# Patient Record
Sex: MALE | Employment: OTHER
[De-identification: names, ages, dates, MRNs, and addresses within clinical notes are randomized per-mention and may not be internally consistent; named-entity substitution may affect disease eponyms.]

---

## 2021-12-01 ENCOUNTER — TRANSCRIBE ORDERS (OUTPATIENT)
Dept: OTHER | Age: 76
End: 2021-12-01

## 2021-12-01 DIAGNOSIS — H91.90 HEARING LOSS: Primary | ICD-10-CM

## 2021-12-14 ENCOUNTER — OFFICE VISIT (OUTPATIENT)
Dept: AUDIOLOGY | Facility: CLINIC | Age: 76
End: 2021-12-14
Payer: COMMERCIAL

## 2021-12-14 DIAGNOSIS — H90.3 SENSORINEURAL HEARING LOSS, ASYMMETRICAL: Primary | ICD-10-CM

## 2021-12-14 PROCEDURE — 92550 TYMPANOMETRY & REFLEX THRESH: CPT | Performed by: AUDIOLOGIST

## 2021-12-14 PROCEDURE — 92557 COMPREHENSIVE HEARING TEST: CPT | Performed by: AUDIOLOGIST

## 2021-12-14 PROCEDURE — 99207 PR NO CHARGE LOS: CPT | Performed by: AUDIOLOGIST

## 2021-12-14 NOTE — PROGRESS NOTES
AUDIOLOGY REPORT    SUBJECTIVE:  Kristofer Montana is a 76 year old male who was seen in the Audiology Clinic [facility] on 12/14/21 for audiologic evaluation, referred by [facility] reference number [id number], expiration 7/21/2022.  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 The patient denies  bilateral tinnitus, bilateral otalgia, bilateral drainage, bilateral aural fullness, family history of hearing loss, and dizziness. The patient notes difficulty with communication in a variety of listening situations. Patient was unaccompanied to today's visit.     Abuse Screening:  Do you feel unsafe at home or work/school? No  Do you feel threatened by someone? No  Does anyone try to keep you from having contact with others, or doing things outside of your home? No  Physical signs of abuse present? No     OBJECTIVE:    Otoscopic exam indicates ears are clear of cerumen bilaterally     Pure Tone Thresholds assessed using standard techniques  audiometry with good  reliability from 250-8000 Hz bilaterally using insert earphones and circumaural headphones     RIGHT:  normal hearing sensitivity through 1500 Hz then a mild to moderately severe sensorineural hearing loss    LEFT:    normal and borderline-normal hearing sensitivity through 1000 Hz then a mild to severe sensorineural hearing loss    Tympanogram:    RIGHT: normal eardrum mobility    LEFT:   normal eardrum mobility    Reflexes (reported by stimulus ear): 1000 Hz  RIGHT: Ipsilateral is present at normal levels  RIGHT: Contralateral is absent at frequencies tested  LEFT:   Ipsilateral is elevated   LEFT:   Contralateral is present at normal levels    Speech Reception Threshold:    RIGHT: 20 dB HL    LEFT:   30 dB HL    Word Recognition Score:     RIGHT: 92% at 65 dB HL using NU-6 recorded word list.    LEFT:   92% at 75 dB HL using NU-6 recorded word list.    ASSESSMENT:   Bilateral  asymmetric sensorineural hearing loss      Today s results were discussed with the patient in detail.     PLAN:  Patient was counseled regarding hearing loss and impact on communication.  Patient is a good candidate for amplification at this time. [facility] is [facility] and possible visit to ENT for the asymmetric hearing loss.  Please call this clinic with questions regarding these results or recommendations.    Benny Lowery [facility]  Licensed Audiologist #[id number]  12/14/2021

## 2024-03-14 ENCOUNTER — TRANSFERRED RECORDS (OUTPATIENT)
Dept: HEALTH INFORMATION MANAGEMENT | Facility: CLINIC | Age: 79
End: 2024-03-14
Payer: COMMERCIAL

## 2024-03-29 ENCOUNTER — TRANSCRIBE ORDERS (OUTPATIENT)
Dept: OTHER | Age: 79
End: 2024-03-29

## 2024-03-29 DIAGNOSIS — C44.92 SCC (SQUAMOUS CELL CARCINOMA): Primary | ICD-10-CM

## 2024-04-01 ENCOUNTER — TELEPHONE (OUTPATIENT)
Dept: DERMATOLOGY | Facility: CLINIC | Age: 79
End: 2024-04-01
Payer: COMMERCIAL

## 2024-04-01 NOTE — TELEPHONE ENCOUNTER
Called patient to schedule surgery with Dr. Deluna    Date of Surgery: 05/08    Surgery type: Mohs    Consult scheduled: Yes    Has patient had mohs with us before? No    Additional comments: pt requested a mailed copy of the appt info.     [facility] for [facility].     Christiane Cortes on 4/1/2024 at 9:25 AM

## 2024-04-24 ENCOUNTER — VIRTUAL VISIT (OUTPATIENT)
Dept: DERMATOLOGY | Facility: CLINIC | Age: 79
End: 2024-04-24
Payer: COMMERCIAL

## 2024-04-24 DIAGNOSIS — D04.4 SQUAMOUS CELL CARCINOMA IN SITU (SCCIS) OF SCALP: Primary | ICD-10-CM

## 2024-04-24 PROCEDURE — 99441 PR PHYSICIAN TELEPHONE EVALUATION 5-10 MIN: CPT | Mod: 93 | Performed by: DERMATOLOGY

## 2024-04-24 RX ORDER — ATORVASTATIN CALCIUM 40 MG/1
20 TABLET, FILM COATED ORAL
COMMUNITY
Start: 2023-11-29

## 2024-04-24 RX ORDER — LOSARTAN POTASSIUM 50 MG/1
50 TABLET ORAL
COMMUNITY
Start: 2023-11-29

## 2024-04-24 RX ORDER — METOPROLOL SUCCINATE 25 MG/1
25 TABLET, EXTENDED RELEASE ORAL
COMMUNITY
Start: 2023-12-06

## 2024-04-24 NOTE — NURSING NOTE
Kristofer Montana's goals for this visit include:   Chief Complaint   Patient presents with    Consult     Mohs consult - SCC, anterior vertex scalp.       He requests these members of his care team be copied on today's visit information:     PCP: No primary care provider on file.    Referring Provider:  Natalya Barron DO  [address]  [place] [address]    There were no vitals taken for this visit.    Do you need any medication refills at today's visit?         Lindsey Grey EMT        Teledermatology Nurse Call Patients:     Are you in the [facility] at the time of the encounter? yes    Today's visit will be billed to you and your insurance.    A teledermatology visit is not as thorough as an in-person visit and the quality of the photograph sent may not be of the same quality as that taken by the dermatology clinic.           Excision/Mohs previsit information                                                    Diagnosis: squamous cell carcinoma  Site(s): anterior vertex scalp    Over the counter Chlorhexidine surgical soap to wash all skin below the belly button twice before surgery should be recommended for the following:  - Surgical sites below the waist  - Immunosuppressed  - Previous surgical site infection  - Anticipated wound care challenges    Medication & Allergy Information                                                      Review and update allergy and medication list.    Do you take the following medications:  Coumadin, Eliquis, Pradaxa, Xarelto:  NO   -If on Coumadin, INR should be checked within 7 days of surgery.  Range should be 3.5 or less or within therapeutic range.    Past Medical History                                                    Do you currently or have you previously had any of the following conditions:    Hepatitis:  NO  HIV/AIDS:  NO  Prolonged bleeding or bleeding disorder:  NO  Pacemaker or Defibrillator:  NO.    History of artificial or heart valve replacement:   NO  Endocarditis (inflammation of the inner lining of the heart's chambers and valves):  NO  Have you ever had a prosthetic joint infection:  NO  Pregnant or Breastfeeding:  N/A  Mobility device (wheelchair, transfer difficulty): NO    Important Reminders:                                                      Ok to take all of their medications as prescribed  Patients can eat, no need to be fasting  Patient will not be able to get the site wet for 48 hrs  No submerging wound in standing water (lake, pool, bathtub, hot tub) for 2 weeks  No physical activity for 48 hrs (further restrictions will be discussed by MD at time of visit)    If any positives, send to RN for further review  Lindsey Grey

## 2024-04-24 NOTE — PROGRESS NOTES
[facility] Dermatology Note  Encounter Date: Apr 24, 2024  Store-and-Forward and Telephone ([phone] ). Location of teledermatologist: [facility].  Start time: 1203. End time: 1211.    Dermatology Problem List:  1. Nmsc  -  SCCIS anterior vertex scalp, status post biopsy 3/14/2024, Mohs scheduled 5/8/2024  - 7-8 NMSC treated surgically at the [facility]    GEN Derm care is at the [facility].   ____________________________________________    Assessment & Plan:     # 1.  SCCIS anterior vertex scalp, status post biopsy 3/14/2024, Mohs scheduled 5/8/2024  -The nature, risks, benefits, and alternatives to Mohs surgery were discussed. The patient would like to proceed with Mohs surgery.  -Likely repair lifting or sliding flap versus granulation  -He does not take anticoagulants  -No indication for preop antibiotics       Staff:     Luís Deluna DO    Department of Dermatology  [facility]: Phone: [phone], Fax:[phone]  [facility]: Phone: [phone], Fax: [phone]    ____________________________________________    CC: Consult (Mohs consult - SCC, anterior vertex scalp.)    HPI:  Mr. 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  He has had Mohs 7 or 8 times already.  The area of the biopsy is tight, there was a Mohs and repair nearby.     Patient is otherwise feeling well, without additional skin concerns.     Labs Reviewed:  [facility] reviewed, anterior vertex scalp, squamous cell carcinoma in situ    Physical Exam:  Vitals: There were no vitals taken for this visit.  SKIN: Teledermatology photos were reviewed; image quality and interpretability: acceptable.   -Anterior vertex scalp  with approximately 1.2 cm poorly defined pink patch with central erosion and fresh blood.     - No other lesions of concern on areas examined.     Medications:  Current Outpatient Medications   Medication Sig Dispense Refill    atorvastatin (LIPITOR) 40 MG tablet 20 mg      losartan (COZAAR) 50 MG tablet 50 mg      metoprolol succinate ER (TOPROL XL) 25 MG 24 hr tablet 25 mg       No current facility-administered medications for this visit.      CC Natalya Barron, DO  [address] [facility] [address] [address] on close of this encounter.

## 2024-04-24 NOTE — LETTER
4/24/2024         RE: Kristofer Montana  [address]  [place] [address]        Dear Colleague,    Thank you for referring your patient, Kristofer Montana, to the [facility]. Please see a copy of my visit note below.    [facility] Dermatology Note  Encounter Date: Apr 24, 2024  Store-and-Forward and Telephone ([phone] ). Location of teledermatologist: [facility].  Start time: 1203. End time: 1211.    Dermatology Problem List:  1. Nmsc  -  SCCIS anterior vertex scalp, status post biopsy 3/14/2024, Mohs scheduled 5/8/2024  - 7-8 NMSC treated surgically at the [facility]    GEN Derm care is at the [facility].   ____________________________________________    Assessment & Plan:     # 1.  SCCIS anterior vertex scalp, status post biopsy 3/14/2024, Mohs scheduled 5/8/2024  -The nature, risks, benefits, and alternatives to Mohs surgery were discussed. The patient would like to proceed with Mohs surgery.  -Likely repair lifting or sliding flap versus granulation  -He does not take anticoagulants  -No indication for preop antibiotics       Staff:     Luís Deluna DO    Department of Dermatology  [facility]: Phone: [phone], Fax:[phone]  [facility]: Phone: [phone], Fax: [phone]    ____________________________________________    CC: Consult (Mohs consult - SCC, anterior vertex scalp.)    HPI:  Mr. 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  He has had Mohs 7 or 8 times already.  The area of the biopsy is tight, there was a Mohs and repair nearby.     Patient is otherwise feeling well, without additional skin concerns.     Labs Reviewed:  [facility]  reviewed, anterior vertex scalp, squamous cell carcinoma in situ    Physical Exam:  Vitals: There were no vitals taken for this visit.  SKIN: Teledermatology photos were reviewed; image quality and interpretability: acceptable.   -Anterior vertex scalp with approximately 1.2 cm poorly defined pink patch with central erosion and fresh blood.     - No other lesions of concern on areas examined.     Medications:  Current Outpatient Medications   Medication Sig Dispense Refill     atorvastatin (LIPITOR) 40 MG tablet 20 mg       losartan (COZAAR) 50 MG tablet 50 mg       metoprolol succinate ER (TOPROL XL) 25 MG 24 hr tablet 25 mg       No current facility-administered medications for this visit.      CC Natalya Barron, DO  [address] [facility] [address] [address] on close of this encounter.     Duplicate note opened in error.       Again, thank you for allowing me to participate in the care of your patient.        Sincerely,        Luís Deluna MD

## 2024-05-08 ENCOUNTER — OFFICE VISIT (OUTPATIENT)
Dept: DERMATOLOGY | Facility: CLINIC | Age: 79
End: 2024-05-08
Payer: COMMERCIAL

## 2024-05-08 VITALS — DIASTOLIC BLOOD PRESSURE: 81 MMHG | HEART RATE: 73 BPM | SYSTOLIC BLOOD PRESSURE: 138 MMHG

## 2024-05-08 DIAGNOSIS — D04.4 SQUAMOUS CELL CARCINOMA IN SITU (SCCIS) OF SCALP: ICD-10-CM

## 2024-05-08 PROCEDURE — 17311 MOHS 1 STAGE H/N/HF/G: CPT | Performed by: DERMATOLOGY

## 2024-05-08 ASSESSMENT — PAIN SCALES - GENERAL: PAINLEVEL: NO PAIN (0)

## 2024-05-08 NOTE — NURSING NOTE
The following medication was given:     MEDICATION:  Lidocaine with epinephrine 1% 1:[id number]  ROUTE: SQ  SITE: see procedure note  DOSE: 2 mL  LOT #: [id number]  : [employer]  EXPIRATION DATE: 04/30/2025  NDC#: 13990-926-82  Was there drug waste? 1 mL  Multi-dose vial: Yes    Vaseline and pressure dressing applied to Mohs site on anterior vertex scalp.  Wound care instructions reviewed with patient and AVS provided.  Patient verbalized understanding.  Patient will follow up for suture removal: N/A.  No further questions or concerns at this time.      Nydia Jaimes CMA  May 8, 2024

## 2024-05-08 NOTE — LETTER
5/8/2024         RE: Kristofer Montana  [address]  [place] [address]        Dear Colleague,    Thank you for referring your patient, Kristofer Montana, to the [facility]. Please see a copy of my visit note below.      [facility] Dermatologic Surgery Clinic [place] Procedure Note    Dermatology Problem List:  1. Hx of NMSC  -  SCCIS anterior vertex scalp, s/p 3/14/24, s/p Mohs 5/8/24  - 7-8 NMSC treated surgically at the [facility]     Gen Derm care is at the [facility].   ____________________________________________    Date of Service:  May 8, 2024  Surgery: Mohs micrographic surgery    Case 1  Repair Type: secondary  Repair Size: n/a  Suture Material: n/a  Tumor Type: SCCIS - Squamous cell carcinoma in situ  Location: anterior vertex scalp  Derm-Path Accession #: [id number]  PreOp Size: 0.9x0.8 cm  PostOp Size: 2.0x1.7 cm  Mohs Accession #: [id number]  Level of Defect: fat      Procedure:  We discussed the principles of treatment and most likely complications including scarring, bleeding, infection, swelling, pain, crusting, nerve damage, large wound,  incomplete excision, wound dehiscence,  nerve damage, recurrence, and a second procedure may be recommended to obtain the best cosmetic or functional result.    Informed consent was obtained and the patient underwent the procedure as follows:  The patient was placed supine on the operating table.  The cancer was identified, outlined with a marker, and verified by the patient.  The entire surgical field was prepped with ChoraPrep.  The surgical site was anesthetized using Lidocaine 1% with epi 1:100,000.      The area of clinically apparent tumor was debulked. The layer of tissue was then surgically excised using a #15 blade and was then transferred onto a specimen sheet maintaining the orientation of the specimen. Hemostasis was obtained using bipolar electrocoagulation. The wound site was then  covered with a dressing while the tissue samples were processed for examination.    The excised tissue was transported to the Mohs histology laboratory maintaining the tissue orientation.  The tissue specimen was relaxed so that the entire surgical margin was in a a single horizontal plane for sectioning and inked for precise mapping.  A precise reference map was drawn to reflect the sectioning of the specimen, colored inking of the margins, and orientation on the patient. The tissue was processed using horizontal sectioning of the base and continuous peripheral margins.  The histopathologic sections were reviewed in conjunction with the reference map.    Total blocks: 1    Total slides:  2    There were no cancer cells visualized on examination, therefore Mohs surgery was complete.    EVALUATION OF MOHS DEFECT FOR RECONSTRUCTION    The patient is status post Mohs micrographic surgery.  The surgical site was examined with attention to normal anatomic and functional relationships.  After consideration and discussion of multiple options with the patient, it was determined that healing by second intention would offer the best chance for preservation/restoration of all normal anatomic and functional relationships.  The patient verbalized understanding and agrees with this plan. It is also understood that should second intention healing be sub-optimal, additional procedures such as scar revision, steroid injection or dermabrasion may be recommended.    The open wound was cleaned with Chlorhexidine and rinsed with sterile saline, and a thick layer of ointment was applied.  A pressure dressing consisting of non-adherent gauze, gauze and hypafixwas applied.   Wound care was discussed with patient both orally and in writing.  The patient stated understanding and agreement of the course of care.    Scribe Disclosure:   Roseanna LIM, am serving as a scribe; to document services personally performed by Dr. Luís Deluna -  -based on data collection and the provider's statements to me.     Provider Disclosure:   The documentation recorded by the scribe accurately reflects the services I personally performed and the decisions made by me.  I personally performed the procedures today.  Luís Deluna DO    Department of Dermatology  [facility]: Phone: [phone], Fax:[phone]  [facility]: Phone: [phone], Fax: [phone]    Care and Laboratory Testing Performed at:  [facility]   Dermatology Clinic  [address] [place] [address]      Again, thank you for allowing me to participate in the care of your patient.        Sincerely,        Luís Deluna MD

## 2024-05-08 NOTE — PROGRESS NOTES
[facility] Dermatologic Surgery Clinic [place] Procedure Note    Dermatology Problem List:  1. Hx of NMSC  -  SCCIS anterior vertex scalp, s/p 3/14/24, s/p Mohs 5/8/24  - 7-8 NMSC treated surgically at the [facility]     Gen Derm care is at the [facility].   ____________________________________________    Date of Service:  May 8, 2024  Surgery: Mohs micrographic surgery    Case 1  Repair Type: secondary  Repair Size: n/a  Suture Material: n/a  Tumor Type: SCCIS - Squamous cell carcinoma in situ  Location: anterior vertex scalp  Derm-Path Accession #: [id number]  PreOp Size: 0.9x0.8 cm  PostOp Size: 2.0x1.7 cm  Mohs Accession #: [id number]  Level of Defect: fat      Procedure:  We discussed the principles of treatment and most likely complications including scarring, bleeding, infection, swelling, pain, crusting, nerve damage, large wound,  incomplete excision, wound dehiscence,  nerve damage, recurrence, and a second procedure may be recommended to obtain the best cosmetic or functional result.    Informed consent was obtained and the patient underwent the procedure as follows:  The patient was placed supine on the operating table.  The cancer was identified, outlined with a marker, and verified by the patient.  The entire surgical field was prepped with ChoraPrep.  The surgical site was anesthetized using Lidocaine 1% with epi 1:100,000.      The area of clinically apparent tumor was debulked. The layer of tissue was then surgically excised using a #15 blade and was then transferred onto a specimen sheet maintaining the orientation of the specimen. Hemostasis was obtained using bipolar electrocoagulation. The wound site was then covered with a dressing while the tissue samples were processed for examination.    The excised tissue was transported to the Mohs histology laboratory maintaining the tissue orientation.  The tissue specimen was relaxed so that the entire surgical  margin was in a a single horizontal plane for sectioning and inked for precise mapping.  A precise reference map was drawn to reflect the sectioning of the specimen, colored inking of the margins, and orientation on the patient. The tissue was processed using horizontal sectioning of the base and continuous peripheral margins.  The histopathologic sections were reviewed in conjunction with the reference map.    Total blocks: 1    Total slides:  2    There were no cancer cells visualized on examination, therefore Mohs surgery was complete.    EVALUATION OF MOHS DEFECT FOR RECONSTRUCTION    The patient is status post Mohs micrographic surgery.  The surgical site was examined with attention to normal anatomic and functional relationships.  After consideration and discussion of multiple options with the patient, it was determined that healing by second intention would offer the best chance for preservation/restoration of all normal anatomic and functional relationships.  The patient verbalized understanding and agrees with this plan. It is also understood that should second intention healing be sub-optimal, additional procedures such as scar revision, steroid injection or dermabrasion may be recommended.    The open wound was cleaned with Chlorhexidine and rinsed with sterile saline, and a thick layer of ointment was applied.  A pressure dressing consisting of non-adherent gauze, gauze and hypafixwas applied.   Wound care was discussed with patient both orally and in writing.  The patient stated understanding and agreement of the course of care.    Scribe Disclosure:   I, Roseanna Kimball, am serving as a scribe; to document services personally performed by Dr. Luís Deluna - -based on data collection and the provider's statements to me.     Provider Disclosure:   The documentation recorded by the scribe accurately reflects the services I personally performed and the decisions made by me.  I personally performed the  procedures today.  Luís Deluna DO    Department of Dermatology  [facility]: Phone: [phone], Fax:[phone]  [facility]: Phone: [phone], Fax: [phone]    Care and Laboratory Testing Performed at:  [facility]   Dermatology Clinic  [address] [place] [address]

## 2024-05-08 NOTE — PATIENT INSTRUCTIONS
Post-Operative Care for Granulating Site  After your surgery, a pressure bandage will be placed over the area. This will help prevent bleeding. Please follow these instructions as they will help you to prevent complications as your wound heals.  For the First 48 hours After Surgery:  Leave the pressure bandage on and keep it dry. If it should come loose, you may retape it, but do not take it off.  Relax and take it easy. Do not do any vigorous exercise, heavy lifting, or bending forward. This could cause the wound to bleed.  Post-operative pain is usually mild. You may alternate between 1000 mg of Tylenol (acetaminophen) and 400 mg of Ibuprofen every 4 hours.  Do not take more than 4,000mg of acetaminophen in a 24 hour period or 3200 mg of Ibuprofen in a 24 hr period.  Avoid alcohol and vitamin E as these may increase your tendency to bleed.  You may put an ice pack around the bandaged area for 20 minutes every 2-3 hours. This may help reduce swelling, bruising, and pain. Make sure the ice pack is waterproof so that the pressure bandage does not get wet.   You may see a small amount of drainage or blood on your pressure bandage. This is normal. However, if drainage or bleeding continues or saturates the bandage, you will need to apply firm pressure over the bandage with a washcloth for 15 minutes. If bleeding continues after applying pressure for 15 minutes, apply an ice pack to the bandaged area for 15 minutes. If bleeding still continues, go to the nearest emergency room.  48 Hours After Surgery:  Carefully remove the bandage and start daily wound care and dressing changes. You may also now shower and get the wound wet. Use caution when washing the wound, be gentle.  Do not use a wash cloth on the wound.  Daily Wound Care:  Wash with mild soap and water every day until wound appears well-healed.  Rinse well and pat dry.  Apply Vaseline over site with a Q-tip and re-apply a dressing until wound appears well healed.  " A well healed wound is \"pinked over\" with a shiny surface.  When area is \"pinked over\" it is no longer necessary to apply Vaseline.  Call Us If:  You have pain that is not controlled with Tylenol.  You have signs or symptoms of an infection, such as: fever over 100 degrees F, redness, warmth, or foul-smelling or yellow/creamy drainage from the wound.  Who should I call with questions?  [facility]: [phone]  [facility]: [phone]  For urgent needs outside of business hours call the [facility] at [phone] and ask for the dermatology resident on call   "

## 2024-05-08 NOTE — NURSING NOTE
Kristofer Montana's chief complaint for this visit includes:  Chief Complaint   Patient presents with    Mohs     SCC anterior vertex scalp     PCP: No Ref-Primary, Physician    Referring Provider:  Natalya Barron DO  [address] [place] [id number]    There were no vitals taken for this visit.  No Pain (0)        Allergies   Allergen Reactions    Amoxicillin Diarrhea         Do you need any medication refills at today's visit? No    Nydia Jaimes CMA

## 2024-05-10 ENCOUNTER — TELEPHONE (OUTPATIENT)
Dept: DERMATOLOGY | Facility: CLINIC | Age: 79
End: 2024-05-10

## 2024-05-10 NOTE — TELEPHONE ENCOUNTER
SUBJECTIVE/OBJECTIVE:                                                    Kristofer is 2 days s/p Mohs     ASSESSMENT:                                                       NURSING ASSESSMENT:     Wound location: scalp     Pressure bandage removed today and patient completed wound care without difficulty.  He denies pain, bleeding.  He has no concerns.     PLAN:                                                       Wound care directions reviewed.  Post op appointment confirmed.     Melissa Jones RN

## 2024-06-10 ENCOUNTER — VIRTUAL VISIT (OUTPATIENT)
Dept: DERMATOLOGY | Facility: CLINIC | Age: 79
End: 2024-06-10
Payer: COMMERCIAL

## 2024-06-10 ENCOUNTER — MYC MEDICAL ADVICE (OUTPATIENT)
Dept: DERMATOLOGY | Facility: CLINIC | Age: 79
End: 2024-06-10

## 2024-06-10 DIAGNOSIS — Z51.89 VISIT FOR WOUND CHECK: Primary | ICD-10-CM

## 2024-06-10 PROCEDURE — 99441 PR PHYSICIAN TELEPHONE EVALUATION 5-10 MIN: CPT | Mod: 93 | Performed by: DERMATOLOGY

## 2024-06-10 NOTE — NURSING NOTE
Teledermatology Nurse Call Patients:     Are you in the [facility] at the time of the encounter? yes    Today's visit will be billed to you and your insurance.    A teledermatology visit is not as thorough as an in-person visit and the quality of the photograph sent may not be of the same quality as that taken by the dermatology clinic.       Laisha Cedillo RN on 6/10/2024 at 10:05 AM

## 2024-06-10 NOTE — LETTER
6/10/2024      Kristofer Montana  [address]  [facility] [address]      Dear Colleague,    Thank you for referring your patient, Kristofer Montana, to the [facility]. Please see a copy of my visit note below.    Dermatologic Surgery Post-Op Wound Check     CC: Follow Up (Wound check on anterior vertex scalp s/p mohs 05/08/2024. Healing up nicely per patient. )  Encounter Date: Darrell 10, 2024  Store-and-Forward and Telephone. Location of teledermatologist: [facility].  Start time: 10:40am. End time: 10:43am.    Dermatology Problem List:  1. 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 Over the past several weeks site has healed well. Denies pain or drainage at site. Continues with vaseline multiple times throughout the day.    - no other concerns today    Objective: An exam of the vertex scalp was performed today via patient submitted photograph from 6/10/2024    - The surgical site noted above is clean, dry, and intact. There is no surrounding erythema, purulence, or significant tenderness to palpation. No clinical evidence of infection noted today.    Assessment and Plan:     1. Wound check following mohs surgery 5/08/2024  - The patient's surgery site(s) is/are healing very well. No evidence of infection on examination today.  - The patient was told to continue with wound cares until the area(s) is/are no longer crusted.   - The patient should follow up with dermatologic surgery PRN, as well as continue with regular skin exams in general dermatology clinic.    Patient was discussed with and evaluated by attending physician Dr. Deluna.    Radha Jovel PA-C  [facility]   Dermatology       Staff  Physician Comments:   I saw the photos and evaluated the patient with the physician assistant (Radha Jovel PA-C) and I agree with the assessment and plan. I was present for the key portions of the telephone call.    Luís Deluna DO    Department of Dermatology  [facility]: Phone: [phone], Fax:[phone]  [facility]: Phone: [phone], Fax: [phone]                Again, thank you for allowing me to participate in the care of your patient.        Sincerely,        Luís Deluna MD

## 2024-06-10 NOTE — PROGRESS NOTES
[facility] Dermatology Note  Encounter Date: Darrell 10, 2024  Store-and-Forward and Telephone ([phone]). Location of teledermatologist: [facility].  Start time: ***. End time: ***.    Dermatology Problem List:  1. ***    ____________________________________________    Assessment & Plan:     # {Diagnosesderm:782469}.   {kkplans:855005}   - ***     # {Diagnosesderm:315442}.   {kkplans:841477}   - ***     Procedures Performed:    None    Follow-up: {kkfollowup:512099}    {kkstaffinvolved:629639}    ***  ____________________________________________    CC: Follow Up (Wound check on anterior vertex scalp s/p mohs 05/08/2024. Healing up nicely per patient. )    HPI:  Mr. Kristofer Montana is a(n) 79 year old male who presents today {kknew/return:015254} for ***    Patient is otherwise feeling well, without additional skin concerns.    Labs Reviewed:  ***N/A    Physical Exam:  Vitals: There were no vitals taken for this visit.  SKIN: Teledermatology photos were reviewed; image quality and interpretability: ***acceptable. Image date: ***.  - ***  - No other lesions of concern on areas examined.     Medications:  Current Outpatient Medications   Medication Sig Dispense Refill    atorvastatin (LIPITOR) 40 MG tablet 20 mg      losartan (COZAAR) 50 MG tablet 50 mg      metoprolol succinate ER (TOPROL XL) 25 MG 24 hr tablet 25 mg       No current facility-administered medications for this visit.      Past Medical/Surgical History:   There is no problem list on file for this patient.    No past medical history on file.    CC Referred Self, MD  No address on file on close of this encounter.

## 2024-06-10 NOTE — PROGRESS NOTES
Dermatologic Surgery Post-Op Wound Check     CC: Follow Up (Wound check on anterior vertex scalp s/p mohs 05/08/2024. Healing up nicely per patient. )  Encounter Date: Darrell 10, 2024  Store-and-Forward and Telephone. Location of teledermatologist: [facility].  Start time: 10:40am. End time: 10:43am.    Dermatology Problem List:  1. 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 Over the past several weeks site has healed well. Denies pain or drainage at site. Continues with vaseline multiple times throughout the day.    - no other concerns today    Objective: An exam of the vertex scalp was performed today via patient submitted photograph from 6/10/2024    - The surgical site noted above is clean, dry, and intact. There is no surrounding erythema, purulence, or significant tenderness to palpation. No clinical evidence of infection noted today.    Assessment and Plan:     1. Wound check following mohs surgery 5/08/2024  - The patient's surgery site(s) is/are healing very well. No evidence of infection on examination today.  - The patient was told to continue with wound cares until the area(s) is/are no longer crusted.   - The patient should follow up with dermatologic surgery PRN, as well as continue with regular skin exams in general dermatology clinic.    Patient was discussed with and evaluated by attending physician Dr. Deluna.    Radha Jovel PA-C  [facility]   Dermatology       Staff Physician Comments:   I saw the photos and evaluated the patient with the physician assistant (Radha Jovel PA-C) and I agree with the assessment and plan. I was present for the key portions of the telephone call.    Luís Deluna,      Department of Dermatology  [facility]: Phone: [phone], Fax:[phone]  [facility]: Phone: [phone], Fax: [phone]

## 2024-07-06 ENCOUNTER — HEALTH MAINTENANCE LETTER (OUTPATIENT)
Age: 79
End: 2024-07-06

## 2025-07-13 ENCOUNTER — TRANSFERRED RECORDS (OUTPATIENT)
Dept: HEALTH INFORMATION MANAGEMENT | Facility: CLINIC | Age: 80
End: 2025-07-13

## 2025-07-13 ENCOUNTER — HEALTH MAINTENANCE LETTER (OUTPATIENT)
Age: 80
End: 2025-07-13

## 2025-07-13 ENCOUNTER — HOSPITAL ENCOUNTER (INPATIENT)
Facility: CLINIC | Age: 80
End: 2025-07-13
Attending: INTERNAL MEDICINE | Admitting: HOSPITALIST
Payer: MEDICARE

## 2025-07-13 DIAGNOSIS — K81.9 CHOLECYSTITIS: ICD-10-CM

## 2025-07-13 DIAGNOSIS — I82.4Y9 ACUTE DEEP VEIN THROMBOSIS (DVT) OF PROXIMAL VEIN OF LOWER EXTREMITY, UNSPECIFIED LATERALITY (H): ICD-10-CM

## 2025-07-13 DIAGNOSIS — K80.50 CHOLEDOCHOLITHIASIS: Primary | ICD-10-CM

## 2025-07-13 PROBLEM — I82.409 DVT (DEEP VENOUS THROMBOSIS) (H): Status: ACTIVE | Noted: 2025-07-13

## 2025-07-13 LAB
ALBUMIN SERPL BCG-MCNC: 2.9 G/DL (ref 3.5–5.2)
ALP SERPL-CCNC: 119 U/L (ref 40–150)
ALT SERPL W P-5'-P-CCNC: 32 U/L (ref 0–70)
ANION GAP SERPL CALCULATED.3IONS-SCNC: 16 MMOL/L (ref 7–15)
AST SERPL W P-5'-P-CCNC: 36 U/L (ref 0–45)
BILIRUB SERPL-MCNC: 1.1 MG/DL
BUN SERPL-MCNC: 27.3 MG/DL (ref 8–23)
CALCIUM SERPL-MCNC: 8 MG/DL (ref 8.8–10.4)
CHLORIDE SERPL-SCNC: 99 MMOL/L (ref 98–107)
CREAT SERPL-MCNC: 1.32 MG/DL (ref 0.67–1.17)
CRP SERPL-MCNC: 331.23 MG/L
EGFRCR SERPLBLD CKD-EPI 2021: 55 ML/MIN/1.73M2
GLUCOSE SERPL-MCNC: 117 MG/DL (ref 70–99)
HCO3 SERPL-SCNC: 20 MMOL/L (ref 22–29)
LACTATE SERPL-SCNC: 2.6 MMOL/L (ref 0.7–2)
POTASSIUM SERPL-SCNC: 4 MMOL/L (ref 3.4–5.3)
PROT SERPL-MCNC: 6.3 G/DL (ref 6.4–8.3)
SODIUM SERPL-SCNC: 135 MMOL/L (ref 135–145)

## 2025-07-13 PROCEDURE — 83605 ASSAY OF LACTIC ACID: CPT | Performed by: HOSPITALIST

## 2025-07-13 PROCEDURE — 99223 1ST HOSP IP/OBS HIGH 75: CPT | Performed by: HOSPITALIST

## 2025-07-13 PROCEDURE — 120N000013 HC R&B IMCU

## 2025-07-13 PROCEDURE — 36415 COLL VENOUS BLD VENIPUNCTURE: CPT | Performed by: HOSPITALIST

## 2025-07-13 PROCEDURE — 85007 BL SMEAR W/DIFF WBC COUNT: CPT | Performed by: HOSPITALIST

## 2025-07-13 PROCEDURE — 250N000011 HC RX IP 250 OP 636: Performed by: INTERNAL MEDICINE

## 2025-07-13 PROCEDURE — 250N000013 HC RX MED GY IP 250 OP 250 PS 637: Performed by: HOSPITALIST

## 2025-07-13 PROCEDURE — 85014 HEMATOCRIT: CPT | Performed by: HOSPITALIST

## 2025-07-13 PROCEDURE — 80053 COMPREHEN METABOLIC PANEL: CPT | Performed by: HOSPITALIST

## 2025-07-13 PROCEDURE — 86140 C-REACTIVE PROTEIN: CPT | Performed by: HOSPITALIST

## 2025-07-13 RX ORDER — HEPARIN SODIUM 10000 [USP'U]/100ML
0-5000 INJECTION, SOLUTION INTRAVENOUS CONTINUOUS
Status: DISCONTINUED | OUTPATIENT
Start: 2025-07-13 | End: 2025-07-13

## 2025-07-13 RX ORDER — LIDOCAINE 40 MG/G
CREAM TOPICAL
Status: DISCONTINUED | OUTPATIENT
Start: 2025-07-13 | End: 2025-07-13

## 2025-07-13 RX ORDER — PROCHLORPERAZINE MALEATE 5 MG/1
5 TABLET ORAL EVERY 6 HOURS PRN
Status: DISCONTINUED | OUTPATIENT
Start: 2025-07-13 | End: 2025-08-07 | Stop reason: HOSPADM

## 2025-07-13 RX ORDER — CARBOXYMETHYLCELLULOSE SODIUM 5 MG/ML
1 SOLUTION/ DROPS OPHTHALMIC 3 TIMES DAILY PRN
Status: ON HOLD | COMMUNITY
Start: 2025-02-04 | End: 2025-07-14

## 2025-07-13 RX ORDER — ONDANSETRON 2 MG/ML
4 INJECTION INTRAMUSCULAR; INTRAVENOUS EVERY 6 HOURS PRN
Status: DISCONTINUED | OUTPATIENT
Start: 2025-07-13 | End: 2025-08-07 | Stop reason: HOSPADM

## 2025-07-13 RX ORDER — HEPARIN SODIUM 10000 [USP'U]/100ML
0-5000 INJECTION, SOLUTION INTRAVENOUS CONTINUOUS
Status: DISCONTINUED | OUTPATIENT
Start: 2025-07-13 | End: 2025-07-17

## 2025-07-13 RX ORDER — CALCIUM CARBONATE 500 MG/1
1000 TABLET, CHEWABLE ORAL 4 TIMES DAILY PRN
Status: DISCONTINUED | OUTPATIENT
Start: 2025-07-13 | End: 2025-08-07 | Stop reason: HOSPADM

## 2025-07-13 RX ORDER — NALOXONE HYDROCHLORIDE 0.4 MG/ML
0.2 INJECTION, SOLUTION INTRAMUSCULAR; INTRAVENOUS; SUBCUTANEOUS
Status: DISCONTINUED | OUTPATIENT
Start: 2025-07-13 | End: 2025-08-07 | Stop reason: HOSPADM

## 2025-07-13 RX ORDER — NALOXONE HYDROCHLORIDE 0.4 MG/ML
0.4 INJECTION, SOLUTION INTRAMUSCULAR; INTRAVENOUS; SUBCUTANEOUS
Status: DISCONTINUED | OUTPATIENT
Start: 2025-07-13 | End: 2025-08-07 | Stop reason: HOSPADM

## 2025-07-13 RX ORDER — ONDANSETRON 4 MG/1
4 TABLET, ORALLY DISINTEGRATING ORAL EVERY 6 HOURS PRN
Status: DISCONTINUED | OUTPATIENT
Start: 2025-07-13 | End: 2025-08-07 | Stop reason: HOSPADM

## 2025-07-13 RX ORDER — ACETAMINOPHEN 325 MG/1
650 TABLET ORAL EVERY 4 HOURS PRN
Status: DISCONTINUED | OUTPATIENT
Start: 2025-07-13 | End: 2025-08-07 | Stop reason: HOSPADM

## 2025-07-13 RX ORDER — AMOXICILLIN 250 MG
1 CAPSULE ORAL 2 TIMES DAILY PRN
Status: DISCONTINUED | OUTPATIENT
Start: 2025-07-13 | End: 2025-07-29

## 2025-07-13 RX ORDER — LIDOCAINE 40 MG/G
CREAM TOPICAL
Status: DISCONTINUED | OUTPATIENT
Start: 2025-07-13 | End: 2025-07-16

## 2025-07-13 RX ORDER — HYDROMORPHONE HYDROCHLORIDE 1 MG/ML
0.5 INJECTION, SOLUTION INTRAMUSCULAR; INTRAVENOUS; SUBCUTANEOUS
Status: DISCONTINUED | OUTPATIENT
Start: 2025-07-13 | End: 2025-07-16

## 2025-07-13 RX ORDER — AMOXICILLIN 250 MG
2 CAPSULE ORAL 2 TIMES DAILY PRN
Status: DISCONTINUED | OUTPATIENT
Start: 2025-07-13 | End: 2025-07-29

## 2025-07-13 RX ORDER — ACETAMINOPHEN 650 MG/1
650 SUPPOSITORY RECTAL EVERY 4 HOURS PRN
Status: DISCONTINUED | OUTPATIENT
Start: 2025-07-13 | End: 2025-08-07 | Stop reason: HOSPADM

## 2025-07-13 RX ORDER — ASPIRIN 81 MG/1
81 TABLET ORAL DAILY
COMMUNITY

## 2025-07-13 RX ORDER — CEFEPIME HYDROCHLORIDE 2 G/1
2 INJECTION, POWDER, FOR SOLUTION INTRAVENOUS EVERY 12 HOURS
Status: DISCONTINUED | OUTPATIENT
Start: 2025-07-14 | End: 2025-07-16

## 2025-07-13 RX ORDER — METRONIDAZOLE 500 MG/100ML
500 INJECTION, SOLUTION INTRAVENOUS EVERY 12 HOURS
Status: DISCONTINUED | OUTPATIENT
Start: 2025-07-14 | End: 2025-07-18

## 2025-07-13 RX ORDER — HYDROMORPHONE HYDROCHLORIDE 2 MG/1
2 TABLET ORAL EVERY 4 HOURS PRN
Status: DISCONTINUED | OUTPATIENT
Start: 2025-07-13 | End: 2025-07-16

## 2025-07-13 RX ADMIN — HEPARIN SODIUM 1500 UNITS/HR: 10000 INJECTION, SOLUTION INTRAVENOUS at 22:19

## 2025-07-13 RX ADMIN — HYDROMORPHONE HYDROCHLORIDE 2 MG: 2 TABLET ORAL at 22:23

## 2025-07-13 ASSESSMENT — ACTIVITIES OF DAILY LIVING (ADL)
ADLS_ACUITY_SCORE: 46
ADLS_ACUITY_SCORE: 23
ADLS_ACUITY_SCORE: 41

## 2025-07-13 NOTE — LETTER
[place] [address] [facility]  [address]  [place] [address]  Phone: [phone]    August 7, 2025        Kristofer Montana  [address]  [place] [address]          To whom it may concern:    RE: Kristofer Montana    Patient was hospitalized at [facility] from 7/13/2025 - 8/7/2025.   Patient may return to work in 2 weeks (8/21/2025) with no specific restrictions.    Please contact me for questions or concerns.      Sincerely,          Stacey Cleveland MD

## 2025-07-13 NOTE — PROGRESS NOTES
Transfer Type: [facility]  Transfer Triage Note    Date of call: 25  Time of call: 4:54 PM    [facility]  Current Level of Care: ED    Vitals: BP: 99/57 HR: 68 O2 Sats: 94% on RA  Diagnosis: acute cholecystitis, possible acute cholangitis, acute DVT, sepsis  Reason for requested transfer: Procedure can be done here and not at referring hospital  Further diagnostic work up, management, and consultation for specialized care   Isolation Needs: None    Care everywhere has been updated and reviewed: Yes  Necessary images have been sent through PACS: Not yet, will request images be sent    If patient is transferring for specialty care or specific procedure, the specialist required has participated in the transfer call and agreed with need for transfer and anticipated timeline: No    Transfer accepted: Yes  Stability of Patient: Patient is at risk for instability, however patient requires urgent transfer and does not meet ICU criteria   Does the patient require placement on the [facility]? No.  Level of Care Needed: IMC  Telemetry Needed:  None  Expected Time of Arrival for Transfer: 0-8 hours  Arrival Location:  [facility]     Recommendations for Management and Stabilization: Not needed    Additional Comments:   80 year old male with PMHx of CAD, stroke (), heart failure (EF 40%) who [facility] due [facility] 2 days of anorexia, 1 day of fever, and RUQ abdominal pain that started this AM.     In the ED, he was hypotensive at arrival, now s/p 2L of boluses with improvement (SBP 90s). Labs were significant for lactate of 5.5 (now down to 3 after fluids), leukocytosis to 17.3, Cr 2 (baseline 1), troponins negative, . Had multiple imaging studies, as follows:   CXR: low lung volumes, minimal bibasilar opacities, likely atelectasis    Abdominal US. Correlating with same-day CT (see separate CT report), evidence of choledocholithiasis with  biliary dilatation and common bile duct stones better demonstrated on CT. Suggest GI consultation and ERCP versus MRCP.   2. Mild gallbladder wall thickening, favor reactive changes related to biliary obstruction.     CT C/A/P without contrast (no contrast given due to FARNAZ):  1. Notable choledocholithiasis and mild bile duct dilation. This study is diagnostic for choledocholithiasis; therefore, MRCP is not required. Advise GI consultation for consideration of ERCP/intervention.   2. Small gallbladder neck stone; inflammatory changes/minor peritoneal fluid in the right upper quadrant may reflect superimposed cholecystitis or ascending inflammation from the choledocholithiasis. Follow-up, as per Surgery.     Doppler USG of bilateral LE:   Left lower extremity: Occlusive DVT in the left mid and distal femoral vein. Nonocclusive DVT in the left popliteal vein.   Right lower extremity: Negative examination for thrombosis in the right lower extremity veins listed above.     He has been started on heparin gtt for DVT. No imaging to rule out PE has been completed yet - deferring CT with contrast due to FARNAZ, unable to get echo at this time to evaluate for right heart strain. He was started on cefepime and flagyl (has allergy to penicillins). He has been getting dilaudid for abdominal pain. Mental status is WNL. He has not yet had any urine output after receiving the fluids.     Surgery saw him as consult. They recommended lap cholecystectomy, but would defer this in favor of percutaneous cholecystostomy if needing anticoagulation for DVT and/or PE (which he is). ED provider [facility] who recommended transfer to facility capable of advanced endoscopy/ERCP given CT findings ([facility] unable to do this). Therefore, ED provider requesting transfer to [facility].     Patient accepted to [facility] bed at [facility]. [facility] captain Dr. Mayorga notified of transfer via [facility].     To notify the admitting provider that the  patient has arrived at their destination:    For [place]: Identify the correct provider on [employer]: [facility] [place] and then find the provider who has the title CAPTAIN: DIRECT ADMITS or CAPTAIN: ER / DIRECT ADMIT / PT PLACEMENT next to their name. Use [employer] to contact that person.      Lisa Greer MD

## 2025-07-13 NOTE — LETTER
[place] [address] [facility]  [address]  [place] [address]  Phone: [phone]    July 21, 2025    Kristofer Montana  [address]  [place] [place] [facility] [address]    To whom it may concern:    Kristofer Montana was admitted to the hospital for a medical condition on 7/13/26. He is currently unable to work and remains in the hospital. Time to return to work is to be determined.     Please contact me for questions or concerns.    Sincerely,    Scott Mcnamara, DO

## 2025-07-14 ENCOUNTER — TRANSFERRED RECORDS (OUTPATIENT)
Dept: HEALTH INFORMATION MANAGEMENT | Facility: CLINIC | Age: 80
End: 2025-07-14

## 2025-07-14 ENCOUNTER — APPOINTMENT (OUTPATIENT)
Dept: PHYSICAL THERAPY | Facility: CLINIC | Age: 80
DRG: 853 | End: 2025-07-14
Attending: HOSPITALIST
Payer: MEDICARE

## 2025-07-14 LAB
ACANTHOCYTES BLD QL SMEAR: ABNORMAL
ACANTHOCYTES BLD QL SMEAR: SLIGHT
ALBUMIN SERPL BCG-MCNC: 2.8 G/DL (ref 3.5–5.2)
ALP SERPL-CCNC: 115 U/L (ref 40–150)
ALT SERPL W P-5'-P-CCNC: 29 U/L (ref 0–70)
ANION GAP SERPL CALCULATED.3IONS-SCNC: 16 MMOL/L (ref 7–15)
AST SERPL W P-5'-P-CCNC: 31 U/L (ref 0–45)
ATRIAL RATE - MUSE: 114 BPM
BASOPHILS # BLD MANUAL: 0 10E3/UL (ref 0–0.2)
BASOPHILS # BLD MANUAL: 0 10E3/UL (ref 0–0.2)
BASOPHILS NFR BLD MANUAL: 0 %
BASOPHILS NFR BLD MANUAL: 0 %
BILIRUB SERPL-MCNC: 1.3 MG/DL
BUN SERPL-MCNC: 25.3 MG/DL (ref 8–23)
CALCIUM SERPL-MCNC: 7.8 MG/DL (ref 8.8–10.4)
CHLORIDE SERPL-SCNC: 101 MMOL/L (ref 98–107)
CREAT SERPL-MCNC: 1.12 MG/DL (ref 0.67–1.17)
DIASTOLIC BLOOD PRESSURE - MUSE: NORMAL MMHG
EGFRCR SERPLBLD CKD-EPI 2021: 67 ML/MIN/1.73M2
EOSINOPHIL # BLD MANUAL: 0 10E3/UL (ref 0–0.7)
EOSINOPHIL # BLD MANUAL: 0 10E3/UL (ref 0–0.7)
EOSINOPHIL NFR BLD MANUAL: 0 %
EOSINOPHIL NFR BLD MANUAL: 0 %
ERYTHROCYTE [DISTWIDTH] IN BLOOD BY AUTOMATED COUNT: 14.3 % (ref 10–15)
ERYTHROCYTE [DISTWIDTH] IN BLOOD BY AUTOMATED COUNT: 14.5 % (ref 10–15)
GLUCOSE SERPL-MCNC: 111 MG/DL (ref 70–99)
HCO3 SERPL-SCNC: 20 MMOL/L (ref 22–29)
HCT VFR BLD AUTO: 37.3 % (ref 40–53)
HCT VFR BLD AUTO: 38.2 % (ref 40–53)
HGB BLD-MCNC: 13 G/DL (ref 13.3–17.7)
HGB BLD-MCNC: 13.4 G/DL (ref 13.3–17.7)
INTERPRETATION ECG - MUSE: NORMAL
LACTATE SERPL-SCNC: 2 MMOL/L (ref 0.7–2)
LACTATE SERPL-SCNC: 2.3 MMOL/L (ref 0.7–2)
LACTATE SERPL-SCNC: 2.5 MMOL/L (ref 0.7–2)
LYMPHOCYTES # BLD MANUAL: 0.6 10E3/UL (ref 0.8–5.3)
LYMPHOCYTES # BLD MANUAL: 0.7 10E3/UL (ref 0.8–5.3)
LYMPHOCYTES NFR BLD MANUAL: 4 %
LYMPHOCYTES NFR BLD MANUAL: 5 %
MAGNESIUM SERPL-MCNC: 1.5 MG/DL (ref 1.7–2.3)
MAGNESIUM SERPL-MCNC: 2.6 MG/DL (ref 1.7–2.3)
MCH RBC QN AUTO: 29.7 PG (ref 26.5–33)
MCH RBC QN AUTO: 30 PG (ref 26.5–33)
MCHC RBC AUTO-ENTMCNC: 34.9 G/DL (ref 31.5–36.5)
MCHC RBC AUTO-ENTMCNC: 35.1 G/DL (ref 31.5–36.5)
MCV RBC AUTO: 85 FL (ref 78–100)
MCV RBC AUTO: 86 FL (ref 78–100)
METAMYELOCYTES # BLD MANUAL: 0.7 10E3/UL
METAMYELOCYTES # BLD MANUAL: 1.4 10E3/UL
METAMYELOCYTES NFR BLD MANUAL: 5 %
METAMYELOCYTES NFR BLD MANUAL: 9 %
MONOCYTES # BLD MANUAL: 0.3 10E3/UL (ref 0–1.3)
MONOCYTES # BLD MANUAL: 0.5 10E3/UL (ref 0–1.3)
MONOCYTES NFR BLD MANUAL: 2 %
MONOCYTES NFR BLD MANUAL: 3 %
MYELOCYTES # BLD MANUAL: 0.1 10E3/UL
MYELOCYTES # BLD MANUAL: 0.2 10E3/UL
MYELOCYTES NFR BLD MANUAL: 1 %
MYELOCYTES NFR BLD MANUAL: 1 %
NEUTROPHILS # BLD MANUAL: 11.9 10E3/UL (ref 1.6–8.3)
NEUTROPHILS # BLD MANUAL: 12.5 10E3/UL (ref 1.6–8.3)
NEUTROPHILS NFR BLD MANUAL: 83 %
NEUTROPHILS NFR BLD MANUAL: 87 %
P AXIS - MUSE: NORMAL DEGREES
PLAT MORPH BLD: ABNORMAL
PLAT MORPH BLD: ABNORMAL
PLATELET # BLD AUTO: 116 10E3/UL (ref 150–450)
PLATELET # BLD AUTO: 121 10E3/UL (ref 150–450)
POTASSIUM SERPL-SCNC: 4.1 MMOL/L (ref 3.4–5.3)
PR INTERVAL - MUSE: NORMAL MS
PROT SERPL-MCNC: 6.2 G/DL (ref 6.4–8.3)
QRS DURATION - MUSE: 100 MS
QT - MUSE: 300 MS
QTC - MUSE: 495 MS
R AXIS - MUSE: -41 DEGREES
RBC # BLD AUTO: 4.34 10E6/UL (ref 4.4–5.9)
RBC # BLD AUTO: 4.51 10E6/UL (ref 4.4–5.9)
RBC MORPH BLD: ABNORMAL
RBC MORPH BLD: ABNORMAL
SODIUM SERPL-SCNC: 137 MMOL/L (ref 135–145)
SYSTOLIC BLOOD PRESSURE - MUSE: NORMAL MMHG
T AXIS - MUSE: -4 DEGREES
TSH SERPL DL<=0.005 MIU/L-ACNC: 3.74 UIU/ML (ref 0.3–4.2)
UFH PPP CHRO-ACNC: 0.29 IU/ML (ref ?–1.1)
UFH PPP CHRO-ACNC: 0.32 IU/ML (ref ?–1.1)
UFH PPP CHRO-ACNC: 0.34 IU/ML (ref ?–1.1)
UFH PPP CHRO-ACNC: 0.49 IU/ML (ref ?–1.1)
VENTRICULAR RATE- MUSE: 164 BPM
WBC # BLD AUTO: 13.7 10E3/UL (ref 4–11)
WBC # BLD AUTO: 15.1 10E3/UL (ref 4–11)

## 2025-07-14 PROCEDURE — 250N000011 HC RX IP 250 OP 636: Performed by: HOSPITALIST

## 2025-07-14 PROCEDURE — 82310 ASSAY OF CALCIUM: CPT | Performed by: HOSPITALIST

## 2025-07-14 PROCEDURE — 93005 ELECTROCARDIOGRAM TRACING: CPT

## 2025-07-14 PROCEDURE — 83735 ASSAY OF MAGNESIUM: CPT | Performed by: HOSPITALIST

## 2025-07-14 PROCEDURE — 36415 COLL VENOUS BLD VENIPUNCTURE: CPT | Performed by: HOSPITALIST

## 2025-07-14 PROCEDURE — 250N000011 HC RX IP 250 OP 636: Performed by: NURSE PRACTITIONER

## 2025-07-14 PROCEDURE — 250N000011 HC RX IP 250 OP 636: Performed by: INTERNAL MEDICINE

## 2025-07-14 PROCEDURE — 85520 HEPARIN ASSAY: CPT | Performed by: INTERNAL MEDICINE

## 2025-07-14 PROCEDURE — 99207 PR APP CREDIT; MD BILLING SHARED VISIT: CPT | Performed by: PHYSICIAN ASSISTANT

## 2025-07-14 PROCEDURE — 120N000013 HC R&B IMCU

## 2025-07-14 PROCEDURE — 97530 THERAPEUTIC ACTIVITIES: CPT | Mod: GP

## 2025-07-14 PROCEDURE — 85007 BL SMEAR W/DIFF WBC COUNT: CPT | Performed by: HOSPITALIST

## 2025-07-14 PROCEDURE — 85520 HEPARIN ASSAY: CPT | Performed by: HOSPITALIST

## 2025-07-14 PROCEDURE — 258N000003 HC RX IP 258 OP 636: Performed by: HOSPITALIST

## 2025-07-14 PROCEDURE — 99223 1ST HOSP IP/OBS HIGH 75: CPT | Mod: FS | Performed by: NURSE PRACTITIONER

## 2025-07-14 PROCEDURE — 84443 ASSAY THYROID STIM HORMONE: CPT | Performed by: HOSPITALIST

## 2025-07-14 PROCEDURE — 83605 ASSAY OF LACTIC ACID: CPT | Performed by: HOSPITALIST

## 2025-07-14 PROCEDURE — 250N000013 HC RX MED GY IP 250 OP 250 PS 637: Performed by: HOSPITALIST

## 2025-07-14 PROCEDURE — 97161 PT EVAL LOW COMPLEX 20 MIN: CPT | Mod: GP

## 2025-07-14 PROCEDURE — 36415 COLL VENOUS BLD VENIPUNCTURE: CPT | Performed by: INTERNAL MEDICINE

## 2025-07-14 PROCEDURE — 93010 ELECTROCARDIOGRAM REPORT: CPT | Performed by: INTERNAL MEDICINE

## 2025-07-14 PROCEDURE — 97116 GAIT TRAINING THERAPY: CPT | Mod: GP

## 2025-07-14 PROCEDURE — 85018 HEMOGLOBIN: CPT | Performed by: HOSPITALIST

## 2025-07-14 PROCEDURE — 250N000009 HC RX 250: Performed by: HOSPITALIST

## 2025-07-14 PROCEDURE — 99233 SBSQ HOSP IP/OBS HIGH 50: CPT | Performed by: HOSPITALIST

## 2025-07-14 PROCEDURE — 99222 1ST HOSP IP/OBS MODERATE 55: CPT | Mod: FS | Performed by: SURGERY

## 2025-07-14 RX ORDER — FUROSEMIDE 10 MG/ML
20 INJECTION INTRAMUSCULAR; INTRAVENOUS ONCE
Status: COMPLETED | OUTPATIENT
Start: 2025-07-14 | End: 2025-07-14

## 2025-07-14 RX ORDER — OMEPRAZOLE 20 MG/1
20 TABLET, DELAYED RELEASE ORAL DAILY
COMMUNITY

## 2025-07-14 RX ORDER — ATORVASTATIN CALCIUM 40 MG/1
40 TABLET, FILM COATED ORAL DAILY
Status: DISCONTINUED | OUTPATIENT
Start: 2025-07-14 | End: 2025-07-17

## 2025-07-14 RX ORDER — SODIUM CHLORIDE 9 MG/ML
INJECTION, SOLUTION INTRAVENOUS CONTINUOUS
Status: ACTIVE | OUTPATIENT
Start: 2025-07-14 | End: 2025-07-14

## 2025-07-14 RX ORDER — HYDRALAZINE HYDROCHLORIDE 20 MG/ML
10 INJECTION INTRAMUSCULAR; INTRAVENOUS EVERY 4 HOURS PRN
Status: DISCONTINUED | OUTPATIENT
Start: 2025-07-14 | End: 2025-08-07 | Stop reason: HOSPADM

## 2025-07-14 RX ORDER — MAGNESIUM SULFATE HEPTAHYDRATE 40 MG/ML
4 INJECTION, SOLUTION INTRAVENOUS ONCE
Status: COMPLETED | OUTPATIENT
Start: 2025-07-14 | End: 2025-07-14

## 2025-07-14 RX ORDER — SODIUM CHLORIDE 9 MG/ML
INJECTION, SOLUTION INTRAVENOUS CONTINUOUS
Status: DISCONTINUED | OUTPATIENT
Start: 2025-07-14 | End: 2025-07-14

## 2025-07-14 RX ORDER — MULTIVITAMIN
1 TABLET ORAL DAILY
COMMUNITY

## 2025-07-14 RX ORDER — METOPROLOL TARTRATE 1 MG/ML
2.5 INJECTION, SOLUTION INTRAVENOUS EVERY 6 HOURS PRN
Status: DISCONTINUED | OUTPATIENT
Start: 2025-07-14 | End: 2025-08-07 | Stop reason: HOSPADM

## 2025-07-14 RX ORDER — METOPROLOL TARTRATE 1 MG/ML
2.5 INJECTION, SOLUTION INTRAVENOUS EVERY 4 HOURS PRN
Status: DISCONTINUED | OUTPATIENT
Start: 2025-07-14 | End: 2025-07-14

## 2025-07-14 RX ORDER — METOPROLOL SUCCINATE 25 MG/1
25 TABLET, EXTENDED RELEASE ORAL DAILY
Status: DISCONTINUED | OUTPATIENT
Start: 2025-07-14 | End: 2025-07-15

## 2025-07-14 RX ORDER — ASPIRIN 81 MG/1
81 TABLET ORAL DAILY
Status: DISCONTINUED | OUTPATIENT
Start: 2025-07-14 | End: 2025-07-25

## 2025-07-14 RX ORDER — LIDOCAINE 40 MG/G
CREAM TOPICAL
Status: DISCONTINUED | OUTPATIENT
Start: 2025-07-14 | End: 2025-07-16

## 2025-07-14 RX ORDER — LOSARTAN POTASSIUM 50 MG/1
50 TABLET ORAL DAILY
Status: DISCONTINUED | OUTPATIENT
Start: 2025-07-14 | End: 2025-08-07 | Stop reason: HOSPADM

## 2025-07-14 RX ADMIN — AMIODARONE HYDROCHLORIDE 1 MG/MIN: 1.8 INJECTION, SOLUTION INTRAVENOUS at 15:44

## 2025-07-14 RX ADMIN — AMIODARONE HYDROCHLORIDE 0.5 MG/MIN: 1.8 INJECTION, SOLUTION INTRAVENOUS at 21:11

## 2025-07-14 RX ADMIN — AMIODARONE HYDROCHLORIDE 150 MG: 1.5 INJECTION, SOLUTION INTRAVENOUS at 15:35

## 2025-07-14 RX ADMIN — MAGNESIUM SULFATE HEPTAHYDRATE 4 G: 40 INJECTION, SOLUTION INTRAVENOUS at 14:04

## 2025-07-14 RX ADMIN — METOPROLOL TARTRATE 2.5 MG: 5 INJECTION INTRAVENOUS at 12:24

## 2025-07-14 RX ADMIN — CEFEPIME 2 G: 2 INJECTION, POWDER, FOR SOLUTION INTRAVENOUS at 02:34

## 2025-07-14 RX ADMIN — METOPROLOL SUCCINATE 25 MG: 25 TABLET, EXTENDED RELEASE ORAL at 10:46

## 2025-07-14 RX ADMIN — CALCIUM CARBONATE (ANTACID) CHEW TAB 500 MG 1000 MG: 500 CHEW TAB at 22:39

## 2025-07-14 RX ADMIN — ACETAMINOPHEN 650 MG: 325 TABLET ORAL at 22:46

## 2025-07-14 RX ADMIN — CALCIUM CARBONATE (ANTACID) CHEW TAB 500 MG 1000 MG: 500 CHEW TAB at 15:54

## 2025-07-14 RX ADMIN — SODIUM CHLORIDE: 0.9 INJECTION, SOLUTION INTRAVENOUS at 10:46

## 2025-07-14 RX ADMIN — ACETAMINOPHEN 650 MG: 325 TABLET ORAL at 03:12

## 2025-07-14 RX ADMIN — CEFEPIME 2 G: 2 INJECTION, POWDER, FOR SOLUTION INTRAVENOUS at 16:54

## 2025-07-14 RX ADMIN — HEPARIN SODIUM 1650 UNITS/HR: 10000 INJECTION, SOLUTION INTRAVENOUS at 11:05

## 2025-07-14 RX ADMIN — METRONIDAZOLE 500 MG: 500 INJECTION, SOLUTION INTRAVENOUS at 03:08

## 2025-07-14 RX ADMIN — FUROSEMIDE 20 MG: 10 INJECTION, SOLUTION INTRAMUSCULAR; INTRAVENOUS at 12:33

## 2025-07-14 RX ADMIN — CALCIUM CARBONATE (ANTACID) CHEW TAB 500 MG 1000 MG: 500 CHEW TAB at 07:22

## 2025-07-14 RX ADMIN — METRONIDAZOLE 500 MG: 500 INJECTION, SOLUTION INTRAVENOUS at 16:54

## 2025-07-14 ASSESSMENT — ACTIVITIES OF DAILY LIVING (ADL)
ADLS_ACUITY_SCORE: 28
ADLS_ACUITY_SCORE: 28
ADLS_ACUITY_SCORE: 23
ADLS_ACUITY_SCORE: 28
ADLS_ACUITY_SCORE: 23
ADLS_ACUITY_SCORE: 23
ADLS_ACUITY_SCORE: 28
ADLS_ACUITY_SCORE: 23
ADLS_ACUITY_SCORE: 28
ADLS_ACUITY_SCORE: 23
ADLS_ACUITY_SCORE: 23
ADLS_ACUITY_SCORE: 28
ADLS_ACUITY_SCORE: 28

## 2025-07-14 NOTE — CONSULTS
[facility]    Cardiology Consultation     Date of Admission:  7/13/2025    Assessment & Plan   Kristofer Montana is a 80 year old male  with past medical history of HTN, HLD, cardiomyopathy HFrEF LVEF 40%, CAD with remote PCI in 2005, stroke (2013), who [facility] cholecystitis, possible acute cholangitis, acute leg leg DVT, sepsis. Had a couple days of not eating well, fever and RUQ pain. This morning around 1145 he went into AF with RVR in the 160's, prior to this in sinus rhythm. Blood pressure low 100's. Cardiology consulted for further management.     Assessment:   AF RVR, new onset 7/14/25 in setting of acute illness    History of ischemic cardiomyopathy with chronic HFrEF  CAD with remote LAD PCI in 2005, stable no chest pain on asa/statin   Prior CVA (2013)  HTN, controlled - low normal. Hold PTA losartan for now  New DVT left leg - started on IV heparin  Choledocholithiasis - GI consulted with plan for ERCP in the coming day  Lactic acidosis, possible sepsis   FARNAZ - improving  Hypomagnesia - replace     Plan:  Echocardiogram as planned  Continue metoprolol XL 25mg for now, monitoring blood pressure response   Given degree of tachycardia and limited BP room will attempt at rhythm control ahead of his ERCP planned for tomorrow per GI   Continue on IV heparin - DOAC test claim affordable [facility]   Start IV Amiodarone bolus with loading protocol   Baseline TSH and LFTs normal  Can use prn metoprolol 2.5mg every 6 hours if HR consistently > 120 despite amiodarone loading  Will request most recent cardiology notes and imaging [facility] the [facility]     High complexity     JERI Choi CNP    Primary Care Physician   [facility]    Reason for Consult   Reason for consult: I was asked by Dr. Pacheco to evaluate this patient for Afib with RVR.    History of Present Illness   Kristofer Montana is a 80 year old male with past medical  history of HTN, HLD, ischemic cardiomyopathy HFrEF LVEF reported more recently 40%, CAD with remote PCI in 2005, stroke (2013), who [facility] cholecystitis, possible acute cholangitis, acute leg leg DVT, sepsis. Had a couple days of not eating well, fever and RUQ pain. This morning around 1145 he went into AF with RVR in the 160's, prior to this in sinus rhythm, confirmed with monitor tech as he was hardwired to other monitor prior to room change. SR strip in chart. Currently AF RVR rates 130-150's.     Kristofer seen at bedside, reports in his usual state of health until a couple days ago with poor intake, RUQ pain. Abdomen tender still. Some palpitations earlier. Currently no chest pain, dyspnea or lightheadedness. No chest pain PTA. No edema in legs.     Past Medical History   Past Medical History:   Diagnosis Date    CAD (coronary artery disease)     History of CVA (cerebrovascular accident)     HTN (hypertension)     Ischemic cardiomyopathy        Past Surgical History   No past surgical history on file.  Prior LAD stenting     Prior to Admission Medications   Prior to Admission Medications   Prescriptions Last Dose Informant Patient Reported? Taking?   aspirin 81 MG EC tablet 7/12/2025  Yes Yes   Sig: Take 81 mg by mouth daily.   atorvastatin (LIPITOR) 40 MG tablet 7/12/2025  Yes Yes   Sig: Take 40 mg by mouth daily.   losartan (COZAAR) 50 MG tablet 7/12/2025  Yes Yes   Sig: Take 50 mg by mouth daily.   metoprolol succinate ER (TOPROL XL) 25 MG 24 hr tablet 7/12/2025  Yes Yes   Sig: Take 25 mg by mouth daily.   multivitamin w/minerals (MULTI-VITAMIN) tablet Past Week  Yes Yes   Sig: Take 1 tablet by mouth daily.   omeprazole (PRILOSEC OTC) 20 MG EC tablet 7/12/2025  Yes Yes   Sig: Take 20 mg by mouth daily.      Facility-Administered Medications: None     Current Facility-Administered Medications   Medication Dose Route Frequency Provider Last Rate Last Admin    acetaminophen  (TYLENOL) tablet 650 mg  650 mg Oral Q4H PRN Harris Sow MD   650 mg at 07/14/25 0312    Or    acetaminophen (TYLENOL) Suppository 650 mg  650 mg Rectal Q4H PRN Harris Sow MD        amiodarone (NEXTERONE) 1.8 mg/mL in dextrose 5% 200 mL ADULT STANDARD infusion  1 mg/min Intravenous Continuous Roselia Luciano APRN CNP        amiodarone (NEXTERONE) 1.8 mg/mL in dextrose 5% 200 mL ADULT STANDARD infusion  0.5 mg/min Intravenous Continuous Roselia Luciano APRN CNP        amiodarone (NEXTERONE) bolus 150 mg  150 mg Intravenous Once Roselia Luciano APRN CNP        [Held by provider] aspirin EC tablet 81 mg  81 mg Oral Daily Nikki Oneill MD        [Held by provider] atorvastatin (LIPITOR) tablet 40 mg  40 mg Oral Daily Nikki Oneill MD        calcium carbonate (TUMS) chewable tablet 1,000 mg  1,000 mg Oral 4x Daily PRN Harris Sow MD   1,000 mg at 07/14/25 0722    ceFEPIme (MAXIPIME) 2 g vial to attach to  mL bag for ADULTS or NS 50 mL bag for PEDS  2 g Intravenous Q12H Harris Sow MD   2 g at 07/14/25 0234    heparin 25,000 units in 0.45% NaCl 250 mL ANTICOAGULANT infusion  0-5,000 Units/hr Intravenous Continuous Keyana Mayorga MD 16.5 mL/hr at 07/14/25 1105 1,650 Units/hr at 07/14/25 1105    hydrALAZINE (APRESOLINE) injection 10 mg  10 mg Intravenous Q4H PRN Nikki Oneill MD        HYDROmorphone (DILAUDID) tablet 2 mg  2 mg Oral Q4H PRN Harris Sow MD   2 mg at 07/13/25 2223    HYDROmorphone (PF) (DILAUDID) injection 0.5 mg  0.5 mg Intravenous Q3H PRN Harris Sow MD        lidocaine (LMX4) cream   Topical Q1H PRN Harris Sow MD        lidocaine 1 % 0.1-1 mL  0.1-1 mL Other Q1H PRN Harris Sow MD        [Held by provider] losartan (COZAAR) tablet 50 mg  50 mg Oral Daily Nikki Oneill MD        magnesium sulfate 4 g in 100 mL sterile water intermittent infusion  4 g Intravenous Once Nikki Oneill,  MD 50 mL/hr at 07/14/25 1404 4 g at 07/14/25 1404    metoprolol (LOPRESSOR) injection 2.5 mg  2.5 mg Intravenous Q6H PRN Roselia Luciano APRN CNP        metoprolol succinate ER (TOPROL XL) 24 hr tablet 25 mg  25 mg Oral Daily Nikki Oneill MD   25 mg at 07/14/25 1046    metroNIDAZOLE (FLAGYL) infusion 500 mg  500 mg Intravenous Q12H Harris Sow MD   500 mg at 07/14/25 0308    naloxone (NARCAN) injection 0.2 mg  0.2 mg Intravenous Q2 Min PRN Keyana Mayorga MD        Or    naloxone (NARCAN) injection 0.4 mg  0.4 mg Intravenous Q2 Min PRN Keyana Mayorga MD        Or    naloxone (NARCAN) injection 0.2 mg  0.2 mg Intramuscular Q2 Min PRN Keyana Mayorga MD        Or    naloxone (NARCAN) injection 0.4 mg  0.4 mg Intramuscular Q2 Min PRN Keyana Mayorga MD        ondansetron (ZOFRAN ODT) ODT tab 4 mg  4 mg Oral Q6H PRN Harris Sow MD        Or    ondansetron (ZOFRAN) injection 4 mg  4 mg Intravenous Q6H PRN Harris Sow MD        Patient is already receiving anticoagulation with heparin, enoxaparin (LOVENOX), warfarin (COUMADIN)  or other anticoagulant medication   Does not apply Continuous PRN Harris Sow MD        prochlorperazine (COMPAZINE) injection 5 mg  5 mg Intravenous Q6H PRN Harris Sow MD        Or    prochlorperazine (COMPAZINE) tablet 5 mg  5 mg Oral Q6H PRN Harris Sow MD        senna-docusate (SENOKOT-S/PERICOLACE) 8.6-50 MG per tablet 1 tablet  1 tablet Oral BID PRN Harris Sow MD        Or    senna-docusate (SENOKOT-S/PERICOLACE) 8.6-50 MG per tablet 2 tablet  2 tablet Oral BID PRN Harris Sow MD        sodium chloride (PF) 0.9% PF flush 3 mL  3 mL Intracatheter Q8H STEPHANIE Harris Sow MD   3 mL at 07/13/25 2217    sodium chloride (PF) 0.9% PF flush 3 mL  3 mL Intracatheter q1 min prn Harris Sow MD        [Held by provider] sodium chloride 0.9 % infusion   Intravenous Continuous Nikki Oneill MD    Stopped at 07/14/25 1221    sodium chloride 0.9 % infusion   Intravenous Continuous Nikki Oneill  mL/hr at 07/14/25 1312 Restarted at 07/14/25 1312     Current Facility-Administered Medications   Medication Dose Route Frequency Provider Last Rate Last Admin    acetaminophen (TYLENOL) tablet 650 mg  650 mg Oral Q4H PRN Harris Sow MD   650 mg at 07/14/25 0312    Or    acetaminophen (TYLENOL) Suppository 650 mg  650 mg Rectal Q4H PRN Harris Sow MD        amiodarone (NEXTERONE) 1.8 mg/mL in dextrose 5% 200 mL ADULT STANDARD infusion  1 mg/min Intravenous Continuous Roselia Lucinao APRN CNP        amiodarone (NEXTERONE) 1.8 mg/mL in dextrose 5% 200 mL ADULT STANDARD infusion  0.5 mg/min Intravenous Continuous Roselia Luciano APRN CNP        amiodarone (NEXTERONE) bolus 150 mg  150 mg Intravenous Once Roselia Luciano APRN CNP        [Held by provider] aspirin EC tablet 81 mg  81 mg Oral Daily Nikki Oneill MD        [Held by provider] atorvastatin (LIPITOR) tablet 40 mg  40 mg Oral Daily Nikki Oneill MD        calcium carbonate (TUMS) chewable tablet 1,000 mg  1,000 mg Oral 4x Daily PRN Harris Sow MD   1,000 mg at 07/14/25 0722    ceFEPIme (MAXIPIME) 2 g vial to attach to  mL bag for ADULTS or NS 50 mL bag for PEDS  2 g Intravenous Q12H Harris Sow MD   2 g at 07/14/25 0234    heparin 25,000 units in 0.45% NaCl 250 mL ANTICOAGULANT infusion  0-5,000 Units/hr Intravenous Continuous Keyana Mayorga MD 16.5 mL/hr at 07/14/25 1105 1,650 Units/hr at 07/14/25 1105    hydrALAZINE (APRESOLINE) injection 10 mg  10 mg Intravenous Q4H PRN Nikki Oneill MD        HYDROmorphone (DILAUDID) tablet 2 mg  2 mg Oral Q4H PRN Harris Sow MD   2 mg at 07/13/25 2223    HYDROmorphone (PF) (DILAUDID) injection 0.5 mg  0.5 mg Intravenous Q3H PRN Harris Sow MD        lidocaine (LMX4) cream   Topical Q1H PRN Harris Sow MD         lidocaine 1 % 0.1-1 mL  0.1-1 mL Other Q1H PRN Harris Sow MD        [Held by provider] losartan (COZAAR) tablet 50 mg  50 mg Oral Daily Nikki Oneill MD        magnesium sulfate 4 g in 100 mL sterile water intermittent infusion  4 g Intravenous Once Nikki Oneill MD 50 mL/hr at 07/14/25 1404 4 g at 07/14/25 1404    metoprolol (LOPRESSOR) injection 2.5 mg  2.5 mg Intravenous Q6H PRN Roselia Luciano APRN CNP        metoprolol succinate ER (TOPROL XL) 24 hr tablet 25 mg  25 mg Oral Daily Nikki Oneill MD   25 mg at 07/14/25 1046    metroNIDAZOLE (FLAGYL) infusion 500 mg  500 mg Intravenous Q12H Harris Sow MD   500 mg at 07/14/25 0308    naloxone (NARCAN) injection 0.2 mg  0.2 mg Intravenous Q2 Min PRN Kyeana Mayorga MD        Or    naloxone (NARCAN) injection 0.4 mg  0.4 mg Intravenous Q2 Min PRN Keyana Mayorga MD        Or    naloxone (NARCAN) injection 0.2 mg  0.2 mg Intramuscular Q2 Min PRN Keyana Mayorga MD        Or    naloxone (NARCAN) injection 0.4 mg  0.4 mg Intramuscular Q2 Min PRN Keyana Mayorga MD        ondansetron (ZOFRAN ODT) ODT tab 4 mg  4 mg Oral Q6H PRN Harris Sow MD        Or    ondansetron (ZOFRAN) injection 4 mg  4 mg Intravenous Q6H PRN Harris Sow MD        Patient is already receiving anticoagulation with heparin, enoxaparin (LOVENOX), warfarin (COUMADIN)  or other anticoagulant medication   Does not apply Continuous PRN Harris Sow MD        prochlorperazine (COMPAZINE) injection 5 mg  5 mg Intravenous Q6H PRN Harris Sow MD        Or    prochlorperazine (COMPAZINE) tablet 5 mg  5 mg Oral Q6H PRN Harris Sow MD        senna-docusate (SENOKOT-S/PERICOLACE) 8.6-50 MG per tablet 1 tablet  1 tablet Oral BID PRN Harris Sow MD        Or    senna-docusate (SENOKOT-S/PERICOLACE) 8.6-50 MG per tablet 2 tablet  2 tablet Oral BID PRN Harris Sow MD        sodium chloride (PF) 0.9% PF  "flush 3 mL  3 mL Intracatheter Q8H STEPHANIE Harris Sow MD   3 mL at 07/13/25 2217    sodium chloride (PF) 0.9% PF flush 3 mL  3 mL Intracatheter q1 min prn Harris Sow MD        [Held by provider] sodium chloride 0.9 % infusion   Intravenous Continuous Nikki Oneill MD   Stopped at 07/14/25 1221    sodium chloride 0.9 % infusion   Intravenous Continuous Nikki Oneill  mL/hr at 07/14/25 1312 Restarted at 07/14/25 1312     Allergies   Allergies   Allergen Reactions    Amoxicillin Diarrhea       Social History    reports that he has never smoked. He has been exposed to tobacco smoke. He has never used smokeless tobacco.    Family History          Review of Systems   A comprehensive review of system was performed and is negative other than that noted in the HPI or here.     Physical Exam   Vital Signs with Ranges  Temp:  [98  F (36.7  C)-99.3  F (37.4  C)] 99.3  F (37.4  C)  Pulse:  [] 140  Resp:  [18] 18  BP: (101-124)/() 112/77  SpO2:  [93 %-96 %] 94 %  Wt Readings from Last 4 Encounters:   07/14/25 86.4 kg (190 lb 7.6 oz)     I/O last 3 completed shifts:  In: 160 [P.O.:60; IV Piggyback:100]  Out: -       Vitals: /77 (BP Location: Right arm)   Pulse (!) 140   Temp 99.3  F (37.4  C) (Oral)   Resp 18   Ht 1.778 m (5' 10\")   Wt 86.4 kg (190 lb 7.6 oz)   SpO2 94%   BMI 27.33 kg/m      Physical Exam:   General - Alert and oriented to time place and person in no acute distress  Cardiovascular - IRR, tachy S1, S2  Extremities - There is no LE edema  Respiratory - Non labored, LS clear   Skin - No pallor or cyanosis  Gastrointestinal - Rounded, tender, RUQ pain   Psych - Appropriate affect     No lab results found in last 7 days.    Invalid input(s): \"TROPONINIES\"    Recent Labs   Lab 07/14/25  1215 07/14/25  0536 07/13/25  2228   WBC 15.1*  --  13.7*   HGB 13.4  --  13.0*   MCV 85  --  86   *  --  116*   NA  --  137 135   POTASSIUM  --  4.1 4.0   CHLORIDE  --  " "101 99   CO2  --  20* 20*   BUN  --  25.3* 27.3*   CR  --  1.12 1.32*   GFRESTIMATED  --  67 55*   ANIONGAP  --  16* 16*   PAUL  --  7.8* 8.0*   GLC  --  111* 117*   ALBUMIN  --  2.8* 2.9*   PROTTOTAL  --  6.2* 6.3*   BILITOTAL  --  1.3* 1.1   ALKPHOS  --  115 119   ALT  --  29 32   AST  --  31 36     No results for input(s): \"CHOL\", \"HDL\", \"LDL\", \"TRIG\", \"CHOLHDLRATIO\" in the last 78548 hours.  Recent Labs   Lab 07/14/25 1215 07/13/25  2228   WBC 15.1* 13.7*   HGB 13.4 13.0*   HCT 38.2* 37.3*   MCV 85 86   * 116*     No results for input(s): \"PH\", \"PHV\", \"PO2\", \"PO2V\", \"SAT\", \"PCO2\", \"PCO2V\", \"HCO3\", \"HCO3V\" in the last 168 hours.  No results for input(s): \"NTBNPI\", \"NTBNP\" in the last 168 hours.  No results for input(s): \"DD\" in the last 168 hours.  No results for input(s): \"SED\", \"CRP\" in the last 168 hours.  Recent Labs   Lab 07/14/25  1215 07/13/25  2228   * 116*     Recent Labs   Lab 07/14/25  1215   TSH 3.74     No results for input(s): \"COLOR\", \"APPEARANCE\", \"URINEGLC\", \"URINEBILI\", \"URINEKETONE\", \"SG\", \"UBLD\", \"URINEPH\", \"PROTEIN\", \"UROBILINOGEN\", \"NITRITE\", \"LEUKEST\", \"RBCU\", \"WBCU\" in the last 168 hours.    Imaging:  No results found for this or any previous visit (from the past 48 hours).    Echo:  No results found for this or any previous visit (from the past 4320 hours).    Reviewed care everywhere - [facility] unable to [facility]     Clinically Significant Risk Factors Present on Admission             # Hypomagnesemia: Lowest Mg = 1.5 mg/dL in last 2 days, will replace as needed   # Hypoalbuminemia: Lowest albumin = 2.8 g/dL at 7/14/2025  5:36 AM, will monitor as appropriate   # Drug Induced Platelet Defect: home medication list includes an antiplatelet medication   # Hypertension: Home medication list includes antihypertensive(s)           # Overweight: Estimated body mass index is 27.33 kg/m  as calculated from the following:    Height as of this encounter: 1.778 m (5' 10\").    " Weight as of this encounter: 86.4 kg (190 lb 7.6 oz).           Native vessel CAD  Cardiac Arrhythmia: Atrial fibrillation: Persistent  Cardiomyopathy  Acidosis, Dehydration, and Other disorders of electrolyte and fluid balance, not elsewhere classified  Acute kidney failure, unspecified

## 2025-07-14 NOTE — PROGRESS NOTES
07/14/25 1000   Appointment Info   Signing Clinician's Name / Credentials (PT) Dianne Camacho, SIMONA   Living Environment   People in Home alone   Current Living Arrangements apartment   Home Accessibility stairs to enter home   Number of Stairs, Main Entrance greater than 10 stairs  (3rd floor apartment)   Stair Railings, Main Entrance railings safe and in good condition;railing on right side (ascending)   Transportation Anticipated car, drives self   Living Environment Comments Has a brother that lives nearby and is able to provide assist as needed   Self-Care   Usual Activity Tolerance good   Current Activity Tolerance moderate   Regular Exercise No   Equipment Currently Used at Home none   Fall history within last six months no   General Information   Onset of Illness/Injury or Date of Surgery 07/13/25   Referring Physician Dr. Mayorga   Patient/Family Therapy Goals Statement (PT) To go home   Pertinent History of Current Problem (include personal factors and/or comorbidities that impact the POC) Pt is a 80 year old male admitted with cholecystitis and left DVT.   Existing Precautions/Restrictions fall   Cognition   Affect/Mental Status (Cognition) WFL   Orientation Status (Cognition) oriented x 4   Follows Commands (Cognition) WFL   Pain Assessment   Patient Currently in Pain No   Range of Motion (ROM)   Range of Motion ROM is WFL   Strength (Manual Muscle Testing)   Strength (Manual Muscle Testing) Deficits observed during functional mobility   Bed Mobility   Comment, (Bed Mobility) SBA   Transfers   Comment, (Transfers) CGA   Gait/Stairs (Locomotion)   Comment, (Gait/Stairs) 5' no AD, IV pole, CGA   Balance   Balance Comments Good in sitting, fair in standing   Clinical Impression   Criteria for Skilled Therapeutic Intervention Yes, treatment indicated   PT Diagnosis (PT) Impaired ambulation   Influenced by the following impairments Decreased strength, decreased endurance, decreased balance   Functional  limitations due to impairments Difficulty with bed mobility, transfers, ambulation, stair management   Clinical Presentation (PT Evaluation Complexity) stable   Clinical Presentation Rationale VSS, pain controlled   Clinical Decision Making (Complexity) low complexity   Planned Therapy Interventions (PT) balance training;bed mobility training;gait training;stair training;strengthening;transfer training;patient/family education   Risk & Benefits of therapy have been explained evaluation/treatment results reviewed;care plan/treatment goals reviewed;risks/benefits reviewed;current/potential barriers reviewed;participants voiced agreement with care plan;participants included;patient   PT Total Evaluation Time   PT Jhon, Low Complexity Minutes (54052) 10   PT Discharge Planning   PT Plan Progress independence, increase ambulation distance, stairs   PT Discharge Recommendation (DC Rec) home with assist   PT Rationale for DC Rec Pt currently requires SBA for mobility. Anticipate with further medical management and therapy, pt will progress to independence and be safe to discharge home with family providing assistance for community errands.   PT Brief overview of current status Goals of therapy will be to address safe mobility and make recs for d/c to next level of care. Pt and RN will continue to follow all falls risk precautions as documented by RN staff while hospitalized. SBA.   PT Total Distance Amb During Session (feet) 200

## 2025-07-14 NOTE — PROVIDER NOTIFICATION
MD Notification    Notified Person: MD    Notified Person Name: Pantera Kealia    Notification Date/Time: 07/14 0110    Notification Interaction: Vocera    Purpose of Notification: Lactic 2.5    Orders Received:    Comments:

## 2025-07-14 NOTE — CONSULTS
[facility]  Gastroenterology Consultation    Kirstofer Montana  [address]  [facility] [address]  80 year old male    Admission Date/Time: 7/13/2025  Primary Care Provider: No Ref-Primary, Physician    We were asked to see the patient in consultation by Dr. Mayorga for evaluation of choledocholithiasis.        HPI:  Kristofer Montana is a 80 year old male who has a past medical history of coronary artery disease and MI with stent to the LAD in 2005, CVA 2013, heart failure, hypertension, hyperlipidemia, diverticulosis, thrombocytopenia, and ASD who presented to the ED with 2 days of anorexia, fever, chills, and right upper quadrant pain starting yesterday am.  Denies nausea or vomiting.  One episode of diarrhea without hematochezia or melena.      Found to be hypotensive initially, improved with IVF.      BUN 25.3, Cr 1.12.  Albumin 2.8.  Total bilirubin 1.3.  WBC 13.7.  Hemoglobin 13.  Platelets 116.    CT A/P at outside hospital without contrast showed choledocholithiasis and mild bile duct dilation.  Small gallbladder neck stone with inflammatory changes and minor peritoneal fluid in the RUQ showing possible superimposed cholecystitis or ascending inflammation.  Abdominal US with choledocholithiasis with biliary dilatation and CBD stones, mild gallbladder wall thickening.  US LLE with occlusive DVT in the left mid and distal femoral vein, nonocclusive left popliteal vein.      The patient was started on a heparin drip for DVT.  He was also started on cefepime and Flagyl.  [facility] in favor of a percutaneous andrew tube given the need for anticoagulation.  Transferred for ERCP.    ROS: A comprehensive ten point review of systems was negative aside from those in mentioned in the HPI.      MEDICATIONS:   Current Facility-Administered Medications   Medication Dose Route Frequency Provider Last Rate Last Admin    acetaminophen (TYLENOL) tablet 650 mg   650 mg Oral Q4H PRN Harris Sow MD   650 mg at 07/14/25 0312    Or    acetaminophen (TYLENOL) Suppository 650 mg  650 mg Rectal Q4H PRN Harris Sow MD        calcium carbonate (TUMS) chewable tablet 1,000 mg  1,000 mg Oral 4x Daily PRN Harris Sow MD   1,000 mg at 07/14/25 0722    ceFEPIme (MAXIPIME) 2 g vial to attach to  mL bag for ADULTS or NS 50 mL bag for PEDS  2 g Intravenous Q12H Harris Sow MD   2 g at 07/14/25 0234    heparin 25,000 units in 0.45% NaCl 250 mL ANTICOAGULANT infusion  0-5,000 Units/hr Intravenous Continuous Keyana Mayorga MD 16.5 mL/hr at 07/14/25 0647 1,650 Units/hr at 07/14/25 0647    HYDROmorphone (DILAUDID) tablet 2 mg  2 mg Oral Q4H PRN Harris Sow MD   2 mg at 07/13/25 2223    HYDROmorphone (PF) (DILAUDID) injection 0.5 mg  0.5 mg Intravenous Q3H PRN Harris Sow MD        lidocaine (LMX4) cream   Topical Q1H PRN Harris Sow MD        lidocaine 1 % 0.1-1 mL  0.1-1 mL Other Q1H PRN Harris Sow MD        metroNIDAZOLE (FLAGYL) infusion 500 mg  500 mg Intravenous Q12H Harris Sow MD   500 mg at 07/14/25 0308    naloxone (NARCAN) injection 0.2 mg  0.2 mg Intravenous Q2 Min PRN Keyana Mayorga MD        Or    naloxone (NARCAN) injection 0.4 mg  0.4 mg Intravenous Q2 Min PRN Keyana Mayorga MD        Or    naloxone (NARCAN) injection 0.2 mg  0.2 mg Intramuscular Q2 Min PRN Keyana Mayorga MD        Or    naloxone (NARCAN) injection 0.4 mg  0.4 mg Intramuscular Q2 Min PRN Keyana Mayorga MD        ondansetron (ZOFRAN ODT) ODT tab 4 mg  4 mg Oral Q6H PRN Harris Sow MD        Or    ondansetron (ZOFRAN) injection 4 mg  4 mg Intravenous Q6H PRN Harris Sow MD        Patient is already receiving anticoagulation with heparin, enoxaparin (LOVENOX), warfarin (COUMADIN)  or other anticoagulant medication   Does not apply Continuous PRN Harris Sow MD        prochlorperazine  (COMPAZINE) injection 5 mg  5 mg Intravenous Q6H PRN Harris Sow MD        Or    prochlorperazine (COMPAZINE) tablet 5 mg  5 mg Oral Q6H PRN Harris Sow MD        senna-docusate (SENOKOT-S/PERICOLACE) 8.6-50 MG per tablet 1 tablet  1 tablet Oral BID PRN Harris Sow MD        Or    senna-docusate (SENOKOT-S/PERICOLACE) 8.6-50 MG per tablet 2 tablet  2 tablet Oral BID PRN Harris Sow MD        sodium chloride (PF) 0.9% PF flush 3 mL  3 mL Intracatheter Q8H STEPHANIE Harris Sow MD   3 mL at 07/13/25 2217    sodium chloride (PF) 0.9% PF flush 3 mL  3 mL Intracatheter q1 min prn Harris Sow MD           ALLERGIES:   Allergies   Allergen Reactions    Amoxicillin Diarrhea       No past medical history on file.    No past surgical history on file.      SOCIAL HISTORY:  Social History     Tobacco Use    Smoking status: Never     Passive exposure: Past (spouse smoked 3 packs a day for 23 days)    Smokeless tobacco: Never       FAMILY HISTORY:  No family history on file.    PHYSICAL EXAM:   /66   Pulse 73   Temp 98  F (36.7  C) (Oral)   Resp 18   Wt 86.4 kg (190 lb 7.6 oz)   SpO2 93%     Constitutional: NAD, comfortable  Cardiovascular: RRR, normal S1 and S2, no r/c/g/m  Respiratory: CTAB  Psychiatric: mentation appears normal and affect normal  Head: Normocephalic. Atraumatic.    Neck: Neck supple. No adenopathy. Thyroid symmetric, normal size, trachea midline  Eyes:  PERRL, no icterus  ENT: Hearing adequate, pharynx normal without erythema or exudate  Abdomen:   Auscultation: + BS  Appearance: distended  Palpation: + RUQ tenderness without rebound or guarding  NEURO: grossly negative  SKIN: no suspicious lesions or rashes  LYMPH:   anterior cervical: no adenopathy  posterior cervical: no adenopathy  supraclavicular: no adenopathy          ADDITIONAL COMMENTS:   I reviewed the patient's new clinical lab test results.   Recent Labs   Lab Test 07/13/25  8624    WBC 13.7*   HGB 13.0*   MCV 86   *     Recent Labs   Lab Test 07/14/25  0536 07/13/25  2228    135   POTASSIUM 4.1 4.0   CHLORIDE 101 99   CO2 20* 20*   BUN 25.3* 27.3*   CR 1.12 1.32*   ANIONGAP 16* 16*   PAUL 7.8* 8.0*   * 117*     Recent Labs   Lab Test 07/14/25  0536 07/13/25  2228   ALBUMIN 2.8* 2.9*   BILITOTAL 1.3* 1.1   ALT 29 32   AST 31 36   ALKPHOS 115 119             .    CONSULTATION ASSESSMENT AND PLAN:      79 yo male with history of MI, CVA, heart failure, hypertension, hyperlipidemia, diverticulosis, thrombocytopenia, and ASD who presents with RUQ pain and is found to have choledocholithiasis on imaging.  Total bilirubin 1.3.  US and CT at outside hospital with choledocholithiasis.    -  Case discussed with Dr. Seymour.  Plan for ERCP tomorrow.  No physician availability today.  -  OK for diet today.  NPO after midnight.  -  OK to continue heparin today and hold prior to procedure tomorrow.  -  Continue antibiotics.    Total Time Spent: 31 minutes      I discussed the patient's findings and plan with Dr. Bob.          Emma Urena, PAC  [facility]  Office:  [phone] call if needed after 5PM  Cell:  [phone], not available after 5PM at this number

## 2025-07-14 NOTE — CONSULTS
IR consult for a possible cholecystostomy tube for choledocholithiasis. GI also consulted and will try first with an ERCP, stone removal, stent placement.     IR will sign off. Please consult if ERCP is not successful. Consult completed.     Total time: 15 minutes     Thanks [facility]iology CNP ([phone]) (phone [phone])

## 2025-07-14 NOTE — PLAN OF CARE
[id number]  Orientations: A&Ox4. Calm and cooperative.   Vitals/Pain: VSS on room air. Reports mild pain RUQ abd pain, declines medication intervention.  Tele: SR. Lungs clear and diminished.   Lines/Drains: R&L PIV- L infusing heparin gtt at 1650 units per hour and NS at 75 ml/hr.  Skin/Wounds: skin intact. Reddened bottom and scattered bruising.   GI/: passing gas, - BM. Voiding in bathroom. NPO ex ice chips and meds- MD notifed that GI is allowing diet to be ordered. No N/V. Prn tums for reflux/heartburn this AM.   Labs: Abnormal/Trends-heparin Xa check at 1234, Electrolyte Replacement- n/a  Ambulation/Assist: x1. PT eval done  Plan: heparin gtt. IR, gen surg, and GI consults. Plan for ERCP tomorrow. Continue plan of care.      Per MD- start clear liquid diet and stop IVF if gen/surg not needing pt NPO for a procedure.

## 2025-07-14 NOTE — H&P
"[facility]    History and Physical - Hospitalist Service       Date of Admission:  7/13/2025    Assessment & Plan      Kristofer Montana is a 80 year old male with history of CAD - MI and stent to LAD ~2005, stroke 2013 (residual difficulty only with spelling words), heart failure (EF 40%), hypertension, hyperlipidemia, diverticulosis, thrombocytopenia, and ASD who [facility] days of anorexia, f/c, and then right upper quadrant abdominal pain started this morning. He denies N or V but did have 1 x diarrhea, nonbloody - he described it as \"loosy juicy.\" He denies cough, SOB, chest pain, headache, vision change, lower extremity edema or pain. He takes his meds as directed. Prior to yesterday he has been feeling well and at baseline.     [facility] patient was hypotensive but improved to SBP's in the 90s after 2 L of IV fluids.  Lactic initially 5.5 then down to 3 after IV fluids.    *WBC 17, FARNAZ with creatinine of 2 (baseline 1), negative troponin, , CXR showed likely bibasilar atelectasis and low lung volumes;   *CT C/A/P without contrast due to FARNAZ showed choledocholithiasis and mild bile duct dilation -diagnostic (MRCP not felt required).    *ERCP/intervention with GI consultation recommended; small gallbladder neck stone with inflammatory changes and minor peritoneal fluid in the RUQ showing possible superimposed cholecystitis or ascending inflammation from the choledocholithiasis -surgery follow-up recommended.    *Abdominal ultrasound showed evidence of choledocholithiasis with biliary dilatation and CBD stones better shown on CT, mild gallbladder wall thickening favoring reactive changes related to biliary obstruction.  *Ultrasound of the left lower extremity showed an occlusive DVT in the left mid and distal femoral vein, nonocclusive DVT left popliteal vein.  RLE ultrasound negative for thrombosis.    --> Patient was started on heparin drip for DVT.  CT PE " protocol was deferred due to FARNAZ.  He was started on cefepime and Flagyl given Dilaudid for abdominal pain.    --> Surgery consulted there and recommended lap andrew but would defer this in favor of PERC Andrew tube given need for anticoagulation as aforementioned.    --> [facility] discussed with GI who recommended transfer to advanced endoscopy/ERCP capable facility patient has been accepted here at [facility].      *Upon arrival at [facility] he is nontoxic in appearance, afebrile, and hemodynamically stable with SBP in 110s. He is on heparin gtt and empiric abx continued.      Choledocholithiasis  Possible acute cholecystitis  Possible sepsis  Lactic acidosis  *2 days of anorexia, 1 day of fever, RUQ pain. Found to have choledocholithiasis on CT/US with possible superimposed acute cholecystitis. [facility] GI and surgery there rec consideration of perc andrew tube given acute DVT of LLE and now on heparin gtt as well as ERCP capable facility so SOC accepted and patient transferred to [facility].   *see full imaging reads in epic when available  *[facility] ED reports leukocytosis of 17, FARNAZ with creat 2 (baseline 1), LA 5--> 3 after 2L IVF. Initially hypotensive but SBP in 90s+ after 2L IVF. Received cefepime and flagyl. Unclear initially if blood cultures drawn but suspect they were. Await further results from care everywhere.  *stat labs ordered - creat improving 1.3, AG 16 (mild), LA 2.6, HCO 20  *LFTs and BR wnl  *VSS upon arrival, borderline tachycardic but RR normal and on RA, he is nontoxic in appearance  - IMC  - GI, surgery, IR consults - possible per andrew tube and ERCP in AM  - npo, sips, chips ok  - consider IVF if BP worsens - will be cautious with history of HFrEF and EF 40%, already received 2L at [facility]  - empiric abx continued with cefepime and flagyl  - labs returning - creat improving to 1.3, LA 2.6 (improved)    Acute LLE DVT  *denies any symptoms of legs or SOB, no predisposing history reported.    *denies history of clotting  *[facility] at [facility] showed occlusive DVT in the left mid and distal femoral vein, nonocclusive DVT left popliteal vein.  *RLE ultrasound negative for thrombosis.    *Patient was started on heparin drip for DVT.  CT PE protocol was deferred due to FARNAZ.  - upon arrival to [facility], stable on RA, mildly tachycardic but RR normal, BP 110s/70s.  - continue heparin gtt  - pharmacy liaison for DOAC coverage  - would consider CT PE only if decompensating as it is unlikely to  otherwise    Possible acute kidney injury - improving  *Creat reportedly 2 at [facility], upon arrival at [facility] creat now returned and 1.3  - follow BMP in AM, as above will be cautious with IVF and hold off for now - given EF 40% and has received 2L at [facility]    Chronic HFrEF  Hx of MI, stented 2004  HTN  *VSS upon [facility] arrival, mildly tachycardia  - PTA ASA 81, atorvastatin, BB, ACE/ARB awaiting verification - hold regimen tonight given on heparin gtt, BP initially soft at [facility] and possible intervention in AM with GI/Surgery/IR.   - could consider updating echo pending clinical course; appears fairly euvolemic currently but with DVT and technically unknown PE status, echo looking for R strain to be considered, holding off upon admission     Per chart review:  Diverticulosis  Thrombocytopenia         Diet:  npo, sips/chips  DVT Prophylaxis: heparin gtt  Mar Catheter: Not present  Lines: None     Cardiac Monitoring: None  Code Status:  Full Code -discussed in detail and confirmed upon adm    Clinically Significant Risk Factors Present on Admission                   # Hypertension: Home medication list includes antihypertensive(s)                      Disposition Plan     Medically Ready for Discharge: Anticipated in 2-4 Days           Harris Sow MD  Hospitalist Service  [facility]  Securely message with [employer] (more info)  Text page via [employer] Paging/Directory  "    ______________________________________________________________________    Chief Complaint   RUQ pain, fevers/chills, anorexia    History is obtained from the patient    History of Present Illness   Kristofer Montana is a 80 year old male with history of CAD - MI and stent to LAD ~2005, stroke 2013 (residual difficulty only with spelling words), heart failure (EF 40%), hypertension, hyperlipidemia, diverticulosis, thrombocytopenia, and ASD who [facility] with 2 days of anorexia, f/c, and then right upper quadrant abdominal pain started this morning. He denies N or V but did have 1 x diarrhea, nonbloody - he described it as \"loosy juicy.\" He denies cough, SOB, chest pain, headache, vision change, lower extremity edema or pain. He takes his meds as directed. Prior to yesterday he has been feeling well and at baseline.     [facility] patient was hypotensive but improved to SBP's in the 90s after 2 L of IV fluids.  Lactic initially 5.5 then down to 3 after IV fluids.    *WBC 17, FARNAZ with creatinine of 2 (baseline 1), negative troponin, , CXR showed likely bibasilar atelectasis and low lung volumes;   *CT C/A/P without contrast due to FARNAZ showed choledocholithiasis and mild bile duct dilation -diagnostic (MRCP not felt required).    *ERCP/intervention with GI consultation recommended; small gallbladder neck stone with inflammatory changes and minor peritoneal fluid in the RUQ showing possible superimposed cholecystitis or ascending inflammation from the choledocholithiasis -surgery follow-up recommended.    *Abdominal ultrasound showed evidence of choledocholithiasis with biliary dilatation and CBD stones better shown on CT, mild gallbladder wall thickening favoring reactive changes related to biliary obstruction.  *Ultrasound of the left lower extremity showed an occlusive DVT in the left mid and distal femoral vein, nonocclusive DVT left popliteal vein.  RLE ultrasound negative for thrombosis.    --> " Patient was started on heparin drip for DVT.  CT PE protocol was deferred due to FARNAZ.  He was started on cefepime and Flagyl given Dilaudid for abdominal pain.    --> Surgery consulted there and recommended lap andrew but would defer this in favor of PERC Andrew tube given need for anticoagulation as aforementioned.    --> [facility] discussed with GI who recommended transfer to advanced endoscopy/ERCP capable facility patient has been accepted here at [facility].      *Upon arrival at [facility] he is nontoxic in appearance, afebrile, and hemodynamically stable with SBP in 110s. He is on heparin gtt and empiric abx continued.      Past Medical History    HFrEF  CAD - stent   HTN  HL  CVA - residual spelling words difficulty      Past Surgical History   No past surgical history on file.    Prior to Admission Medications   Prior to Admission Medications   Prescriptions Last Dose Informant Patient Reported? Taking?   atorvastatin (LIPITOR) 40 MG tablet   Yes No   Si mg   losartan (COZAAR) 50 MG tablet   Yes No   Si mg   metoprolol succinate ER (TOPROL XL) 25 MG 24 hr tablet   Yes No   Si mg      Facility-Administered Medications: None        Review of Systems    The 10 point Review of Systems is negative other than noted in the HPI or here.      Physical Exam   Vital Signs: Temp: 98.3  F (36.8  C) Temp src: Oral BP: 114/72 Pulse: 101   Resp: 18 SpO2: 96 % O2 Device: None (Room air)    Weight: 0 lbs 0 oz    Gen: NAD, pleasant, resting comfortably   HEENT: EOMI, MMM  Resp: no focal crackles, no wheezes, no increased work of resp  CV: S1S2 heard, reg rhythm, reg rate  Abdo: soft, nontender on gentle palpation, no rebound, nondistended, bowel sounds present  Ext: calves nontender, well perfused  Neuro: aa, conversant, moving ext, CN grossly intact, no facial asymmetry      Medical Decision Making       78 MINUTES SPENT BY ME on the date of service doing chart review, history, exam, documentation & further  activities per the note.      Data     I have personally reviewed the following data over the past 24 hrs:    N/A  \   N/A   / N/A     135 99 27.3 (H) /  117 (H)   4.0 20 (L) 1.32 (H) \     ALT: 32 AST: 36 AP: 119 TBILI: 1.1   ALB: 2.9 (L) TOT PROTEIN: 6.3 (L) LIPASE: N/A     Procal: N/A CRP: 331.23 (H) Lactic Acid: 2.6 (H)         Imaging results reviewed over the past 24 hrs:   No results found for this or any previous visit (from the past 24 hours).

## 2025-07-14 NOTE — CONSULTS
General Surgery Consultation    Kristofer Montana MRN# [id number]   Age: 80 year old YOB: 1945     Date of Admission:  7/13/2025    Reason for consult:            Abdominal pain, right upper quadrant       Requesting physician:            Harris Sow MD                   Chief Complaint:   Abdominal pain, right upper quadrant     History is obtained from the patient and EMR         History of Present Illness:     This patient is a 80 year old male who developed right upper quadrant pain radiating to his back last Saturday evening. He phoned his brother [facility] Blood tests and imaging studies were obtained and determined he had choledocholithiasis. He also had acute kidney insufficiency and acute femoral vein thrombus. Anticoagulation was promptly initiated and the consulting surgeon recommended cholecystostomy tube or ERCP.  Patient was then transferred to [facility] on 7/13/25 for ERCP which will be performed tomorrow, 7/15/25.  In the meantime, Mr. Montana developed atrial fibrillation with RVR in the setting of acute illness.     Regarding his abdominal pain, Mr. Montana reports that it has improved markedly.  He last ate Saturday. He has not felt nauseated or vomited. He recently had a bowel movement that was normal and without blood.  Labs today show resolution of FARNAZ with a normal creatinine. Bilirubin is mildly elevated at 1.3. Lactic acid remains elevated at 2.3. Wbc is elevated at 15.1.            Past Medical History:    has a past medical history of CAD (coronary artery disease), History of CVA (cerebrovascular accident), HTN (hypertension), and Ischemic cardiomyopathy.          Past Surgical History:   No past surgical history on file.  Additional abdominal surgery: none          Social History:     Social History     Tobacco Use    Smoking status: Never     Passive exposure: Past (spouse smoked 3 packs a day for 23 days)    Smokeless tobacco: Never   Substance  Use Topics    Alcohol use: Not on file             Family History:   Noncontributory         Allergies:     Allergies   Allergen Reactions    Amoxicillin Diarrhea             Medications:     Current Facility-Administered Medications   Medication Dose Route Frequency Provider Last Rate Last Admin    acetaminophen (TYLENOL) tablet 650 mg  650 mg Oral Q4H PRN Harris Sow MD   650 mg at 07/14/25 0312    Or    acetaminophen (TYLENOL) Suppository 650 mg  650 mg Rectal Q4H PRN Harris Sow MD        amiodarone (NEXTERONE) 1.8 mg/mL in dextrose 5% 200 mL ADULT STANDARD infusion  1 mg/min Intravenous Continuous Roselia Luciano APRN CNP        amiodarone (NEXTERONE) 1.8 mg/mL in dextrose 5% 200 mL ADULT STANDARD infusion  0.5 mg/min Intravenous Continuous Roselia Luciano APRN CNP        amiodarone (NEXTERONE) bolus 150 mg  150 mg Intravenous Once Roselia Luciano APRN CNP        [Held by provider] aspirin EC tablet 81 mg  81 mg Oral Daily Nikki Oneill MD        [Held by provider] atorvastatin (LIPITOR) tablet 40 mg  40 mg Oral Daily Nikki Oneill MD        calcium carbonate (TUMS) chewable tablet 1,000 mg  1,000 mg Oral 4x Daily PRN Harris Sow MD   1,000 mg at 07/14/25 0722    ceFEPIme (MAXIPIME) 2 g vial to attach to  mL bag for ADULTS or NS 50 mL bag for PEDS  2 g Intravenous Q12H Harris Sow MD   2 g at 07/14/25 0234    heparin 25,000 units in 0.45% NaCl 250 mL ANTICOAGULANT infusion  0-5,000 Units/hr Intravenous Continuous Keyana Mayorga MD 16.5 mL/hr at 07/14/25 1105 1,650 Units/hr at 07/14/25 1105    hydrALAZINE (APRESOLINE) injection 10 mg  10 mg Intravenous Q4H PRN Nikki Oneill MD        HYDROmorphone (DILAUDID) tablet 2 mg  2 mg Oral Q4H PRN Harris Sow MD   2 mg at 07/13/25 2223    HYDROmorphone (PF) (DILAUDID) injection 0.5 mg  0.5 mg Intravenous Q3H PRN Harris Sow MD        lidocaine (LMX4) cream   Topical Q1H PRN  Nikki Oneill MD        lidocaine (LMX4) cream   Topical Q1H PRN Harris Sow MD        lidocaine 1 % 0.1-1 mL  0.1-1 mL Other Q1H PRN Nikki Oneill MD        lidocaine 1 % 0.1-1 mL  0.1-1 mL Other Q1H PRN Harris Sow MD        [Held by provider] losartan (COZAAR) tablet 50 mg  50 mg Oral Daily Nikki Oneill MD        magnesium sulfate 4 g in 100 mL sterile water intermittent infusion  4 g Intravenous Once Nikki Oneill MD 50 mL/hr at 07/14/25 1404 4 g at 07/14/25 1404    metoprolol (LOPRESSOR) injection 2.5 mg  2.5 mg Intravenous Q6H PRN Roselia Luciano APRN CNP        metoprolol succinate ER (TOPROL XL) 24 hr tablet 25 mg  25 mg Oral Daily Nikki Oneill MD   25 mg at 07/14/25 1046    metroNIDAZOLE (FLAGYL) infusion 500 mg  500 mg Intravenous Q12H Harris Sow MD   500 mg at 07/14/25 0308    naloxone (NARCAN) injection 0.2 mg  0.2 mg Intravenous Q2 Min PRN Keyana Mayorga MD        Or    naloxone (NARCAN) injection 0.4 mg  0.4 mg Intravenous Q2 Min PRN Keyana Mayorga MD        Or    naloxone (NARCAN) injection 0.2 mg  0.2 mg Intramuscular Q2 Min PRN Keyana Mayorga MD        Or    naloxone (NARCAN) injection 0.4 mg  0.4 mg Intramuscular Q2 Min PRN Keyana Mayorga MD        ondansetron (ZOFRAN ODT) ODT tab 4 mg  4 mg Oral Q6H PRN Harris Sow MD        Or    ondansetron (ZOFRAN) injection 4 mg  4 mg Intravenous Q6H PRN Harris Sow MD        Patient is already receiving anticoagulation with heparin, enoxaparin (LOVENOX), warfarin (COUMADIN)  or other anticoagulant medication   Does not apply Continuous PRN Harris Sow MD        prochlorperazine (COMPAZINE) injection 5 mg  5 mg Intravenous Q6H PRN Harris Sow MD        Or    prochlorperazine (COMPAZINE) tablet 5 mg  5 mg Oral Q6H PRN Harris Sow MD        senna-docusate (SENOKOT-S/PERICOLACE) 8.6-50 MG per tablet 1 tablet  1 tablet Oral BID PRN Harris Sow  MD Fredis        Or    senna-docusate (SENOKOT-S/PERICOLACE) 8.6-50 MG per tablet 2 tablet  2 tablet Oral BID PRN Harris Sow MD        sodium chloride (PF) 0.9% PF flush 3 mL  3 mL Intracatheter Q8H [facility] Nikki Oneill MD        sodium chloride (PF) 0.9% PF flush 3 mL  3 mL Intracatheter q1 min prn Nikki Oneill MD        sodium chloride (PF) 0.9% PF flush 3 mL  3 mL Intracatheter Q8H [facility] Harris Sow MD   3 mL at 07/13/25 2217    sodium chloride (PF) 0.9% PF flush 3 mL  3 mL Intracatheter q1 min prn Harris Sow MD        [Held by provider] sodium chloride 0.9 % infusion   Intravenous Continuous Nikki Oneill MD   Stopped at 07/14/25 1221    sodium chloride 0.9 % infusion   Intravenous Continuous Nikki Oneill  mL/hr at 07/14/25 1312 Restarted at 07/14/25 1312     Current Facility-Administered Medications   Medication Dose Route Frequency Provider Last Rate Last Admin    amiodarone (NEXTERONE) bolus 150 mg  150 mg Intravenous Once Roselia Luciano APRN CNP        [Held by provider] aspirin EC tablet 81 mg  81 mg Oral Daily Nikki Oneill MD        [Held by provider] atorvastatin (LIPITOR) tablet 40 mg  40 mg Oral Daily Nikki Oneill MD        ceFEPIme (MAXIPIME) 2 g vial to attach to  mL bag for ADULTS or NS 50 mL bag for PEDS  2 g Intravenous Q12H Harris Sow MD   2 g at 07/14/25 0234    [Held by provider] losartan (COZAAR) tablet 50 mg  50 mg Oral Daily Nikki Oneill MD        magnesium sulfate 4 g in 100 mL sterile water intermittent infusion  4 g Intravenous Once Nikki Oneill MD 50 mL/hr at 07/14/25 1404 4 g at 07/14/25 1404    metoprolol succinate ER (TOPROL XL) 24 hr tablet 25 mg  25 mg Oral Daily Nikki Oneill MD   25 mg at 07/14/25 1046    metroNIDAZOLE (FLAGYL) infusion 500 mg  500 mg Intravenous Q12H Harris Swo MD   500 mg at 07/14/25 0308    sodium chloride (PF) 0.9% PF flush 3 mL  3 mL  "Intracatheter Q8H Nikki Muniz MD        sodium chloride (PF) 0.9% PF flush 3 mL  3 mL Intracatheter Q8H [facility] Hraris Sow MD   3 mL at 07/13/25 2217            Review of Systems:   10 point ROS neg other than the symptoms noted above in the HPI.          Physical Exam:   /77 (BP Location: Right arm)   Pulse (!) 140   Temp 99.3  F (37.4  C) (Oral)   Resp 18   Ht 1.778 m (5' 10\")   Wt 86.4 kg (190 lb 7.6 oz)   SpO2 94%   BMI 27.33 kg/m    General - Well developed, well nourished male in no apparent distress. Comfortable and conversational.   HEENT:  Head normocephalic and atraumatic, pupils equal and round, conjunctivae clear, no scleral icterus, mucous membranes moist, external ears and nose normal  Neck: Supple without thyromegaly or masses  Lymphatic: No cervical, or supraclavicular lymphadenopathy  Lungs: Clear to auscultation bilaterally  Heart: regular rate and rhythm, no murmurs  Abdomen: hypoactive bowel sounds, rounded, soft, mildly tender RUQ, Non Distended. No rebound or guarding. .  Extremities: Warm without edema or tenderness to palpation  Neurologic: nonfocal  Psychiatric: Mood and affect appropriate         Data:     Lab Results   Component Value Date    WBC 15.1 07/14/2025     Lab Results   Component Value Date    HGB 13.4 07/14/2025     Lab Results   Component Value Date     07/14/2025     Last Basic Metabolic Panel:  Lab Results   Component Value Date     07/14/2025      Lab Results   Component Value Date    POTASSIUM 4.1 07/14/2025     Lab Results   Component Value Date    CHLORIDE 101 07/14/2025     Lab Results   Component Value Date    PAUL 7.8 07/14/2025     Lab Results   Component Value Date    CO2 20 07/14/2025     Lab Results   Component Value Date    BUN 25.3 07/14/2025     Lab Results   Component Value Date    CR 1.12 07/14/2025     Lab Results   Component Value Date     07/14/2025       Liver Function Studies -   Recent Labs   Lab Test " 07/14/25  0536   PROTTOTAL 6.2*   ALBUMIN 2.8*   BILITOTAL 1.3*   ALKPHOS 115   AST 31   ALT 29     Imaging:    OSH imaging including an US and CT that report a thickened gallbladder wall with   sludge concerning for acute cholecystitis.          Assessment and Plan:   Assessment:   Kristofer Montana is a 80 year old man with multiple ongoing and acute medical issues who [facility]. [facility] recommended [facility] a cholecystostomy tube due to active anticoagulation for acute DVT.     Choledocholithiasis  Atrial fibrillation with RVR  Acute DVT left lower extremity - anticoagulated.  Lactic Acidosis  FARNAZ - resolved  HTN  CAD  History CVA      Plan:   Ok to have PO intake this evening and after procedure from surgery standpoint. Agree with ERCP tomorrow, 7/15/25.  Could proceed with cholecystostomy tube to follow if clinically necessary versus laparoscopic cholecystectomy latter half of the week if able to hold anticoagulation and surgery is endorsed by medicine and cardiology.   Continue antibiotics.  Surgery acute care team will continue to follow along.     Gallbladder disease (cholecystitis and choledocholithiasis) was discussed with the patient. Laparoscopic cholecystectomy was also discussed and with the risks associated with the procedure.  Risks and adverse outcomes include, bleeding, bile leak, bile duct injury, retained gallstones, drain placement, conversion to open as well as anesthesia and cardiac complications.  Possible complications requiring further surgical intervention during or after the procedure were also discussed.           Time spent with the patient, reviewing the EMR, reviewing laboratory and imaging studies, counseling, educating and coordinating care:  65 minutes.    Pawel Rush PA-C

## 2025-07-14 NOTE — PROGRESS NOTES
[id number]  Orientations: A&Ox4. Calm and cooperative.   Vitals/Pain: VSS on room air. Reports mild pain- given prn PO dilaudid proactively per pt preference.   Tele: SR. Lungs clear and diminished.   Lines/Drains: R&L PIV- L infusing heparin gtt at 1500 units per hour.   Skin/Wounds: skin intact. Reddened bottom and scattered bruising.   GI/: passing gas, - BM. Voiding in bathroom. NPO ex ice chips and meds. No N/V.  Labs: Abnormal/Trends-heparin Xa check at 0425, Electrolyte Replacement- n/a  Ambulation/Assist: x1. Patient moves well.  Plan: heparin gtt. IR, gen surg, and GI consults placed. Pain control. Possible thrombectomy and/or ERCP tomorrow. Continue plan of care.

## 2025-07-14 NOTE — PLAN OF CARE
Goal Outcome Evaluation:    Plan of Care Reviewed With: patient    Overall Patient Progress: no change    Orientation: A&O x4  Aggression Stop Light: Green  Activity: Ax1 w/ IV pole  Diet/BS Checks: Clears, NPO at midnight  Tele: converted to Afib RVR at around 1148  IV Access/Drains: 2 PIV, 1 infusing amio gtt 33.3mL/hr, 1 infusing NS @100mL/hr and Heparin @ 1650 units/hr  Pain Management: denies pain, PRN tums given for heartburn  Abnormal VS/Results: HR elevated, BP fluctuating stable now, O2 stable on RA. Afebrile   Bowel/Bladder: Continent of B/B, 1 small BM  Skin/Wounds: scattered bruising, blanchable sacrum  Consults: Cardio, GenSurg, IR, GI  D/C Disposition: pending   Other Info: ERCP procedure tomorrow, PRN IV metoprolol given X1 when pt went into Afib RVR. Lactic 2.0 this evening. needs ECHO. Mg replaced x1, recheck for 1900.

## 2025-07-14 NOTE — CONSULTS
[facility].    [facility] can provide 14 days of medications on the [facility]'s emergent formulary (10 days of opioid medications).    For subsequent refills, he may have his [facility] health care provider send his prescriptions to the [facility].      Xarelto, Eliquis, and Pradaxa:  $11/mo at the [facility].    Jantoven (warfarin):  $5/mo at the [facility].    Mima Delacruz  Pharmacy Technician/Liaison, [facility]acy   [phone] (voice or text)  [email or web address]  Pharmacy test claims are estimates and may not reflect final costs.  Suggested alternatives aim to be cost-effective and may not be therapeutically equivalent as this consult is informational and does not constitute medical advice.  Clinical decisions should be made by qualified healthcare providers.

## 2025-07-14 NOTE — PROGRESS NOTES
CLINICAL NUTRITION SERVICES ASSESSMENT NOTE    Registered Dietitian Interventions:  - Encouraged small, frequent meals with protein. Discussed low fat diet.       Reason for Assessment: At nutrition risk    Reason for Admission: Choledocholithiasis  Cholecystitis   DVT  PMH: CAD, Stroke, HF, HTN, HLD, Diverticulosis    SUBJECTIVE INFORMATION  Assessed patient in room.    NUTRITION HISTORY  Patient reports his usual diet is 1-2 meals daily with snacks. States this has been his typical intake for some time. Reports 3 days PTA he started eating less and nothing on day of admission d/t symptoms. Patient reports no changes in weight.     CURRENT NUTRITION ORDERS  Diet: NPO for procedure    CURRENT INTAKE/TOLERANCE  No documented intakes to assess    LABS  Nutrition-relevant labs: Reviewed  Lab Results   Component Value Date     07/14/2025    POTASSIUM 4.1 07/14/2025     (H) 07/14/2025    BUN 25.3 (H) 07/14/2025    CR 1.12 07/14/2025    GFRESTIMATED 67 07/14/2025    PAUL 7.8 (L) 07/14/2025    CRPI 331.23 (H) 07/13/2025    ALBUMIN 2.8 (L) 07/14/2025     MEDICATIONS  Nutrition-relevant medications: Reviewed  Current Facility-Administered Medications   Medication Dose Route Frequency Provider Last Rate Last Admin    acetaminophen (TYLENOL) tablet 650 mg  650 mg Oral Q4H PRN Harris Sow MD   650 mg at 07/14/25 0312    Or    acetaminophen (TYLENOL) Suppository 650 mg  650 mg Rectal Q4H PRN Harris Sow MD        calcium carbonate (TUMS) chewable tablet 1,000 mg  1,000 mg Oral 4x Daily PRN Harris Sow MD   1,000 mg at 07/14/25 0722    ceFEPIme (MAXIPIME) 2 g vial to attach to  mL bag for ADULTS or NS 50 mL bag for PEDS  2 g Intravenous Q12H Harris Sow MD   2 g at 07/14/25 0234    heparin 25,000 units in 0.45% NaCl 250 mL ANTICOAGULANT infusion  0-5,000 Units/hr Intravenous Continuous Keyana Mayorga MD 16.5 mL/hr at 07/14/25 0647 1,650 Units/hr at 07/14/25 0647     HYDROmorphone (DILAUDID) tablet 2 mg  2 mg Oral Q4H PRN Harris Sow MD   2 mg at 07/13/25 2223    HYDROmorphone (PF) (DILAUDID) injection 0.5 mg  0.5 mg Intravenous Q3H PRN Harris Sow MD        lidocaine (LMX4) cream   Topical Q1H PRN Harris Sow MD        lidocaine 1 % 0.1-1 mL  0.1-1 mL Other Q1H PRN Harris Sow MD        metroNIDAZOLE (FLAGYL) infusion 500 mg  500 mg Intravenous Q12H Harris Sow MD   500 mg at 07/14/25 0308    naloxone (NARCAN) injection 0.2 mg  0.2 mg Intravenous Q2 Min PRN Keyana Mayorga MD        Or    naloxone (NARCAN) injection 0.4 mg  0.4 mg Intravenous Q2 Min PRN Keyana Mayorga MD        Or    naloxone (NARCAN) injection 0.2 mg  0.2 mg Intramuscular Q2 Min PRKeyana Sotelo MD        Or    naloxone (NARCAN) injection 0.4 mg  0.4 mg Intramuscular Q2 Min PRN Keyana Mayorga MD        ondansetron (ZOFRAN ODT) ODT tab 4 mg  4 mg Oral Q6H PRN Harris Sow MD        Or    ondansetron (ZOFRAN) injection 4 mg  4 mg Intravenous Q6H PRN Harris Sow MD        Patient is already receiving anticoagulation with heparin, enoxaparin (LOVENOX), warfarin (COUMADIN)  or other anticoagulant medication   Does not apply Continuous PRN Harris Sow MD        prochlorperazine (COMPAZINE) injection 5 mg  5 mg Intravenous Q6H PRN Harris Sow MD        Or    prochlorperazine (COMPAZINE) tablet 5 mg  5 mg Oral Q6H PRN Harris Sow MD        senna-docusate (SENOKOT-S/PERICOLACE) 8.6-50 MG per tablet 1 tablet  1 tablet Oral BID PRN Harris Sow MD        Or    senna-docusate (SENOKOT-S/PERICOLACE) 8.6-50 MG per tablet 2 tablet  2 tablet Oral BID PRN Harris Sow MD        sodium chloride (PF) 0.9% PF flush 3 mL  3 mL Intracatheter Q8H [facility] Harris Sow MD   3 mL at 07/13/25 2217    sodium chloride (PF) 0.9% PF flush 3 mL  3 mL Intracatheter q1 min prn Harris Sow MD      "    ANTHROPOMETRICS  Height: 5'10\"  (177.8 cm)  Admission Weight: 83.9 kg (184 lb 14.4 oz) (07/13/25 2110)   IBW: 68.2 kg , IBW: 123% of admit weight  Body mass index is 27.33 kg/m . Overweight BMI 25-29.9  Weight History: No significant weight loss noted  Wt Readings from Last 15 Encounters:   07/14/25 86.4 kg (190 lb 7.6 oz)   Additional Weights per EMR  06/30/25 84.4 kg (186 lb)    ESTIMATED NUTRITION NEEDS    Based on: 68.2 kg (Ideal Body Weight)    Energy: 1687-3540 kcals/day (25 - 30 kcals/kg of ideal wt)  Justification: Maintenance  Protein:  grams protein/day (1.2 - 1.5 grams of pro/kg of ideal wt)  Justification: Maintenance  Fluid:    1 mL/kcal (1 mL/kcal)  Justification: Maintenance    SYSTEM FINDINGS    Skin/wounds: Reviewed    GI symptoms: Abdominal discomfort; Last BM:      MALNUTRITION  % Intake: No decreased intake noted  % Weight Loss: None noted  Subcutaneous Fat Loss: None observed  Muscle Loss: None observed  Fluid Accumulation/Edema: None noted  Malnutrition Diagnosis: Patient does not meet two of the established criteria necessary for diagnosing malnutrition  Malnutrition Present on Admission: No    Nutrition Diagnosis  No nutrition diagnosis at this time     Interventions  See nutrition interventions above    Goals  PO intake >50% meals TID.       Monitoring/Evaluation  Progress toward goals will be monitored and evaluated per policy.    Nikhil Guerrero RD, LD, MS  [employer]  Available on Vocera     "

## 2025-07-14 NOTE — PROGRESS NOTES
[facility]  Hospitalist Progress Note   07/14/2025          Assessment and Plan:       Kristofer Montana is a 80 year old male with history of CAD with remote PCI in 2005, heart failure with reduced EF of 40%, hypertension, hyperlipidemia, ASD, stroke 2013 (residual difficulty only with spelling words), diverticulosis, thrombocytopenia [facility] of fever and decreased appetite, right upper quadrant abdominal pain.       [facility] was hypotensive but improved to SBP's in the 90s after 2 L of IV fluids.  WBC 17, lactic acid 5.5 trended down to 3 after IV fluid bolus. Creatinine of 2 (baseline 1), negative troponin, , CXR showed likely bibasilar atelectasis and low lung volumes.   *CT C/A/P without contrast due to FARNAZ showed choledocholithiasis and mild bile duct dilation -diagnostic   *Abdominal ultrasound showed evidence of choledocholithiasis with biliary dilatation and CBD stones better shown on CT, mild gallbladder wall thickening favoring reactive changes related to biliary obstruction.  *Ultrasound of the left lower extremity showed an occlusive DVT in the left mid and distal femoral vein, nonocclusive DVT left popliteal vein.  RLE ultrasound negative for thrombosis.    --> Patient was started on heparin drip for DVT.  CT PE protocol was deferred due to FARNAZ.  He was started on cefepime and Flagyl given Dilaudid for abdominal pain.    --> Surgery consulted there and recommended lap andrew but would defer this in favor of PERC Andrew tube given need for anticoagulation as aforementioned.    --> [facility] with [facility] who recommended transfer to advanced endoscopy/ERCP capable facility patient has been accepted here at [facility].       *Upon arrival at [facility] nontoxic in appearance, afebrile, and hemodynamically stable with SBP in 110s.  Was on heparin drip and empirical antibiotics.  Mid morning on 7/14 patient in A-fib with RVR despite intravenous  metoprolol.  Started on intravenous amiodarone drip and transferred to [facility].       Choledocholithiasis with biliary dilatation and CBD stones.  Possible acute cholecystitis  Sepsis.  Lactic acidosis improving.  Leukocytosis  --Presentation/workup as above.  No nausea or vomiting.  Has been NPO.  -WBC 13.7, .23.  [facility] team consulted.  Interventional radiology consult requested - possible per andrew tube   General Surgery consultation requested.    Continue IV fluids-monitor respiratory status closely.  Follow lactic acid levels.    Continue empiric antibiotics with IV cefepime, IV Flagyl.  Trend liver enzymes.  Follow electrolytes.  Trend WBC count, fever curve.  Follow CRP      Acute LLE DVT  *denies any symptoms of legs or SOB, no predisposing history reported. Denies history of clotting  *[facility] showed occlusive DVT in the left mid and distal femoral vein, nonocclusive DVT left popliteal vein.  *RLE ultrasound negative for thrombosis.    *Patient was started on heparin drip for DVT.  CT PE protocol was deferred due to FARNAZ.  - upon arrival to [facility], stable on RA, mildly tachycardic but RR normal, BP 110s/70s.  - Continue heparin gtt  - pharmacy liaison for DOAC coverage  - would consider CT PE only if decompensating as it is unlikely to  otherwise.    Atrial fibrillation with RVR.  History of chronic heart failure with reduced EF.  History of coronary artery disease stented 2005.  Hypertension.  On 7/14 AM patient in A-fib with RVR.  Complains of shortness of breath, no palpitations.  No chest pain.  - Telemetry monitoring.  Echocardiogram ordered.  Follow TSH, magnesium levels.  Continue intravenous heparin drip for anticoagulation.  Resume PTA metoprolol.  As needed IV metoprolol ordered.  If no response with metoprolol will start on amiodarone drip given soft blood pressures.  Will administer 1 dose of 20 mg IV Lasix, monitor respiratory status closely.  As needed Lasix as  "needed.  Hold PTA aspirin until okayed by GI/ surgery, planned procedure.  On intravenous heparin.  Hold PTA losartan  Hold PTA Lipitor in the setting of acute illness  Cardiology consultation requested.  Strict input intake output monitoring, daily weights     Acute kidney injury improving.  *[facility], upon [facility] at [facility] creat now returned and 1.3.  Cautious with IV fluids given history of heart failure with reduced EF.  Avoid nephrotoxic drugs, monitor renal function in AM.  Hold PTA losartan.    Hypomagnesemia.  Magnesium replacement ordered.    Chronic thrombocytopenia.  No active bleeding.  Monitor platelets closely.    History of diverticulosis.  Noted    Clinically Significant Risk Factors Present on Admission               # Hypoalbuminemia: Lowest albumin = 2.8 g/dL at 7/14/2025  5:36 AM, will monitor as appropriate   # Drug Induced Platelet Defect: home medication list includes an antiplatelet medication   # Hypertension: Home medication list includes antihypertensive(s)           # Overweight: Estimated body mass index is 27.33 kg/m  as calculated from the following:    Height as of this encounter: 1.778 m (5' 10\").    Weight as of this encounter: 86.4 kg (190 lb 7.6 oz).                  Orders Placed This Encounter      NPO for Medical/Clinical Reasons Except for: Meds, Ice Chips      DVT Prophylaxis: SCDs, on intravenous heparin drip.  Code Status: Full Code  Disposition: Expected discharge in greater than 2 days pending clinical improvement.  Continue IMC status.    Medically Ready for Discharge: Anticipated in 2-4 Days     Discussed with patient, bedside RN  Total time greater than 51 minutes.  More than 70% of time spent in direct patient care, care coordination, patient counseling, and formalizing plan of care.        Nikki Oneill MD        Interval History:        Patient lying in bed.  This morning tachycardic, irregularly irregular heart rate.  Denies any chest pain " or palpitations.  Reports some shortness of breath with minimal ambulation.  No hypoxia.  Denies any headache or dizziness.  Reports some nausea, no vomiting.  Received as needed antiemetics.  Has been n.p.o. except ice chips.  On intravenous heparin drip.  Telemetry A-fib with RVR.       Physical Exam:        Physical Exam   Temp:  [98  F (36.7  C)-99.3  F (37.4  C)] 99.3  F (37.4  C)  Pulse:  [] 100  Resp:  [18] 18  BP: (104-122)/(65-74) 122/74  SpO2:  [93 %-96 %] 94 %    Intake/Output Summary (Last 24 hours) at 7/14/2025 1219  Last data filed at 7/14/2025 0308  Gross per 24 hour   Intake 160 ml   Output --   Net 160 ml       Admission Weight: 83.9 kg (184 lb 14.4 oz)  Current Weight: 86.4 kg (190 lb 7.6 oz)    PHYSICAL EXAM  GENERAL: Patient is in no distress. Alert and oriented.  HEENT: Oropharynx pink  HEART: Irregular rate and rhythm. S1S2.   LUNGS: Bilateral decreased breath sounds, crackles present to  Respirations unlabored  ABDOMEN: Soft, some abdominal tenderness, bowel sounds heard.  No guarding or rigidity  NEURO: Moving all extremities  EXTREMITIES: 1 + pedal edema.   SKIN: Warm, dry. No rash   PSYCHIATRY Cooperative       Medications:        Current Facility-Administered Medications   Medication Dose Route Frequency Provider Last Rate Last Admin    [Held by provider] aspirin EC tablet 81 mg  81 mg Oral Daily Nikki Oneill MD        [Held by provider] atorvastatin (LIPITOR) tablet 40 mg  40 mg Oral Daily Nikki Oneill MD        ceFEPIme (MAXIPIME) 2 g vial to attach to  mL bag for ADULTS or NS 50 mL bag for PEDS  2 g Intravenous Q12H Harris Sow MD   2 g at 07/14/25 0234    furosemide (LASIX) injection 20 mg  20 mg Intravenous Once Nikki Oneill MD        [Held by provider] losartan (COZAAR) tablet 50 mg  50 mg Oral Daily Nikki Oneill MD        metoprolol succinate ER (TOPROL XL) 24 hr tablet 25 mg  25 mg Oral Daily Nikki Oneill MD   25 mg at 07/14/25  1046    metroNIDAZOLE (FLAGYL) infusion 500 mg  500 mg Intravenous Q12H Harris Sow MD   500 mg at 07/14/25 0308    sodium chloride (PF) 0.9% PF flush 3 mL  3 mL Intracatheter Q8H STEPHANIE Harris Sow MD   3 mL at 07/13/25 2217     Current Facility-Administered Medications   Medication Dose Route Frequency Provider Last Rate Last Admin    acetaminophen (TYLENOL) tablet 650 mg  650 mg Oral Q4H PRN Harris Sow MD   650 mg at 07/14/25 0312    Or    acetaminophen (TYLENOL) Suppository 650 mg  650 mg Rectal Q4H PRN Harris Sow MD        calcium carbonate (TUMS) chewable tablet 1,000 mg  1,000 mg Oral 4x Daily PRN Harris Sow MD   1,000 mg at 07/14/25 0722    hydrALAZINE (APRESOLINE) injection 10 mg  10 mg Intravenous Q4H PRN Nikki Oneill MD        HYDROmorphone (DILAUDID) tablet 2 mg  2 mg Oral Q4H PRN Harris Sow MD   2 mg at 07/13/25 2223    HYDROmorphone (PF) (DILAUDID) injection 0.5 mg  0.5 mg Intravenous Q3H PRN Harris Sow MD        lidocaine (LMX4) cream   Topical Q1H PRN Harris Sow MD        lidocaine 1 % 0.1-1 mL  0.1-1 mL Other Q1H PRN Harris Sow MD        metoprolol (LOPRESSOR) injection 2.5 mg  2.5 mg Intravenous Q4H PRN Nikki Oneill MD        naloxone (NARCAN) injection 0.2 mg  0.2 mg Intravenous Q2 Min PRN Keyana Mayorga MD        Or    naloxone (NARCAN) injection 0.4 mg  0.4 mg Intravenous Q2 Min PRN Keyana Mayogra MD        Or    naloxone (NARCAN) injection 0.2 mg  0.2 mg Intramuscular Q2 Min PRN Keyana Mayorga MD        Or    naloxone (NARCAN) injection 0.4 mg  0.4 mg Intramuscular Q2 Min PRN Keyana Mayorga MD        ondansetron (ZOFRAN ODT) ODT tab 4 mg  4 mg Oral Q6H PRN Harris Sow MD        Or    ondansetron (ZOFRAN) injection 4 mg  4 mg Intravenous Q6H PRN Harris Sow MD        Patient is already receiving anticoagulation with heparin, enoxaparin (LOVENOX), warfarin (COUMADIN)   or other anticoagulant medication   Does not apply Continuous PRHarris Healy MD        prochlorperazine (COMPAZINE) injection 5 mg  5 mg Intravenous Q6H PRHarris Healy MD        Or    prochlorperazine (COMPAZINE) tablet 5 mg  5 mg Oral Q6H PRHarris Healy MD        senna-docusate (SENOKOT-S/PERICOLACE) 8.6-50 MG per tablet 1 tablet  1 tablet Oral BID PRHarris Healy MD        Or    senna-docusate (SENOKOT-S/PERICOLACE) 8.6-50 MG per tablet 2 tablet  2 tablet Oral BID Harris Rogers MD        sodium chloride (PF) 0.9% PF flush 3 mL  3 mL Intracatheter q1 min Harris Rogers MD                Data:      All new lab and imaging data was reviewed.

## 2025-07-15 ENCOUNTER — APPOINTMENT (OUTPATIENT)
Dept: CARDIOLOGY | Facility: CLINIC | Age: 80
DRG: 853 | End: 2025-07-15
Attending: HOSPITALIST
Payer: MEDICARE

## 2025-07-15 ENCOUNTER — APPOINTMENT (OUTPATIENT)
Dept: ULTRASOUND IMAGING | Facility: CLINIC | Age: 80
DRG: 853 | End: 2025-07-15
Attending: HOSPITALIST
Payer: MEDICARE

## 2025-07-15 ENCOUNTER — ANESTHESIA (OUTPATIENT)
Dept: SURGERY | Facility: CLINIC | Age: 80
End: 2025-07-15

## 2025-07-15 ENCOUNTER — ANESTHESIA EVENT (OUTPATIENT)
Dept: SURGERY | Facility: CLINIC | Age: 80
End: 2025-07-15

## 2025-07-15 ENCOUNTER — APPOINTMENT (OUTPATIENT)
Dept: PHYSICAL THERAPY | Facility: CLINIC | Age: 80
DRG: 853 | End: 2025-07-15
Attending: INTERNAL MEDICINE
Payer: MEDICARE

## 2025-07-15 ENCOUNTER — APPOINTMENT (OUTPATIENT)
Dept: GENERAL RADIOLOGY | Facility: CLINIC | Age: 80
DRG: 853 | End: 2025-07-15
Attending: NURSE PRACTITIONER
Payer: MEDICARE

## 2025-07-15 LAB
ALBUMIN SERPL BCG-MCNC: 2.7 G/DL (ref 3.5–5.2)
ALP SERPL-CCNC: 138 U/L (ref 40–150)
ALT SERPL W P-5'-P-CCNC: 24 U/L (ref 0–70)
ANION GAP SERPL CALCULATED.3IONS-SCNC: 12 MMOL/L (ref 7–15)
AST SERPL W P-5'-P-CCNC: 21 U/L (ref 0–45)
ATRIAL RATE - MUSE: 67 BPM
BASE EXCESS BLDV CALC-SCNC: 1.4 MMOL/L (ref -3–3)
BI-PLANE LVEF ECHO: NORMAL
BILIRUB SERPL-MCNC: 1.2 MG/DL
BUN SERPL-MCNC: 27.4 MG/DL (ref 8–23)
CALCIUM SERPL-MCNC: 9 MG/DL (ref 8.8–10.4)
CHLORIDE SERPL-SCNC: 101 MMOL/L (ref 98–107)
CREAT SERPL-MCNC: 1.06 MG/DL (ref 0.67–1.17)
CRP SERPL-MCNC: 317.16 MG/L
DIASTOLIC BLOOD PRESSURE - MUSE: NORMAL MMHG
EGFRCR SERPLBLD CKD-EPI 2021: 72 ML/MIN/1.73M2
ERYTHROCYTE [DISTWIDTH] IN BLOOD BY AUTOMATED COUNT: 14.5 % (ref 10–15)
GLUCOSE BLDC GLUCOMTR-MCNC: 148 MG/DL (ref 70–99)
GLUCOSE SERPL-MCNC: 139 MG/DL (ref 70–99)
HCO3 BLDV-SCNC: 26 MMOL/L (ref 21–28)
HCO3 SERPL-SCNC: 23 MMOL/L (ref 22–29)
HCT VFR BLD AUTO: 38.2 % (ref 40–53)
HGB BLD-MCNC: 13.2 G/DL (ref 13.3–17.7)
INTERPRETATION ECG - MUSE: NORMAL
LACTATE SERPL-SCNC: 2.1 MMOL/L (ref 0.7–2)
LACTATE SERPL-SCNC: 2.2 MMOL/L (ref 0.7–2)
LACTATE SERPL-SCNC: 2.4 MMOL/L (ref 0.7–2)
LACTATE SERPL-SCNC: 2.5 MMOL/L (ref 0.7–2)
MAGNESIUM SERPL-MCNC: 2.4 MG/DL (ref 1.7–2.3)
MCH RBC QN AUTO: 29.5 PG (ref 26.5–33)
MCHC RBC AUTO-ENTMCNC: 34.6 G/DL (ref 31.5–36.5)
MCV RBC AUTO: 86 FL (ref 78–100)
NT-PROBNP SERPL-MCNC: 1667 PG/ML (ref 0–852)
O2/TOTAL GAS SETTING VFR VENT: 25 %
OXYHGB MFR BLDV: 58 % (ref 70–75)
P AXIS - MUSE: 32 DEGREES
PCO2 BLDV: 40 MM HG (ref 40–50)
PH BLDV: 7.42 [PH] (ref 7.32–7.43)
PLATELET # BLD AUTO: 133 10E3/UL (ref 150–450)
PO2 BLDV: 32 MM HG (ref 25–47)
POTASSIUM SERPL-SCNC: 3.5 MMOL/L (ref 3.4–5.3)
PR INTERVAL - MUSE: 154 MS
PROT SERPL-MCNC: 6.4 G/DL (ref 6.4–8.3)
QRS DURATION - MUSE: 106 MS
QT - MUSE: 432 MS
QTC - MUSE: 456 MS
R AXIS - MUSE: -37 DEGREES
RBC # BLD AUTO: 4.47 10E6/UL (ref 4.4–5.9)
SAO2 % BLDV: 58.6 % (ref 70–75)
SODIUM SERPL-SCNC: 136 MMOL/L (ref 135–145)
SYSTOLIC BLOOD PRESSURE - MUSE: NORMAL MMHG
T AXIS - MUSE: 3 DEGREES
TROPONIN T SERPL HS-MCNC: 12 NG/L
TROPONIN T SERPL HS-MCNC: 13 NG/L
UFH PPP CHRO-ACNC: 0.25 IU/ML (ref ?–1.1)
UFH PPP CHRO-ACNC: 0.28 IU/ML (ref ?–1.1)
VENTRICULAR RATE- MUSE: 67 BPM
WBC # BLD AUTO: 16 10E3/UL (ref 4–11)

## 2025-07-15 PROCEDURE — 97110 THERAPEUTIC EXERCISES: CPT | Mod: GP

## 2025-07-15 PROCEDURE — 93010 ELECTROCARDIOGRAM REPORT: CPT | Mod: XP | Performed by: INTERNAL MEDICINE

## 2025-07-15 PROCEDURE — 250N000011 HC RX IP 250 OP 636: Performed by: NURSE PRACTITIONER

## 2025-07-15 PROCEDURE — 255N000002 HC RX 255 OP 636: Performed by: HOSPITALIST

## 2025-07-15 PROCEDURE — 82310 ASSAY OF CALCIUM: CPT | Performed by: NURSE PRACTITIONER

## 2025-07-15 PROCEDURE — 85520 HEPARIN ASSAY: CPT | Performed by: HOSPITALIST

## 2025-07-15 PROCEDURE — 83605 ASSAY OF LACTIC ACID: CPT | Performed by: NURSE PRACTITIONER

## 2025-07-15 PROCEDURE — 250N000011 HC RX IP 250 OP 636: Performed by: INTERNAL MEDICINE

## 2025-07-15 PROCEDURE — 36415 COLL VENOUS BLD VENIPUNCTURE: CPT | Performed by: NURSE PRACTITIONER

## 2025-07-15 PROCEDURE — 83605 ASSAY OF LACTIC ACID: CPT | Performed by: HOSPITALIST

## 2025-07-15 PROCEDURE — 120N000013 HC R&B IMCU

## 2025-07-15 PROCEDURE — 83880 ASSAY OF NATRIURETIC PEPTIDE: CPT | Performed by: NURSE PRACTITIONER

## 2025-07-15 PROCEDURE — 83735 ASSAY OF MAGNESIUM: CPT | Performed by: HOSPITALIST

## 2025-07-15 PROCEDURE — 86140 C-REACTIVE PROTEIN: CPT | Performed by: HOSPITALIST

## 2025-07-15 PROCEDURE — 85027 COMPLETE CBC AUTOMATED: CPT | Performed by: NURSE PRACTITIONER

## 2025-07-15 PROCEDURE — 84484 ASSAY OF TROPONIN QUANT: CPT | Performed by: NURSE PRACTITIONER

## 2025-07-15 PROCEDURE — 99232 SBSQ HOSP IP/OBS MODERATE 35: CPT | Performed by: PHYSICIAN ASSISTANT

## 2025-07-15 PROCEDURE — 93971 EXTREMITY STUDY: CPT | Mod: LT

## 2025-07-15 PROCEDURE — 93005 ELECTROCARDIOGRAM TRACING: CPT

## 2025-07-15 PROCEDURE — 999N000157 HC STATISTIC RCP TIME EA 10 MIN

## 2025-07-15 PROCEDURE — 99291 CRITICAL CARE FIRST HOUR: CPT | Performed by: NURSE PRACTITIONER

## 2025-07-15 PROCEDURE — 93306 TTE W/DOPPLER COMPLETE: CPT | Mod: 26 | Performed by: INTERNAL MEDICINE

## 2025-07-15 PROCEDURE — 36415 COLL VENOUS BLD VENIPUNCTURE: CPT | Performed by: HOSPITALIST

## 2025-07-15 PROCEDURE — 97116 GAIT TRAINING THERAPY: CPT | Mod: GP

## 2025-07-15 PROCEDURE — 250N000011 HC RX IP 250 OP 636: Performed by: HOSPITALIST

## 2025-07-15 PROCEDURE — 999N000208 ECHOCARDIOGRAM COMPLETE

## 2025-07-15 PROCEDURE — 99232 SBSQ HOSP IP/OBS MODERATE 35: CPT | Mod: 25 | Performed by: NURSE PRACTITIONER

## 2025-07-15 PROCEDURE — 87040 BLOOD CULTURE FOR BACTERIA: CPT | Performed by: NURSE PRACTITIONER

## 2025-07-15 PROCEDURE — 250N000013 HC RX MED GY IP 250 OP 250 PS 637: Performed by: NURSE PRACTITIONER

## 2025-07-15 PROCEDURE — 71045 X-RAY EXAM CHEST 1 VIEW: CPT

## 2025-07-15 PROCEDURE — 94660 CPAP INITIATION&MGMT: CPT

## 2025-07-15 PROCEDURE — 250N000013 HC RX MED GY IP 250 OP 250 PS 637: Performed by: HOSPITALIST

## 2025-07-15 PROCEDURE — 82805 BLOOD GASES W/O2 SATURATION: CPT | Performed by: NURSE PRACTITIONER

## 2025-07-15 PROCEDURE — 99232 SBSQ HOSP IP/OBS MODERATE 35: CPT | Performed by: HOSPITALIST

## 2025-07-15 RX ORDER — ONDANSETRON 2 MG/ML
4 INJECTION INTRAMUSCULAR; INTRAVENOUS EVERY 6 HOURS PRN
Status: CANCELLED | OUTPATIENT
Start: 2025-07-15

## 2025-07-15 RX ORDER — ONDANSETRON 4 MG/1
4 TABLET, ORALLY DISINTEGRATING ORAL EVERY 6 HOURS PRN
Status: CANCELLED | OUTPATIENT
Start: 2025-07-15

## 2025-07-15 RX ORDER — PANTOPRAZOLE SODIUM 40 MG/1
40 TABLET, DELAYED RELEASE ORAL
Status: DISCONTINUED | OUTPATIENT
Start: 2025-07-16 | End: 2025-07-16

## 2025-07-15 RX ORDER — FLUMAZENIL 0.1 MG/ML
0.2 INJECTION, SOLUTION INTRAVENOUS
Status: CANCELLED | OUTPATIENT
Start: 2025-07-15 | End: 2025-07-15

## 2025-07-15 RX ORDER — LIDOCAINE 40 MG/G
CREAM TOPICAL
Status: DISCONTINUED | OUTPATIENT
Start: 2025-07-15 | End: 2025-07-16

## 2025-07-15 RX ORDER — FUROSEMIDE 10 MG/ML
20 INJECTION INTRAMUSCULAR; INTRAVENOUS ONCE
Status: COMPLETED | OUTPATIENT
Start: 2025-07-15 | End: 2025-07-15

## 2025-07-15 RX ORDER — PROCHLORPERAZINE MALEATE 5 MG/1
5 TABLET ORAL EVERY 6 HOURS PRN
Status: CANCELLED | OUTPATIENT
Start: 2025-07-15

## 2025-07-15 RX ORDER — LIDOCAINE 40 MG/G
CREAM TOPICAL
Status: DISCONTINUED | OUTPATIENT
Start: 2025-07-15 | End: 2025-07-16 | Stop reason: HOSPADM

## 2025-07-15 RX ADMIN — PERFLUTREN 10 ML (DILUTED): 6.52 INJECTION, SUSPENSION INTRAVENOUS at 11:09

## 2025-07-15 RX ADMIN — METRONIDAZOLE 500 MG: 500 INJECTION, SOLUTION INTRAVENOUS at 02:04

## 2025-07-15 RX ADMIN — AMIODARONE HYDROCHLORIDE 0.5 MG/MIN: 1.8 INJECTION, SOLUTION INTRAVENOUS at 19:43

## 2025-07-15 RX ADMIN — FUROSEMIDE 20 MG: 10 INJECTION, SOLUTION INTRAMUSCULAR; INTRAVENOUS at 14:16

## 2025-07-15 RX ADMIN — HEPARIN SODIUM 1650 UNITS/HR: 10000 INJECTION, SOLUTION INTRAVENOUS at 02:01

## 2025-07-15 RX ADMIN — CEFEPIME 2 G: 2 INJECTION, POWDER, FOR SOLUTION INTRAVENOUS at 03:04

## 2025-07-15 RX ADMIN — HEPARIN SODIUM 1800 UNITS/HR: 10000 INJECTION, SOLUTION INTRAVENOUS at 20:05

## 2025-07-15 RX ADMIN — METRONIDAZOLE 500 MG: 500 INJECTION, SOLUTION INTRAVENOUS at 16:21

## 2025-07-15 RX ADMIN — CALCIUM CARBONATE (ANTACID) CHEW TAB 500 MG 1000 MG: 500 CHEW TAB at 17:25

## 2025-07-15 RX ADMIN — METOPROLOL SUCCINATE 12.5 MG: 25 TABLET, EXTENDED RELEASE ORAL at 08:17

## 2025-07-15 RX ADMIN — CEFEPIME 2 G: 2 INJECTION, POWDER, FOR SOLUTION INTRAVENOUS at 14:16

## 2025-07-15 ASSESSMENT — ACTIVITIES OF DAILY LIVING (ADL)
ADLS_ACUITY_SCORE: 28
ADLS_ACUITY_SCORE: 38
ADLS_ACUITY_SCORE: 38
ADLS_ACUITY_SCORE: 28
ADLS_ACUITY_SCORE: 28
ADLS_ACUITY_SCORE: 38
ADLS_ACUITY_SCORE: 28
ADLS_ACUITY_SCORE: 28
ADLS_ACUITY_SCORE: 38
ADLS_ACUITY_SCORE: 28
ADLS_ACUITY_SCORE: 38
ADLS_ACUITY_SCORE: 38
ADLS_ACUITY_SCORE: 28
ADLS_ACUITY_SCORE: 38
ADLS_ACUITY_SCORE: 28
ADLS_ACUITY_SCORE: 38
ADLS_ACUITY_SCORE: 38

## 2025-07-15 NOTE — PROGRESS NOTES
[facility]astroenterology Note    Patient was scheduled for ERCP today .  I was contacted by hospitalist that patient currently not stable from a respiratory standpoint.    Plan:  - will tentatively reschedule EUS/ERCP for Wednesday 7/16/25 (approx 2pm)  - NPO at midnight  - if Heparin cannot be held for ERCP a stent could still be placed with sphincterotomy and removal of stones at a later date, but if heparin can be held ideally hold for 6 hours prior to procedure (8am hold tomorrow), will defer on heparin hold to internal medicine    Julia Bob MD  [facility]  [phone] - cell

## 2025-07-15 NOTE — PROGRESS NOTES
[facility]  Hospitalist Progress Note   07/15/2025          Assessment and Plan:       Kristofer Montana is a 80 year old male with history of CAD with remote PCI in 2005, heart failure with reduced EF of 40%, hypertension, hyperlipidemia, ASD, stroke 2013 (residual difficulty only with spelling words), diverticulosis, thrombocytopenia [facility] days of fever and decreased appetite, right upper quadrant abdominal pain.       [facility] patient was hypotensive but improved to SBP's in the 90s after 2 L of IV fluids.  WBC 17, lactic acid 5.5 trended down to 3 after IV fluid bolus. Creatinine of 2 (baseline 1), negative troponin, , CXR showed likely bibasilar atelectasis and low lung volumes.   *CT C/A/P without contrast due to FARNAZ showed choledocholithiasis and mild bile duct dilation -diagnostic   *Abdominal ultrasound showed evidence of choledocholithiasis with biliary dilatation and CBD stones better shown on CT, mild gallbladder wall thickening favoring reactive changes related to biliary obstruction.  *Ultrasound of the left lower extremity showed an occlusive DVT in the left mid and distal femoral vein, nonocclusive DVT left popliteal vein.  RLE ultrasound negative for thrombosis.    --> Patient was started on heparin drip for DVT.  CT PE protocol was deferred due to FARNAZ.  He was started on cefepime and Flagyl given Dilaudid for abdominal pain.    --> Surgery consulted there and recommended lap andrew but would defer this in favor of PERC Andrew tube given need for anticoagulation as aforementioned.    --> [facility] ED doc discussed with GI who recommended transfer to advanced endoscopy/ERCP capable facility patient has been accepted here at [facility].       *Upon arrival at [facility] nontoxic in appearance, afebrile, and hemodynamically stable with SBP in 110s.  Was on heparin drip and empirical antibiotics.  Mid morning on 7/14 patient in A-fib with RVR despite intravenous  metoprolol.  Started on intravenous amiodarone drip and transferred to [facility].    -- On 7/15 RRT for acute hypoxic respiratory failure.  Initiated on BiPAP support     Choledocholithiasis with biliary dilatation and CBD stones.  Possible acute cholecystitis.  E. coli bacteremia.  Sepsis.  Lactic acidosis improving.  Leukocytosis  --Presentation/workup as above.  No nausea or vomiting.  Has been NPO.  -WBC 13.7, .23.  --7/15 patient afebrile, respiratory failure.  --Discussed with [facility] team, blood cultures from [facility] ED growing E. coli  Continue empiric antibiotics with IV cefepime, IV Flagyl.  [facility] following, initially planned for ERCP on 7/15, postponed to 7/16 given respiratory failure  Interventional radiology following- ? per andrew tube   General Surgery following -?  Cholecystectomy.  Appreciate multidisciplinary team input  Continue IV fluids-monitor respiratory status closely.    Repeat blood cultures 7/15  Lactic acid levels closely.  Monitor liver enzymes, electrolytes  WBC count, CRP, fever curve     Acute LLE DVT  *denies any symptoms of legs or SOB, no predisposing history reported. Denies history of clotting  *[facility] at [facility] showed occlusive DVT in the left mid and distal femoral vein, nonocclusive DVT left popliteal vein.  *RLE ultrasound negative for thrombosis.    *Patient was started on heparin drip for DVT.  CT PE protocol was deferred due to FARANZ.  -On 7/14 stable on RA, mildly tachycardic but RR normal, BP 110s/70s.  On 7/15 patient with acute respiratory failure, left leg slightly more swollen.  Ultrasound left lower extremity ordered.  Echocardiogram pending  CT chest to rule out PE.    Continue heparin drip.  IR consult for possible thrombectomy.  Pharmacy liaison input for DOAC coverage appreciated    Atrial fibrillation with RVR.  History of chronic heart failure with reduced EF.  History of coronary artery disease stented 2005.  Hypertension.  On 7/14 AM patient in A-fib with RVR.  --Initiated on  "intravenous amiodarone drip on 7/14, to continue.  Deceived 20 mg IV Lasix x 1 on 7/14, administer intravenous Lasix x 1 on 7/15.  - Telemetry monitoring.  Echocardiogram ordered.  Continue intravenous amiodarone drip  Continue intravenous heparin drip for anticoagulation.  Continue PTA metoprolol.  Hold PTA aspirin until okayed by GI/ surgery, planned procedure  Hold PTA losartan  Hold PTA Lipitor in the setting of acute illness  Cardiology following, appreciate input  Strict input intake output monitoring, daily weights    Acute hypoxic respiratory failure multifactorial  -- Heparin drip on hold for ERCP, will resume.  Continue amiodarone drip.  Intravenous Lasix ordered.  Continue BiPAP support, wean off as able to.  Monitor respiratory status closely.  Telemetry monitoring.  Trend troponin.  Strict intake output monitoring, daily weights  Echocardiogram pending.  CT Chest to rule out PE.    Acute kidney injury improving.  *Creat reportedly 2 at [facility], upon arrival at [facility] creat now returned and 1.3.  Cautious with IV fluids given history of heart failure with reduced EF.  Avoid nephrotoxic drugs, monitor renal function in AM.  Hold PTA losartan.    Hypomagnesemia.  Magnesium replacement ordered.    Chronic thrombocytopenia.  No active bleeding.  Monitor platelets closely.    History of diverticulosis.  Noted    Physical deconditioning from medical illness.  Rehab team evaluation    Clinically Significant Risk Factors             # Hypomagnesemia: Lowest Mg = 1.5 mg/dL in last 2 days, will replace as needed   # Hypoalbuminemia: Lowest albumin = 2.8 g/dL at 7/14/2025  5:36 AM, will monitor as appropriate   # Thrombocytopenia: Lowest platelets = 116 in last 2 days, will monitor for bleeding              # Overweight: Estimated body mass index is 27.46 kg/m  as calculated from the following:    Height as of this encounter: 1.778 m (5' 10\").    Weight as of this encounter: 86.8 kg (191 lb 6.4 oz)., PRESENT ON " ADMISSION          Orders Placed This Encounter      NPO for Procedure/Surgery per Anesthesia Guidelines Except for: Meds; Clear liquids before procedure/surgery: ADULT (Age GREATER than or Equal to 18 years) - Clear liquids 2 hours before procedure/surgery      DVT Prophylaxis: SCDs, on intravenous heparin drip.  Code Status: Full Code  Disposition: Expected discharge in greater than 2 days pending clinical improvement.  Continue IMC status.    Medically Ready for Discharge: Anticipated in 2-4 Days     Discussed with patient, bedside RN, housestaff, gastroenterologist, care team.  Attempted calling patient's brother number listed voicemail 7/15  Total time greater than 51 minutes.  More than 70% of time spent in direct patient care, care coordination, patient counseling, and formalizing plan of care.        Nikki Oneill MD        Interval History:        Patient lying in bed.  This morning in hypoxic respiratory failure.  Denies any chest pain or palpitations.  Denies any headache or dizziness  Has been NPO.  On intravenous heparin drip.  On amiodarone drip.  Telemetry converted to sinus rhythm       Physical Exam:        Physical Exam   Temp:  [98  F (36.7  C)-99.3  F (37.4  C)] 98  F (36.7  C)  Pulse:  [] 67  Resp:  [16-20] 18  BP: ()/() 122/82  SpO2:  [92 %-95 %] 93 %    Intake/Output Summary (Last 24 hours) at 7/14/2025 1219  Last data filed at 7/14/2025 0308  Gross per 24 hour   Intake 160 ml   Output --   Net 160 ml       Admission Weight: 83.9 kg (184 lb 14.4 oz)  Current Weight: 86.4 kg (190 lb 7.6 oz)    PHYSICAL EXAM  GENERAL: Patient is in no distress. Alert and oriented.  HEENT: Oropharynx pink  HEART: regular rate and rhythm. S1S2.   LUNGS: Bilateral decreased breath sounds, crackles present to  Respirations unlabored  ABDOMEN: Soft, some abdominal tenderness, bowel sounds heard.  No guarding or rigidity  NEURO: Moving all extremities  EXTREMITIES: 1 + pedal edema right leg, 2+  edema left leg.  Warmth.  SKIN: Warm, dry.   PSYCHIATRY Cooperative       Medications:        Current Facility-Administered Medications   Medication Dose Route Frequency Provider Last Rate Last Admin    [Held by provider] aspirin EC tablet 81 mg  81 mg Oral Daily Nikki Oneill MD        [Held by provider] atorvastatin (LIPITOR) tablet 40 mg  40 mg Oral Daily Nikki Oneill MD        ceFEPIme (MAXIPIME) 2 g vial to attach to  mL bag for ADULTS or NS 50 mL bag for PEDS  2 g Intravenous Q12H Harris Sow MD   2 g at 07/15/25 0304    [Held by provider] losartan (COZAAR) tablet 50 mg  50 mg Oral Daily Nikki Oneill MD        metoprolol succinate ER (TOPROL-XL) 24 hr half-tab 12.5 mg  12.5 mg Oral Daily Roselia Luciano APRN CNP   12.5 mg at 07/15/25 0817    metroNIDAZOLE (FLAGYL) infusion 500 mg  500 mg Intravenous Q12H Harris Sow MD   500 mg at 07/15/25 0204    sodium chloride (PF) 0.9% PF flush 3 mL  3 mL Intracatheter Q8H Emma Payne PA-C        sodium chloride (PF) 0.9% PF flush 3 mL  3 mL Intracatheter Q8H Aleyda Pope MD        sodium chloride (PF) 0.9% PF flush 3 mL  3 mL Intracatheter Q8H Nikki Muniz MD   3 mL at 07/14/25 1537    sodium chloride (PF) 0.9% PF flush 3 mL  3 mL Intracatheter Q8H [facility] Harris Sow MD   3 mL at 07/13/25 2217     Current Facility-Administered Medications   Medication Dose Route Frequency Provider Last Rate Last Admin    acetaminophen (TYLENOL) tablet 650 mg  650 mg Oral Q4H PRN Harris Sow MD   650 mg at 07/14/25 2246    Or    acetaminophen (TYLENOL) Suppository 650 mg  650 mg Rectal Q4H PRN Harris Sow MD        calcium carbonate (TUMS) chewable tablet 1,000 mg  1,000 mg Oral 4x Daily PRN Harris Sow MD   1,000 mg at 07/14/25 2239    hydrALAZINE (APRESOLINE) injection 10 mg  10 mg Intravenous Q4H PRN Nikki Oneill MD        HYDROmorphone (DILAUDID) tablet 2 mg   2 mg Oral Q4H PRN Harris Sow MD   2 mg at 07/13/25 2223    HYDROmorphone (PF) (DILAUDID) injection 0.5 mg  0.5 mg Intravenous Q3H PRN Harris Sow MD        lidocaine (LMX4) cream   Topical Q1H PRN Emma Urena PA-C        lidocaine (LMX4) cream   Topical Q1H PRN Aleyda Lee MD        lidocaine (LMX4) cream   Topical Q1H PRN Nikki Oneill MD        lidocaine (LMX4) cream   Topical Q1H PRN Harris Sow MD        lidocaine 1 % 0.1-1 mL  0.1-1 mL Other Q1H PRN Emma Urena PA-C        lidocaine 1 % 0.1-1 mL  0.1-1 mL Other Q1H PRN Aleyda Lee MD        lidocaine 1 % 0.1-1 mL  0.1-1 mL Other Q1H PRN Nikki Oneill MD        lidocaine 1 % 0.1-1 mL  0.1-1 mL Other Q1H PRN Harris Sow MD        metoprolol (LOPRESSOR) injection 2.5 mg  2.5 mg Intravenous Q6H PRN Roselia Luciano APRN CNP        naloxone (NARCAN) injection 0.2 mg  0.2 mg Intravenous Q2 Min PRN Keyana Mayorga MD        Or    naloxone (NARCAN) injection 0.4 mg  0.4 mg Intravenous Q2 Min PRN Keyana Mayorga MD        Or    naloxone (NARCAN) injection 0.2 mg  0.2 mg Intramuscular Q2 Min PRN Keyana Mayorga MD        Or    naloxone (NARCAN) injection 0.4 mg  0.4 mg Intramuscular Q2 Min PRN Keyana Mayorga MD        ondansetron (ZOFRAN ODT) ODT tab 4 mg  4 mg Oral Q6H PRN Harris Sow MD        Or    ondansetron (ZOFRAN) injection 4 mg  4 mg Intravenous Q6H PRN Harris Sow MD        Patient is already receiving anticoagulation with heparin, enoxaparin (LOVENOX), warfarin (COUMADIN)  or other anticoagulant medication   Does not apply Continuous PRN Harris Sow MD        prochlorperazine (COMPAZINE) injection 5 mg  5 mg Intravenous Q6H PRN Harris Sow MD        Or    prochlorperazine (COMPAZINE) tablet 5 mg  5 mg Oral Q6H PRN Harris Sow MD        senna-docusate (SENOKOT-S/PERICOLACE) 8.6-50 MG per tablet 1 tablet  1 tablet Oral  BID PRN Harris Sow MD        Or    senna-docusate (SENOKOT-S/PERICOLACE) 8.6-50 MG per tablet 2 tablet  2 tablet Oral BID PRN Harris Sow MD        sodium chloride (PF) 0.9% PF flush 3 mL  3 mL Intracatheter q1 min prn Emma Urena PA-C        sodium chloride (PF) 0.9% PF flush 3 mL  3 mL Intracatheter q1 min prn Aleyda Lee MD        sodium chloride (PF) 0.9% PF flush 3 mL  3 mL Intracatheter q1 min prn Nikki Oneill MD        sodium chloride (PF) 0.9% PF flush 3 mL  3 mL Intracatheter q1 min prn Harris Sow MD                Data:      All new lab and imaging data was reviewed.

## 2025-07-15 NOTE — CONSULTS
IR consult request for a gallbladder drain placement and possible DVT thrombectomy.     Kristofer Montana is a 80 year old male with a h/o CAD, CHF, who [facility] with RUQ pain and a couple days of anorexia. The following imaging was done and choledocolithiasis was noted and it was felt he was not a good surgical candidate so ERCP was requested and he was transferred to .     Kristofer was scheduled for ERCP and had some respiratory difficulty and the procedure was canceled. IR contacted by Radha DISLA NP who managed the RRT today for possible gallbladder drain placement. Also request that patient be reviewed for a DVT thrombectomy.     Chart reviewed and noted that GI is going to attempted the ERCP tomorrow, so will hold on drain placement at this time. Gallbladder drain placement requires the drain to be in place for a minimum of 6 weeks prior to removing it. If gallbladder drain was placed IR would require a blood thinner hold and patient would need sedation also.     Reviewed imaging reports noted in H&P noted from 7/13 with IR Dr Collier. (There is no [facility] from [facility] or reports anywhere in the chart) The DVT doesn't meet the criteria for treatment based on what is mentioned. Would need DVT to extend into the common iliac or into the IVC. The clot per report is mid femoral. Noted that another LE US has been ordered. The patient would need to lie on his stomach for a procedure which would be difficult with respiratory difficulties. Recommend blood thinners.     Also I have requested the  to get [facility] which IR requires to do evaluate for procedures.     *CT C/A/P without contrast due to FARNAZ showed choledocholithiasis and mild bile duct dilation -diagnostic (MRCP not felt required).    *ERCP/intervention with GI consultation recommended; small gallbladder neck stone with inflammatory changes and minor peritoneal fluid in the RUQ showing possible superimposed  cholecystitis or ascending inflammation from the choledocholithiasis -surgery follow-up recommended.    *Abdominal ultrasound showed evidence of choledocholithiasis with biliary dilatation and CBD stones better shown on CT, mild gallbladder wall thickening favoring reactive changes related to biliary obstruction.  *Ultrasound of the left lower extremity showed an occlusive DVT in the left mid and distal femoral vein, nonocclusive DVT left popliteal vein.  RLE ultrasound negative for thrombosis.      Total time: 20 minutes     ThanksMalina [facility] Interventional Radiology CNP ([phone]) (phone [phone])

## 2025-07-15 NOTE — PROGRESS NOTES
"Surgery    No complaints  +bowel function     Gen:  Awake, Alert  /65   Pulse 58   Temp 97.4  F (36.3  C) (Axillary)   Resp 24   Ht 1.778 m (5' 10\")   Wt 86.8 kg (191 lb 6.4 oz)   SpO2 96%   BMI 27.46 kg/m    Resp - On bipap    Abdomen - exam deferred (patient on commode)    BNP 1600  Wbc 16  Hgb 13.2    A/P Kristofer Montana is a 80 year old man with multiple ongoing and acute medical issues who [facility]. [facility] recommended [facility] a cholecystostomy tube due to active anticoagulation for acute DVT. Developed AF with RVR in the interim.  ERCP scheduled for today deferred to tomorrow due to respiratory decompensation.    - Decision to perform laparoscopic cholecystectomy vs cholecystostomy tube pending medical optimization and clearance as well as holding therapeutic anticoagulation for at least a day.   - Surgery team will follow along.     Pawel Rush PA-C  Office: [phone]  Pager: [phone]    "

## 2025-07-15 NOTE — PROGRESS NOTES
[facility] Progress Note  Date of Service: 07/15/2025    [facility]     Kristofer Montana is a very pleasant 80 year old male  with past medical history of HTN, HLD, cardiomyopathy HFrEF LVEF 40%, CAD with remote PCI in 2005, stroke (2013), who was [facility] with [facility] cholecystitis, possible acute cholangitis, acute left leg DVT, sepsis. Had a couple days of not eating well, fever and RUQ pain. Yesterday morning around 1145 he went into AF with RVR in the 160's, prior to this in sinus rhythm. Blood pressure low 100's. Cardiology consulted for further management. Started on IV amiodarone with conversion to sinus rhythm.      Interim History: Converted to sinus rhythm.    Subjective: Resting in bed. Still with abdominal discomfort. No further palpitations or shortness of breath    Assessment:  AF RVR, new onset 7/14/25 in setting of acute illness, symptomatic with palpitations/dyspnea, converted to sinus on amiodarone protocol    History of ischemic cardiomyopathy with chronic HFrEF, volume status stable, monitor closely post procedure given degree of volume resuscitation   CAD with remote LAD PCI in 2005, stable no chest pain on asa/statin   Prior CVA (2013)  HTN, controlled - low normal. Hold PTA losartan for now until post procedure  New DVT left leg - started on IV heparin - defer to hospital medicine on hold times in perioperative evaluation  Choledocholithiasis - GI consulted with plan for ERCP in the coming day  Lactic acidosis, possible sepsis   FARNAZ - improving/creat normalized   Hypomagnesia - replaced     Plan:   Given difficult rate control in setting of acute illness recommended short term amiodarone. Successful conversion to sinus rhythm. Continue IV amiodarone 0.5mg/min for now until post procedure  Once able to take orals would recommend stopping IV amiodarone and start oral amiodarone 400mg twice a day for 10 days then  reduce to 200mg once daily for 30 days then stop.  Can follow up with [facility] team [facility] to monitor for any Afib recurrence thereafter.  Baseline TSH, LFTs normal.   Continues on IV heparin for anticoagulation at present with procedure and possible surgical plans. Note has a new left leg DVT so defer to hospital medicine for further recommendations on safety of hold time if cholecystectomy planned.   Will need DOAC started once okay per GI. Is affordable if prescribed through the [facility]  Continue metoprolol XL okay to resume back to 25mg daily if heart rates remain stable  Resume losartan post procedure   Echocardiogram pending - will review once completed  Has close [facility] up with the [facility] upon [facility]     Cardiology will sign-off. Thank you for allowing us to participate in the care of this patient. Please do not hesitate to contact us if we can be of further assistance.    Plan discussed with Dr. Hernadez and bedside RN.    JERI Choi [facility]  Page via [employer]   __________________________________________________________________________  Physical Exam   Temp: 98  F (36.7  C) Temp src: Oral BP: 122/82 Pulse: 67   Resp: 18 SpO2: 93 % O2 Device: None (Room air)    Vitals:    07/13/25 2110 07/14/25 0130 07/15/25 0537   Weight: 83.9 kg (184 lb 14.4 oz) 86.4 kg (190 lb 7.6 oz) 86.8 kg (191 lb 6.4 oz)       GENERAL:  The patient is in no apparent distress.   PULMONARY:  There is a normal respiratory effort. Clear lungs to auscultation bilaterally.   CARDIOVASCULAR:  RRR, normal S1 S2, no m/r/g.  GI:  Tender abdomen   EXTREMITIES:  Warm/dry, no edema   VASCULAR: 2+ Pulses bilaterally in upper and lower extremities.    Medications   Current Facility-Administered Medications   Medication Dose Route Frequency Provider Last Rate Last Admin    amiodarone (NEXTERONE) 1.8 mg/mL in dextrose 5% 200 mL ADULT STANDARD infusion  0.5 mg/min Intravenous Continuous  Roselia Luciano APRN CNP 16.7 mL/hr at 07/15/25 0905 0.5 mg/min at 07/15/25 0905    heparin 25,000 units in 0.45% NaCl 250 mL ANTICOAGULANT infusion  0-5,000 Units/hr Intravenous Continuous Keyana Mayorga MD   Stopped at 07/15/25 0820    Patient is already receiving anticoagulation with heparin, enoxaparin (LOVENOX), warfarin (COUMADIN)  or other anticoagulant medication   Does not apply Continuous PRN Harris Sow MD         Current Facility-Administered Medications   Medication Dose Route Frequency Provider Last Rate Last Admin    [Held by provider] aspirin EC tablet 81 mg  81 mg Oral Daily Nikki Oneill MD        [Held by provider] atorvastatin (LIPITOR) tablet 40 mg  40 mg Oral Daily Nikki Oneill MD        ceFEPIme (MAXIPIME) 2 g vial to attach to  mL bag for ADULTS or NS 50 mL bag for PEDS  2 g Intravenous Q12H Harris Sow MD   2 g at 07/15/25 0304    [Held by provider] losartan (COZAAR) tablet 50 mg  50 mg Oral Daily Nikki Oneill MD        metoprolol succinate ER (TOPROL-XL) 24 hr half-tab 12.5 mg  12.5 mg Oral Daily Roselia Luciano APRN CNP   12.5 mg at 07/15/25 0817    metroNIDAZOLE (FLAGYL) infusion 500 mg  500 mg Intravenous Q12H Harris Sow MD   500 mg at 07/15/25 0204    sodium chloride (PF) 0.9% PF flush 3 mL  3 mL Intracatheter Q8H Emma Payne PA-C        sodium chloride (PF) 0.9% PF flush 3 mL  3 mL Intracatheter Q8H [facility] Aleyda Lee MD        sodium chloride (PF) 0.9% PF flush 3 mL  3 mL Intracatheter Q8H Nikki Muniz MD   3 mL at 07/14/25 1537    sodium chloride (PF) 0.9% PF flush 3 mL  3 mL Intracatheter Q8H [facility] Harris Sow MD   3 mL at 07/13/25 2217       Data   Most Recent 3 Creatinines:  Recent Labs   Lab Test 07/14/25  0536 07/13/25  2228   CR 1.12 1.32*     Most Recent 3 Hemoglobins:  Recent Labs   Lab Test 07/14/25  1215 07/13/25  2228   HGB 13.4 13.0*     Most Recent TSH and T4:  Recent  Labs   Lab Test 07/14/25  1215   TSH 3.74

## 2025-07-15 NOTE — CODE/RAPID RESPONSE
[facility]    RRT Note  7/15/2025   Time Called: 10:22 AM    RRT called for: Dyspnea    HPI:    Kristofer Montana is a 80 year old male w/ PMH of HTN, HLD, cardiomyopathy HFrEF LVEF 40%, CAD with remote PCI in 2005, stroke (2013)  who [facility] cholecystitis, possible acute cholangitis/ choledocholithiasis w/ sepsis including e coli bacteremia w/ end organ damage of FARNAZ on cefepime and Flagyl.  Also was diagnosed with, acute left leg occlusive DVT in the mid and distal femoral vein, nonocclusive DVT left popliteal vein started on heparin infusion.  He is pending ERCP for stone mgmt.  Course c/b afib w/ RVR started on amiodarone drip and p.o. metoprolol.      RRT activated for dyspnea, onset with turning    Patient was turning to reposition for the echocardiogram where after he developed dyspnea.  He denies any chest pain endorsed dyspnea.  Staff report increasing O2 requirements from room air to 2 L.  Heparin drip has been off since 9:05 AM in anticipation ERCP at 1 PM 07/15/25.  Patient reports he is passing gas and moving bowels.  HR -86, BP 88/59, repeat SBP's in the 120s, 94% SpO2 on 2 L.  He is quite tachypneic with abnormal breathing pattern, 2 inhalations and 1 exhalation, appears he cannot take a full deep breath.  He has mottled knees skin is clammy though not yet diaphoretic he is ill-appearing.    I personally reviewed the radiographs of the chest showing poor inspiration, mild pulm vasc congestion and some dilated loops of bowel    Assessment & Plan   Acute resp failure w/ increased WOB  Differential diagnosis:  Considered PE since his heparin has been held and he recently ambulated.  Cannot rule out cardiogenic shock, obstructive shock that was precipitated by septic shock and or occurring in and of themselves.  Acidemia, mucous plugging, pneumothorax all considered.  Also considered restrictive process with gastric distention on  exam    INTERVENTIONS:  -stat pcxr, see above  -stat initiation of NIPPV 12/6, 0.25 FiO2  -stat CMP, CBC, LA, proBNP, troponin, bld cx  -stat EKG  -consult IR re: possible urgent perc cholecystostomy tube in lieu of ERCP w/ new acute resp failure, concern pt would potentially require intubation gela-procedure if ERCP pursued.  I have personally discussed mgmt w/ the IR ANY who will attempt to obtain imaging from [facility] reviewed the case and consider both cholecystostomy tube and possible LLE thrombectomy  -EKG  -gluc 148mg/dL  -change to IMC status  - If no intervention to be completed today resume anticoagulation ASAP    Last 24H PRN:     acetaminophen (TYLENOL) tablet 650 mg, 650 mg at 07/14/25 2246 **OR** acetaminophen (TYLENOL) Suppository 650 mg    calcium carbonate (TUMS) chewable tablet 1,000 mg, 1,000 mg at 07/14/25 2239    Working diagnosis: Acute respiratory failure likely endorgan damage from sepsis  from biliary source.  Cannot rule out that there is not a concurrent either cardiogenic or obstructive component to his shock    At the end of the RRT labs have been obtained respiratory status is subjectively improved on NIPPV.  TTE was completed and I spoken with the interventional radiology ANY.    disposition status    Discussed with and defer further cares to hospitalist attending physician Dr. Oneill who rounded on the patient during the RRT and with whom I personally coordinated care.  Dr. Oneill coordinated w/ GI team.  Will resume heparin gtt asap.     Code Status: Full Code    Physical Exam   Vital Signs with Ranges:  Temp:  [97.4  F (36.3  C)-98.7  F (37.1  C)] 97.4  F (36.3  C)  Pulse:  [] 58  Resp:  [16-33] 24  BP: ()/(59-89) 102/59  FiO2 (%):  [25 %] 25 %  SpO2:  [92 %-97 %] 97 %  I/O last 3 completed shifts:  In: 250 [P.O.:250]  Out: 850 [Urine:850]      Constitutional: vs as above and/or per EMR  General:  adult pt lying in bed with acute distress   GCS:   Motor 6=Obeys commands    Verbal 5=Oriented   Eye Opening 4=Spontaneous   Total: 15     Neuro: +follows commands wiggle toes and show 2 fingers bilat, face symmetric, tongue midline, speech fluent  Neck: supple  CV S1S2 NSR, normotensive  resp: Tachypneic, few rales bilateral lower lobes  gi:normoactive bowel sounds, soft, nontender, disteded tympanitic on percussion  Ext: Bilateral LE edema, bilateral knee mottling  Skin: no rashes on exposed skin  Musculoskeletal no bony joint deformities      Data     EKG:  Interpreted by JERI Zimmer CNP  Time reviewed: 129pm  Symptoms at time of EKG: dyspnea   Rhythm: normal sinus   Rate: Normal  Axis: Normal  Ectopy: none  Conduction: normal  ST Segments/ T Waves: No acute ischemic changes  Q Waves: none  Comparison to prior: Compared with 7/14/2025 when patient was in atrial fibrillation with RVR now in normal sinus rhythm without acute ischemic changes    Clinical Impression: Normal sinus rhythm normal axis normal intervals, no acute ischemic changes only difference compared with prior is now in sinus compared with A-fib RVR    VBG: reviewed In EMR    Troponin:    13    IMAGING: (X-ray/CT/MRI)   Recent Results (from the past 24 hours)   XR Chest Port 1 View    Narrative    EXAM: XR CHEST PORT 1 VIEW  LOCATION: [facility]  DATE: 7/15/2025    INDICATION: SOB  COMPARISON: Chest x-ray 06/30/2025      Impression    IMPRESSION: Lower lung volumes compared to previous chest x-ray. Increased interstitial opacities, possibly bronchovascular crowding from low lung volumes but could be due to edema or pneumonitis. Increased bibasilar opacities, atelectasis versus   pneumonitis. Possible trace pleural effusions or pleural thickening. No pneumothorax.   Echocardiogram Complete   Result Value    Biplane LVEF 45%    Narrative    [id number]  [id number]  [id number]  [id number]^DESI^JUN^     [facility]  Echocardiography Laboratory  [address] [place] [address]  [id number]     Name: JENNY JOLLY  MRN: [id number]  : 1945  Study Date: 07/15/2025 10:18 AM  Age: 80 yrs  Gender: Male  Patient Location: [facility]  Reason For Study: Atrial Fibrillation  Ordering Physician: JUN WEEKS  Referring Physician: WES HOLMAN  Performed By: Lonnie Giraldo     BSA: 2.0 m2  Height: 70 in  Weight: 190 lb  HR: 56  BP: 103/80 mmHg  ______________________________________________________________________________  Procedure  Echocardiogram with two-dimensional, color and spectral Doppler. Definity (NDC  #06061-225) given intravenously.  ______________________________________________________________________________  Interpretation Summary     1. The left ventricle is normal in size. Biplane LVEF is 45%. Anteroseptal and  apical hypokinesis.  2. The right ventricle is normal in structure, function and size.  3. No valve disease.     Outside echo  reported EF 35% with anteroseptal hypokinesis.  ______________________________________________________________________________  Left Ventricle  The left ventricle is normal in size. There is normal left ventricular wall  thickness. Biplane LVEF is 45%. Grade I or early diastolic dysfunction.  Anteroseptal and apical hypokinesis.     Right Ventricle  The right ventricle is normal in structure, function and size.     Atria  The left atrium is mildly dilated. Right atrial size is normal. There is no  atrial shunt seen.     Mitral Valve  There is mild (1+) mitral regurgitation.     Tricuspid Valve  No tricuspid regurgitation.     Aortic Valve  There is mild (1+) aortic regurgitation.     Pulmonic Valve  The pulmonic valve is normal in structure and function.     Vessels  Normal ascending, transverse (arch), and descending aorta. Inferior vena cava  not well visualized for estimation of right atrial pressure.     Pericardium  There is no pericardial effusion.     Rhythm  Sinus rhythm was  noted.  ______________________________________________________________________________  MMode/2D Measurements & Calculations  IVSd: 1.2 cm     LVIDd: 4.5 cm  LVIDs: 4.0 cm  LVPWd: 1.1 cm  FS: 11.2 %  LV mass(C)d: 192.0 grams  LV mass(C)dI: 94.0 grams/m2  Ao root diam: 3.9 cm  asc Aorta Diam: 3.4 cm  LVOT diam: 2.1 cm  LVOT area: 3.6 cm2  Ao root diam index Ht(cm/m): 2.2  Ao root diam index BSA (cm/m2): 1.9  Asc Ao diam index BSA (cm/m2): 1.7  Asc Ao diam index Ht(cm/m): 1.9  EF Biplane: 45.6 %  LA Volume (BP): 76.1 ml     LA Volume Index (BP): 37.3 ml/m2  RV Base: 3.3 cm  RWT: 0.47  TAPSE: 2.0 cm     Doppler Measurements & Calculations  MV E max theodore: 63.7 cm/sec  MV A max theodore: 85.2 cm/sec  MV E/A: 0.75  MV dec slope: 266.0 cm/sec2  MV dec time: 0.24 sec  PA acc time: 0.10 sec  E/E' av.5  Lateral E/e': 5.1  Medial E/e': 11.9  RV S Theodore: 11.0 cm/sec     ______________________________________________________________________________  Report approved by: Rusty Huitron MD on 07/15/2025 12:03 PM             CBC with Diff:  Recent Labs   Lab Test 07/15/25  1054   WBC 16.0*   HGB 13.2*   MCV 86   *        Lactic Acid:    2.5    Comprehensive Metabolic Panel:  Recent Labs   Lab 07/15/25  1054 07/15/25  1052 07/15/25  0524     --   --    POTASSIUM 3.5  --   --    CHLORIDE 101  --   --    CO2 23  --   --    ANIONGAP 12  --   --    *   < >  --    BUN 27.4*  --   --    CR 1.06  --   --    GFRESTIMATED 72  --   --    PAUL 9.0  --   --    MAG  --   --  2.4*   PROTTOTAL 6.4  --   --    ALBUMIN 2.7*  --   --    BILITOTAL 1.2  --   --    ALKPHOS 138  --   --    AST 21  --   --    ALT 24  --   --     < > = values in this interval not displayed.     Time Spent on this Encounter   I spent 51 minutes of critical care time on the unit/floor managing the care of Kristofer Montana. Upon evaluation, this patient had a high probability of imminent or life-threatening deterioration due to acute resp failure, which required  my direct attention, intervention, and personal management including initiation of NIPPV 100% of my time was spent at the bedside counseling the patient and/or coordinating care regarding services listed in this note.    JERI Zimmer [facility]  Hospitalist Service  [facility]  Securely message with [employer] (more info)  Text page via [facility] Paging/Directory

## 2025-07-15 NOTE — PLAN OF CARE
Alert and oriented x4. Vital signs stable on RA. Up SBA. NPO since midnight, plan for ERCP today. Lungs sounds diminished. Bowel sounds +. Passing flatus, BM -. Voiding adequately via urinal. Skin: scattered bruising. Pain managed with PRN Tylenol. Denies nausea. Neuro's and CMS intact. Tele SR. Amio gtt stopped at 0602, for HR <60. Hep gtt infusing at 1800u/hr. Hep Xa recheck at 1221pm

## 2025-07-16 ENCOUNTER — APPOINTMENT (OUTPATIENT)
Dept: GENERAL RADIOLOGY | Facility: CLINIC | Age: 80
DRG: 853 | End: 2025-07-16
Payer: MEDICARE

## 2025-07-16 ENCOUNTER — APPOINTMENT (OUTPATIENT)
Dept: GENERAL RADIOLOGY | Facility: CLINIC | Age: 80
DRG: 853 | End: 2025-07-16
Attending: INTERNAL MEDICINE
Payer: MEDICARE

## 2025-07-16 ENCOUNTER — APPOINTMENT (OUTPATIENT)
Dept: CT IMAGING | Facility: CLINIC | Age: 80
DRG: 853 | End: 2025-07-16
Payer: MEDICARE

## 2025-07-16 ENCOUNTER — ANESTHESIA EVENT (OUTPATIENT)
Dept: SURGERY | Facility: CLINIC | Age: 80
End: 2025-07-16
Payer: MEDICARE

## 2025-07-16 ENCOUNTER — ANESTHESIA (OUTPATIENT)
Dept: SURGERY | Facility: CLINIC | Age: 80
End: 2025-07-16
Payer: MEDICARE

## 2025-07-16 ENCOUNTER — APPOINTMENT (OUTPATIENT)
Dept: CT IMAGING | Facility: CLINIC | Age: 80
DRG: 853 | End: 2025-07-16
Attending: HOSPITALIST
Payer: MEDICARE

## 2025-07-16 LAB
ALBUMIN SERPL BCG-MCNC: 2.5 G/DL (ref 3.5–5.2)
ALBUMIN SERPL BCG-MCNC: 3.1 G/DL (ref 3.5–5.2)
ALBUMIN UR-MCNC: 30 MG/DL
ALLEN'S TEST: YES
ALP SERPL-CCNC: 157 U/L (ref 40–150)
ALP SERPL-CCNC: 159 U/L (ref 40–150)
ALT SERPL W P-5'-P-CCNC: 17 U/L (ref 0–70)
ALT SERPL W P-5'-P-CCNC: 22 U/L (ref 0–70)
ANION GAP SERPL CALCULATED.3IONS-SCNC: 12 MMOL/L (ref 7–15)
ANION GAP SERPL CALCULATED.3IONS-SCNC: 13 MMOL/L (ref 7–15)
APPEARANCE UR: CLEAR
AST SERPL W P-5'-P-CCNC: 18 U/L (ref 0–45)
AST SERPL W P-5'-P-CCNC: 21 U/L (ref 0–45)
ATRIAL RATE - MUSE: 63 BPM
BASE EXCESS BLDA CALC-SCNC: 0.6 MMOL/L (ref -3–3)
BILIRUB SERPL-MCNC: 1.3 MG/DL
BILIRUB SERPL-MCNC: 1.4 MG/DL
BILIRUB UR QL STRIP: NEGATIVE
BUN SERPL-MCNC: 30.1 MG/DL (ref 8–23)
BUN SERPL-MCNC: 40.8 MG/DL (ref 8–23)
CALCIUM SERPL-MCNC: 8.5 MG/DL (ref 8.8–10.4)
CALCIUM SERPL-MCNC: 9.4 MG/DL (ref 8.8–10.4)
CHLORIDE SERPL-SCNC: 101 MMOL/L (ref 98–107)
CHLORIDE SERPL-SCNC: 99 MMOL/L (ref 98–107)
COLOR UR AUTO: ABNORMAL
CREAT SERPL-MCNC: 0.97 MG/DL (ref 0.67–1.17)
CREAT SERPL-MCNC: 1.1 MG/DL (ref 0.67–1.17)
DIASTOLIC BLOOD PRESSURE - MUSE: NORMAL MMHG
EGFRCR SERPLBLD CKD-EPI 2021: 69 ML/MIN/1.73M2
EGFRCR SERPLBLD CKD-EPI 2021: 80 ML/MIN/1.73M2
ERCP: NORMAL
ERYTHROCYTE [DISTWIDTH] IN BLOOD BY AUTOMATED COUNT: 14.6 % (ref 10–15)
ERYTHROCYTE [DISTWIDTH] IN BLOOD BY AUTOMATED COUNT: 15.1 % (ref 10–15)
GLUCOSE BLDC GLUCOMTR-MCNC: 163 MG/DL (ref 70–99)
GLUCOSE SERPL-MCNC: 128 MG/DL (ref 70–99)
GLUCOSE SERPL-MCNC: 154 MG/DL (ref 70–99)
GLUCOSE UR STRIP-MCNC: NEGATIVE MG/DL
HCO3 BLD-SCNC: 25 MMOL/L (ref 21–28)
HCO3 SERPL-SCNC: 25 MMOL/L (ref 22–29)
HCO3 SERPL-SCNC: 27 MMOL/L (ref 22–29)
HCT VFR BLD AUTO: 38.9 % (ref 40–53)
HCT VFR BLD AUTO: 41.8 % (ref 40–53)
HGB BLD-MCNC: 13.6 G/DL (ref 13.3–17.7)
HGB BLD-MCNC: 14.6 G/DL (ref 13.3–17.7)
HGB UR QL STRIP: NEGATIVE
INTERPRETATION ECG - MUSE: NORMAL
KETONES UR STRIP-MCNC: NEGATIVE MG/DL
LACTATE SERPL-SCNC: 3.1 MMOL/L (ref 0.7–2)
LACTATE SERPL-SCNC: 3.3 MMOL/L (ref 0.7–2)
LACTATE SERPL-SCNC: 3.3 MMOL/L (ref 0.7–2)
LEUKOCYTE ESTERASE UR QL STRIP: NEGATIVE
MAGNESIUM SERPL-MCNC: 2 MG/DL (ref 1.7–2.3)
MAGNESIUM SERPL-MCNC: 2.2 MG/DL (ref 1.7–2.3)
MCH RBC QN AUTO: 29.5 PG (ref 26.5–33)
MCH RBC QN AUTO: 29.5 PG (ref 26.5–33)
MCHC RBC AUTO-ENTMCNC: 34.9 G/DL (ref 31.5–36.5)
MCHC RBC AUTO-ENTMCNC: 35 G/DL (ref 31.5–36.5)
MCV RBC AUTO: 84 FL (ref 78–100)
MCV RBC AUTO: 84 FL (ref 78–100)
MUCOUS THREADS #/AREA URNS LPF: PRESENT /LPF
NITRATE UR QL: NEGATIVE
O2/TOTAL GAS SETTING VFR VENT: 70 %
OXYHGB MFR BLDA: 91 % (ref 92–100)
P AXIS - MUSE: 5 DEGREES
PCO2 BLD: 37 MM HG (ref 35–45)
PEEP: 8 CM H2O
PH BLD: 7.43 [PH] (ref 7.35–7.45)
PH UR STRIP: 6.5 [PH] (ref 5–7)
PHOSPHATE SERPL-MCNC: 2.5 MG/DL (ref 2.5–4.5)
PLATELET # BLD AUTO: 130 10E3/UL (ref 150–450)
PLATELET # BLD AUTO: 149 10E3/UL (ref 150–450)
PO2 BLD: 65 MM HG (ref 80–105)
POTASSIUM SERPL-SCNC: 3.5 MMOL/L (ref 3.4–5.3)
POTASSIUM SERPL-SCNC: 3.7 MMOL/L (ref 3.4–5.3)
PR INTERVAL - MUSE: 152 MS
PROCALCITONIN SERPL IA-MCNC: 2.91 NG/ML
PROT SERPL-MCNC: 5.9 G/DL (ref 6.4–8.3)
PROT SERPL-MCNC: 7 G/DL (ref 6.4–8.3)
QRS DURATION - MUSE: 104 MS
QT - MUSE: 470 MS
QTC - MUSE: 480 MS
R AXIS - MUSE: -23 DEGREES
RBC # BLD AUTO: 4.61 10E6/UL (ref 4.4–5.9)
RBC # BLD AUTO: 4.95 10E6/UL (ref 4.4–5.9)
RBC URINE: 4 /HPF
SAO2 % BLDA: 92.1 % (ref 95–96)
SODIUM SERPL-SCNC: 138 MMOL/L (ref 135–145)
SODIUM SERPL-SCNC: 139 MMOL/L (ref 135–145)
SP GR UR STRIP: 1.01 (ref 1–1.03)
SYSTOLIC BLOOD PRESSURE - MUSE: NORMAL MMHG
T AXIS - MUSE: 9 DEGREES
TROPONIN T SERPL HS-MCNC: 12 NG/L
TROPONIN T SERPL HS-MCNC: 13 NG/L
UFH PPP CHRO-ACNC: 0.5 IU/ML (ref ?–1.1)
UFH PPP CHRO-ACNC: <0.1 IU/ML (ref ?–1.1)
UPPER EUS: NORMAL
UROBILINOGEN UR STRIP-MCNC: NORMAL MG/DL
VENTRICULAR RATE- MUSE: 63 BPM
WBC # BLD AUTO: 10.5 10E3/UL (ref 4–11)
WBC # BLD AUTO: 18.6 10E3/UL (ref 4–11)
WBC URINE: 1 /HPF

## 2025-07-16 PROCEDURE — 81001 URINALYSIS AUTO W/SCOPE: CPT

## 2025-07-16 PROCEDURE — 999N000009 HC STATISTIC AIRWAY CARE

## 2025-07-16 PROCEDURE — 36600 WITHDRAWAL OF ARTERIAL BLOOD: CPT

## 2025-07-16 PROCEDURE — 85520 HEPARIN ASSAY: CPT | Performed by: HOSPITALIST

## 2025-07-16 PROCEDURE — 74330 X-RAY BILE/PANC ENDOSCOPY: CPT

## 2025-07-16 PROCEDURE — 258N000003 HC RX IP 258 OP 636: Performed by: NURSE ANESTHETIST, CERTIFIED REGISTERED

## 2025-07-16 PROCEDURE — C1726 CATH, BAL DIL, NON-VASCULAR: HCPCS | Performed by: INTERNAL MEDICINE

## 2025-07-16 PROCEDURE — 83605 ASSAY OF LACTIC ACID: CPT

## 2025-07-16 PROCEDURE — 85014 HEMATOCRIT: CPT

## 2025-07-16 PROCEDURE — 250N000013 HC RX MED GY IP 250 OP 250 PS 637: Performed by: HOSPITALIST

## 2025-07-16 PROCEDURE — 74176 CT ABD & PELVIS W/O CONTRAST: CPT

## 2025-07-16 PROCEDURE — 250N000011 HC RX IP 250 OP 636: Performed by: INTERNAL MEDICINE

## 2025-07-16 PROCEDURE — 83605 ASSAY OF LACTIC ACID: CPT | Performed by: HOSPITALIST

## 2025-07-16 PROCEDURE — 99233 SBSQ HOSP IP/OBS HIGH 50: CPT | Performed by: HOSPITALIST

## 2025-07-16 PROCEDURE — 250N000011 HC RX IP 250 OP 636: Performed by: NURSE PRACTITIONER

## 2025-07-16 PROCEDURE — 250N000011 HC RX IP 250 OP 636: Performed by: HOSPITALIST

## 2025-07-16 PROCEDURE — 250N000013 HC RX MED GY IP 250 OP 250 PS 637

## 2025-07-16 PROCEDURE — 250N000009 HC RX 250: Performed by: HOSPITALIST

## 2025-07-16 PROCEDURE — 83735 ASSAY OF MAGNESIUM: CPT | Performed by: HOSPITALIST

## 2025-07-16 PROCEDURE — 250N000011 HC RX IP 250 OP 636: Performed by: ANESTHESIOLOGY

## 2025-07-16 PROCEDURE — 71275 CT ANGIOGRAPHY CHEST: CPT

## 2025-07-16 PROCEDURE — 84100 ASSAY OF PHOSPHORUS: CPT

## 2025-07-16 PROCEDURE — 82805 BLOOD GASES W/O2 SATURATION: CPT

## 2025-07-16 PROCEDURE — 999N000157 HC STATISTIC RCP TIME EA 10 MIN

## 2025-07-16 PROCEDURE — 272N000452 HC KIT SHRLOCK 5FR POWER PICC TRIPLE LUMEN

## 2025-07-16 PROCEDURE — C1769 GUIDE WIRE: HCPCS | Performed by: INTERNAL MEDICINE

## 2025-07-16 PROCEDURE — 84155 ASSAY OF PROTEIN SERUM: CPT

## 2025-07-16 PROCEDURE — 250N000009 HC RX 250: Performed by: NURSE ANESTHETIST, CERTIFIED REGISTERED

## 2025-07-16 PROCEDURE — 36415 COLL VENOUS BLD VENIPUNCTURE: CPT | Performed by: HOSPITALIST

## 2025-07-16 PROCEDURE — 84450 TRANSFERASE (AST) (SGOT): CPT | Performed by: HOSPITALIST

## 2025-07-16 PROCEDURE — 250N000011 HC RX IP 250 OP 636

## 2025-07-16 PROCEDURE — 250N000025 HC SEVOFLURANE, PER MIN: Performed by: INTERNAL MEDICINE

## 2025-07-16 PROCEDURE — 84484 ASSAY OF TROPONIN QUANT: CPT

## 2025-07-16 PROCEDURE — 5A1945Z RESPIRATORY VENTILATION, 24-96 CONSECUTIVE HOURS: ICD-10-PCS | Performed by: STUDENT IN AN ORGANIZED HEALTH CARE EDUCATION/TRAINING PROGRAM

## 2025-07-16 PROCEDURE — 370N000017 HC ANESTHESIA TECHNICAL FEE, PER MIN: Performed by: INTERNAL MEDICINE

## 2025-07-16 PROCEDURE — 3E043XZ INTRODUCTION OF VASOPRESSOR INTO CENTRAL VEIN, PERCUTANEOUS APPROACH: ICD-10-PCS | Performed by: STUDENT IN AN ORGANIZED HEALTH CARE EDUCATION/TRAINING PROGRAM

## 2025-07-16 PROCEDURE — 87040 BLOOD CULTURE FOR BACTERIA: CPT

## 2025-07-16 PROCEDURE — 0DJ08ZZ INSPECTION OF UPPER INTESTINAL TRACT, VIA NATURAL OR ARTIFICIAL OPENING ENDOSCOPIC: ICD-10-PCS | Performed by: INTERNAL MEDICINE

## 2025-07-16 PROCEDURE — 85027 COMPLETE CBC AUTOMATED: CPT | Performed by: HOSPITALIST

## 2025-07-16 PROCEDURE — 999N000127 HC STATISTIC PERIPHERAL IV START W US GUIDANCE

## 2025-07-16 PROCEDURE — 250N000011 HC RX IP 250 OP 636: Performed by: NURSE ANESTHETIST, CERTIFIED REGISTERED

## 2025-07-16 PROCEDURE — 87641 MR-STAPH DNA AMP PROBE: CPT

## 2025-07-16 PROCEDURE — 87581 M.PNEUMON DNA AMP PROBE: CPT

## 2025-07-16 PROCEDURE — 360N000083 HC SURGERY LEVEL 3 W/ FLUORO, PER MIN: Performed by: INTERNAL MEDICINE

## 2025-07-16 PROCEDURE — 250N000009 HC RX 250

## 2025-07-16 PROCEDURE — 250N000013 HC RX MED GY IP 250 OP 250 PS 637: Performed by: NURSE ANESTHETIST, CERTIFIED REGISTERED

## 2025-07-16 PROCEDURE — 94002 VENT MGMT INPAT INIT DAY: CPT

## 2025-07-16 PROCEDURE — 0FC98ZZ EXTIRPATION OF MATTER FROM COMMON BILE DUCT, VIA NATURAL OR ARTIFICIAL OPENING ENDOSCOPIC: ICD-10-PCS | Performed by: INTERNAL MEDICINE

## 2025-07-16 PROCEDURE — 999N000065 XR CHEST PORT 1 VIEW

## 2025-07-16 PROCEDURE — 99232 SBSQ HOSP IP/OBS MODERATE 35: CPT | Performed by: PHYSICIAN ASSISTANT

## 2025-07-16 PROCEDURE — 272N000001 HC OR GENERAL SUPPLY STERILE: Performed by: INTERNAL MEDICINE

## 2025-07-16 PROCEDURE — 200N000001 HC R&B ICU

## 2025-07-16 PROCEDURE — 36415 COLL VENOUS BLD VENIPUNCTURE: CPT

## 2025-07-16 PROCEDURE — 84145 PROCALCITONIN (PCT): CPT

## 2025-07-16 PROCEDURE — 999N000141 HC STATISTIC PRE-PROCEDURE NURSING ASSESSMENT: Performed by: INTERNAL MEDICINE

## 2025-07-16 PROCEDURE — 36569 INSJ PICC 5 YR+ W/O IMAGING: CPT

## 2025-07-16 PROCEDURE — 83735 ASSAY OF MAGNESIUM: CPT

## 2025-07-16 PROCEDURE — 250N000009 HC RX 250: Performed by: INTERNAL MEDICINE

## 2025-07-16 PROCEDURE — 258N000003 HC RX IP 258 OP 636

## 2025-07-16 RX ORDER — LIDOCAINE HYDROCHLORIDE 20 MG/ML
INJECTION, SOLUTION INFILTRATION; PERINEURAL PRN
Status: DISCONTINUED | OUTPATIENT
Start: 2025-07-16 | End: 2025-07-16

## 2025-07-16 RX ORDER — SODIUM CHLORIDE, SODIUM LACTATE, POTASSIUM CHLORIDE, CALCIUM CHLORIDE 600; 310; 30; 20 MG/100ML; MG/100ML; MG/100ML; MG/100ML
INJECTION, SOLUTION INTRAVENOUS CONTINUOUS
Status: DISCONTINUED | OUTPATIENT
Start: 2025-07-16 | End: 2025-07-16 | Stop reason: HOSPADM

## 2025-07-16 RX ORDER — ALBUTEROL SULFATE 90 UG/1
INHALANT RESPIRATORY (INHALATION) PRN
Status: DISCONTINUED | OUTPATIENT
Start: 2025-07-16 | End: 2025-07-16

## 2025-07-16 RX ORDER — CEFEPIME HYDROCHLORIDE 2 G/1
2 INJECTION, POWDER, FOR SOLUTION INTRAVENOUS EVERY 8 HOURS
Status: DISCONTINUED | OUTPATIENT
Start: 2025-07-16 | End: 2025-07-17

## 2025-07-16 RX ORDER — PROPOFOL 10 MG/ML
INJECTION, EMULSION INTRAVENOUS CONTINUOUS PRN
Status: DISCONTINUED | OUTPATIENT
Start: 2025-07-16 | End: 2025-07-16

## 2025-07-16 RX ORDER — PROPOFOL 10 MG/ML
5-75 INJECTION, EMULSION INTRAVENOUS CONTINUOUS
Status: DISCONTINUED | OUTPATIENT
Start: 2025-07-16 | End: 2025-07-18

## 2025-07-16 RX ORDER — LABETALOL HYDROCHLORIDE 5 MG/ML
10 INJECTION, SOLUTION INTRAVENOUS
Status: DISCONTINUED | OUTPATIENT
Start: 2025-07-16 | End: 2025-07-16 | Stop reason: HOSPADM

## 2025-07-16 RX ORDER — NALOXONE HYDROCHLORIDE 0.4 MG/ML
0.1 INJECTION, SOLUTION INTRAMUSCULAR; INTRAVENOUS; SUBCUTANEOUS
Status: DISCONTINUED | OUTPATIENT
Start: 2025-07-16 | End: 2025-07-16 | Stop reason: HOSPADM

## 2025-07-16 RX ORDER — LEVALBUTEROL INHALATION SOLUTION 1.25 MG/3ML
1.25 SOLUTION RESPIRATORY (INHALATION)
Status: DISCONTINUED | OUTPATIENT
Start: 2025-07-16 | End: 2025-07-17

## 2025-07-16 RX ORDER — ALBUTEROL SULFATE 0.83 MG/ML
2.5 SOLUTION RESPIRATORY (INHALATION) EVERY 4 HOURS PRN
Status: DISCONTINUED | OUTPATIENT
Start: 2025-07-16 | End: 2025-08-07 | Stop reason: HOSPADM

## 2025-07-16 RX ORDER — DEXAMETHASONE SODIUM PHOSPHATE 4 MG/ML
4 INJECTION, SOLUTION INTRA-ARTICULAR; INTRALESIONAL; INTRAMUSCULAR; INTRAVENOUS; SOFT TISSUE
Status: DISCONTINUED | OUTPATIENT
Start: 2025-07-16 | End: 2025-07-16 | Stop reason: HOSPADM

## 2025-07-16 RX ORDER — ROPIVACAINE IN 0.9% SOD CHL/PF 0.1 %
.01-.2 PLASTIC BAG, INJECTION (ML) EPIDURAL CONTINUOUS
Status: DISCONTINUED | OUTPATIENT
Start: 2025-07-16 | End: 2025-07-17

## 2025-07-16 RX ORDER — DEXTROSE MONOHYDRATE 25 G/50ML
25-50 INJECTION, SOLUTION INTRAVENOUS
Status: DISCONTINUED | OUTPATIENT
Start: 2025-07-16 | End: 2025-08-07 | Stop reason: HOSPADM

## 2025-07-16 RX ORDER — SODIUM CHLORIDE, SODIUM LACTATE, POTASSIUM CHLORIDE, CALCIUM CHLORIDE 600; 310; 30; 20 MG/100ML; MG/100ML; MG/100ML; MG/100ML
INJECTION, SOLUTION INTRAVENOUS CONTINUOUS
Status: DISCONTINUED | OUTPATIENT
Start: 2025-07-16 | End: 2025-07-16

## 2025-07-16 RX ORDER — ONDANSETRON 4 MG/1
4 TABLET, ORALLY DISINTEGRATING ORAL EVERY 30 MIN PRN
Status: DISCONTINUED | OUTPATIENT
Start: 2025-07-16 | End: 2025-07-16 | Stop reason: HOSPADM

## 2025-07-16 RX ORDER — CHLORHEXIDINE GLUCONATE ORAL RINSE 1.2 MG/ML
15 SOLUTION DENTAL EVERY 12 HOURS
Status: DISCONTINUED | OUTPATIENT
Start: 2025-07-16 | End: 2025-07-18

## 2025-07-16 RX ORDER — LIDOCAINE 40 MG/G
CREAM TOPICAL
Status: ACTIVE | OUTPATIENT
Start: 2025-07-16 | End: 2025-07-19

## 2025-07-16 RX ORDER — HYDROMORPHONE HCL IN WATER/PF 6 MG/30 ML
0.2 PATIENT CONTROLLED ANALGESIA SYRINGE INTRAVENOUS EVERY 5 MIN PRN
Status: DISCONTINUED | OUTPATIENT
Start: 2025-07-16 | End: 2025-07-16 | Stop reason: HOSPADM

## 2025-07-16 RX ORDER — ACETYLCYSTEINE 200 MG/ML
1 SOLUTION ORAL; RESPIRATORY (INHALATION) EVERY 6 HOURS
Status: DISCONTINUED | OUTPATIENT
Start: 2025-07-16 | End: 2025-07-16

## 2025-07-16 RX ORDER — ACETYLCYSTEINE 200 MG/ML
1 SOLUTION ORAL; RESPIRATORY (INHALATION) EVERY 6 HOURS
Status: DISCONTINUED | OUTPATIENT
Start: 2025-07-16 | End: 2025-07-17

## 2025-07-16 RX ORDER — IOPAMIDOL 755 MG/ML
70 INJECTION, SOLUTION INTRAVASCULAR ONCE
Status: COMPLETED | OUTPATIENT
Start: 2025-07-16 | End: 2025-07-16

## 2025-07-16 RX ORDER — FENTANYL CITRATE 50 UG/ML
25 INJECTION, SOLUTION INTRAMUSCULAR; INTRAVENOUS EVERY 5 MIN PRN
Status: DISCONTINUED | OUTPATIENT
Start: 2025-07-16 | End: 2025-07-16 | Stop reason: HOSPADM

## 2025-07-16 RX ORDER — HYDROMORPHONE HCL IN WATER/PF 6 MG/30 ML
0.4 PATIENT CONTROLLED ANALGESIA SYRINGE INTRAVENOUS EVERY 5 MIN PRN
Status: DISCONTINUED | OUTPATIENT
Start: 2025-07-16 | End: 2025-07-16 | Stop reason: HOSPADM

## 2025-07-16 RX ORDER — SODIUM CHLORIDE, SODIUM LACTATE, POTASSIUM CHLORIDE, CALCIUM CHLORIDE 600; 310; 30; 20 MG/100ML; MG/100ML; MG/100ML; MG/100ML
INJECTION, SOLUTION INTRAVENOUS CONTINUOUS PRN
Status: DISCONTINUED | OUTPATIENT
Start: 2025-07-16 | End: 2025-07-16

## 2025-07-16 RX ORDER — ONDANSETRON 2 MG/ML
4 INJECTION INTRAMUSCULAR; INTRAVENOUS EVERY 30 MIN PRN
Status: DISCONTINUED | OUTPATIENT
Start: 2025-07-16 | End: 2025-07-16 | Stop reason: HOSPADM

## 2025-07-16 RX ORDER — NOREPINEPHRINE BITARTRATE 0.02 MG/ML
INJECTION, SOLUTION INTRAVENOUS
Status: COMPLETED
Start: 2025-07-16 | End: 2025-07-16

## 2025-07-16 RX ORDER — EPHEDRINE SULFATE 50 MG/ML
INJECTION, SOLUTION INTRAMUSCULAR; INTRAVENOUS; SUBCUTANEOUS PRN
Status: DISCONTINUED | OUTPATIENT
Start: 2025-07-16 | End: 2025-07-16

## 2025-07-16 RX ORDER — PROPOFOL 10 MG/ML
INJECTION, EMULSION INTRAVENOUS PRN
Status: DISCONTINUED | OUTPATIENT
Start: 2025-07-16 | End: 2025-07-16

## 2025-07-16 RX ORDER — ONDANSETRON 2 MG/ML
INJECTION INTRAMUSCULAR; INTRAVENOUS PRN
Status: DISCONTINUED | OUTPATIENT
Start: 2025-07-16 | End: 2025-07-16

## 2025-07-16 RX ORDER — FENTANYL CITRATE 50 UG/ML
50 INJECTION, SOLUTION INTRAMUSCULAR; INTRAVENOUS EVERY 5 MIN PRN
Status: DISCONTINUED | OUTPATIENT
Start: 2025-07-16 | End: 2025-07-16 | Stop reason: HOSPADM

## 2025-07-16 RX ORDER — NICOTINE POLACRILEX 4 MG
15-30 LOZENGE BUCCAL
Status: DISCONTINUED | OUTPATIENT
Start: 2025-07-16 | End: 2025-08-07 | Stop reason: HOSPADM

## 2025-07-16 RX ADMIN — NOREPINEPHRINE BITARTRATE 0.03 MCG/KG/MIN: 0.02 INJECTION, SOLUTION INTRAVENOUS at 18:05

## 2025-07-16 RX ADMIN — PHENYLEPHRINE HYDROCHLORIDE 100 MCG: 10 INJECTION INTRAVENOUS at 16:30

## 2025-07-16 RX ADMIN — PROPOFOL 40 MCG/KG/MIN: 10 INJECTION, EMULSION INTRAVENOUS at 21:07

## 2025-07-16 RX ADMIN — HEPARIN SODIUM 1950 UNITS/HR: 10000 INJECTION, SOLUTION INTRAVENOUS at 06:40

## 2025-07-16 RX ADMIN — SODIUM CHLORIDE, SODIUM LACTATE, POTASSIUM CHLORIDE, AND CALCIUM CHLORIDE 500 ML: .6; .31; .03; .02 INJECTION, SOLUTION INTRAVENOUS at 18:18

## 2025-07-16 RX ADMIN — LIDOCAINE HYDROCHLORIDE 2 ML: 10 INJECTION, SOLUTION EPIDURAL; INFILTRATION; INTRACAUDAL; PERINEURAL at 20:45

## 2025-07-16 RX ADMIN — METOPROLOL SUCCINATE 12.5 MG: 25 TABLET, EXTENDED RELEASE ORAL at 09:04

## 2025-07-16 RX ADMIN — PHENYLEPHRINE HYDROCHLORIDE 200 MCG: 10 INJECTION INTRAVENOUS at 16:01

## 2025-07-16 RX ADMIN — METRONIDAZOLE 500 MG: 500 INJECTION, SOLUTION INTRAVENOUS at 04:36

## 2025-07-16 RX ADMIN — PHENYLEPHRINE HYDROCHLORIDE 100 MCG: 10 INJECTION INTRAVENOUS at 16:25

## 2025-07-16 RX ADMIN — SODIUM CHLORIDE 94 ML: 9 INJECTION, SOLUTION INTRAVENOUS at 11:01

## 2025-07-16 RX ADMIN — PHENYLEPHRINE HYDROCHLORIDE 200 MCG: 10 INJECTION INTRAVENOUS at 15:39

## 2025-07-16 RX ADMIN — Medication 5 MG: at 15:53

## 2025-07-16 RX ADMIN — PHENYLEPHRINE HYDROCHLORIDE 100 MCG: 10 INJECTION INTRAVENOUS at 16:22

## 2025-07-16 RX ADMIN — LIDOCAINE HYDROCHLORIDE 60 MG: 20 INJECTION, SOLUTION INFILTRATION; PERINEURAL at 15:28

## 2025-07-16 RX ADMIN — PHENYLEPHRINE HYDROCHLORIDE 200 MCG: 10 INJECTION INTRAVENOUS at 15:46

## 2025-07-16 RX ADMIN — IOPAMIDOL 70 ML: 755 INJECTION, SOLUTION INTRAVENOUS at 11:00

## 2025-07-16 RX ADMIN — LEVALBUTEROL HYDROCHLORIDE 1.25 MG: 1.25 SOLUTION RESPIRATORY (INHALATION) at 19:50

## 2025-07-16 RX ADMIN — Medication 10 MG: at 15:39

## 2025-07-16 RX ADMIN — Medication 10 MG: at 15:46

## 2025-07-16 RX ADMIN — AMIODARONE HYDROCHLORIDE 0.5 MG/MIN: 1.8 INJECTION, SOLUTION INTRAVENOUS at 18:09

## 2025-07-16 RX ADMIN — ALBUTEROL SULFATE 2.5 MG: 2.5 SOLUTION RESPIRATORY (INHALATION) at 03:38

## 2025-07-16 RX ADMIN — ONDANSETRON 4 MG: 2 INJECTION INTRAMUSCULAR; INTRAVENOUS at 16:13

## 2025-07-16 RX ADMIN — SUCCINYLCHOLINE CHLORIDE 100 MG: 20 INJECTION, SOLUTION INTRAMUSCULAR; INTRAVENOUS; PARENTERAL at 15:28

## 2025-07-16 RX ADMIN — AMIODARONE HYDROCHLORIDE 0.5 MG/MIN: 1.8 INJECTION, SOLUTION INTRAVENOUS at 06:40

## 2025-07-16 RX ADMIN — PHENYLEPHRINE HYDROCHLORIDE 100 MCG: 10 INJECTION INTRAVENOUS at 16:49

## 2025-07-16 RX ADMIN — PHENYLEPHRINE HYDROCHLORIDE 200 MCG: 10 INJECTION INTRAVENOUS at 15:38

## 2025-07-16 RX ADMIN — PROPOFOL 100 MG: 10 INJECTION, EMULSION INTRAVENOUS at 16:41

## 2025-07-16 RX ADMIN — PHENYLEPHRINE HYDROCHLORIDE 200 MCG: 10 INJECTION INTRAVENOUS at 15:36

## 2025-07-16 RX ADMIN — PROPOFOL 40 MCG/KG/MIN: 10 INJECTION, EMULSION INTRAVENOUS at 16:44

## 2025-07-16 RX ADMIN — PANTOPRAZOLE SODIUM 40 MG: 40 INJECTION, POWDER, FOR SOLUTION INTRAVENOUS at 20:24

## 2025-07-16 RX ADMIN — SODIUM CHLORIDE, SODIUM LACTATE, POTASSIUM CHLORIDE, AND CALCIUM CHLORIDE 500 ML: .6; .31; .03; .02 INJECTION, SOLUTION INTRAVENOUS at 22:37

## 2025-07-16 RX ADMIN — PROPOFOL 45 MCG/KG/MIN: 10 INJECTION, EMULSION INTRAVENOUS at 17:55

## 2025-07-16 RX ADMIN — PANTOPRAZOLE SODIUM 40 MG: 40 TABLET, DELAYED RELEASE ORAL at 06:42

## 2025-07-16 RX ADMIN — ALBUTEROL SULFATE 6 PUFF: 108 INHALANT RESPIRATORY (INHALATION) at 16:30

## 2025-07-16 RX ADMIN — SODIUM CHLORIDE, SODIUM LACTATE, POTASSIUM CHLORIDE, AND CALCIUM CHLORIDE: .6; .31; .03; .02 INJECTION, SOLUTION INTRAVENOUS at 15:23

## 2025-07-16 RX ADMIN — ACETYLCYSTEINE 1 ML: 200 SOLUTION ORAL; RESPIRATORY (INHALATION) at 19:50

## 2025-07-16 RX ADMIN — SUCCINYLCHOLINE CHLORIDE 100 MG: 20 INJECTION, SOLUTION INTRAMUSCULAR; INTRAVENOUS; PARENTERAL at 16:41

## 2025-07-16 RX ADMIN — PROPOFOL 200 MG: 10 INJECTION, EMULSION INTRAVENOUS at 15:28

## 2025-07-16 RX ADMIN — CEFEPIME 2 G: 2 INJECTION, POWDER, FOR SOLUTION INTRAVENOUS at 23:31

## 2025-07-16 RX ADMIN — CHLORHEXIDINE GLUCONATE 15 ML: 1.2 SOLUTION ORAL at 20:24

## 2025-07-16 RX ADMIN — CEFEPIME 2 G: 2 INJECTION, POWDER, FOR SOLUTION INTRAVENOUS at 04:28

## 2025-07-16 RX ADMIN — Medication 25 MCG/HR: at 20:37

## 2025-07-16 RX ADMIN — ALBUTEROL SULFATE 6 PUFF: 108 INHALANT RESPIRATORY (INHALATION) at 15:30

## 2025-07-16 ASSESSMENT — ACTIVITIES OF DAILY LIVING (ADL)
ADLS_ACUITY_SCORE: 38
ADLS_ACUITY_SCORE: 45
ADLS_ACUITY_SCORE: 45
ADLS_ACUITY_SCORE: 38
ADLS_ACUITY_SCORE: 45
ADLS_ACUITY_SCORE: 38
ADLS_ACUITY_SCORE: 45
ADLS_ACUITY_SCORE: 38
ADLS_ACUITY_SCORE: 45
ADLS_ACUITY_SCORE: 38

## 2025-07-16 ASSESSMENT — ENCOUNTER SYMPTOMS: DYSRHYTHMIAS: 1

## 2025-07-16 NOTE — PROGRESS NOTES
[facility]  Hospitalist Progress Note   07/16/2025          Assessment and Plan:       Kristofer Montana is a 80 year old male with history of CAD with remote PCI in 2005, heart failure with reduced EF of 40%, hypertension, hyperlipidemia, ASD, stroke 2013 (residual difficulty only with spelling words), diverticulosis, thrombocytopenia [facility] days of fever and decreased appetite, right upper quadrant abdominal pain.       [facility] patient was hypotensive but improved to SBP's in the 90s after 2 L of IV fluids.  WBC 17, lactic acid 5.5 trended down to 3 after IV fluid bolus. Creatinine of 2 (baseline 1), negative troponin, , CXR showed likely bibasilar atelectasis and low lung volumes.   *CT C/A/P without contrast due to FARNAZ showed choledocholithiasis and mild bile duct dilation -diagnostic   *Abdominal ultrasound showed evidence of choledocholithiasis with biliary dilatation and CBD stones better shown on CT, mild gallbladder wall thickening favoring reactive changes related to biliary obstruction.  *Ultrasound of the left lower extremity showed an occlusive DVT in the left mid and distal femoral vein, nonocclusive DVT left popliteal vein.  RLE ultrasound negative for thrombosis.    --> Patient was started on heparin drip for DVT.  CT PE protocol was deferred due to FARNAZ.  He was started on cefepime and Flagyl given Dilaudid for abdominal pain.    --> Surgery consulted there and recommended lap andrew but would defer this in favor of PERC Andrew tube given need for anticoagulation as aforementioned.    --> [facility] discussed with [facility] who recommended transfer to advanced endoscopy/ERCP capable facility patient has been accepted here at [facility].       *Upon arrival at [facility] nontoxic in appearance, afebrile, and hemodynamically stable with SBP in 110s.  Was on heparin drip and empirical antibiotics.  Mid morning on 7/14 patient in A-fib with RVR despite intravenous  metoprolol.  Started on intravenous amiodar[place] drip [place] [facility] [place]    -- On 7/15 RRT for acute hypoxic respiratory failure.  Initiated on BiPAP support     Choledocholithiasis with biliary dilatation and CBD stones.  Possible acute cholecystitis.  E. coli bacteremia - cleared  Sepsis.  Lactic acidosis   Leukocytosis.  --Presentation/workup as above.    --[facility] growing E. coli sensitive to cephalosporins and fluoroquinolones.  -Repeat blood cultures from 7/15 with no growth to date  --CRP was 317.1 6.  Lactic acid elevated at 2.2.  -- 7/16 patient continues to have abdominal pain, distended.  Afebrile.  WBC of 18.6  -- Continue n.p.o. status.  Continue IV cefepime, IV Flagyl.  *Interventional radiology followed 7/15, hold on drain placement at this time.  *[facility] following, initially planned for ERCP this afternoon on 7/16  *General surgery following, see note for details.  Tentative n.p.o. from midnight.  Tentative  Appreciate multidisciplinary team input  Trend WBC count, fever curve, CRP, lactic acid levels closely.  Monitor liver function test, electrolytes.  IV/p.o. as needed antiemetics.  IV/p.o. as needed Zofran       Acute LLE DVT  *denied any symptoms of legs or SOB, no predisposing history reported. Denies history of clotting  *[facility] showed occlusive DVT in the left mid and distal femoral vein, nonocclusive DVT left popliteal vein.  *RLE ultrasound negative for thrombosis.    *Patient was started on heparin drip for DVT.  CT PE protocol was deferred due to FARNAZ.  --On 7/15 patient's heparin drip was held in anticipation for ERCP.  Patient had acute respiratory failure, worsening leg swelling.  --Ultrasound left lower extremity Acute appearing thrombus from the left proximal femoral vein to popliteal vein.   -- CT chest to rule out PE ordered, pending imaging.  Continue intravenous heparin drip.  Will avoid holding of heparin as patient had worsening respiratory status on  7/15.  Switch to oral anticoagulation once GI workup completed/stable.  -Interventional radiology followed, patient does not meet criteria for thrombectomy.  Pharmacy liaison input for DOAC coverage appreciated.  Age-appropriate health maintenance including malignancy workup as outpatient    Atrial fibrillation with RVR -converted to sinus rhythm on intravenous amiodarone.  History of ischemic cardiomyopathy.  History of chronic heart failure with reduced EF.  History of coronary artery disease stented 2005.  Hypertension.  On 7/14 AM patient in A-fib with RVR.  Initiated on intravenous amiodarone drip on 7/14, converted to sinus rhythm on 7/15.  Baseline TSH, liver function test normal  Echocardiogram with LVEF of 45%, Anteroseptal and apical hypokinesis right ventricle normal in structure function and size.  No valve disease.  -- Cardiology signed off. Once able to take orals would recommend stopping IV amiodarone and start oral amiodarone 400mg twice a day for 10 days then reduce to 200mg once daily for 30 days then stop.  Appreciate input--Continue intravenous amiodarone drip.  Switch to oral amiodarone once able to tolerate orals  --Continue intravenous heparin drip, switch to DOAC once stable.  Continue PTA metoprolol with hold parameters.  PTA aspirin on hold, plan to start on DOAC as above.  Hold PTA losartan.  Hold PTA Lipitor in the setting of acute illness.  Strict intake output monitoring, daily weights.  Telemetry monitoring.  [facility] after [facility]    Acute hypoxic respiratory failure multifactorial -improving.  --On 7/15 patient's heparin drip on hold for ERCP.  Had acute shortness of breath, patient was initiated on BiPAP.  EKG nonischemic.  Heparin drip restarted.  Received IV Lasix for diuresis, patient had received fluid resuscitation for sepsis on admission.   --7/16 significant improvement in respiratory status, off BiPAP.  Closely monitor volume  "status.  CT chest to rule out PE pending.  I-S.  Encourage ambulation    Acute kidney injury improved  *Creat reportedly 2 at [facility], upon arrival at [facility] creat now returned and 1.3.  Cautious with IV fluids given history of heart failure with reduced EF.  Avoid nephrotoxic drugs, monitor renal function in AM.  Hold PTA losartan.    Hypomagnesemia.  Magnesium replacement ordered.    Chronic thrombocytopenia.  No active bleeding.  Monitor platelets closely.    History of diverticulosis.  Noted    Physical deconditioning from medical illness.  Rehab team evaluation    Clinically Significant Risk Factors               # Hypoalbuminemia: Lowest albumin = 2.7 g/dL at 7/15/2025 10:54 AM, will monitor as appropriate                # Overweight: Estimated body mass index is 27.35 kg/m  as calculated from the following:    Height as of this encounter: 1.778 m (5' 10\").    Weight as of this encounter: 86.5 kg (190 lb 9.6 oz)., PRESENT ON ADMISSION          Orders Placed This Encounter      NPO for Medical/Clinical Reasons Except for: Meds      DVT Prophylaxis: SCDs, on intravenous heparin drip.  Code Status: Full Code  Disposition: Expected discharge in greater than 2 days pending clinical improvement.  Continue IMC status.    Medically Ready for Discharge: Anticipated in 2-4 Days     Discussed with patient, bedside RN, care team.  Attempted calling patient's brother number listed voicemail 7/15.  Patient reports he will update his brother on 7/16  Total time greater than 51 minutes.  More than 70% of time spent in direct patient care, care coordination, patient counseling, and formalizing plan of care.        Nikki Oneill MD        Interval History:        Patient lying in bed.  This morning off oxygen, does admit to significant improvement in shortness of breath.  Denies any chest pain or palpitations.  Denies any headache or dizziness  Denies any leg pain or leg cramps.  Complains of abdominal pain.  Denies nausea.  " No vomiting  Has been NPO -anticipation for ERCP this afternoon  On intravenous heparin drip.  On amiodarone drip.  Telemetry converted to sinus rhythm.  Afebrile       Physical Exam:        Physical Exam   Temp:  [97.5  F (36.4  C)-98.8  F (37.1  C)] 97.5  F (36.4  C)  Pulse:  [] 78  Resp:  [15-20] 16  BP: (121-137)/(71-86) 123/86  FiO2 (%):  [3 %-25 %] 3 %  SpO2:  [91 %-95 %] 94 %    Intake/Output Summary (Last 24 hours) at 7/14/2025 1219  Last data filed at 7/14/2025 0308  Gross per 24 hour   Intake 160 ml   Output --   Net 160 ml       Admission Weight: 83.9 kg (184 lb 14.4 oz)  Current Weight: 86.4 kg (190 lb 7.6 oz)    PHYSICAL EXAM  GENERAL: Patient is in no distress. Alert and oriented.  HEENT: Oropharynx pink  HEART: regular rate and rhythm. S1S2.   LUNGS: Bilateral decreased breath sounds  Respirations unlabored  ABDOMEN: Soft, some abdominal tenderness, bowel sounds heard.  No guarding or rigidity  NEURO: Moving all extremities  EXTREMITIES: 1 + pedal edema right leg, 2+ edema left leg.  Warmth.  SKIN: Warm, dry.   PSYCHIATRY Cooperative       Medications:        Current Facility-Administered Medications   Medication Dose Route Frequency Provider Last Rate Last Admin    [Held by provider] aspirin EC tablet 81 mg  81 mg Oral Daily Nikki Oneill MD        [Held by provider] atorvastatin (LIPITOR) tablet 40 mg  40 mg Oral Daily Nikki Oneill MD        [Auto Hold] ceFEPIme (MAXIPIME) 2 g vial to attach to  mL bag for ADULTS or NS 50 mL bag for PEDS  2 g Intravenous Q12H Harris Sow MD   2 g at 07/16/25 0428    [Held by provider] losartan (COZAAR) tablet 50 mg  50 mg Oral Daily Nikki Oneill MD        [Auto Hold] metoprolol succinate ER (TOPROL-XL) 24 hr half-tab 12.5 mg  12.5 mg Oral Daily Nikki Oneill MD   12.5 mg at 07/16/25 0904    [Auto Hold] metroNIDAZOLE (FLAGYL) infusion 500 mg  500 mg Intravenous Q12H Harris Sow MD   500 mg at 07/16/25 0438     [Auto Hold] pantoprazole (PROTONIX) EC tablet 40 mg  40 mg Oral QAM AC Nikki Oneill MD   40 mg at 07/16/25 0642    sodium chloride (PF) 0.9% PF flush 3 mL  3 mL Intracatheter Q8H [facility] Aleyda Lee MD         Current Facility-Administered Medications   Medication Dose Route Frequency Provider Last Rate Last Admin    [Auto Hold] acetaminophen (TYLENOL) tablet 650 mg  650 mg Oral Q4H PRN Harris Sow MD   650 mg at 07/14/25 2246    Or    [Auto Hold] acetaminophen (TYLENOL) Suppository 650 mg  650 mg Rectal Q4H PRN Harris Sow MD        [Auto Hold] albuterol (PROVENTIL) neb solution 2.5 mg  2.5 mg Nebulization Q4H PRN France Bass MD   2.5 mg at 07/16/25 0338    [Auto Hold] calcium carbonate (TUMS) chewable tablet 1,000 mg  1,000 mg Oral 4x Daily PRN Harris Sow MD   1,000 mg at 07/15/25 1725    [Auto Hold] hydrALAZINE (APRESOLINE) injection 10 mg  10 mg Intravenous Q4H PRN Nikki Oneill MD        [Auto Hold] HYDROmorphone (DILAUDID) tablet 2 mg  2 mg Oral Q4H PRN Harris Sow MD   2 mg at 07/13/25 2223    [Auto Hold] HYDROmorphone (PF) (DILAUDID) injection 0.5 mg  0.5 mg Intravenous Q3H PRN Harris Sow MD        lidocaine (LMX4) cream   Topical Q1H PRN Aleyda Lee MD        lidocaine 1 % 0.1-1 mL  0.1-1 mL Other Q1H PRN Aleyda Lee MD        [Auto Hold] metoprolol (LOPRESSOR) injection 2.5 mg  2.5 mg Intravenous Q6H PRN Roselia Luciano APRN CNP        [Auto Hold] naloxone (NARCAN) injection 0.2 mg  0.2 mg Intravenous Q2 Min PRN Keyana Mayorga MD        Or    [Auto Hold] naloxone (NARCAN) injection 0.4 mg  0.4 mg Intravenous Q2 Min PRN Keyana Mayorga MD        Or    [Auto Hold] naloxone (NARCAN) injection 0.2 mg  0.2 mg Intramuscular Q2 Min PRN Keyana Mayorga MD        Or    [Auto Hold] naloxone (NARCAN) injection 0.4 mg  0.4 mg Intramuscular Q2 Min PRN Keyana Mayorga MD        [Auto Hold] ondansetron  (ZOFRAN ODT) ODT tab 4 mg  4 mg Oral Q6H PRN Harris Sow MD        Or    [Auto Hold] ondansetron (ZOFRAN) injection 4 mg  4 mg Intravenous Q6H PRN Harris Sow MD        Patient is already receiving anticoagulation with heparin, enoxaparin (LOVENOX), warfarin (COUMADIN)  or other anticoagulant medication   Does not apply Continuous PRN Harris Sow MD        [Auto Hold] prochlorperazine (COMPAZINE) injection 5 mg  5 mg Intravenous Q6H PRN Harris Sow MD        Or    [Auto Hold] prochlorperazine (COMPAZINE) tablet 5 mg  5 mg Oral Q6H PRN Harris Sow MD        [Auto Hold] senna-docusate (SENOKOT-S/PERICOLACE) 8.6-50 MG per tablet 1 tablet  1 tablet Oral BID PRN Harris Sow MD        Or    [Auto Hold] senna-docusate (SENOKOT-S/PERICOLACE) 8.6-50 MG per tablet 2 tablet  2 tablet Oral BID PRN Harris Sow MD        sodium chloride (PF) 0.9% PF flush 3 mL  3 mL Intracatheter q1 min prn Aleyda Lee MD                Data:      All new lab and imaging data was reviewed.

## 2025-07-16 NOTE — PROGRESS NOTES
"[facility] GASTROENTEROLOGY PROGRESS NOTE    SUBJECTIVE:  Feeling much better today, not short of breath. Intermittent RUQ pain.    OBJECTIVE:    /80   Pulse 85   Temp 98.3  F (36.8  C) (Oral)   Resp 16   Ht 1.778 m (5' 10\")   Wt 86.5 kg (190 lb 9.6 oz)   SpO2 (!) 91%   BMI 27.35 kg/m    Temp (24hrs), Av.2  F (36.8  C), Min:97.4  F (36.3  C), Max:98.8  F (37.1  C)    Patient Vitals for the past 72 hrs:   Weight   25 0543 86.5 kg (190 lb 9.6 oz)   07/15/25 0537 86.8 kg (191 lb 6.4 oz)   25 0130 86.4 kg (190 lb 7.6 oz)   25 2110 83.9 kg (184 lb 14.4 oz)       Intake/Output Summary (Last 24 hours) at 2025 0828  Last data filed at 2025 0525  Gross per 24 hour   Intake 270 ml   Output 1040 ml   Net -770 ml         PHYSICAL EXAM    Constitutional: NAD, comfortable  Abdomen: soft, non-tender, nondistended,         Additional Comments:  ROS, FH, SH: See initial GI consult for details.    I have reviewed the patient's new clinical lab results:    Recent Labs   Lab Test 250 07/15/25  1054 25  1215   WBC 18.6* 16.0* 15.1*   HGB 14.6 13.2* 13.4   MCV 84 86 85   * 133* 121*     Recent Labs   Lab Test 250 07/15/25  1054 25  0536    136 137   POTASSIUM 3.7 3.5 4.1   CHLORIDE 99 101 101   CO2 27 23 20*   BUN 30.1* 27.4* 25.3*   CR 0.97 1.06 1.12   ANIONGAP 13 12 16*   PAUL 9.4 9.0 7.8*     Recent Labs   Lab Test 25  0420 07/15/25  1054 25  0536   ALBUMIN 3.1* 2.7* 2.8*   BILITOTAL 1.4* 1.2 1.3*   ALT 22 24 29   AST 21 21 31   ALKPHOS 157* 138 115   25      Active Problems:    Choledocholithiasis    Assessment: 81 yo male with history of MI, CVA, heart failure, hypertension, hyperlipidemia, diverticulosis, thrombocytopenia, and ASD who presents with RUQ pain and is found to have choledocholithiasis on imaging 25.  Bili 1.4.     Hospitalization complicated by new onset afib, acute LLE DVT (on heparin) and acute episode of hypoxic " respiratory failure, currently resolved.      Plan:  - EUS +/- ERCP today.  If still on heparin sphincterotomy cannot be done but stent could still be placed for drainage until anticoagulation could be held.        Juila Bob  [facility]  Office:  [phone]  25 minutes of total time was spent providing patient care, including patient evaluation, reviewing documentation/test results, and .

## 2025-07-16 NOTE — ANESTHESIA PREPROCEDURE EVALUATION
Anesthesia Pre-Procedure Evaluation    Patient: Kristofer Montana   MRN: [id number] : 1945          Procedure : Procedure(s):  ENDOSCOPIC ULTRASOUND, ESOPHAGOSCOPY / UPPER GASTROINTESTINAL TRACT (GI)  ENDOSCOPIC RETROGRADE CHOLANGIOPANCREATOGRAPHY         Past Medical History:   Diagnosis Date    CAD (coronary artery disease)     History of CVA (cerebrovascular accident)     HTN (hypertension)     Ischemic cardiomyopathy       No past surgical history on file.   Allergies   Allergen Reactions    Amoxicillin Diarrhea      Social History     Tobacco Use    Smoking status: Never     Passive exposure: Past (spouse smoked 3 packs a day for 23 days)    Smokeless tobacco: Never   Substance Use Topics    Alcohol use: Not on file      Wt Readings from Last 1 Encounters:   25 86.5 kg (190 lb 9.6 oz)        Anesthesia Evaluation   Pt has had prior anesthetic. Type: General.    No history of anesthetic complications       ROS/MED HX  ENT/Pulmonary:       Neurologic:     (+)          CVA,                      Cardiovascular:     (+) Dyslipidemia hypertension- -  CAD -  - -      CHF                  dysrhythmias (Afib RVR 7/15/25, procedure postponed. On amiodarone gtt now in SR), a-fib,           Echo: Date: 7/15/25 Results:  Interpretation Summary     1. The left ventricle is normal in size. Biplane LVEF is 45%. Anteroseptal and  apical hypokinesis.  2. The right ventricle is normal in structure, function and size.  3. No valve disease.     Outside echo  reported EF 35% with anteroseptal hypokinesis.    Stress Test:  Date: Results:    ECG Reviewed:  Date: Results:    Cath:  Date: Results:      METS/Exercise Tolerance:     Hematologic:     (+) History of blood clots,               Musculoskeletal:       GI/Hepatic:     (+)          cholecystitis/cholelithiasis,          Renal/Genitourinary:       Endo:       Psychiatric/Substance Use:       Infectious Disease:       Malignancy:       Other:              Physical  "Exam  Airway  Mallampati: II  TM distance: >3 FB  Neck ROM: full  Mouth opening: >= 4 cm    Cardiovascular - normal exam   Dental   (+) Modest Abnormalities - crowns, retainers, 1 or 2 missing teeth      Pulmonary - normal exam      Neurological - normal exam  He appears awake, alert and oriented x3.    Other Findings       OUTSIDE LABS:  CBC:   Lab Results   Component Value Date    WBC 18.6 (H) 07/16/2025    WBC 16.0 (H) 07/15/2025    HGB 14.6 07/16/2025    HGB 13.2 (L) 07/15/2025    HCT 41.8 07/16/2025    HCT 38.2 (L) 07/15/2025     (L) 07/16/2025     (L) 07/15/2025     BMP:   Lab Results   Component Value Date     07/16/2025     07/15/2025    POTASSIUM 3.7 07/16/2025    POTASSIUM 3.5 07/15/2025    CHLORIDE 99 07/16/2025    CHLORIDE 101 07/15/2025    CO2 27 07/16/2025    CO2 23 07/15/2025    BUN 30.1 (H) 07/16/2025    BUN 27.4 (H) 07/15/2025    CR 0.97 07/16/2025    CR 1.06 07/15/2025     (H) 07/16/2025     (H) 07/15/2025     COAGS: No results found for: \"PTT\", \"INR\", \"FIBR\"  POC: No results found for: \"BGM\", \"HCG\", \"HCGS\"  HEPATIC:   Lab Results   Component Value Date    ALBUMIN 3.1 (L) 07/16/2025    PROTTOTAL 7.0 07/16/2025    ALT 22 07/16/2025    AST 21 07/16/2025    ALKPHOS 157 (H) 07/16/2025    BILITOTAL 1.4 (H) 07/16/2025     OTHER:   Lab Results   Component Value Date    LACT 2.2 (H) 07/15/2025    PAUL 9.4 07/16/2025    MAG 2.2 07/16/2025    TSH 3.74 07/14/2025       Anesthesia Plan    ASA Status:  3      NPO Status: NPO Appropriate   Anesthesia Type: General.  Airway: oral.  Induction: intravenous.  Maintenance: Balanced.   Techniques and Equipment:       - Monitoring Plan: standard ASA monitoring     Consents    Anesthesia Plan(s) and associated risks, benefits, and realistic alternatives discussed. Questions answered and patient/representative(s) expressed understanding.     - Discussed:     - Discussed with:  Patient        - Pt is DNR/DNI Status: no DNR      " "    Postoperative Care    Pain management: multimodal analgesia.     Comments:                   Thania Maguire MD    I have reviewed the pertinent notes and labs in the chart from the past 30 days and (re)examined the patient.  Any updates or changes from those notes are reflected in this note.    Clinically Significant Risk Factors               # Hypoalbuminemia: Lowest albumin = 2.7 g/dL at 7/15/2025 10:54 AM, will monitor as appropriate                # Overweight: Estimated body mass index is 27.35 kg/m  as calculated from the following:    Height as of this encounter: 1.778 m (5' 10\").    Weight as of this encounter: 86.5 kg (190 lb 9.6 oz)., PRESENT ON ADMISSION                  "

## 2025-07-16 NOTE — PROVIDER NOTIFICATION
Pt is wheezy & coughing. No prn neb available. Pt denies asthma, reported allergies to dust & pollens. Paged Dr. KARI Bass.

## 2025-07-16 NOTE — ANESTHESIA CARE TRANSFER NOTE
Patient: Kristofer Montana    Procedure: Procedure(s):  ENDOSCOPIC ULTRASOUND, ESOPHAGOSCOPY / UPPER GASTROINTESTINAL TRACT (GI)  ENDOSCOPIC RETROGRADE CHOLANGIOPANCREATOGRAPHY, SHPYNCTEROTOMY, DILATION, STONE EXTRACTION       Diagnosis: Choledocholithiasis [K80.50]  Diagnosis Additional Information: No value filed.    Anesthesia Type:   General     Note:    Oropharynx: endotracheal tube in place, oral gastic tube in place and ventilatory support        Independent Airway: airway patency not satisfactory and stable  Dentition: dentition unchanged  Vital Signs Stable: post-procedure vital signs reviewed and stable  Report to RN Given: handoff report given  Patient transferred to: ICU  Comments: Patient transported to ICU with assist of DO, CRNA, RN, and scrub tech. Ventilated en route by DO. VSS stable, report to RN.   ICU Handoff: Call for PAUSE to initiate/utilize ICU HANDOFF, Identified Patient, Identified Responsible Provider, Reviewed the Pertinent Medical History, Discussed Surgical Course, Reviewed Intra-OP Anesthesia Management and Issues during Anesthesia, Set Expectations for Post Procedure Period and Allowed Opportunity for Questions and Acknowledgement of Understanding      Vitals:  Vitals Value Taken Time   /68    Temp     Pulse 68 07/16/25 17:25   Resp 20    SpO2 95 % 07/16/25 17:25   Vitals shown include unfiled device data.    Electronically Signed By: JERI Irizarry CRNA  July 16, 2025  5:26 PM

## 2025-07-16 NOTE — PROGRESS NOTES
Robert's test performed prior to radial ABG draw. Collateral circulation confirmed. Sample obtained from patient's right radial artery while on a PEEP of 8 cm H2O and 70% FiO2. Results pending.    Tanvir Costa, RT on 7/16/2025 at 6:48 PM

## 2025-07-16 NOTE — H&P
[facility] ICU H&P  07/16/2025    Date of Hospital Admission: 7/13/2025  Date of ICU Admission: 7/16/2025  Reason for Critical Care Admission: Acute hypoxic respiratory failure  Date of Service (when I saw the patient): 07/16/2025    ASSESSMENT: Kristofer Montana is a 80 year old male with a medical history of HTN, HLD, HFrEF LVEF 40%, CAD with remote PCI in 2005, stroke (2013)  who [facility] 2/2 acute cholecystitis and choledocholithiasis, including e coli bacteremia and FARNAZ on cefepime and Flagyl.  Also found to have acute LLE DVT in the mid and distal femoral vein, nonocclusive DVT left popliteal vein started on heparin infusion. Course c/b afib w/ RVR s/p amiodarone. Underwent planned EUS/ERCP 07/16/25 complicated by large volume emesis/ aspiration event at beginning of case, attempt at extubation but required immediate re-intubation post procedure for acute hypoxic respiratory failure. Admitted to ICU for further management.       PLAN:    Neuro:  # Pain and sedation  - Propofol gtt + PRN bolus  - Fentanyl gtt + PRN bolus   - RASS goal -2 to -3 for better synchrony     Pulmonary:  # Acute hypoxic respiratory failure   Had episode of acute hypoxia and SOB 7/15, briefly on BiPAP 25% 12/6, s/p diuresis with improvement. 07/16 CT chest ordered prior to stopping heparin gtt, no definitive PE seen; loculated R pleural effusion and associated atelectasis. Had large volume coffee ground emesis/aspiration event on induction of anesthesia for ERCP 07/16; post procedure trialed extubation however patient immediately developed increased WOB and hypoxia, was subsequently re-intubated.    - CXR, ABG on arrival to ICU  - Lung protective ventilation, on 6 ml/kg IBW  - Increase PEEP to 12  - Airway clearance:    - Xopenex + Mucomyst Q4H  - Send sputum, resp PCR, MRSA    FiO2 (%): 70 %, Resp: 16, Vent Mode: VC/AC, Resp Rate (Set): 18 breaths/min, Tidal Volume (Set, mL): 440 mL, PEEP (cm H2O): 8  cmH2O, Resp Rate (Set): 18 breaths/min, Tidal Volume (Set, mL): 440 mL, PEEP (cm H2O): 8 cmH2O    Cardiovascular:  # Shock, likely distributive  # Ischemic cardiomyopathy with chronic HFrEF, LVEF 45%  # CAD with remote LAD PCI in 2004  # hx HTN  # hx HLD  Hypotensive on arrival to ICU, 500 mL bolus x2, started on norepinephrine. Lactic 3.3. Trop negative x2. Likely 2/2 sedation +/- sepsis in setting of recent GI procedure and aspiration event.   - Additional fluid resuscitation as indicated  - Start Norepi   - MAP goal > 65 mmHg  - Hold PTA antihypertensives  - Hold PTA ASA    # Atrial fibrillation with RVR, new onset 7/14/2025 7/14 developed Afib RVR despite IV Metoprolol. Started on amio infusion.   - Sinus bradycardia on arrival to ICU with hypotension, hold Amio for now  - Hold BB while hypotensive    GI/Nutrition:  # Choledocholithiasis with biliary dilatation s/p EUS/ERCP and sphincterotomy 7/16/2025  # Cholecystitis  EUS with coffee ground material in gastric body & antrum; unable to identify source 2/2 residual blood in stomach; ERCP 7 mm biliary sphincterotomy and balloon dilation; 4 stones removed  - GI & General surgery following  - Strict NPO  - Keep NG clamped per GI  - IV PPI BID  - Hold anticoagulation  - CXR with linear radiodensity along RUQ with question of contrast leakage or perforation - notified [facility] on call, will send for STAT noncontrast CT A/P to r/o perforation   - Possible lap andrew vs cholecystostomy tube in coming days per general surgery     Renal/Fluids/Electrolytes:  # FARNAZ, improving  - Avoid nephrotoxic agents as able  - Strict I&O monitoring  - Daily weight  - Electrolyte replacement protocol     Endocrine:  # Stress hyperglycemia  - Currently not requiring insulin  - Hypoglycemia protocol     ID:  # Choledocholithiasis with biliary dilatation s/p above procedures  # Acute cholecystitis  # E.Coli bacteremia, resolved   Presented with acute onset RUQ pain, diarrhea. [facility] cultures  "[facility] growing E. coli sensitive to cephalosporins and fluoroquinolones. 7/15 repeat Bcx NGTD. Initial CRP was 317. On IV Cefepime and Flagyl. CT A/P   - Follow WBC, fever curve  - On Cefepime, Flagyl   - Will send repeat infectious work up   - Bcx, sputum, resp pcr, mrsa  - Repeat CRP in AM    Hematology:    # Acute LLE DVT  [facility] was found to have acute LLE DVT in the mid and distal femoral vein, nonocclusive DVT left popliteal vein, negative in RLE. Was on heparin gtt prior to GI procedure, held AM 7/16.   - Hold anticoagulation, resume when okay by GI     Musculoskeletal:  - PT / OT when appropriate         General Cares/Prophylaxis:    DVT Prophylaxis: Contraindicated - no chemical ppx per GI, no SCDs given known LLE DVT  GI Prophylaxis: PPI  Restraints: Bilateral wrist  Family Communication: Brother updated at bedside by bedside RN - telephone number in chart does not appear to be accurate  Code Status: Full Code    Lines/tubes/drains:  - PIVs  - OG    Disposition:  - [facility] ICU      Tasha Ferraro CNP      Clinically Significant Risk Factors               # Hypoalbuminemia: Lowest albumin = 2.7 g/dL at 7/15/2025 10:54 AM, will monitor as appropriate                # Overweight: Estimated body mass index is 27.35 kg/m  as calculated from the following:    Height as of this encounter: 1.778 m (5' 10\").    Weight as of this encounter: 86.5 kg (190 lb 9.6 oz)., PRESENT ON ADMISSION                     -----------------------------------------------------------------------    HISTORY PRESENTING ILLNESS: Kristofer Montana is a 80 year old male with a medical history of HTN, HLD, HFrEF LVEF 40%, CAD with remote PCI in 2005, stroke (2013)  who [facility] 2/2 acute cholecystitis and choledocholithiasis, including e coli bacteremia and FARNAZ on cefepime and Flagyl.  Also found to have acute LLE DVT in the mid and distal femoral vein, nonocclusive DVT left popliteal vein started " on heparin infusion. Course c/b afib w/ RVR s/p amiodarone. Underwent planned EUS/ERCP 07/16/25 complicated by large volume emesis/ aspiration event at beginning of case, attempt at extubation but required immediate re-intubation post procedure for acute hypoxic respiratory failure. Admitted to ICU for further management.     REVIEW OF SYSTEMS: Unable to assess - intubated/sedated    PAST MEDICAL HISTORY:   Past Medical History:   Diagnosis Date    CAD (coronary artery disease)     History of CVA (cerebrovascular accident)     HTN (hypertension)     Ischemic cardiomyopathy      SURGICAL HISTORY:  No past surgical history on file.  SOCIAL HISTORY:  Social History     Socioeconomic History    Marital status:    Tobacco Use    Smoking status: Never     Passive exposure: Past (spouse smoked 3 packs a day for 23 days)    Smokeless tobacco: Never     Social Drivers of Health     Financial Resource Strain: Low Risk  (7/13/2025)    Financial Resource Strain     Within the past 12 months, have you or your family members you live with been unable to get utilities (heat, electricity) when it was really needed?: No   Food Insecurity: Low Risk  (7/13/2025)    Food Insecurity     Within the past 12 months, did you worry that your food would run out before you got money to buy more?: No     Within the past 12 months, did the food you bought just not last and you didn t have money to get more?: No   Transportation Needs: Low Risk  (7/13/2025)    Transportation Needs     Within the past 12 months, has lack of transportation kept you from medical appointments, getting your medicines, non-medical meetings or appointments, work, or from getting things that you need?: No   Interpersonal Safety: Low Risk  (7/13/2025)    Interpersonal Safety     Do you feel physically and emotionally safe where you currently live?: Yes     Within the past 12 months, have you been hit, slapped, kicked or otherwise physically hurt by someone?: No      Within the past 12 months, have you been humiliated or emotionally abused in other ways by your partner or ex-partner?: No   Housing Stability: Low Risk  (7/13/2025)    Housing Stability     Do you have housing? : Yes     Are you worried about losing your housing?: No     FAMILY HISTORY:   No family history on file.  ALLERGIES:   Allergies   Allergen Reactions    Amoxicillin Diarrhea     MEDICATIONS:  Current Facility-Administered Medications   Medication Dose Route Frequency Provider Last Rate Last Admin    acetaminophen (TYLENOL) tablet 650 mg  650 mg Oral Q4H PRN Harris Sow MD   650 mg at 07/14/25 2246    Or    acetaminophen (TYLENOL) Suppository 650 mg  650 mg Rectal Q4H PRN Harris Sow MD        acetylcysteine (MUCOMYST) 20 % nebulizer solution 1 mL  1 mL Nebulization Q6H Tasha Ferraro APRN CNP        albuterol (PROVENTIL) neb solution 2.5 mg  2.5 mg Nebulization Q4H PRN France Bass MD   2.5 mg at 07/16/25 0338    amiodarone (NEXTERONE) 1.8 mg/mL in dextrose 5% 200 mL ADULT STANDARD infusion  0.5 mg/min Intravenous Continuous Roselia Luciano APRN CNP 16.7 mL/hr at 07/16/25 0720 0.5 mg/min at 07/16/25 0720    [Held by provider] aspirin EC tablet 81 mg  81 mg Oral Daily Nikki Oneill MD        [Held by provider] atorvastatin (LIPITOR) tablet 40 mg  40 mg Oral Daily Nikki Oneill MD        calcium carbonate (TUMS) chewable tablet 1,000 mg  1,000 mg Oral 4x Daily PRN Harris Sow MD   1,000 mg at 07/15/25 1725    ceFEPIme (MAXIPIME) 2 g vial to attach to  mL bag for ADULTS or NS 50 mL bag for PEDS  2 g Intravenous Q12H Harris Sow MD   2 g at 07/16/25 0428    chlorhexidine (PERIDEX) 0.12 % solution 15 mL  15 mL Mouth/Throat Q12H Tasha Ferraro APRN CNP        glucose gel 15-30 g  15-30 g Oral Q15 Min PRN Tasha Ferraro APRN CNP        Or    dextrose 50 % injection 25-50 mL  25-50 mL Intravenous Q15 Min PRN Tasha Ferraro APRN CNP        Or     glucagon injection 1 mg  1 mg Subcutaneous Q15 Min PRN Tasha Ferraro APRN CNP        fentaNYL (SUBLIMAZE) 50 mcg/mL bolus from pump  25-50 mcg Intravenous Q1H PRN Tasha Ferraro APRN CNP        fentaNYL (SUBLIMAZE) infusion   mcg/hr Intravenous Continuous Tasha Ferraro APRN CNP        [Held by provider] heparin 25,000 units in 0.45% NaCl 250 mL ANTICOAGULANT infusion  0-5,000 Units/hr Intravenous Continuous Keyana Mayorga MD   Paused at 07/16/25 1256    hydrALAZINE (APRESOLINE) injection 10 mg  10 mg Intravenous Q4H PRN Nikki Oneill MD        levalbuterol (XOPENEX) neb solution 1.25 mg  1.25 mg Nebulization Q6H Tasha Ferraro APRN CNP        [Held by provider] losartan (COZAAR) tablet 50 mg  50 mg Oral Daily Nikki Oneill MD        propofol (DIPRIVAN) infusion  5-75 mcg/kg/min Intravenous Continuous Tasha Ferraro APRN CNP        And    propofol (DIPRIVAN) bolus from bag or syringe pump  10 mg Intravenous Q15 Min PRN Tasha Ferraro APRN CNP        And    Medication Instruction   Does not apply Continuous PRN Tasha Ferraro APRN CNP        metoprolol (LOPRESSOR) injection 2.5 mg  2.5 mg Intravenous Q6H PRN Roselia Luciano APRN CNP        [Held by provider] metoprolol succinate ER (TOPROL-XL) 24 hr half-tab 12.5 mg  12.5 mg Oral Daily Nikki Oneill MD   12.5 mg at 07/16/25 0904    metroNIDAZOLE (FLAGYL) infusion 500 mg  500 mg Intravenous Q12H Harris Sow MD   500 mg at 07/16/25 0436    naloxone (NARCAN) injection 0.2 mg  0.2 mg Intravenous Q2 Min PRN eKyana Mayorga MD        Or    naloxone (NARCAN) injection 0.4 mg  0.4 mg Intravenous Q2 Min PRN Keyana Mayorga MD        Or    naloxone (NARCAN) injection 0.2 mg  0.2 mg Intramuscular Q2 Min PRN Keyana Mayorga MD        Or    naloxone (NARCAN) injection 0.4 mg  0.4 mg Intramuscular Q2 Min PRN Keyana Mayorga MD        ondansetron (ZOFRAN ODT) ODT tab 4 mg  4 mg Oral Q6H PRN Harris Sow MD        Or     ondansetron (ZOFRAN) injection 4 mg  4 mg Intravenous Q6H PRN Harris Sow MD        pantoprazole (PROTONIX) injection 40 mg  40 mg Intravenous BID Aleyda Lee MD        Patient is already receiving anticoagulation with heparin, enoxaparin (LOVENOX), warfarin (COUMADIN)  or other anticoagulant medication   Does not apply Continuous PRN Harris Sow MD        prochlorperazine (COMPAZINE) injection 5 mg  5 mg Intravenous Q6H PRN Harris Sow MD        Or    prochlorperazine (COMPAZINE) tablet 5 mg  5 mg Oral Q6H PRN Harris Sow MD        senna-docusate (SENOKOT-S/PERICOLACE) 8.6-50 MG per tablet 1 tablet  1 tablet Oral BID PRN Harris Sow MD        Or    senna-docusate (SENOKOT-S/PERICOLACE) 8.6-50 MG per tablet 2 tablet  2 tablet Oral BID PRN Harris Sow MD           PHYSICAL EXAMINATION:  Temp:  [97.5  F (36.4  C)-98.8  F (37.1  C)] 97.5  F (36.4  C)  Pulse:  [] 78  Resp:  [15-20] 16  BP: (121-137)/(71-86) 123/86  FiO2 (%):  [3 %-25 %] 3 %  SpO2:  [91 %-95 %] 94 %  General: Intubated/sedated  HEENT: NC, AT, mucous membranes moist, intact, PERRL 2 mm  Neuro: Sedated, withdraws to painful stimuli  Pulm/Resp: Expiratory rhonchi to RLL, diminished bases, nonlabored on mechanical ventilation  CV: RRR, S1S2, no murmur or rub  Abdomen: Soft, distended, grimaces to pain in all quadrants, active bowel sounds  : valdivia catheter in place, urine yellow and clear  Incisions/Skin: Warm, dry. No appreciable rashes or lesions.     LABS: Reviewed.   Arterial Blood Gases   No lab results found in last 7 days.  Complete Blood Count   Recent Labs   Lab 07/16/25  0420 07/15/25  1054 07/14/25  1215 07/13/25  2228   WBC 18.6* 16.0* 15.1* 13.7*   HGB 14.6 13.2* 13.4 13.0*   * 133* 121* 116*     Basic Metabolic Panel  Recent Labs   Lab 07/16/25  0420 07/15/25  1054 07/15/25  1052 07/14/25  0536 07/13/25  2228    136  --  137 135   POTASSIUM 3.7 3.5   --  4.1 4.0   CHLORIDE 99 101  --  101 99   CO2 27 23  --  20* 20*   BUN 30.1* 27.4*  --  25.3* 27.3*   CR 0.97 1.06  --  1.12 1.32*   * 139* 148* 111* 117*     Liver Function Tests  Recent Labs   Lab 07/16/25  0420 07/15/25  1054 07/14/25  0536 07/13/25  2228   AST 21 21 31 36   ALT 22 24 29 32   ALKPHOS 157* 138 115 119   BILITOTAL 1.4* 1.2 1.3* 1.1   ALBUMIN 3.1* 2.7* 2.8* 2.9*     Coagulation Profile  No lab results found in last 7 days.    IMAGING:  Recent Results (from the past 24 hours)   CT Chest Pulmonary Embolism w Contrast    Narrative    EXAM: CT CHEST PULMONARY EMBOLISM W CONTRAST  LOCATION: [facility]  DATE: 7/16/2025    INDICATION: Acute hypoxic respiratory failure.  Acute left lower extremity DVT. Evaluate for PE.  COMPARISON: Noncontrast CT 7/13/2025.  TECHNIQUE: CT chest pulmonary angiogram during arterial phase injection of IV contrast. Multiplanar reformats and MIP reconstructions were performed. Dose reduction techniques were used.   CONTRAST: 70mL Isovue 370.    FINDINGS:  ANGIOGRAM CHEST: Evaluation of pulmonary embolism limited due to motion artifact and consolidation/pleural fluid in the right lung base.      Allowing for the aforementioned limitations, there is no evidence for pulmonary embolism. Normal caliber thoracic aorta. No dissection. Minimal vascular calcification.    LUNGS AND PLEURA: Loculated subpulmonic posterior right pleural fluid. Minimal pleural fluid posterior medial right mid hemithorax. Atelectasis right lung base. Allowing for motion artifact there is no evidence for pulmonary infiltrate or nodularity.   Bronchial wall thickening bilaterally.    MEDIASTINUM/AXILLAE: Mild cardiac enlargement. No pericardial fluid. Wall thickening mid to distal esophagus with surrounding mild edema. No lymphadenopathy.    CORONARY ARTERY CALCIFICATION: Mild.    UPPER ABDOMEN: Large loculated subdiaphragmatic upper abdominal fluid collection (series 3, image  219 and series 9, image 65). This is elevating the right hemidiaphragm. Surface contour nodularity to liver compatible with chronic underlying hepatic   parenchymal disease. Left hepatic lobe cyst, no follow-up. Mild gallbladder wall thickening with mild gallbladder distention. No radiopaque cholelithiasis. Right renal cyst, no follow-up. Adrenal glands negative.    MUSCULOSKELETAL: Degenerative hypertrophic changes in the spine.      Impression    IMPRESSION:  1.  Evaluation for pulmonary embolism limited due to motion artifact and consolidation/pleural fluid right lung base.     2.  Loculated subpulmonic pleural fluid right lung base with pleural fluid in the posterior aspect of the right hemithorax. Associated atelectasis right lung base. Allowing for motion artifact there is no definitive evidence for pulmonary infiltrate   although pneumonia in the right lung base cannot be excluded in the area of volume loss. This process has a significantly progressed since prior study.    3.  Bronchial wall thickening bilaterally.    4.  No definitive evidence for pulmonary embolism.    5.  Normal caliber aorta without dissection.    6.  Large loculated subdiaphragmatic fluid collection over the right hepatic lobe, markedly increased since prior. Underlying chronic hepatic parenchymal disease.    7.  Distended gallbladder with gallbladder wall thickening, similar to prior. Consider further evaluation with ultrasound if not previously performed.   XR ERCP    Narrative    This exam was marked as non-reportable because it will not be read by a   radiologist or a [place] non-radiologist provider.

## 2025-07-16 NOTE — PLAN OF CARE
Goal Outcome Evaluation:    Plan of Care Reviewed With: patient    Overall Patient Progress: no change    Orientation: A&O x4  Aggression Stop Light: Green  Activity: Ax1 w/ IV pole  Diet/BS Checks: Clears, NPO at midnight  Tele:   IV Access/Drains: 2 PIV, 1 infusing amio gtt 0.5mg/min, 1 infusing Heparin @ 1800 units/hr  Pain Management: denies pain.  Abnormal VS/Results: VSS on RA. Pt asked to be placed back on BiPAP this evening for SOB 25%   Bowel/Bladder: Continent of B/B, 1 BM  Skin/Wounds: scattered bruising, blanchable sacrum  Consults: Cardio, GenSurg, IR, GI  D/C Disposition: pending   Other Info: RRT called today for SOB when turned to left side during ECHO. Placed on BiPAP for awhile. Breathing improved per pt. See RRT note.   -ERCP procedure rescheduled for tomorrow   -Amio gtt stopped due to consistent HR<55, restarted this evening HR consistently 80s-90s.  -Hep gtt will need to be stopped 8am tomorrow for procedure per GI.   -Lactic 2.4 , BNP elevated, and CRP  -ECHO completed  -Mg replacement recheck for tomorrow morning.   - Sepsis protocol fired 1858.

## 2025-07-16 NOTE — PROGRESS NOTES
Resp: Clear breath sound. Weaned from BiPAP @ 25% FiO2. Stable on room air.   Telemetry: NSR  Neuro: Intact. AO x4.  GI/: NPO except meds at midnight. Voiding to urinal, adequate amount. Abdomen distended, hypoactive BS x4, passing gas. RUQ pain 2/10.   Skin/Wounds: Skin intact.  Lines/Drains:   -Amiodarone infusing @ 0.5 mg/hr in R AC.  -Heparin infusing @ 1950 unit(s)/hr, re-check at 1116h  Activity: SBA  Sleep: 6h  Labs:     Bilirubin total 1.4  Alk Phos 157  WBC 18.6   Platelet 130        Aggression Stop Light: Green    -Wheezy & coughing, albuterol neb prn given.          Patient Care Plan: Monitor for signs of bleeding & increase abdominal pain. For ERCP 07/16.

## 2025-07-16 NOTE — PLAN OF CARE
Goal Outcome Evaluation:    Orientations: A/Ox4, pleasant and calm  Vitals/Pain: VSS on RA. LS are dim. BELLE. Denies shortness of breath. Pt reporting RUQ pain, declining intervention  Tele: SR w/ PVCs   Lines/Drains: PIV x3, infusing amiodarone at 0.5 mg/hr.  Skin/Wounds: LLE +1 edema.   GI/: Adequate UOP. No BM, BS audible.   Labs: Abnormal/Trends, Electrolyte Replacement- Mag 2.2. WBC 18.6  Ambulation/Assist: SBA  Diet: NPO    Pt transferred to ICU after procedure

## 2025-07-16 NOTE — PROGRESS NOTES
"Surgery    No complaints  Denies abdominal pain at rest  +bowel function   Breathing much better today. Sats >90% on RA  Denies CP, dyspnea    Gen:  Awake, Alert, NAD  /74 (BP Location: Right arm)   Pulse 91   Temp 98.3  F (36.8  C) (Oral)   Resp 16   Ht 1.778 m (5' 10\")   Wt 86.5 kg (190 lb 9.6 oz)   SpO2 92%   BMI 27.35 kg/m    Resp - Non-labored   Abdomen - soft, tender beneath right costal margin with palpation  Extremities - no lower extremity edema or tenderness with palpation    Wbc 18.6 (16)  Blood cx. NG after 1 day.    A/P Kristofer Montana is a 80 year old man with multiple ongoing and acute medical issues who [facility]. [facility] a cholecystostomy tube due to active anticoagulation for acute DVT. Developed AF with RVR in the interim.  ERCP scheduled for this afternoon.     - Worsening leukocytosis -> Cefipime+Flagyl  - May have full liquids this evening if ok with GI. NPO at MN.  - Anticipate EUS/ERCP this afternoon.   - Decision to perform laparoscopic cholecystectomy vs cholecystostomy tube pending medical optimization and clearance as well as holding therapeutic anticoagulation for at least a day.   - Surgery team following progress closely.     Pawel Rush PA-C  Office: [phone]  Pager: [phone]    "

## 2025-07-16 NOTE — PROGRESS NOTES
Updated cardiology regarding RRT incident for SOB & pt has to be placed on BiPAP 30%. At present pt is NSR and was converted midnight. Asked Dr. Johns if ok to continue the amiod drip. Received order to continue.

## 2025-07-16 NOTE — PROGRESS NOTES
Brief note.  Discussed with Dr. Bob from GI, per report patient had a sphincterotomy.  Recommend holding intravenous heparin drip tonight.  Concern for oozing/bleeding status post sphincterotomy.  Monitor hemoglobin level in AM and resumption of anticoagulation for acute DVT on 7/17 if okay by GI.   Updated bedside RN

## 2025-07-16 NOTE — ANESTHESIA PROCEDURE NOTES
Airway       Patient location during procedure: OR       Procedure Start/Stop Times: 7/16/2025 4:42 PM  Staff -        CRNA: Christi Carbajal APRN CRNA       Performed By: CRNA  Consent for Airway        Urgency: emergent  Indications and Patient Condition       Indications for airway management: gela-procedural       Induction type:RSI       Mask difficulty assessment: 0 - not attempted    Final Airway Details       Final airway type: endotracheal airway       Successful airway: ETT - single and Single subglottic suction  Endotracheal Airway Details        ETT size (mm): 8.0       Cuffed: yes       Successful intubation technique: video laryngoscopy       VL Blade Size: Glidescope 4       Grade View of Cords: 1       Adjucts: stylet       Position: Right       Measured from: gums/teeth       Secured at (cm): 23       Bite block used: None    Post intubation assessment        Placement verified by: capnometry, equal breath sounds and chest rise        Number of attempts at approach: 1       Number of other approaches attempted: 0       Secured with: tape       Ease of procedure: easy       Dentition: Unchanged    Medication(s) Administered   Medication Administration Time: 7/16/2025 4:42 PM

## 2025-07-16 NOTE — PROVIDER NOTIFICATION
"7/16/25 0750 to Dr. Oneill via [employer] \"GI is recommending to hold heparin gtt 6 hours prior to procedure, but is deferring to hospitalist team. What are your recs?\"     - \" Continue heparin, will round soon\"    Addendum 7/16/25 1250 via [employer]: Writer updated Dr. Oneill that CT PE study results are back. Per Dr. Oneill ordered to hold heparin gtt now and resume after procedure w/ okay from GI team.    "

## 2025-07-16 NOTE — ANESTHESIA PROCEDURE NOTES
Airway       Patient location during procedure: OR       Procedure Start/Stop Times: 7/16/2025 3:29 PM  Staff -        CRNA: John Looney APRN CRNA       Performed By: CRNA  Consent for Airway        Urgency: elective  Indications and Patient Condition       Indications for airway management: gela-procedural       Induction type:intravenous       Mask difficulty assessment: 0 - not attempted    Final Airway Details       Final airway type: endotracheal airway       Successful airway: ETT - single  Endotracheal Airway Details        ETT size (mm): 8.0       Cuffed: yes       Successful intubation technique: video laryngoscopy       VL Blade Size: Glidescope 4       Grade View of Cords: 1       Adjucts: stylet       Bite block used: None    Post intubation assessment        Placement verified by: capnometry, equal breath sounds and chest rise        Number of attempts at approach: 1       Ease of procedure: easy       Dentition: Intact and Unchanged    Medication(s) Administered   Medication Administration Time: 7/16/2025 3:29 PM

## 2025-07-17 ENCOUNTER — APPOINTMENT (OUTPATIENT)
Dept: GENERAL RADIOLOGY | Facility: CLINIC | Age: 80
End: 2025-07-17
Attending: STUDENT IN AN ORGANIZED HEALTH CARE EDUCATION/TRAINING PROGRAM
Payer: MEDICARE

## 2025-07-17 ENCOUNTER — APPOINTMENT (OUTPATIENT)
Dept: ULTRASOUND IMAGING | Facility: CLINIC | Age: 80
DRG: 853 | End: 2025-07-17
Attending: STUDENT IN AN ORGANIZED HEALTH CARE EDUCATION/TRAINING PROGRAM
Payer: MEDICARE

## 2025-07-17 VITALS
HEART RATE: 56 BPM | RESPIRATION RATE: 12 BRPM | HEIGHT: 70 IN | WEIGHT: 190.6 LBS | BODY MASS INDEX: 27.29 KG/M2 | DIASTOLIC BLOOD PRESSURE: 56 MMHG | TEMPERATURE: 96.6 F | OXYGEN SATURATION: 97 % | SYSTOLIC BLOOD PRESSURE: 84 MMHG

## 2025-07-17 LAB
ALBUMIN SERPL BCG-MCNC: 2.1 G/DL (ref 3.5–5.2)
ALLEN'S TEST: ABNORMAL
ALLEN'S TEST: ABNORMAL
ALLEN'S TEST: YES
ALP SERPL-CCNC: 120 U/L (ref 40–150)
ALT SERPL W P-5'-P-CCNC: 14 U/L (ref 0–70)
ANION GAP SERPL CALCULATED.3IONS-SCNC: 16 MMOL/L (ref 7–15)
AST SERPL W P-5'-P-CCNC: 18 U/L (ref 0–45)
BACTERIA SPEC CULT: NORMAL
BASE EXCESS BLDA CALC-SCNC: -0.5 MMOL/L (ref -3–3)
BASE EXCESS BLDA CALC-SCNC: -1.2 MMOL/L (ref -3–3)
BASE EXCESS BLDA CALC-SCNC: -1.3 MMOL/L (ref -3–3)
BASE EXCESS BLDV CALC-SCNC: -1.5 MMOL/L (ref -3–3)
BILIRUB DIRECT SERPL-MCNC: 0.58 MG/DL (ref 0–0.3)
BILIRUB SERPL-MCNC: 1.1 MG/DL
BUN SERPL-MCNC: 43.9 MG/DL (ref 8–23)
C PNEUM DNA SPEC QL NAA+PROBE: NOT DETECTED
CALCIUM SERPL-MCNC: 7.4 MG/DL (ref 8.8–10.4)
CHLORIDE SERPL-SCNC: 102 MMOL/L (ref 98–107)
CREAT SERPL-MCNC: 1.39 MG/DL (ref 0.67–1.17)
EGFRCR SERPLBLD CKD-EPI 2021: 52 ML/MIN/1.73M2
EST. AVERAGE GLUCOSE BLD GHB EST-MCNC: 120 MG/DL
FLUAV H1 2009 PAND RNA SPEC QL NAA+PROBE: NOT DETECTED
FLUAV H1 RNA SPEC QL NAA+PROBE: NOT DETECTED
FLUAV H3 RNA SPEC QL NAA+PROBE: NOT DETECTED
FLUAV RNA SPEC QL NAA+PROBE: NOT DETECTED
FLUBV RNA SPEC QL NAA+PROBE: NOT DETECTED
GLUCOSE BLDC GLUCOMTR-MCNC: 157 MG/DL (ref 70–99)
GLUCOSE BLDC GLUCOMTR-MCNC: 184 MG/DL (ref 70–99)
GLUCOSE BLDC GLUCOMTR-MCNC: 186 MG/DL (ref 70–99)
GLUCOSE BLDC GLUCOMTR-MCNC: 212 MG/DL (ref 70–99)
GLUCOSE SERPL-MCNC: 182 MG/DL (ref 70–99)
GRAM STAIN RESULT: ABNORMAL
GRAM STAIN RESULT: ABNORMAL
HADV DNA SPEC QL NAA+PROBE: NOT DETECTED
HBA1C MFR BLD: 5.8 %
HCO3 BLD-SCNC: 24 MMOL/L (ref 21–28)
HCO3 BLDV-SCNC: 24 MMOL/L (ref 21–28)
HCO3 SERPL-SCNC: 21 MMOL/L (ref 22–29)
HCOV PNL SPEC NAA+PROBE: NOT DETECTED
HGB BLD-MCNC: 12.4 G/DL (ref 13.3–17.7)
HMPV RNA SPEC QL NAA+PROBE: NOT DETECTED
HPIV1 RNA SPEC QL NAA+PROBE: NOT DETECTED
HPIV2 RNA SPEC QL NAA+PROBE: NOT DETECTED
HPIV3 RNA SPEC QL NAA+PROBE: NOT DETECTED
HPIV4 RNA SPEC QL NAA+PROBE: NOT DETECTED
LACTATE SERPL-SCNC: 1.4 MMOL/L (ref 0.7–2)
LACTATE SERPL-SCNC: 1.8 MMOL/L (ref 0.7–2)
LACTATE SERPL-SCNC: 2.2 MMOL/L (ref 0.7–2)
LACTATE SERPL-SCNC: 3.5 MMOL/L (ref 0.7–2)
LACTATE SERPL-SCNC: 7 MMOL/L (ref 0.7–2)
M PNEUMO DNA SPEC QL NAA+PROBE: NOT DETECTED
MAGNESIUM SERPL-MCNC: 1.9 MG/DL (ref 1.7–2.3)
MCV RBC AUTO: 86 FL (ref 78–100)
MCV RBC AUTO: 86 FL (ref 78–100)
MCV RBC AUTO: 87 FL (ref 78–100)
MRSA DNA SPEC QL NAA+PROBE: NEGATIVE
O2/TOTAL GAS SETTING VFR VENT: 50 %
O2/TOTAL GAS SETTING VFR VENT: 55 %
O2/TOTAL GAS SETTING VFR VENT: 55 %
O2/TOTAL GAS SETTING VFR VENT: 70 %
OXYHGB MFR BLDA: 95 % (ref 92–100)
OXYHGB MFR BLDA: 95 % (ref 92–100)
OXYHGB MFR BLDA: 97 % (ref 92–100)
OXYHGB MFR BLDV: 75 % (ref 70–75)
PCO2 BLD: 36 MM HG (ref 35–45)
PCO2 BLD: 42 MM HG (ref 35–45)
PCO2 BLD: 42 MM HG (ref 35–45)
PCO2 BLDV: 41 MM HG (ref 40–50)
PEEP: 12 CM H2O
PEEP: 12 CM H2O
PH BLD: 7.37 [PH] (ref 7.35–7.45)
PH BLD: 7.37 [PH] (ref 7.35–7.45)
PH BLD: 7.42 [PH] (ref 7.35–7.45)
PH BLDV: 7.37 [PH] (ref 7.32–7.43)
PLATELET # BLD AUTO: 144 10E3/UL (ref 150–450)
PO2 BLD: 103 MM HG (ref 80–105)
PO2 BLD: 84 MM HG (ref 80–105)
PO2 BLD: 84 MM HG (ref 80–105)
PO2 BLDV: 44 MM HG (ref 25–47)
POTASSIUM SERPL-SCNC: 3.7 MMOL/L (ref 3.4–5.3)
PROT SERPL-MCNC: 5 G/DL (ref 6.4–8.3)
RSV RNA SPEC QL NAA+PROBE: NOT DETECTED
RSV RNA SPEC QL NAA+PROBE: NOT DETECTED
RV+EV RNA SPEC QL NAA+PROBE: NOT DETECTED
SA TARGET DNA: NEGATIVE
SAO2 % BLDA: 96.3 % (ref 95–96)
SAO2 % BLDA: 96.3 % (ref 95–96)
SAO2 % BLDA: 98.4 % (ref 95–96)
SAO2 % BLDV: 75.9 % (ref 70–75)
SODIUM SERPL-SCNC: 139 MMOL/L (ref 135–145)
UFH PPP CHRO-ACNC: 0.31 IU/ML (ref ?–1.1)
WBC # BLD AUTO: 12.9 10E3/UL (ref 4–11)

## 2025-07-17 PROCEDURE — 85048 AUTOMATED LEUKOCYTE COUNT: CPT | Performed by: HOSPITALIST

## 2025-07-17 PROCEDURE — 99291 CRITICAL CARE FIRST HOUR: CPT | Mod: 25 | Performed by: INTERNAL MEDICINE

## 2025-07-17 PROCEDURE — 999N000287 HC ICU ADULT ROUNDING, EACH 10 MINS

## 2025-07-17 PROCEDURE — 71045 X-RAY EXAM CHEST 1 VIEW: CPT

## 2025-07-17 PROCEDURE — 93971 EXTREMITY STUDY: CPT | Mod: RT

## 2025-07-17 PROCEDURE — 258N000003 HC RX IP 258 OP 636

## 2025-07-17 PROCEDURE — 250N000011 HC RX IP 250 OP 636

## 2025-07-17 PROCEDURE — 250N000009 HC RX 250: Performed by: INTERNAL MEDICINE

## 2025-07-17 PROCEDURE — 82805 BLOOD GASES W/O2 SATURATION: CPT | Performed by: STUDENT IN AN ORGANIZED HEALTH CARE EDUCATION/TRAINING PROGRAM

## 2025-07-17 PROCEDURE — 250N000011 HC RX IP 250 OP 636: Performed by: HOSPITALIST

## 2025-07-17 PROCEDURE — 85049 AUTOMATED PLATELET COUNT: CPT | Performed by: HOSPITALIST

## 2025-07-17 PROCEDURE — 999N000185 HC STATISTIC TRANSPORT TIME EA 15 MIN

## 2025-07-17 PROCEDURE — 83036 HEMOGLOBIN GLYCOSYLATED A1C: CPT | Performed by: STUDENT IN AN ORGANIZED HEALTH CARE EDUCATION/TRAINING PROGRAM

## 2025-07-17 PROCEDURE — 250N000011 HC RX IP 250 OP 636: Performed by: SURGERY

## 2025-07-17 PROCEDURE — 258N000003 HC RX IP 258 OP 636: Performed by: SURGERY

## 2025-07-17 PROCEDURE — 83735 ASSAY OF MAGNESIUM: CPT | Performed by: HOSPITALIST

## 2025-07-17 PROCEDURE — 36600 WITHDRAWAL OF ARTERIAL BLOOD: CPT

## 2025-07-17 PROCEDURE — 85018 HEMOGLOBIN: CPT | Performed by: INTERNAL MEDICINE

## 2025-07-17 PROCEDURE — 82248 BILIRUBIN DIRECT: CPT | Performed by: INTERNAL MEDICINE

## 2025-07-17 PROCEDURE — 250N000009 HC RX 250

## 2025-07-17 PROCEDURE — 250N000011 HC RX IP 250 OP 636: Performed by: INTERNAL MEDICINE

## 2025-07-17 PROCEDURE — 258N000003 HC RX IP 258 OP 636: Performed by: STUDENT IN AN ORGANIZED HEALTH CARE EDUCATION/TRAINING PROGRAM

## 2025-07-17 PROCEDURE — 94640 AIRWAY INHALATION TREATMENT: CPT | Mod: 76

## 2025-07-17 PROCEDURE — 250N000011 HC RX IP 250 OP 636: Performed by: STUDENT IN AN ORGANIZED HEALTH CARE EDUCATION/TRAINING PROGRAM

## 2025-07-17 PROCEDURE — 999N000157 HC STATISTIC RCP TIME EA 10 MIN

## 2025-07-17 PROCEDURE — 87205 SMEAR GRAM STAIN: CPT

## 2025-07-17 PROCEDURE — 250N000013 HC RX MED GY IP 250 OP 250 PS 637

## 2025-07-17 PROCEDURE — 83605 ASSAY OF LACTIC ACID: CPT | Performed by: STUDENT IN AN ORGANIZED HEALTH CARE EDUCATION/TRAINING PROGRAM

## 2025-07-17 PROCEDURE — 94003 VENT MGMT INPAT SUBQ DAY: CPT

## 2025-07-17 PROCEDURE — 83605 ASSAY OF LACTIC ACID: CPT | Performed by: SURGERY

## 2025-07-17 PROCEDURE — 84155 ASSAY OF PROTEIN SERUM: CPT | Performed by: HOSPITALIST

## 2025-07-17 PROCEDURE — 94640 AIRWAY INHALATION TREATMENT: CPT

## 2025-07-17 PROCEDURE — 200N000001 HC R&B ICU

## 2025-07-17 PROCEDURE — 250N000012 HC RX MED GY IP 250 OP 636 PS 637: Performed by: STUDENT IN AN ORGANIZED HEALTH CARE EDUCATION/TRAINING PROGRAM

## 2025-07-17 PROCEDURE — 99232 SBSQ HOSP IP/OBS MODERATE 35: CPT | Performed by: SURGERY

## 2025-07-17 PROCEDURE — 85520 HEPARIN ASSAY: CPT | Performed by: STUDENT IN AN ORGANIZED HEALTH CARE EDUCATION/TRAINING PROGRAM

## 2025-07-17 PROCEDURE — 36620 INSERTION CATHETER ARTERY: CPT | Mod: GC | Performed by: STUDENT IN AN ORGANIZED HEALTH CARE EDUCATION/TRAINING PROGRAM

## 2025-07-17 RX ORDER — HEPARIN SODIUM 10000 [USP'U]/100ML
0-5000 INJECTION, SOLUTION INTRAVENOUS CONTINUOUS
Status: DISPENSED | OUTPATIENT
Start: 2025-07-17 | End: 2025-07-25

## 2025-07-17 RX ORDER — ATORVASTATIN CALCIUM 40 MG/1
40 TABLET, FILM COATED ORAL DAILY
Status: DISCONTINUED | OUTPATIENT
Start: 2025-07-18 | End: 2025-08-07 | Stop reason: HOSPADM

## 2025-07-17 RX ORDER — NICOTINE POLACRILEX 4 MG
15-30 LOZENGE BUCCAL
Status: DISCONTINUED | OUTPATIENT
Start: 2025-07-17 | End: 2025-07-17

## 2025-07-17 RX ORDER — DEXTROSE MONOHYDRATE 25 G/50ML
25-50 INJECTION, SOLUTION INTRAVENOUS
Status: DISCONTINUED | OUTPATIENT
Start: 2025-07-17 | End: 2025-07-17

## 2025-07-17 RX ORDER — CEFEPIME HYDROCHLORIDE 2 G/1
2 INJECTION, POWDER, FOR SOLUTION INTRAVENOUS EVERY 12 HOURS
Status: DISCONTINUED | OUTPATIENT
Start: 2025-07-17 | End: 2025-07-18

## 2025-07-17 RX ORDER — HYDROCORTISONE SODIUM SUCCINATE 100 MG/2ML
75 INJECTION INTRAMUSCULAR; INTRAVENOUS EVERY 6 HOURS
Status: DISCONTINUED | OUTPATIENT
Start: 2025-07-17 | End: 2025-07-19

## 2025-07-17 RX ORDER — NOREPINEPHRINE BITARTRATE 0.02 MG/ML
.01-.6 INJECTION, SOLUTION INTRAVENOUS CONTINUOUS
Status: DISCONTINUED | OUTPATIENT
Start: 2025-07-17 | End: 2025-07-20

## 2025-07-17 RX ORDER — ACETYLCYSTEINE 200 MG/ML
1 SOLUTION ORAL; RESPIRATORY (INHALATION) EVERY 4 HOURS
Status: DISCONTINUED | OUTPATIENT
Start: 2025-07-17 | End: 2025-07-19

## 2025-07-17 RX ORDER — LEVALBUTEROL INHALATION SOLUTION 1.25 MG/3ML
1.25 SOLUTION RESPIRATORY (INHALATION)
Status: DISCONTINUED | OUTPATIENT
Start: 2025-07-17 | End: 2025-07-19

## 2025-07-17 RX ORDER — MAGNESIUM SULFATE HEPTAHYDRATE 40 MG/ML
2 INJECTION, SOLUTION INTRAVENOUS ONCE
Status: COMPLETED | OUTPATIENT
Start: 2025-07-17 | End: 2025-07-17

## 2025-07-17 RX ADMIN — PROPOFOL 40 MCG/KG/MIN: 10 INJECTION, EMULSION INTRAVENOUS at 01:35

## 2025-07-17 RX ADMIN — LEVALBUTEROL HYDROCHLORIDE 1.25 MG: 1.25 SOLUTION RESPIRATORY (INHALATION) at 11:32

## 2025-07-17 RX ADMIN — CHLORHEXIDINE GLUCONATE 15 ML: 1.2 SOLUTION ORAL at 19:34

## 2025-07-17 RX ADMIN — ACETYLCYSTEINE 1 ML: 200 SOLUTION ORAL; RESPIRATORY (INHALATION) at 01:46

## 2025-07-17 RX ADMIN — PROPOFOL 50 MCG/KG/MIN: 10 INJECTION, EMULSION INTRAVENOUS at 19:34

## 2025-07-17 RX ADMIN — HYDROCORTISONE SODIUM SUCCINATE 75 MG: 100 INJECTION, POWDER, FOR SOLUTION INTRAMUSCULAR; INTRAVENOUS at 20:35

## 2025-07-17 RX ADMIN — CEFEPIME 2 G: 2 INJECTION, POWDER, FOR SOLUTION INTRAVENOUS at 17:29

## 2025-07-17 RX ADMIN — ACETYLCYSTEINE 1 ML: 200 SOLUTION ORAL; RESPIRATORY (INHALATION) at 07:32

## 2025-07-17 RX ADMIN — PROPOFOL 50 MCG/KG/MIN: 10 INJECTION, EMULSION INTRAVENOUS at 05:42

## 2025-07-17 RX ADMIN — HYDROCORTISONE SODIUM SUCCINATE 75 MG: 100 INJECTION, POWDER, FOR SOLUTION INTRAMUSCULAR; INTRAVENOUS at 15:39

## 2025-07-17 RX ADMIN — LEVALBUTEROL HYDROCHLORIDE 1.25 MG: 1.25 SOLUTION RESPIRATORY (INHALATION) at 07:32

## 2025-07-17 RX ADMIN — SODIUM CHLORIDE, SODIUM LACTATE, POTASSIUM CHLORIDE, AND CALCIUM CHLORIDE 250 ML: .6; .31; .03; .02 INJECTION, SOLUTION INTRAVENOUS at 01:57

## 2025-07-17 RX ADMIN — CEFEPIME 2 G: 2 INJECTION, POWDER, FOR SOLUTION INTRAVENOUS at 06:06

## 2025-07-17 RX ADMIN — NOREPINEPHRINE BITARTRATE 0.18 MCG/KG/MIN: 0.02 INJECTION, SOLUTION INTRAVENOUS at 09:40

## 2025-07-17 RX ADMIN — SODIUM CHLORIDE, SODIUM LACTATE, POTASSIUM CHLORIDE, AND CALCIUM CHLORIDE 1000 ML: .6; .31; .03; .02 INJECTION, SOLUTION INTRAVENOUS at 08:40

## 2025-07-17 RX ADMIN — INSULIN ASPART 1 UNITS: 100 INJECTION, SOLUTION INTRAVENOUS; SUBCUTANEOUS at 17:26

## 2025-07-17 RX ADMIN — PANTOPRAZOLE SODIUM 40 MG: 40 INJECTION, POWDER, FOR SOLUTION INTRAVENOUS at 19:34

## 2025-07-17 RX ADMIN — ACETYLCYSTEINE 1 ML: 200 SOLUTION ORAL; RESPIRATORY (INHALATION) at 19:36

## 2025-07-17 RX ADMIN — HEPARIN SODIUM 1550 UNITS/HR: 10000 INJECTION, SOLUTION INTRAVENOUS at 14:27

## 2025-07-17 RX ADMIN — SODIUM CHLORIDE, SODIUM LACTATE, POTASSIUM CHLORIDE, AND CALCIUM CHLORIDE 1000 ML: .6; .31; .03; .02 INJECTION, SOLUTION INTRAVENOUS at 10:07

## 2025-07-17 RX ADMIN — METRONIDAZOLE 500 MG: 500 INJECTION, SOLUTION INTRAVENOUS at 02:47

## 2025-07-17 RX ADMIN — ACETYLCYSTEINE 1 ML: 200 SOLUTION ORAL; RESPIRATORY (INHALATION) at 11:32

## 2025-07-17 RX ADMIN — LEVALBUTEROL HYDROCHLORIDE 1.25 MG: 1.25 SOLUTION RESPIRATORY (INHALATION) at 19:36

## 2025-07-17 RX ADMIN — ACETYLCYSTEINE 1 ML: 200 SOLUTION ORAL; RESPIRATORY (INHALATION) at 16:00

## 2025-07-17 RX ADMIN — PROPOFOL 50 MCG/KG/MIN: 10 INJECTION, EMULSION INTRAVENOUS at 15:03

## 2025-07-17 RX ADMIN — NOREPINEPHRINE BITARTRATE 0.16 MCG/KG/MIN: 0.02 INJECTION, SOLUTION INTRAVENOUS at 08:47

## 2025-07-17 RX ADMIN — MAGNESIUM SULFATE HEPTAHYDRATE 2 G: 40 INJECTION, SOLUTION INTRAVENOUS at 05:06

## 2025-07-17 RX ADMIN — PANTOPRAZOLE SODIUM 40 MG: 40 INJECTION, POWDER, FOR SOLUTION INTRAVENOUS at 08:40

## 2025-07-17 RX ADMIN — NOREPINEPHRINE BITARTRATE 0.07 MCG/KG/MIN: 0.02 INJECTION, SOLUTION INTRAVENOUS at 01:37

## 2025-07-17 RX ADMIN — SODIUM CHLORIDE, SODIUM LACTATE, POTASSIUM CHLORIDE, AND CALCIUM CHLORIDE 250 ML: .6; .31; .03; .02 INJECTION, SOLUTION INTRAVENOUS at 04:34

## 2025-07-17 RX ADMIN — INSULIN ASPART 1 UNITS: 100 INJECTION, SOLUTION INTRAVENOUS; SUBCUTANEOUS at 20:19

## 2025-07-17 RX ADMIN — SODIUM CHLORIDE, SODIUM LACTATE, POTASSIUM CHLORIDE, AND CALCIUM CHLORIDE 1000 ML: .6; .31; .03; .02 INJECTION, SOLUTION INTRAVENOUS at 04:50

## 2025-07-17 RX ADMIN — NOREPINEPHRINE BITARTRATE 0.06 MCG/KG/MIN: 0.02 INJECTION, SOLUTION INTRAVENOUS at 18:41

## 2025-07-17 RX ADMIN — INSULIN ASPART 1 UNITS: 100 INJECTION, SOLUTION INTRAVENOUS; SUBCUTANEOUS at 10:03

## 2025-07-17 RX ADMIN — VASOPRESSIN 2.4 UNITS/HR: 20 INJECTION, SOLUTION INTRAMUSCULAR; SUBCUTANEOUS at 09:45

## 2025-07-17 RX ADMIN — PROPOFOL 50 MCG/KG/MIN: 10 INJECTION, EMULSION INTRAVENOUS at 10:33

## 2025-07-17 RX ADMIN — HYDROCORTISONE SODIUM SUCCINATE 75 MG: 100 INJECTION, POWDER, FOR SOLUTION INTRAMUSCULAR; INTRAVENOUS at 10:04

## 2025-07-17 RX ADMIN — LEVALBUTEROL HYDROCHLORIDE 1.25 MG: 1.25 SOLUTION RESPIRATORY (INHALATION) at 16:00

## 2025-07-17 RX ADMIN — INSULIN ASPART 2 UNITS: 100 INJECTION, SOLUTION INTRAVENOUS; SUBCUTANEOUS at 13:23

## 2025-07-17 RX ADMIN — CHLORHEXIDINE GLUCONATE 15 ML: 1.2 SOLUTION ORAL at 08:40

## 2025-07-17 RX ADMIN — VASOPRESSIN 2.4 UNITS/HR: 20 INJECTION, SOLUTION INTRAMUSCULAR; SUBCUTANEOUS at 16:40

## 2025-07-17 RX ADMIN — METRONIDAZOLE 500 MG: 500 INJECTION, SOLUTION INTRAVENOUS at 15:39

## 2025-07-17 RX ADMIN — LEVALBUTEROL HYDROCHLORIDE 1.25 MG: 1.25 SOLUTION RESPIRATORY (INHALATION) at 01:46

## 2025-07-17 ASSESSMENT — ACTIVITIES OF DAILY LIVING (ADL)
ADLS_ACUITY_SCORE: 47
ADLS_ACUITY_SCORE: 48
ADLS_ACUITY_SCORE: 48
ADLS_ACUITY_SCORE: 46
ADLS_ACUITY_SCORE: 47
ADLS_ACUITY_SCORE: 47
ADLS_ACUITY_SCORE: 48
ADLS_ACUITY_SCORE: 47
ADLS_ACUITY_SCORE: 48
ADLS_ACUITY_SCORE: 53
ADLS_ACUITY_SCORE: 47
ADLS_ACUITY_SCORE: 47
ADLS_ACUITY_SCORE: 53
ADLS_ACUITY_SCORE: 47

## 2025-07-17 NOTE — PLAN OF CARE
"Goal Outcome Evaluation:    Neuro: Intubated & sedated on propofol. Withdraws to pain. PERRL.   Cardiac: Tele: SR  Resp: FiO2 70, Peep 8. Minimal secretions.   GI/: External catheter. BS faint.   Skin: Scattered bruising.   IV/Drips: PIV x3, levophed infusing. LR bolus given. Amnio started per order.   POC: Arrived for OR, settled.       Problem: Adult Inpatient Plan of Care  Goal: Plan of Care Review  Description: The Plan of Care Review/Shift note should be completed every shift.  The Outcome Evaluation is a brief statement about your assessment that the patient is improving, declining, or no change.  This information will be displayed automatically on your shift  note.  Outcome: Not Progressing  Goal: Patient-Specific Goal (Individualized)  Description: You can add care plan individualizations to a care plan. Examples of Individualization might be:  \"Parent requests to be called daily at 9am for status\", \"I have a hard time hearing out of my right ear\", or \"Do not touch me to wake me up as it startles  me\".  Outcome: Not Progressing  Goal: Absence of Hospital-Acquired Illness or Injury  Outcome: Not Progressing  Intervention: Identify and Manage Fall Risk  Recent Flowsheet Documentation  Taken 7/16/2025 1800 by Dara Padilla, RN  Safety Promotion/Fall Prevention:   activity supervised   assistive device/personal items within reach   clutter free environment maintained   lighting adjusted   mobility aid in reach   nonskid shoes/slippers when out of bed   room near nurse's station   room organization consistent   safety round/check completed   supervised activity  Intervention: Prevent Skin Injury  Recent Flowsheet Documentation  Taken 7/16/2025 1800 by Dara Padilla, RN  Body Position:   turned   side-lying 30 degrees   lower extremity elevated   upper extremity elevated  Intervention: Prevent and Manage VTE (Venous Thromboembolism) Risk  Recent Flowsheet Documentation  Taken 7/16/2025 1800 by Randy" Dara ROGEL RN  VTE Prevention/Management: (heparin gtt off; no PCD's d/t DVT)   SCDs off (sequential compression devices)   other (see comments)  Goal: Optimal Comfort and Wellbeing  Outcome: Not Progressing  Intervention: Provide Person-Centered Care  Recent Flowsheet Documentation  Taken 7/16/2025 1800 by Dara Padilla RN  Trust Relationship/Rapport:   care explained   choices provided   questions answered   questions encouraged   thoughts/feelings acknowledged   reassurance provided  Goal: Readiness for Transition of Care  Outcome: Not Progressing

## 2025-07-17 NOTE — ANESTHESIA POSTPROCEDURE EVALUATION
Patient: Kristofer Montana    Procedure: Procedure(s):  ENDOSCOPIC ULTRASOUND, ESOPHAGOSCOPY / UPPER GASTROINTESTINAL TRACT (GI)  ENDOSCOPIC RETROGRADE CHOLANGIOPANCREATOGRAPHY, SHPYNCTEROTOMY, DILATION, STONE EXTRACTION       Anesthesia Type:  General    Note:  Disposition: ICU            ICU Sign Out: Anesthesiologist/ICU physician sign out WAS performed   Postop Pain Control:             Sign Out: intubated and sedated.   PONV:    Neuro/Psych:             Sign Out: PLANNED postop sedation   Airway/Respiratory:             Sign Out: AIRWAY IN SITU/Resp. Support               Airway in situ/Resp. Support: ETT                 Reason: Unplanned (aspiration at beginning of case, failed extubation at the end)   CV/Hemodynamics: Uneventful            Sign Out: Acceptable CV status; No obvious hypovolemia; No obvious fluid overload   Other NRE: NONE   DID A NON-ROUTINE EVENT OCCUR? No           Last vitals:  Vitals:    07/16/25 1845 07/16/25 1850 07/16/25 1900   BP: 95/55 98/54 93/51   Pulse: 57 55 53   Resp:      Temp:      SpO2: 94% 93% 94%       Electronically Signed By: Harris Goncalves DO  July 16, 2025  7:28 PM

## 2025-07-17 NOTE — PROGRESS NOTES
"Surgery    Note events with his aspiration on induction and now in the ICU.    We usually take out gallbladders after common duct stones to prevent current choledocholithiasis.  Given his current clinical status, I think that should be put on the \"back burner.\"  I discussed with his brother that if he recovers from this, he could have it arranged and removed as an outpatient.    Sometimes there is both choledocholithiasis and cholecystitis.  Given that there is contrast within the gallbladder on both CT scans the mucosa of the gallbladder is alive and secreting.  Meaning it is alive.  I do not think he has active cholecystitis.  If it thought that he has active cholecystitis I would favor placement of an IR cholecystostomy tube.    I had an extensive conversation with his brother.  Apparently we had the wrong phone number for him.  He called in an hour ago and got it corrected.  No one has discussed his care here at [facility] since he has arrived.  I have done that.  I encouraged him to come visit his brother.  I let Dr. Garza know my plan and she will plan on updating the brother later today.  The brother agrees with the plan.    Patient is in critical condition due to acute respiratory failure for likely aspiration, possible cholangitis now drained by GI.  I personally spent 35 minutes of critical care time with this patient which was spent mostly updating the family with his medical care to this point.  This included determining a surgical plan as well as ongoing coordination of cares with primary and any consulting services..  Any time spent on separately billable procedures is not included in this time.          Randy Houser M.D., F.A.C.S.  [facility]  [facility]  [place], [place]  [phone]    "

## 2025-07-17 NOTE — PROGRESS NOTES
Critical Care Services Attending Note:     Please see resident/fellow Dr. Neves's, note from today, 7/17, for details of physical exam, history, medications and labs.  I agree with assessment and plan as documented in said note, with main points listed below.     Kristofer Montana  is a 80 year old male admitted on 7/13/2025 for choledocholithiasis.      Kristofer Montana remains critically ill with mental status changes, hypoxic respiratory failure, and septic shock.      I personally examined and evaluated the patient today. I have evaluated all laboratory values and imaging studies from the past 24 hours.  I personally managed the ventilator, sedation, antibiotic therapy, and vasoactive medications.     Key findings today include:  Vasopressor needs moderate. Improved blood pressure on vasopressin and after IV fluids and steroidsd. On 50% O2. Preliminary sputum culture yeast.   Key decisions made by me today include:  - continue norepinephrine and vasopressin and steroids  - Continue lung protective ventilation. Not ready for extubation  - Continue current antibiotics. Consider narrowing tomorrow if stable  - Ask IR whether fluid collection is big enough to sample  - Following LFTs      Consults ongoing and ordered are surgery and GI  The patient s prognosis today is tentative    .     I spent a total of 35 minutes (excluding procedure time) personally providing and directing critical care services at the bedside and on the critical care unit for Kristofer Montana.          Licha Marquez MD  [phone]

## 2025-07-17 NOTE — PROCEDURES
[facility]    Triple Lumen PICC Placement    Date/Time: 7/16/2025 10:09 PM    Performed by: Nguyễn Pugh RN  Authorized by: Tasha Ferraro APRN CNP  Indications: pressors.      UNIVERSAL PROTOCOL   Site Marked: Yes  Prior Images Obtained and Reviewed:  Yes  Required items: Required blood products, implants, devices and special equipment available    Patient identity confirmed:  Arm band and hospital-assigned identification number  NA - No sedation, light sedation, or local anesthesia  Confirmation Checklist:  Patient's identity using two indicators, relevant allergies, procedure was appropriate and matched the consent or emergent situation and correct equipment/implants were available  Time out: Immediately prior to the procedure a time out was called    Universal Protocol: the Joint Commission Universal Protocol was followed    Preparation: Patient was prepped and draped in usual sterile fashion    ESBL (mL):  0     ANESTHESIA    Anesthesia:  Local infiltration  Local Anesthetic:  Lidocaine 1% without epinephrine  Anesthetic Total (mL):  2      SEDATION    Patient Sedated: No        Preparation: skin prepped with 2% chlorhexidine  Skin prep agent: skin prep agent completely dried prior to procedure  Sterile barriers: maximum sterile barriers were used: cap, mask, sterile gown, sterile gloves, and large sterile sheet  Hand hygiene: hand hygiene performed prior to central venous catheter insertion  Type of line used: PICC  Catheter type: triple lumen  Lumen type: valved and power PICC  Lumen Identification: Red, White and Gray  Catheter size: 5 Fr  Brand: Bard  Lot number: [id number]  Placement method: venipuncture, MST, ultrasound and tip navigation system  Number of attempts: 1  Difficulty threading catheter: no  Successful placement: yes  Orientation: right    Location: basilic vein  Tip Location: SVC  Arm circumference: adults 10 cm  Extremity circumference: 30  Visible catheter length:  5  Total catheter length: 49  Dressing and securement: blood cleaned with CHG, blood removed, chlorhexidine disc applied, occlusive dressing applied, subcutaneous anchor securement system and transparent securement dressing  Post procedure assessment: blood return through all ports and placement verified by 3CG technology  Complications: none.  PROCEDURE Describe Procedure: 3L PICC line placed w/o comps. Lines flushed with NS and with good blood return. Tip in SVC as confirmed by 3CG.  Disposal: sharps and needle count correct at the end of procedure, needles and guidewire disposed in sharps container  Patient Tolerance:  Patient tolerated the procedure well with no immediate complications

## 2025-07-17 NOTE — PLAN OF CARE
"Goal Outcome Evaluation:      Plan of Care Reviewed With: patient, family    Overall Patient Progress: improvingOverall Patient Progress: improving     Patient continued on vent setting, full support. On Vaso, levo, fent gtt, prop and hep.  Hep xa recheck at 2030. Tele SR MAP >65.OG clapped strict NPO. Had 2L of LR this shift. A- line inserted this shift. Writer noted leak on PICC and incorrect dressing, PICC notified. Brother updated at bedside.     Problem: Adult Inpatient Plan of Care  Goal: Plan of Care Review  Description: The Plan of Care Review/Shift note should be completed every shift.  The Outcome Evaluation is a brief statement about your assessment that the patient is improving, declining, or no change.  This information will be displayed automatically on your shift  note.  Outcome: Progressing  Flowsheets (Taken 7/17/2025 1816)  Plan of Care Reviewed With:   patient   family  Overall Patient Progress: improving  Goal: Patient-Specific Goal (Individualized)  Description: You can add care plan individualizations to a care plan. Examples of Individualization might be:  \"Parent requests to be called daily at 9am for status\", \"I have a hard time hearing out of my right ear\", or \"Do not touch me to wake me up as it startles  me\".  Outcome: Progressing  Goal: Absence of Hospital-Acquired Illness or Injury  Outcome: Progressing  Intervention: Identify and Manage Fall Risk  Recent Flowsheet Documentation  Taken 7/17/2025 1800 by Andrzej Dias, RN  Safety Promotion/Fall Prevention: safety round/check completed  Taken 7/17/2025 1600 by Andrzej Dias, RN  Safety Promotion/Fall Prevention:   activity supervised   assistive device/personal items within reach   clutter free environment maintained   lighting adjusted   mobility aid in reach   nonskid shoes/slippers when out of bed   room near nurse's station   room organization consistent   safety round/check completed   supervised activity  Taken 7/17/2025 1400 " by Andrzej Dias RN  Safety Promotion/Fall Prevention: safety round/check completed  Taken 7/17/2025 1200 by Andrzej Dias RN  Safety Promotion/Fall Prevention:   activity supervised   assistive device/personal items within reach   clutter free environment maintained   lighting adjusted   mobility aid in reach   nonskid shoes/slippers when out of bed   room near nurse's station   room organization consistent   safety round/check completed   supervised activity  Taken 7/17/2025 1000 by Andrzej Dias RN  Safety Promotion/Fall Prevention: safety round/check completed  Taken 7/17/2025 0800 by Andrzej Dias RN  Safety Promotion/Fall Prevention:   activity supervised   assistive device/personal items within reach   clutter free environment maintained   lighting adjusted   mobility aid in reach   nonskid shoes/slippers when out of bed   room near nurse's station   room organization consistent   safety round/check completed   supervised activity  Intervention: Prevent Skin Injury  Recent Flowsheet Documentation  Taken 7/17/2025 1800 by Andrzej Dias RN  Body Position:   turned   lower extremity elevated   upper extremity elevated   heels elevated   legs elevated  Skin Protection:   adhesive use limited   incontinence pads utilized   silicone foam dressing in place   tubing/devices free from skin contact  Taken 7/17/2025 1600 by Andrzej Dias RN  Body Position:   turned   lower extremity elevated   upper extremity elevated   heels elevated   legs elevated  Skin Protection:   adhesive use limited   incontinence pads utilized   silicone foam dressing in place   tubing/devices free from skin contact  Taken 7/17/2025 1400 by Andrzej Dias RN  Body Position:   turned   lower extremity elevated   upper extremity elevated   heels elevated   legs elevated  Skin Protection:   adhesive use limited   incontinence pads utilized   silicone foam dressing in place   tubing/devices free from skin contact  Taken  7/17/2025 1200 by Andrzej Dias RN  Body Position:   turned   lower extremity elevated   upper extremity elevated   heels elevated   legs elevated  Skin Protection:   adhesive use limited   incontinence pads utilized   silicone foam dressing in place   tubing/devices free from skin contact  Taken 7/17/2025 1000 by Andrzej Dias RN  Body Position:   turned   lower extremity elevated   upper extremity elevated   heels elevated   legs elevated  Skin Protection:   adhesive use limited   incontinence pads utilized   silicone foam dressing in place   tubing/devices free from skin contact  Taken 7/17/2025 0800 by Andrzej Dias RN  Body Position:   turned   lower extremity elevated   upper extremity elevated   heels elevated   legs elevated  Skin Protection:   adhesive use limited   incontinence pads utilized   silicone foam dressing in place   tubing/devices free from skin contact  Intervention: Prevent and Manage VTE (Venous Thromboembolism) Risk  Recent Flowsheet Documentation  Taken 7/17/2025 1600 by Andrzej Dias RN  VTE Prevention/Management: SCDs on (sequential compression devices)  Taken 7/17/2025 1200 by Andrzej Dias RN  VTE Prevention/Management: SCDs on (sequential compression devices)  Taken 7/17/2025 0800 by Andrzej Dias RN  VTE Prevention/Management: SCDs on (sequential compression devices)  Intervention: Prevent Infection  Recent Flowsheet Documentation  Taken 7/17/2025 1600 by Andrzej Dias RN  Infection Prevention:   environmental surveillance performed   equipment surfaces disinfected   personal protective equipment utilized   hand hygiene promoted   rest/sleep promoted   single patient room provided  Taken 7/17/2025 1200 by Andrzej Dias RN  Infection Prevention:   environmental surveillance performed   equipment surfaces disinfected   personal protective equipment utilized   hand hygiene promoted   rest/sleep promoted   single patient room provided  Taken 7/17/2025 0800  by Andrzej Dias, MARIO  Infection Prevention:   environmental surveillance performed   equipment surfaces disinfected   personal protective equipment utilized   hand hygiene promoted   rest/sleep promoted   single patient room provided  Goal: Optimal Comfort and Wellbeing  Outcome: Progressing  Intervention: Provide Person-Centered Care  Recent Flowsheet Documentation  Taken 7/17/2025 1600 by Andrzej iDas, RN  Trust Relationship/Rapport:   care explained   reassurance provided   emotional support provided   thoughts/feelings acknowledged  Taken 7/17/2025 1200 by Andrzej Dias, MARIO  Trust Relationship/Rapport:   care explained   reassurance provided   emotional support provided   thoughts/feelings acknowledged  Taken 7/17/2025 0800 by Andrzej Dias, RN  Trust Relationship/Rapport:   care explained   reassurance provided   emotional support provided   thoughts/feelings acknowledged  Goal: Readiness for Transition of Care  Outcome: Progressing

## 2025-07-17 NOTE — PROGRESS NOTES
[facility] ICU RESPIRATORY NOTE        Date of Admission: 7/13/2025    Date of Intubation (most recent): 7/16/25    Reason for Mechanical Ventilation: Respiratory failure post op    Number of Days on Mechanical Ventilation: 2    Met Criteria for Spontaneous Breathing Trial: No    Reason for No Spontaneous Breathing Trial: Per MD    Bite Block: No    Significant Events Today: None    ABG Results:   Recent Labs   Lab 07/17/25  0938 07/16/25  2358 07/16/25  1846 07/15/25  1055   PH  --  7.42 7.43  --    PCO2  --  36 37  --    PO2  --  103 65*  --    HCO3  --  24 25  --    O2PER 55 70 70 25         Current Vent Settings: FiO2 (%): 50 %, Resp: 12, Vent Mode: VC/AC, Resp Rate (Set): 12 breaths/min, Tidal Volume (Set, mL): 440 mL, PEEP (cm H2O): 12 cmH2O, Resp Rate (Set): 12 breaths/min, Tidal Volume (Set, mL): 440 mL, PEEP (cm H2O): 12 cmH2O    Skin Assessment: Intact    Plan: RT to follow    Licha Webber, RT on 7/17/2025 at 4:31 PM

## 2025-07-17 NOTE — PLAN OF CARE
Goal Outcome Evaluation:      Plan of Care Reviewed With: patient    Overall Patient Progress: decliningOverall Patient Progress: declining     Neuro: RASS -1/-2 for vent synchrony. PERRL. Withdraws in all extremities; localizes pain. Tmax 99.1.  Cardiac: SR/ST. HR . Levo titrated for MAP >65.  Resp: Vent 55%, peep increased to 12 from 8. Dim lung sounds.  GI/: OG clamped. Mar placed - very low uop, MD aware 500mL LR bolus given. No BM.  Skin: no new concerns  Lines: RPICC, piv x2  Gtts: Levo 0.16, Prop 50, Fent 75  Labs/imaging: lactic 7.0 - 1L LR bolus given; Mg 1.9 - replaced. CT ab completed overnight - see results.      *see Epic flowsheets for full nursing assessment findings       Problem: Adult Inpatient Plan of Care  Goal: Plan of Care Review  Description: The Plan of Care Review/Shift note should be completed every shift.  The Outcome Evaluation is a brief statement about your assessment that the patient is improving, declining, or no change.  This information will be displayed automatically on your shift  note.  Outcome: Not Progressing  Flowsheets (Taken 7/17/2025 0537)  Plan of Care Reviewed With: patient  Overall Patient Progress: declining     Problem: Mechanical Ventilation Invasive  Goal: Effective Communication  Outcome: Not Progressing  Intervention: Ensure Effective Communication  Recent Flowsheet Documentation  Taken 7/17/2025 0400 by Francisca Meneses RN  Trust Relationship/Rapport:   care explained   reassurance provided  Taken 7/17/2025 0000 by Francisca Meneses RN  Trust Relationship/Rapport:   care explained   reassurance provided  Taken 7/16/2025 2230 by Francisca Meneses RN  Trust Relationship/Rapport:   care explained   reassurance provided     Problem: Mechanical Ventilation Invasive  Goal: Optimal Device Function  Outcome: Not Progressing  Intervention: Optimize Device Care and Function  Recent Flowsheet Documentation  Taken 7/17/2025 0400 by Francisca Meneses RN  Oral Care:   suction  provided   swabbed with antiseptic solution  Taken 7/17/2025 0200 by Francisca Meneses RN  Oral Care:   suction provided   swabbed with antiseptic solution  Taken 7/17/2025 0000 by Francisca Meneses RN  Oral Care:   suction provided   swabbed with antiseptic solution  Taken 7/16/2025 2230 by Francisca Meneses RN  Oral Care:   suction provided   swabbed with antiseptic solution     Problem: Mechanical Ventilation Invasive  Goal: Mechanical Ventilation Liberation  Outcome: Not Progressing  Intervention: Promote Extubation and Mechanical Ventilation Liberation  Recent Flowsheet Documentation  Taken 7/17/2025 0400 by Francisca Meneses RN  Sleep/Rest Enhancement:   awakenings minimized   comfort measures   noise level reduced   regular sleep/rest pattern promoted   relaxation techniques promoted   room darkened  Medication Review/Management:   medications reviewed   high-risk medications identified  Taken 7/17/2025 0000 by Francisca Meneses RN  Sleep/Rest Enhancement:   awakenings minimized   comfort measures   noise level reduced   regular sleep/rest pattern promoted   relaxation techniques promoted   room darkened  Medication Review/Management:   medications reviewed   high-risk medications identified  Taken 7/16/2025 2230 by Francisca Meneses RN  Sleep/Rest Enhancement:   awakenings minimized   comfort measures   noise level reduced   regular sleep/rest pattern promoted   relaxation techniques promoted   room darkened  Medication Review/Management:   medications reviewed   high-risk medications identified     Problem: Mechanical Ventilation Invasive  Goal: Optimal Nutrition Delivery  Outcome: Not Progressing     Problem: Mechanical Ventilation Invasive  Goal: Absence of Device-Related Skin and Tissue Injury  Outcome: Not Progressing     Problem: Mechanical Ventilation Invasive  Goal: Absence of Ventilator-Induced Lung Injury  Outcome: Not Progressing  Intervention: Prevent Ventilator-Associated Pneumonia  Recent Flowsheet  Documentation  Taken 7/17/2025 0400 by Francisca Meneses RN  Oral Care:   suction provided   swabbed with antiseptic solution  Head of Bed (HOB) Positioning: HOB at 30 degrees  Taken 7/17/2025 0200 by Francisca Meneses RN  Oral Care:   suction provided   swabbed with antiseptic solution  Head of Bed (HOB) Positioning: HOB at 30 degrees  Taken 7/17/2025 0000 by Francisca Meneses RN  Oral Care:   suction provided   swabbed with antiseptic solution  Head of Bed (HOB) Positioning: HOB at 30 degrees  Taken 7/16/2025 2230 by Francisca Meneses RN  Oral Care:   suction provided   swabbed with antiseptic solution  Head of Bed (HOB) Positioning: HOB at 30 degrees     Problem: Infection  Goal: Absence of Infection Signs and Symptoms  Outcome: Not Progressing     Problem: Comorbidity Management  Goal: Maintenance of Heart Failure Symptom Control  Outcome: Not Progressing  Intervention: Maintain Heart Failure Management  Recent Flowsheet Documentation  Taken 7/17/2025 0400 by Francisca Meneses RN  Medication Review/Management:   medications reviewed   high-risk medications identified  Taken 7/17/2025 0000 by Francisca Meneses RN  Medication Review/Management:   medications reviewed   high-risk medications identified  Taken 7/16/2025 2230 by Francisca Meneses RN  Medication Review/Management:   medications reviewed   high-risk medications identified     Problem: Comorbidity Management  Goal: Blood Pressure in Desired Range  Outcome: Not Progressing  Intervention: Maintain Blood Pressure Management  Recent Flowsheet Documentation  Taken 7/17/2025 0400 by Francisca Meneses RN  Medication Review/Management:   medications reviewed   high-risk medications identified  Taken 7/17/2025 0000 by Francisca Meneses RN  Medication Review/Management:   medications reviewed   high-risk medications identified  Taken 7/16/2025 2230 by Francisca Meneses RN  Medication Review/Management:   medications reviewed   high-risk medications identified

## 2025-07-17 NOTE — PROCEDURES
Dx: Septic shock, acute hypoxic respiratory failure      Indication: Persistent pressor needs, acute hypoxic respiratory failure, need for frequent ABGs while on mechanical intervention      Prior to procedure, patient was identified and correct procedure was clarified to team members present. Arrow arterial line kit was opened and set up per sterile protocol. Sterile cap, gown, gloves were worn the duration of procedure. Patients R ulnar artery was prepped per protocol using chlorhexidine scrub and allowed to dry. Sterile drape placed. Ultrasound, with sterile probe cover, was utilized to visualize artery. Under continuous US guidance, a needle was inserted under skin with negative pressure on syringe with 3 attempts made. The needle was advanced until flash of blood return was seen in syringe. Needle was taken off syringe confirming pulsatile arterial blood return. A guidewire was then inserted without resistance and catheter inserted overtop the guidewire. Guidewire removed confirming pulsatile arterial blood return from catheter. Art line waveform confirmed on monitor. Biopatch placed. Catheter was sutured in place and sterile dressing applied.     Patient tolerated the procedure well. No complications seen during or following procedure.     Total procedure time was separate from total critical care time.     Dr. Reilly Neves MD present   Dr. William Macedo present     Clay Norton PA-C  07/17/25  4:23 PM

## 2025-07-17 NOTE — CONSULTS
Imaging reviewed - patient has trace/small volume ascites, nothing concerning for subdiaphragmatic abscess and nothing to drain. Sepsis is most certainly related to recent aspiration and biliary obstruction. Do not recommend intervention at this time.     Jomar Gimenez MD

## 2025-07-17 NOTE — PROGRESS NOTES
[facility]    ICU Progress Note       Date of Admission:  7/13/2025    Assessment: Critical Care   79 y/o male with PMHx HTN, HLD, HFrEF (LVEF 40%), CAD (remote PCI 2005), CVA (2013, no residual weakness/deficits per chart review)  ad[facility] coli bacteremia thought to be caused by acute cholecystitis vs choledocholithiasis/cholangitis. Workup also revealed acute LLE DVT in mid - distal femoral as well as popliteal vein. Hospital course was complicated by atrial fibrillation with RVR, and aspiration event following planned EUS/ERCP 07/16/25. Attempted extubation following procedure but required immediate re-intubation.        Plan: Critical Care          Respiratory:  Acute hypoxic respiratory failure - secondary to aspiration event during EUS/ERCP. Remains hypoxic, on PEEP 12, FiO2 0.5. CXR reassuring but suspect lags behind clinical picture. Leave at PEEP 12 today. Daily CXR  Aspiration pneumonitis vs aspiration pneumonia - reportedly large aspiration event during intubation for EUS/ERCP as above. Concern for pneumonia, on cefepime - sputum culture obtained, currently only with yeast. Continue abx for now    Cardiovascular:  Central Line: Yes PICC  Indications: vasoactive medications  Arterial Line: Yes  Septic shock - as discussed below, likely related to E coli bacteremia thought to be caused by acute cholecystitis vs choledocholithiasis/cholangitis. On levophed, added vasopressin this am, stress dose steroids initiated. Lactic 7 this am, downtrending, continue to check q6H until normalized. MAP goal >65.   HLD - on PTA statin  CAD - remote PCI (2005). Will need ASA restarted once able - starting therapeutic anticoagulation today. Per GI, will need at least 72 hours post-sphincterotomy prior to restarting ASA given that it is not a critical med.   HTN - on ARB/metoprolol PTA, holding given shock state as above    Hematologic:  DVT prophylaxis: Yes  mechanical and chemical   Femoral/popliteal DVT - new diagnosis this admission. IR consulted for possible thrombectomy but does not meet criteria per IR consult. Therapeutic anticoagulation initiated - s/p sphincterotomy so bleeding risk exists but in the setting of acute DVT, risks of anticoagulation are outweighed by benefits. Per GI, OK to start therapeutic heparin gtt but no bolus on initiation or correction bolus - rate increases only.  Thrombocytopenia - unclear etiology, but mild and stable/improving. Suspect some consumption and decreased production    Neurologic:  Pain management: Multimodal.  Continuous Sedation/Analgesia: Propofol  Propofol for sedation, fentanyl gtt for pain    Gastrointestinal:  Enteral Access: Yes  Ulcer prophylaxis: PPI  Choledocholithiasis with cholecystitis vs cholangitis - now s/p ERCP and stent/sphincterotomy by GI. Surgery consulted for cholecystectomy - appreciate recommendations. Surgery does not feel that patient has cholecystitis, but feels that given his critical illness if there is concern for cholecystitis, a percutaneous cholecystostomy tube would be recommended. Will eventually need cholecystectomy following recovery from this hospitalization to be performed electively  PPI for gastric ulcer ppx    Genitourinary/Renal:  Mar Catheter: Yes  Indication: Hourly urine output in critically ill patient  Mar in place, urine o/p borderline. Responsive to fluid resuscitation.  Electrolyte replacement protocol    Endocrine/Metabolic:  Hyperglycemia - stress hyperglycemia in setting of septic shock, although likely with pre-diabetes given A1C. Glucose goal 120-180, ssi if needed    Musculoskeletal:  No acute concerns.     Integumentary:  Routine cares for skin surveillance - frequent turns, protective measures. OOB, mobilize as able    Infectious Disease:  Current antibiotics: cefepime, flagyl  Septic shock - due to E coli bacteremia (reportedly from blood cultures from [facility]).  Presumed secondary to cholecystitis vs cholangitis. Surgery suspects cholecystitis less likely; cholangitis possible, although LFTs not impressively elevated. CT abdomen/pelvis without additional obvious source - however, large loculated perihepatic/subdiaphragmatic fluid collection noted on admission, with decreased size following ERCP and increased fluid in abdomen with relative hyperdensity compared to simple ascites. Concerned this may have been loculated fluid collection of unclear source, possibly abscess as Hgb stable. Discussed with IR but now that fluid collection is smaller, unable to drain percutaneously. Will continue cefepime/flagyl for now given clinical improvement, followup cultures. Septic shock also may be partially (or wholly) caused by aspiration pneumonitis vs aspiration pneumonia - sputum culture negative other than yeast, CXR reassuring, remains on cefepime for coverage as above.     Psychologic:  Restraints: non violent  Unable to assess    Family Communication: brother updated by telephone    Criteria for Transfer to Floor/Discharge Planning: ICU      Lines/ tubes/ drains:  Mar Catheter: PRESENT, indication: ICU only: hourly urine output needed for patient care  Lines: PRESENT      PICC 07/16/25 Triple Lumen Right Basilic Pressors-Site Assessment: WDL        Prophylaxis:  - DVT Prophylaxis: heparin gtt  - PUD Prophylaxis: PPI    [facility] ICU Rounding checklist    Assessment of central venous catheter access Central access present and needed   Assessment of urinary catheter use Mar present and needed.  Indication:  hourly urine o/p in critically ill patient   DVT prophylaxis Therapeutic anticoagulation (see A/P)        Code Status: Full Code      Disposition:  - ICU,     The patient's care was discussed with the Attending Physician, Dr. Marquez.      Reilly Neves MD, MD  [facility]  Securely message with [employer] (more info)  Text page via [employer] [facility]  "Paging/Directory     Clinically Significant Risk Factors            # Hypercalcemia: corrected calcium is >10.1, will monitor as appropriate    # Hypoalbuminemia: Lowest albumin = 2.1 g/dL at 7/17/2025  4:17 AM, will monitor as appropriate                # Overweight: Estimated body mass index is 27.35 kg/m  as calculated from the following:    Height as of this encounter: 1.778 m (5' 10\").    Weight as of this encounter: 86.5 kg (190 lb 9.6 oz)., PRESENT ON ADMISSION                 ______________________________________________________________________    Interval History   Reintubated after attempted extubation o/n.     Physical Exam   Vital Signs: Temp: 97.9  F (36.6  C) Temp src: Bladder BP: (!) 79/62 Pulse: 80   Resp: 12 SpO2: 95 % O2 Device: Mechanical Ventilator    Weight: 190 lbs 9.6 oz  Intubated, sedated  RRR  Good air entry bilaterally, no wheezes  Soft non distended, non tender abdomen  Moves all extremities, unable to obtain full MSK/neuro exam as becomes dysynchronous with ventilator causing oxygen saturations to drop when sedation lightened. Appears purposeful, non focal neurologic exam    Data   I reviewed all medications, new labs and imaging results over the last 24 hours.  Arterial Blood Gases   Recent Labs   Lab 07/16/25  2358 07/16/25  1846   PH 7.42 7.43   PCO2 36 37   PO2 103 65*   HCO3 24 25       Complete Blood Count   Recent Labs   Lab 07/17/25  0417 07/16/25  1932 07/16/25  0420 07/15/25  1054   WBC 12.9* 10.5 18.6* 16.0*   HGB 12.4* 13.6 14.6 13.2*   * 149* 130* 133*       Basic Metabolic Panel  Recent Labs   Lab 07/17/25  1321 07/17/25  0807 07/17/25  0417 07/16/25  1932 07/16/25  1741 07/16/25  0420 07/15/25  1054   NA  --   --  139 138  --  139 136   POTASSIUM  --   --  3.7 3.5  --  3.7 3.5   CHLORIDE  --   --  102 101  --  99 101   CO2  --   --  21* 25  --  27 23   BUN  --   --  43.9* 40.8*  --  30.1* 27.4*   CR  --   --  1.39* 1.10  --  0.97 1.06   * 157* 182* 154*   < " > 128* 139*    < > = values in this interval not displayed.       Liver Function Tests  Recent Labs   Lab 07/17/25  0417 07/16/25  1932 07/16/25  0420 07/15/25  1054   AST 18 18 21 21   ALT 14 17 22 24   ALKPHOS 120 159* 157* 138   BILITOTAL 1.1 1.3* 1.4* 1.2   ALBUMIN 2.1* 2.5* 3.1* 2.7*       Pancreatic Enzymes  No lab results found in last 7 days.    Coagulation Profile  No lab results found in last 7 days.    IMAGING:  Recent Results (from the past 24 hours)   XR ERCP    Narrative    This exam was marked as non-reportable because it will not be read by a   radiologist or a [place] non-radiologist provider.         XR Chest Port 1 View    Narrative    EXAM: XR CHEST PORT 1 VIEW  LOCATION: [facility]  DATE: 7/16/2025    INDICATION: Endotracheal tube positioning, NG tube position  COMPARISON: CTA chest 7/16/2025      Impression    IMPRESSION: Endotracheal tube tip approximately 2.2 cm above the shahriar. Enteric tube tip projects over the left upper quadrant/proximal stomach.    Increased retrocardiac opacity that could reflect atelectasis or infection.. Similar right pleural effusion and adjacent opacity. No pneumothorax. Similar heart size.    Linear radiodensity along the right upper quadrant could reflect peritoneal contrast from the recent ERCP. Correlate with recent procedure for any contrast leakage or concern for perforation.   CT Abdomen Pelvis w/o Contrast    Narrative    EXAM: CT ABDOMEN PELVIS W/O CONTRAST  LOCATION: [facility]  DATE: 7/16/2025    INDICATION: choledocholithiasis s p ERCP 07 16, CXR w  concern for RUQ radiodensity contrast leakage vs perf, ?free air  COMPARISON: CTA chest 7/16/2025, chest radiograph 7/16/2025  TECHNIQUE: CT scan of the abdomen and pelvis was performed without IV contrast. Multiplanar reformats were obtained. Dose reduction techniques were used.  CONTRAST: None.    FINDINGS:   LOWER CHEST: Unchanged small right pleural  effusion with adjacent compressive atelectasis. New dense opacity in the left lower lobe with associated volume loss, likely near complete lobar atelectasis.    Streak artifact from the bilateral arms placed at the side, slightly limiting evaluation.    HEPATOBILIARY: Unchanged left hepatic lobe cyst measuring 1.3 cm. New pneumobilia in the left hepatic lobe. Contrast within the gallbladder. No definite adjacent contrast. Residual 0.3 cm gallstone in the common duct. Persistent mild dilatation of the   common duct measuring up to 1.1 cm.    PANCREAS: Diffuse fatty replacement.    SPLEEN: Unremarkable.    ADRENAL GLANDS: Unremarkable.    KIDNEYS/BLADDER: Right renal simple cysts; no follow-up indicated. No hydronephrosis. Excreted contrast in the urinary bladder.    BOWEL: Mild dilatation of nondependent small bowel loops, measuring up to 3.6 cm in diameter. No discrete transition point identified. Moderate colonic diverticulosis. No evidence of acute diverticulitis. Normal appendix. No evidence of acute   appendicitis.    PERITONEUM/RETROPERITONEUM: New small volume free fluid in the abdomen and pelvis. Fluid measures higher than simple fluid attenuation. No focal fluid collection. No intraperitoneal free air.    LYMPH NODES: No lymphadenopathy.    VASCULATURE: Mild atherosclerotic calcifications.    PELVIC ORGANS: Mildly enlarged prostate.    MUSCULOSKELETAL: Multilevel degenerative changes of spine. Severe levoconvex curvature of the lumbar spine.      Impression    IMPRESSION:   1.  Pneumobilia, within expected status post ERCP. Residual 0.3 cm gallstone in the common duct. Persistent mild common duct dilatation measuring 1.1 cm.    2.  Contrast within the gallbladder. No definite extraluminal contrast. However, new small volume intraperitoneal free fluid measuring slightly higher than simple fluid attenuation, which may be due to dilute contrast or other complicated fluid (blood   products or proteinaceous  contents). Consider correlation with fluid sampling. If there is clinical concern for bile leak, HIDA may be considered.    3.  No intraperitoneal free air.    4.  Mild dilatation of nondependent small bowel loops, likely ileus. No transition point to suggest obstruction.    5.  New near complete lobar atelectasis of the left lower lobe. Unchanged small right pleural effusion with adjacent compressive atelectasis.     XR Chest Port 1 View    Narrative    EXAM: XR CHEST PORT 1 VIEW  LOCATION: [facility]  DATE: 7/17/2025    INDICATION: Intubation.  COMPARISON: 7/16/2025.      Impression    IMPRESSION: There has been interval placement of a right PICC line, with tip in the low SVC. Endotracheal tube tip is 2.5 cm above the shahriar. Enteric tube, tip in the gastric body. Shallow inspiration. Bibasilar opacities are unchanged, and may be   related to atelectasis or infection. No pneumothorax. Previously described linear radiodensity projected over the right upper quadrant is unchanged.

## 2025-07-17 NOTE — PROGRESS NOTES
"[facility] GASTROENTEROLOGY PROGRESS NOTE    SUBJECTIVE:  Patient aspirated during intubation yesterday for ERCP. Now intubated in ICU on pressors.    OBJECTIVE:    /68   Pulse 66   Temp 97.7  F (36.5  C)   Resp 14   Ht 1.778 m (5' 10\")   Wt 86.5 kg (190 lb 9.6 oz)   SpO2 100%   BMI 27.35 kg/m    Temp (24hrs), Av.2  F (36.8  C), Min:97.4  F (36.3  C), Max:98.8  F (37.1  C)    Patient Vitals for the past 72 hrs:   Weight   25 0543 86.5 kg (190 lb 9.6 oz)   07/15/25 0537 86.8 kg (191 lb 6.4 oz)       Intake/Output Summary (Last 24 hours) at 2025 0828  Last data filed at 2025 0525  Gross per 24 hour   Intake 270 ml   Output 1040 ml   Net -770 ml         PHYSICAL EXAM    Constitutional: intubated  Abdomen: distended        Additional Comments:  ROS, FH, SH: See initial GI consult for details.    I have reviewed the patient's new clinical lab results:    Recent Labs   Lab Test 25  042   WBC 12.9* 10.5 18.6*   HGB 12.4* 13.6 14.6   MCV 87  86  86 84 84   * 149* 130*     Recent Labs   Lab Test 25  042    138 139   POTASSIUM 3.7 3.5 3.7   CHLORIDE 102 101 99   CO2  25 27   BUN 43.9* 40.8* 30.1*   CR 1.39* 1.10 0.97   ANIONGAP 16* 12 13   PAUL 7.4* 8.5* 9.4     Recent Labs   Lab Test 25  042   ALBUMIN 2.1*  --  2.5* 3.1*   BILITOTAL 1.1  --  1.3* 1.4*   ALT 14  --  17 22   AST 18  --  18 21   ALKPHOS 120  --  159* 157*   PROTEIN  --  30*  --   --        25 CT:  1.  Pneumobilia, within expected status post ERCP. Residual 0.3 cm gallstone in the common duct. Persistent mild common duct dilatation measuring 1.1 cm.  2.  Contrast within the gallbladder. No definite extraluminal contrast. However, new small volume intraperitoneal free fluid measuring slightly higher than simple fluid attenuation, which may be due to dilute contrast or other complicated fluid " (blood   products or proteinaceous contents). Consider correlation with fluid sampling. If there is clinical concern for bile leak, HIDA may be considered.  3.  No intraperitoneal free air.  4.  Mild dilatation of nondependent small bowel loops, likely ileus. No transition point to suggest obstruction.  5.  New near complete lobar atelectasis of the left lower lobe. Unchanged small right pleural effusion with adjacent compressive atelectasis.      Active Problems:    Choledocholithiasis    Assessment: 81 yo male with history of MI, CVA, heart failure, hypertension, hyperlipidemia, diverticulosis, thrombocytopenia, and ASD who presents with RUQ pain and is found to have choledocholithiasis on imaging 7/13/25.     Hospitalization complicated by new onset afib, acute LLE DVT (on heparin) and acute episode of hypoxic respiratory failure on 7/15/25 which quickly resolved.    ERCP done 7/16/25 with aspiration during intubation.  ERCP with sphincterotomy completed 7/16/25 with removal of stones, no stent placed.      Plan:  Ideally heparin would be held for 72 hours, however patient has new acute DVT and is now intubated in the ICU, discussed with ICU team.  Ok to restart heparin slowly later today given the current clinical situation, timing pending possible IR drainage of fluid collection        Julia Bob  [facility]  Office:  [phone]  25 minutes of total time was spent providing patient care, including patient evaluation, reviewing documentation/test results, and .

## 2025-07-18 ENCOUNTER — APPOINTMENT (OUTPATIENT)
Dept: GENERAL RADIOLOGY | Facility: CLINIC | Age: 80
End: 2025-07-18
Attending: STUDENT IN AN ORGANIZED HEALTH CARE EDUCATION/TRAINING PROGRAM
Payer: MEDICARE

## 2025-07-18 LAB
ALBUMIN SERPL BCG-MCNC: 2.1 G/DL (ref 3.5–5.2)
ALLEN'S TEST: ABNORMAL
ALP SERPL-CCNC: 121 U/L (ref 40–150)
ALT SERPL W P-5'-P-CCNC: 14 U/L (ref 0–70)
ANION GAP SERPL CALCULATED.3IONS-SCNC: 11 MMOL/L (ref 7–15)
AST SERPL W P-5'-P-CCNC: 21 U/L (ref 0–45)
BASE EXCESS BLDA CALC-SCNC: -0.4 MMOL/L (ref -3–3)
BASE EXCESS BLDA CALC-SCNC: -0.4 MMOL/L (ref -3–3)
BASE EXCESS BLDA CALC-SCNC: -0.7 MMOL/L (ref -3–3)
BILIRUB SERPL-MCNC: 0.7 MG/DL
BUN SERPL-MCNC: 47.7 MG/DL (ref 8–23)
CALCIUM SERPL-MCNC: 7.7 MG/DL (ref 8.8–10.4)
CHLORIDE SERPL-SCNC: 105 MMOL/L (ref 98–107)
CREAT SERPL-MCNC: 1.3 MG/DL (ref 0.67–1.17)
EGFRCR SERPLBLD CKD-EPI 2021: 56 ML/MIN/1.73M2
ERYTHROCYTE [DISTWIDTH] IN BLOOD BY AUTOMATED COUNT: 15.5 % (ref 10–15)
GLUCOSE BLDC GLUCOMTR-MCNC: 135 MG/DL (ref 70–99)
GLUCOSE BLDC GLUCOMTR-MCNC: 153 MG/DL (ref 70–99)
GLUCOSE BLDC GLUCOMTR-MCNC: 156 MG/DL (ref 70–99)
GLUCOSE BLDC GLUCOMTR-MCNC: 158 MG/DL (ref 70–99)
GLUCOSE BLDC GLUCOMTR-MCNC: 163 MG/DL (ref 70–99)
GLUCOSE BLDC GLUCOMTR-MCNC: 179 MG/DL (ref 70–99)
GLUCOSE SERPL-MCNC: 168 MG/DL (ref 70–99)
HCO3 BLD-SCNC: 24 MMOL/L (ref 21–28)
HCO3 BLD-SCNC: 24 MMOL/L (ref 21–28)
HCO3 BLD-SCNC: 25 MMOL/L (ref 21–28)
HCO3 SERPL-SCNC: 22 MMOL/L (ref 22–29)
HCT VFR BLD AUTO: 28.8 % (ref 40–53)
HGB BLD-MCNC: 9.8 G/DL (ref 13.3–17.7)
HOLD SPECIMEN: NORMAL
LACTATE SERPL-SCNC: 0.9 MMOL/L (ref 0.7–2)
LACTATE SERPL-SCNC: 1.2 MMOL/L (ref 0.7–2)
MAGNESIUM SERPL-MCNC: 2.4 MG/DL (ref 1.7–2.3)
MCH RBC QN AUTO: 29.9 PG (ref 26.5–33)
MCHC RBC AUTO-ENTMCNC: 34 G/DL (ref 31.5–36.5)
MCV RBC AUTO: 88 FL (ref 78–100)
O2/TOTAL GAS SETTING VFR VENT: 50 %
OXYHGB MFR BLDA: 95 % (ref 92–100)
OXYHGB MFR BLDA: 97 % (ref 92–100)
OXYHGB MFR BLDA: 97 % (ref 92–100)
PCO2 BLD: 40 MM HG (ref 35–45)
PCO2 BLD: 40 MM HG (ref 35–45)
PCO2 BLD: 41 MM HG (ref 35–45)
PEEP: 12 CM H2O
PH BLD: 7.38 [PH] (ref 7.35–7.45)
PH BLD: 7.39 [PH] (ref 7.35–7.45)
PH BLD: 7.4 [PH] (ref 7.35–7.45)
PLATELET # BLD AUTO: 167 10E3/UL (ref 150–450)
PO2 BLD: 104 MM HG (ref 80–105)
PO2 BLD: 79 MM HG (ref 80–105)
PO2 BLD: 96 MM HG (ref 80–105)
POTASSIUM SERPL-SCNC: 3.3 MMOL/L (ref 3.4–5.3)
PROT SERPL-MCNC: 4.9 G/DL (ref 6.4–8.3)
RBC # BLD AUTO: 3.28 10E6/UL (ref 4.4–5.9)
SAO2 % BLDA: 96.2 % (ref 95–96)
SAO2 % BLDA: 97.7 % (ref 95–96)
SAO2 % BLDA: 98.4 % (ref 95–96)
SODIUM SERPL-SCNC: 138 MMOL/L (ref 135–145)
UFH PPP CHRO-ACNC: 0.48 IU/ML (ref ?–1.1)
WBC # BLD AUTO: 26.3 10E3/UL (ref 4–11)

## 2025-07-18 PROCEDURE — 82040 ASSAY OF SERUM ALBUMIN: CPT | Performed by: STUDENT IN AN ORGANIZED HEALTH CARE EDUCATION/TRAINING PROGRAM

## 2025-07-18 PROCEDURE — 82805 BLOOD GASES W/O2 SATURATION: CPT | Performed by: STUDENT IN AN ORGANIZED HEALTH CARE EDUCATION/TRAINING PROGRAM

## 2025-07-18 PROCEDURE — 250N000009 HC RX 250: Performed by: INTERNAL MEDICINE

## 2025-07-18 PROCEDURE — 83735 ASSAY OF MAGNESIUM: CPT | Performed by: SURGERY

## 2025-07-18 PROCEDURE — 250N000011 HC RX IP 250 OP 636: Performed by: INTERNAL MEDICINE

## 2025-07-18 PROCEDURE — 250N000009 HC RX 250

## 2025-07-18 PROCEDURE — 250N000011 HC RX IP 250 OP 636: Performed by: STUDENT IN AN ORGANIZED HEALTH CARE EDUCATION/TRAINING PROGRAM

## 2025-07-18 PROCEDURE — 250N000011 HC RX IP 250 OP 636: Performed by: HOSPITALIST

## 2025-07-18 PROCEDURE — 999N000253 HC STATISTIC WEANING TRIALS

## 2025-07-18 PROCEDURE — 94640 AIRWAY INHALATION TREATMENT: CPT | Mod: 76

## 2025-07-18 PROCEDURE — 999N000040 HC STATISTIC CONSULT NO CHARGE VASC ACCESS

## 2025-07-18 PROCEDURE — 83605 ASSAY OF LACTIC ACID: CPT | Performed by: STUDENT IN AN ORGANIZED HEALTH CARE EDUCATION/TRAINING PROGRAM

## 2025-07-18 PROCEDURE — 84100 ASSAY OF PHOSPHORUS: CPT | Performed by: INTERNAL MEDICINE

## 2025-07-18 PROCEDURE — 71045 X-RAY EXAM CHEST 1 VIEW: CPT

## 2025-07-18 PROCEDURE — 258N000003 HC RX IP 258 OP 636: Performed by: STUDENT IN AN ORGANIZED HEALTH CARE EDUCATION/TRAINING PROGRAM

## 2025-07-18 PROCEDURE — 250N000013 HC RX MED GY IP 250 OP 250 PS 637

## 2025-07-18 PROCEDURE — 999N000157 HC STATISTIC RCP TIME EA 10 MIN

## 2025-07-18 PROCEDURE — 82805 BLOOD GASES W/O2 SATURATION: CPT | Performed by: INTERNAL MEDICINE

## 2025-07-18 PROCEDURE — 250N000011 HC RX IP 250 OP 636

## 2025-07-18 PROCEDURE — 85027 COMPLETE CBC AUTOMATED: CPT | Performed by: STUDENT IN AN ORGANIZED HEALTH CARE EDUCATION/TRAINING PROGRAM

## 2025-07-18 PROCEDURE — 999N000009 HC STATISTIC AIRWAY CARE

## 2025-07-18 PROCEDURE — 99291 CRITICAL CARE FIRST HOUR: CPT | Mod: GC | Performed by: INTERNAL MEDICINE

## 2025-07-18 PROCEDURE — 250N000009 HC RX 250: Performed by: STUDENT IN AN ORGANIZED HEALTH CARE EDUCATION/TRAINING PROGRAM

## 2025-07-18 PROCEDURE — 85520 HEPARIN ASSAY: CPT | Performed by: SURGERY

## 2025-07-18 PROCEDURE — 94003 VENT MGMT INPAT SUBQ DAY: CPT

## 2025-07-18 PROCEDURE — 200N000001 HC R&B ICU

## 2025-07-18 RX ORDER — CEFTRIAXONE 2 G/1
2 INJECTION, POWDER, FOR SOLUTION INTRAMUSCULAR; INTRAVENOUS EVERY 24 HOURS
Status: DISCONTINUED | OUTPATIENT
Start: 2025-07-18 | End: 2025-07-27

## 2025-07-18 RX ORDER — ROPIVACAINE IN 0.9% SOD CHL/PF 0.1 %
.01-.2 PLASTIC BAG, INJECTION (ML) EPIDURAL CONTINUOUS
Status: DISCONTINUED | OUTPATIENT
Start: 2025-07-18 | End: 2025-07-20

## 2025-07-18 RX ORDER — DEXMEDETOMIDINE HYDROCHLORIDE 4 UG/ML
.1-1.2 INJECTION, SOLUTION INTRAVENOUS CONTINUOUS
Status: DISCONTINUED | OUTPATIENT
Start: 2025-07-18 | End: 2025-07-18

## 2025-07-18 RX ADMIN — LEVALBUTEROL HYDROCHLORIDE 1.25 MG: 1.25 SOLUTION RESPIRATORY (INHALATION) at 19:16

## 2025-07-18 RX ADMIN — INSULIN ASPART 1 UNITS: 100 INJECTION, SOLUTION INTRAVENOUS; SUBCUTANEOUS at 08:36

## 2025-07-18 RX ADMIN — ACETYLCYSTEINE 1 ML: 200 SOLUTION ORAL; RESPIRATORY (INHALATION) at 15:02

## 2025-07-18 RX ADMIN — INSULIN ASPART 1 UNITS: 100 INJECTION, SOLUTION INTRAVENOUS; SUBCUTANEOUS at 05:49

## 2025-07-18 RX ADMIN — INSULIN ASPART 1 UNITS: 100 INJECTION, SOLUTION INTRAVENOUS; SUBCUTANEOUS at 16:56

## 2025-07-18 RX ADMIN — Medication 25 MCG: at 08:15

## 2025-07-18 RX ADMIN — HEPARIN SODIUM 1550 UNITS/HR: 10000 INJECTION, SOLUTION INTRAVENOUS at 05:30

## 2025-07-18 RX ADMIN — LEVALBUTEROL HYDROCHLORIDE 1.25 MG: 1.25 SOLUTION RESPIRATORY (INHALATION) at 15:01

## 2025-07-18 RX ADMIN — INSULIN ASPART 1 UNITS: 100 INJECTION, SOLUTION INTRAVENOUS; SUBCUTANEOUS at 01:37

## 2025-07-18 RX ADMIN — ACETYLCYSTEINE 1 ML: 200 SOLUTION ORAL; RESPIRATORY (INHALATION) at 11:20

## 2025-07-18 RX ADMIN — PANTOPRAZOLE SODIUM 40 MG: 40 INJECTION, POWDER, FOR SOLUTION INTRAVENOUS at 08:29

## 2025-07-18 RX ADMIN — ACETYLCYSTEINE 1 ML: 200 SOLUTION ORAL; RESPIRATORY (INHALATION) at 05:05

## 2025-07-18 RX ADMIN — CEFTRIAXONE SODIUM 2 G: 2 INJECTION, POWDER, FOR SOLUTION INTRAMUSCULAR; INTRAVENOUS at 12:20

## 2025-07-18 RX ADMIN — ATORVASTATIN CALCIUM 40 MG: 40 TABLET, FILM COATED ORAL at 08:29

## 2025-07-18 RX ADMIN — PROPOFOL 20 MCG/KG/MIN: 10 INJECTION, EMULSION INTRAVENOUS at 15:25

## 2025-07-18 RX ADMIN — PROPOFOL 35 MCG/KG/MIN: 10 INJECTION, EMULSION INTRAVENOUS at 08:29

## 2025-07-18 RX ADMIN — NOREPINEPHRINE BITARTRATE 0.16 MCG/KG/MIN: 0.02 INJECTION, SOLUTION INTRAVENOUS at 07:15

## 2025-07-18 RX ADMIN — LEVALBUTEROL HYDROCHLORIDE 1.25 MG: 1.25 SOLUTION RESPIRATORY (INHALATION) at 23:26

## 2025-07-18 RX ADMIN — PANTOPRAZOLE SODIUM 40 MG: 40 INJECTION, POWDER, FOR SOLUTION INTRAVENOUS at 20:39

## 2025-07-18 RX ADMIN — NOREPINEPHRINE BITARTRATE 0.13 MCG/KG/MIN: 0.02 INJECTION, SOLUTION INTRAVENOUS at 01:29

## 2025-07-18 RX ADMIN — ACETYLCYSTEINE 1 ML: 200 SOLUTION ORAL; RESPIRATORY (INHALATION) at 23:26

## 2025-07-18 RX ADMIN — PROPOFOL 45 MCG/KG/MIN: 10 INJECTION, EMULSION INTRAVENOUS at 04:45

## 2025-07-18 RX ADMIN — VASOPRESSIN 2.4 UNITS/HR: 20 INJECTION, SOLUTION INTRAMUSCULAR; SUBCUTANEOUS at 01:29

## 2025-07-18 RX ADMIN — HYDROCORTISONE SODIUM SUCCINATE 75 MG: 100 INJECTION, POWDER, FOR SOLUTION INTRAMUSCULAR; INTRAVENOUS at 03:07

## 2025-07-18 RX ADMIN — LEVALBUTEROL HYDROCHLORIDE 1.25 MG: 1.25 SOLUTION RESPIRATORY (INHALATION) at 07:02

## 2025-07-18 RX ADMIN — INSULIN ASPART 1 UNITS: 100 INJECTION, SOLUTION INTRAVENOUS; SUBCUTANEOUS at 12:20

## 2025-07-18 RX ADMIN — CEFEPIME 2 G: 2 INJECTION, POWDER, FOR SOLUTION INTRAVENOUS at 05:30

## 2025-07-18 RX ADMIN — HYDROCORTISONE SODIUM SUCCINATE 75 MG: 100 INJECTION, POWDER, FOR SOLUTION INTRAMUSCULAR; INTRAVENOUS at 20:39

## 2025-07-18 RX ADMIN — LEVALBUTEROL HYDROCHLORIDE 1.25 MG: 1.25 SOLUTION RESPIRATORY (INHALATION) at 05:05

## 2025-07-18 RX ADMIN — HYDROCORTISONE SODIUM SUCCINATE 75 MG: 100 INJECTION, POWDER, FOR SOLUTION INTRAMUSCULAR; INTRAVENOUS at 15:25

## 2025-07-18 RX ADMIN — HYDROCORTISONE SODIUM SUCCINATE 75 MG: 100 INJECTION, POWDER, FOR SOLUTION INTRAMUSCULAR; INTRAVENOUS at 08:49

## 2025-07-18 RX ADMIN — PROPOFOL 45 MCG/KG/MIN: 10 INJECTION, EMULSION INTRAVENOUS at 00:20

## 2025-07-18 RX ADMIN — LEVALBUTEROL HYDROCHLORIDE 1.25 MG: 1.25 SOLUTION RESPIRATORY (INHALATION) at 11:20

## 2025-07-18 RX ADMIN — METRONIDAZOLE 500 MG: 500 INJECTION, SOLUTION INTRAVENOUS at 02:41

## 2025-07-18 RX ADMIN — DEXMEDETOMIDINE HYDROCHLORIDE 0.2 MCG/KG/HR: 400 INJECTION INTRAVENOUS at 13:53

## 2025-07-18 RX ADMIN — VASOPRESSIN 2.4 UNITS/HR: 20 INJECTION, SOLUTION INTRAMUSCULAR; SUBCUTANEOUS at 10:22

## 2025-07-18 RX ADMIN — ACETYLCYSTEINE 1 ML: 200 SOLUTION ORAL; RESPIRATORY (INHALATION) at 19:16

## 2025-07-18 RX ADMIN — CHLORHEXIDINE GLUCONATE 15 ML: 1.2 SOLUTION ORAL at 20:39

## 2025-07-18 RX ADMIN — CHLORHEXIDINE GLUCONATE 15 ML: 1.2 SOLUTION ORAL at 08:29

## 2025-07-18 RX ADMIN — NOREPINEPHRINE BITARTRATE 0.1 MCG/KG/MIN: 0.02 INJECTION, SOLUTION INTRAVENOUS at 12:55

## 2025-07-18 RX ADMIN — DEXMEDETOMIDINE HYDROCHLORIDE 0.7 MCG/KG/HR: 400 INJECTION INTRAVENOUS at 20:39

## 2025-07-18 RX ADMIN — NOREPINEPHRINE BITARTRATE 0.1 MCG/KG/MIN: 0.02 INJECTION, SOLUTION INTRAVENOUS at 18:46

## 2025-07-18 RX ADMIN — HEPARIN SODIUM 1550 UNITS/HR: 10000 INJECTION, SOLUTION INTRAVENOUS at 21:48

## 2025-07-18 ASSESSMENT — ACTIVITIES OF DAILY LIVING (ADL)
ADLS_ACUITY_SCORE: 49

## 2025-07-18 NOTE — PROGRESS NOTES
[facility] ICU RESPIRATORY NOTE        Date of Admission: 7/13/2025    Date of Intubation (most recent): 7/16/2025    Reason for Mechanical Ventilation: Respiratory failure post op     Number of Days on Mechanical Ventilation: 3    Met Criteria for Spontaneous Breathing Trial: Yes      Bite Block: No    Significant Events Today: PS 5/8 since 1500    ABG Results:   Recent Labs   Lab 07/18/25  1616 07/18/25  1032 07/18/25  0549 07/17/25  2200   PH 7.39 7.40 7.38 7.37   PCO2 40 40 41 42   PO2 104 79* 96 84   HCO3 25 24 24 24   O2PER 50 50 50 50         Current Vent Settings: FiO2 (%): 50 %, Resp: 15, Vent Mode: PS, Resp Rate (Set): 8 breaths/min, Tidal Volume (Set, mL): 440 mL, PEEP (cm H2O): 8 cmH2O, Pressure Support (cm H2O): 5 cmH2O, Resp Rate (Set): 8 breaths/min, Tidal Volume (Set, mL): 440 mL, PEEP (cm H2O): 8 cmH2O    Skin Assessment: Skin intact     Plan: Continue full vent support and wean as tolerated    RT Higinio on 7/18/2025 at 4:57 PM

## 2025-07-18 NOTE — PROGRESS NOTES
[facility]    ICU Progress Note       Date of Admission:  7/13/2025    Assessment: Critical Care   81 y/o male with PMHx HTN, HLD, HFrEF (LVEF 40%), CAD (remote PCI 2005), CVA (2013, no residual weakness/deficits per chart review)  ad[facility]13/[facility] bacteremia thought to be caused by acute cholecystitis vs choledocholithiasis/cholangitis. Workup also revealed acute LLE DVT in mid - distal femoral as well as popliteal vein. Hospital course was complicated by atrial fibrillation with RVR, and aspiration event following planned EUS/ERCP 07/16/25. Attempted extubation following procedure but required immediate re-intubation.      Plan: Critical Care           Respiratory:  Acute hypoxic respiratory failure - secondary to aspiration event during EUS/ERCP. Remains hypoxic, slightly improved, on PEEP 12, FiO2 0.5. CXR reassuring but suspect lags behind clinical picture. Wean PEEP to 10 today - P/F ratio remains poor but improved, now 160-180. Some dysynchrony with AC-VC, changed to AC-PC.  Daily CXR  Aspiration pneumonitis vs aspiration pneumonia - reportedly large aspiration event during intubation for EUS/ERCP as above. Concern for pneumonia, on cefepime - sputum culture obtained, currently only with yeast. Narrow abx to ceftraixone today.      Cardiovascular:  Central Line: Yes PICC  Indications: vasoactive medications  Arterial Line: Yes  Septic shock - as discussed below, likely related to E coli bacteremia thought to be caused by acute cholecystitis vs choledocholithiasis/cholangitis. On levophed/vasopressin, stress dose steroids, remains on high pressor requirements. Lactic normalized. MAP goal >65.   HLD - on PTA statin  CAD - remote PCI (2005). Will need ASA restarted once able - starting therapeutic anticoagulation today. Per GI, will need at least 72 hours post-sphincterotomy prior to restarting ASA given that it is not a critical med - anticipate  restarting 7/19  HTN - on ARB/metoprolol PTA, holding given shock state as above     Hematologic:  DVT prophylaxis: Yes mechanical and chemical   Femoral/popliteal DVT - new diagnosis this admission. IR consulted for possible thrombectomy but does not meet criteria per IR consult. Therapeutic anticoagulation initiated - s/p sphincterotomy so bleeding risk exists but in the setting of acute DVT, risks of anticoagulation are outweighed by benefits. Per GI, OK to start therapeutic heparin gtt but no bolus on initiation or correction bolus - rate increases only. Currently therapeutic.   Thrombocytopenia - unclear etiology, but mild and resolved. Suspect some consumption and decreased production     Neurologic:  Pain management: Multimodal.  Continuous Sedation/Analgesia: Propofol  Propofol for sedation, fentanyl gtt for pain     Gastrointestinal:  Enteral Access: Yes  Ulcer prophylaxis: PPI  Choledocholithiasis with cholecystitis vs cholangitis - now s/p ERCP and stent/sphincterotomy by GI. Retained stone noted post procedurally, but GI feels this should pass on its own, now signed off. Surgery consulted for cholecystectomy - appreciate recommendations. Surgery does not feel that patient has cholecystitis, but feels that given his critical illness if there is concern for cholecystitis, a percutaneous cholecystostomy tube would be recommended. Will eventually need cholecystectomy following recovery from this hospitalization to be performed electively.   PPI for gastric ulcer ppx     Genitourinary/Renal:  Mar Catheter: Yes  Indication: Hourly urine output in critically ill patient  FARNAZ - secondary to septic shock. Mar in place, urine o/p adequate, cr stable.    Electrolyte replacement protocol     Endocrine/Metabolic:  Hyperglycemia - stress hyperglycemia in setting of septic shock, although likely with pre-diabetes given A1C. Glucose goal 120-180, ssi if needed     Musculoskeletal:  No acute concerns.       Integumentary:  Routine cares for skin surveillance - frequent turns, protective measures. OOB, mobilize as able     Infectious Disease:  Current antibiotics: cefepime, flagyl  Septic shock - due to E coli bacteremia (reportedly from blood cultures [facility]) vs recent aspiration pneumonitis/pneumonia. Bacteremia presumed secondary to cholecystitis vs cholangitis. Surgery suspects cholecystitis less likely; cholangitis possible, although LFTs not impressively elevated. CT abdomen/pelvis without additional obvious source - however, large loculated perihepatic/subdiaphragmatic fluid collection noted on admission, with decreased size following ERCP and increased fluid in abdomen with relative hyperdensity compared to simple ascites. Concerned this may have been loculated fluid collection of unclear source, possibly abscess as Hgb stable. Abdominal exam reassuring as patient denies pain, abdomen soft. Afebrile, but leukocytosis persistent/increased today. Discussed with IR but now that fluid collection is smaller, unable to drain percutaneously. Discussed with ID (not actively following) and will narrow abx to ceftriaxone for now given clinical improvement, followup cultures. Septic shock also may be partially (or wholly) caused by aspiration pneumonitis vs aspiration pneumonia as above - sputum culture negative other than yeast, CXR reassuring, narrow abx to ceftriaxone as mentioned above.     Psychologic:  Restraints: non violent  Unable to assess     Family Communication: will attempt to update brother by telephone     Criteria for Transfer to Floor/Discharge Planning: ICU      Lines/ tubes/ drains:  Mar Catheter: PRESENT, indication: ICU only: hourly urine output needed for patient care  Lines: PRESENT      PICC 07/16/25 Triple Lumen Right Basilic Pressors-Site Assessment: WDL except;Leaking  Arterial Line 07/17/25 Radial-Site Assessment: WDL        Prophylaxis:  - DVT Prophylaxis: heparin gtt  - PUD  "Prophylaxis: PPI     [facility] ICU Rounding checklist       Assessment of central venous catheter access Central access present and needed   Assessment of urinary catheter use Mar present and needed.  Indication:  hourly urine o/p in critically ill patient   DVT prophylaxis Therapeutic anticoagulation (see A/P)         Code Status: Full Code       Disposition:  - ICU     The patient's care was discussed with the Attending Physician, Dr. Marquez.    Reilly Neves MD, MD  [facility]  Securely message with [employer] (more info)  Text page via [employer] Paging/Directory     Clinically Significant Risk Factors               # Hypoalbuminemia: Lowest albumin = 2.1 g/dL at 7/17/2025  4:17 AM, will monitor as appropriate                # Overweight: Estimated body mass index is 29.45 kg/m  as calculated from the following:    Height as of this encounter: 1.778 m (5' 10\").    Weight as of this encounter: 93.1 kg (205 lb 4 oz).                  ______________________________________________________________________    Interval History   No events o/n. Remains on high dose vasopressors. Sedation weaned, patient following commands, denies abdominal pain.     Physical Exam   Vital Signs: Temp: 98.6  F (37  C) Temp src: Axillary BP: (!) 84/56 Pulse: 76   Resp: 10 SpO2: 96 % O2 Device: Mechanical Ventilator    Weight: 205 lbs 3.97 oz    Intubated, sedated  RRR  Good air entry bilaterally, no wheezes  Soft non distended, non tender abdomen  Moves all extremities, follows commands in all extremities, grossly normal appearing strength in B UE/LE, unable to obtain full MSK/neuro exam as remains sedated to maintain ventilator synchrony and prevent hypoxia. Appears purposeful, non focal neurologic exam    Data   I reviewed all medications, new labs and imaging results over the last 24 hours.  Arterial Blood Gases   Recent Labs   Lab 07/18/25  1032 07/18/25  0549 07/17/25  2200 07/17/25  1802   PH 7.40 " 7.38 7.37 7.37   PCO2 40 41 42 42   PO2 79* 96 84 84   HCO3 24 24 24 24       Complete Blood Count   Recent Labs   Lab 07/17/25  0417 07/16/25  1932 07/16/25  0420 07/15/25  1054   WBC 12.9* 10.5 18.6* 16.0*   HGB 12.4* 13.6 14.6 13.2*   * 149* 130* 133*       Basic Metabolic Panel  Recent Labs   Lab 07/18/25  0836 07/18/25  0548 07/18/25  0135 07/17/25 2019 07/17/25  0807 07/17/25 0417 07/16/25 1932 07/16/25  1741 07/16/25  0420 07/15/25  1054   NA  --   --   --   --   --  139 138  --  139 136   POTASSIUM  --   --   --   --   --  3.7 3.5  --  3.7 3.5   CHLORIDE  --   --   --   --   --  102 101  --  99 101   CO2  --   --   --   --   --  21* 25  --  27 23   BUN  --   --   --   --   --  43.9* 40.8*  --  30.1* 27.4*   CR  --   --   --   --   --  1.39* 1.10  --  0.97 1.06   * 163* 179* 184*   < > 182* 154*   < > 128* 139*    < > = values in this interval not displayed.       Liver Function Tests  Recent Labs   Lab 07/17/25  0417 07/16/25  1932 07/16/25  0420 07/15/25  1054   AST 18 18 21 21   ALT 14 17 22 24   ALKPHOS 120 159* 157* 138   BILITOTAL 1.1 1.3* 1.4* 1.2   ALBUMIN 2.1* 2.5* 3.1* 2.7*       Pancreatic Enzymes  No lab results found in last 7 days.    Coagulation Profile  No lab results found in last 7 days.    IMAGING:  Recent Results (from the past 24 hours)   US Upper Extremity Venous Duplex Right    Narrative    EXAM: US UPPER EXTREMITY VENOUS DUPLEX RIGHT  LOCATION: [facility]  DATE: 7/17/2025    INDICATION*: Rule out DVT pt with PICC; PICC line difficulties, assess for DVT  COMPARISON: None.  TECHNIQUE: Venous Duplex ultrasound of the right upper extremity with (when possible) and without compression, augmentation, and duplex. Color flow and spectral Doppler with waveform analysis performed.    FINDINGS: Ultrasound includes evaluation of the internal jugular vein, innominate vein, subclavian vein, axillary vein, and brachial vein. The superficial cephalic and  basilic veins were also evaluated where seen.     RIGHT: No deep venous thrombosis. Superficial thrombophlebitis is identified in the right basilic vein at site of PICC line (greater than 5 cm in length). Occlusive thrombus surrounds the PICC line site, with nonocclusive thrombus proximal and distal to   the insertion site.      Impression    IMPRESSION:  1.  No deep venous thrombosis in the right upper extremity.  2.  Exam POSITIVE for right basilic vein superficial thrombophlebitis.   XR Chest Port 1 View    Narrative    EXAM: XR CHEST PORT 1 VIEW  LOCATION: [facility]  DATE: 7/18/2025    INDICATION: Interval change.  COMPARISON: 07/17/2025      Impression    IMPRESSION: Stable cardiomegaly. The ET tube is unchanged in position. Enteric tube tip projects over the mid stomach. The side port is near the GE junction. The lungs are hypoventilated and there is bibasilar atelectasis similar to the previous study.   Can't exclude tiny effusions. No evidence for pneumothorax or consolidation.

## 2025-07-18 NOTE — PROGRESS NOTES
Ultrasound showing partially occlusive superficial thrombus, see imaging results.  Patient continues with leaking at insertion site, now serous.  Intensivist okays new line in am, as patient already on heparin.  Per primary nursing, medications currently delivered via PICC, including propofol, seem to be effective.  No overnight VAT coverage.  Discuss with primary nursing, if medications delivered via PICC seem subtherapeutic for doses administered, may need to place internal jugular prior to VAT returning in am.  Patient had PICC placed secondary to very limited vasculature.  Also discuss with ICU provider and RN that leaking PICC, even small amount, not sustainable long-term, as sterility beneath dressing is compromised when dressing is wet. VAT will assess next shift whether replacing PICC feasible.  Primary RN staff will attempt to keep dressing intact overnight.

## 2025-07-18 NOTE — PROGRESS NOTES
Paged for PICC leaking.  Upon earlier arrival in room, part of PICC line typically beneath sterile dressing found outside of dressing. Patient with many infusions and heavier line separation device connected to PICC.  Unclear whether weight of necessary devices interfered with dressing.  Small tegaderm placed over section meant to be covered until VAT able return to complete dressing change. Dressing change completed, serosanguineous fluid noted beneath dressing.  Discuss with primary RN serosanguineous fluid may be sign of DVT in arm.  Will request ICU provider to place ultrasound order for right upper arm.

## 2025-07-18 NOTE — PROGRESS NOTES
Attempt to discuss right upper arm ultrasound results with ICU provider / unavailable.  VAT will attempt again when able.

## 2025-07-18 NOTE — PROGRESS NOTES
Surgery    Improved from yesterday.  ICU working on weaning vent.    Cholecystectomy would be used in this patient to prevent further cholangitis.  This should be considered if he recovers from this aspiration pneumonia.      Tube cholecystostomy should be used if we think the gallbladder is an infectious source.  It will not prevent him from shedding further stones.    So at this point, no intervention planned from out standpoint.    Will sign off.  Please reconsult if our input is needed.    Randy Houser M.D., F.A.C.S.  [facility]  [facility]  [place], [place]  [phone]

## 2025-07-18 NOTE — PLAN OF CARE
ICU End of Shift Summary. See flowsheets for vital signs and detailed assessment.    Changes this shift: Tolerating pressure support on vent, remains on presser, levo titrated to SBP >95 on art line. Cuff pressure slightly higher than art line. Vasopressin stopped. Fentanyl infusion discontinued, precedex started, attempting to wean propofol. Pt is alert and able to make needs known, becomes restless when propofol titrated down. Denies pain. Afebrile.     Plan: Continue to wean propofol, pressors down as able. Wean from ventilator.    Goal Outcome Evaluation: Progressing

## 2025-07-18 NOTE — PROGRESS NOTES
"[facility]  [facility]  Gastroenterology Progress note    Interval History:      No acute events.  Patient remains vented on Levo.      Vital Signs:      BP (!) 84/56   Pulse 75   Temp 98.9  F (37.2  C) (Axillary)   Resp 13   Ht 1.778 m (5' 10\")   Wt 93.1 kg (205 lb 4 oz)   SpO2 95%   BMI 29.45 kg/m    Temp (24hrs), Av.6  F (36.4  C), Min:96.6  F (35.9  C), Max:98.9  F (37.2  C)    Patient Vitals for the past 72 hrs:   Weight   25 0400 93.1 kg (205 lb 4 oz)   25 0543 86.5 kg (190 lb 9.6 oz)       Intake/Output Summary (Last 24 hours) at 2025 0819  Last data filed at 2025 0600  Gross per 24 hour   Intake 3989.26 ml   Output 767 ml   Net 3222.26 ml       Additional Comments:  ROS, FH, SH: See initial GI consult for details.    Laboratory Data:  Recent Labs   Lab Test 25  0420   WBC 12.9* 10.5 18.6*   HGB 12.4* 13.6 14.6   MCV 87  86  86 84 84   * 149* 130*     Recent Labs   Lab Test 25  0420    138 139   POTASSIUM 3.7 3.5 3.7   CHLORIDE 102 101 99   CO2 21* 25 27   BUN 43.9* 40.8* 30.1*   CR 1.39* 1.10 0.97   ANIONGAP 16* 12 13   PAUL 7.4* 8.5* 9.4     Recent Labs   Lab Test 25  0420   ALBUMIN 2.1*  --  2.5* 3.1*   BILITOTAL 1.1  --  1.3* 1.4*   DBIL 0.58*  --   --   --    ALT 14  --  17 22   AST 18  --  18 21   ALKPHOS 120  --  159* 157*   PROTEIN  --  30*  --   --          Assessment:  81 yo male with history of MI, CVA, heart failure, hypertension, hyperlipidemia, diverticulosis, thrombocytopenia, and ASD who presented with RUQ pain and was found to have choledocholithiasis on imaging 25.  His hospitalization has been complicated by new onset atrial fibrillation, acute LLE DVT (on heparin), and acute episodic hypoxic respiratory failure on 7/15.  ERCP  with aspiration during intubation.  ERCP with " sphincterotomy completed 7/16 with removal of stones, no stent placed.  CT 7/17 with pneumobilia, expected s/p ERCP.  Residual 0.3 cm gallstone in the common duct.  Persistent mild common duct dilatation measuring 1.1 cm.  New small volume intraperitoneal free fluid measuring slightly higher than simple fluid attenuation.  No intraperitoneal free air.  Mild dilatation of nondependent small bowel loops, likely ileus.  No transition point to suggest obstruction.    Liver function tests resolving.  Percutaneous cholecystostomy tube recommended instead of surgery if continued concern for cholecystitis.  Plan:  -  Discussed retained stone with Dr. Lee (biliary).  She feels this will pass on it's own and no intervention is needed.  -  Radiology does not recommend intervention as not a significant amount of fluid to drain.  -  No further GI recommendations at this time.  Will sign off.  Please call with questions.    Total Time Spent: 10 minutes      AMI Matta  [facility]  Office:  [phone] call if needed after 5PM  Cell:  [phone], not available after 5PM at this number

## 2025-07-18 NOTE — PLAN OF CARE
"Goal Outcome Evaluation:      Plan of Care Reviewed With: patient    Overall Patient Progress: no changeOverall Patient Progress: no change     Neuro: RASS -1. PERRL. Arouses to voice. Follows commands in all extremities.  Cardiac: SR/SB. HR 50-80. Levo titrated for MAP >65. Vaso continues.  Resp: Vent 50%, peep 12. Dim lung sounds.  GI/: OG clamped. Mar marginal uop. No BM.  Skin: no new concerns  Lines: RPICC (leaking at site, found to have superficial thrombophlebitis - see notes and results for further info), piv x2, R ulnar ART  Gtts: Levo 0.14, Prop 45, Fent 50, Vaso 2.4, Hep 1550        *see Epic flowsheets for full nursing assessment findings           Problem: Adult Inpatient Plan of Care  Goal: Plan of Care Review  Description: The Plan of Care Review/Shift note should be completed every shift.  The Outcome Evaluation is a brief statement about your assessment that the patient is improving, declining, or no change.  This information will be displayed automatically on your shift  note.  Outcome: Progressing  Flowsheets (Taken 7/18/2025 0621)  Plan of Care Reviewed With: patient  Overall Patient Progress: no change  Goal: Patient-Specific Goal (Individualized)  Description: You can add care plan individualizations to a care plan. Examples of Individualization might be:  \"Parent requests to be called daily at 9am for status\", \"I have a hard time hearing out of my right ear\", or \"Do not touch me to wake me up as it startles  me\".  Outcome: Progressing  Goal: Absence of Hospital-Acquired Illness or Injury  Outcome: Progressing  Intervention: Identify and Manage Fall Risk  Recent Flowsheet Documentation  Taken 7/18/2025 0400 by Francisca Meneses, RN  Safety Promotion/Fall Prevention:   activity supervised   clutter free environment maintained   lighting adjusted   room near nurse's station   room organization consistent   safety round/check completed   supervised activity   room door open  Taken 7/18/2025 0000 by " Zaira, Francisca, RN  Safety Promotion/Fall Prevention:   activity supervised   clutter free environment maintained   lighting adjusted   room near nurse's station   room organization consistent   safety round/check completed   supervised activity   room door open  Taken 7/17/2025 2000 by Francisca Meneses RN  Safety Promotion/Fall Prevention:   activity supervised   clutter free environment maintained   lighting adjusted   room near nurse's station   room organization consistent   safety round/check completed   supervised activity   room door open  Intervention: Prevent Skin Injury  Recent Flowsheet Documentation  Taken 7/18/2025 0600 by Francisca Meneses RN  Body Position:   turned   heels elevated   upper extremity elevated   side-lying 30 degrees  Taken 7/18/2025 0400 by Francisca Meneses RN  Body Position:   turned   heels elevated   upper extremity elevated   side-lying 30 degrees  Skin Protection:   adhesive use limited   incontinence pads utilized   silicone foam dressing in place   tubing/devices free from skin contact   pulse oximeter probe site changed   skin to device areas padded   transparent dressing maintained  Taken 7/18/2025 0200 by Francisca Meneses RN  Body Position:   turned   heels elevated   upper extremity elevated   side-lying 30 degrees  Taken 7/18/2025 0000 by Francisca Meneses RN  Body Position:   turned   heels elevated   upper extremity elevated   side-lying 30 degrees  Skin Protection:   adhesive use limited   incontinence pads utilized   silicone foam dressing in place   tubing/devices free from skin contact   pulse oximeter probe site changed   skin to device areas padded   transparent dressing maintained  Taken 7/17/2025 2200 by Francisca Meneses RN  Body Position:   turned   heels elevated   upper extremity elevated   side-lying 30 degrees  Taken 7/17/2025 2000 by Francisca Meneses RN  Body Position:   turned   heels elevated   upper extremity elevated   side-lying 30 degrees  Skin Protection:   adhesive use  limited   incontinence pads utilized   silicone foam dressing in place   tubing/devices free from skin contact   pulse oximeter probe site changed   skin to device areas padded   transparent dressing maintained  Intervention: Prevent and Manage VTE (Venous Thromboembolism) Risk  Recent Flowsheet Documentation  Taken 7/18/2025 0400 by Francisca Meneses RN  VTE Prevention/Management: (hep gtt) SCDs off (sequential compression devices)  Taken 7/18/2025 0000 by Francisca Meneses RN  VTE Prevention/Management: (hep gtt) SCDs off (sequential compression devices)  Taken 7/17/2025 2000 by Francisca Meneses RN  VTE Prevention/Management: (hep gtt) SCDs off (sequential compression devices)  Intervention: Prevent Infection  Recent Flowsheet Documentation  Taken 7/18/2025 0400 by Francisca Meneses RN  Infection Prevention:   environmental surveillance performed   equipment surfaces disinfected   personal protective equipment utilized   hand hygiene promoted   rest/sleep promoted   single patient room provided   cohorting utilized  Taken 7/18/2025 0000 by Francisca Meneses RN  Infection Prevention:   environmental surveillance performed   equipment surfaces disinfected   personal protective equipment utilized   hand hygiene promoted   rest/sleep promoted   single patient room provided   cohorting utilized  Taken 7/17/2025 2000 by Francisca Meneses RN  Infection Prevention:   environmental surveillance performed   equipment surfaces disinfected   personal protective equipment utilized   hand hygiene promoted   rest/sleep promoted   single patient room provided   cohorting utilized  Goal: Optimal Comfort and Wellbeing  Outcome: Progressing  Intervention: Monitor Pain and Promote Comfort  Recent Flowsheet Documentation  Taken 7/18/2025 0400 by Francisca Meneses RN  Pain Management Interventions:   repositioned   rest   relaxation techniques promoted   pain pump in use  Taken 7/18/2025 0000 by Francisca Meneses RN  Pain Management Interventions:   repositioned    rest   relaxation techniques promoted   pain pump in use  Taken 7/17/2025 2000 by Francisca Meneses RN  Pain Management Interventions:   repositioned   rest   relaxation techniques promoted   pain pump in use  Intervention: Provide Person-Centered Care  Recent Flowsheet Documentation  Taken 7/18/2025 0400 by Francisca Meneses RN  Trust Relationship/Rapport:   care explained   reassurance provided  Taken 7/18/2025 0000 by Francisca Meneses RN  Trust Relationship/Rapport:   care explained   reassurance provided  Taken 7/17/2025 2000 by Francisca Meneses RN  Trust Relationship/Rapport:   care explained   reassurance provided  Goal: Readiness for Transition of Care  Outcome: Progressing     Problem: Restraint, Nonviolent  Goal: Absence of Harm or Injury  Outcome: Progressing  Intervention: Implement Least Restrictive Safety Strategies  Recent Flowsheet Documentation  Taken 7/18/2025 0400 by Francisca Meneses RN  Medical Device Protection:   tubing secured   torso covered  Taken 7/18/2025 0000 by Francisca Meneses RN  Medical Device Protection:   tubing secured   torso covered  Taken 7/17/2025 2000 by Francisca Meneses RN  Medical Device Protection:   tubing secured   torso covered  Intervention: Protect Dignity, Rights and Personal Wellbeing  Recent Flowsheet Documentation  Taken 7/18/2025 0400 by Francisca Meneses RN  Trust Relationship/Rapport:   care explained   reassurance provided  Taken 7/18/2025 0000 by Francisca Meneses RN  Trust Relationship/Rapport:   care explained   reassurance provided  Taken 7/17/2025 2000 by Francisca Meneses RN  Trust Relationship/Rapport:   care explained   reassurance provided  Intervention: Protect Skin and Joint Integrity  Recent Flowsheet Documentation  Taken 7/18/2025 0600 by Francisca Meneses RN  Body Position:   turned   heels elevated   upper extremity elevated   side-lying 30 degrees  Taken 7/18/2025 0400 by Francisca Meneses RN  Body Position:   turned   heels elevated   upper extremity elevated   side-lying 30  degrees  Skin Protection:   adhesive use limited   incontinence pads utilized   silicone foam dressing in place   tubing/devices free from skin contact   pulse oximeter probe site changed   skin to device areas padded   transparent dressing maintained  Range of Motion: ROM (range of motion) performed  Taken 7/18/2025 0200 by Francisca Meneses RN  Body Position:   turned   heels elevated   upper extremity elevated   side-lying 30 degrees  Taken 7/18/2025 0000 by Francisca Meneses RN  Body Position:   turned   heels elevated   upper extremity elevated   side-lying 30 degrees  Skin Protection:   adhesive use limited   incontinence pads utilized   silicone foam dressing in place   tubing/devices free from skin contact   pulse oximeter probe site changed   skin to device areas padded   transparent dressing maintained  Range of Motion: ROM (range of motion) performed  Taken 7/17/2025 2200 by Francisca Meneses RN  Body Position:   turned   heels elevated   upper extremity elevated   side-lying 30 degrees  Taken 7/17/2025 2000 by Francisca Meneses RN  Body Position:   turned   heels elevated   upper extremity elevated   side-lying 30 degrees  Skin Protection:   adhesive use limited   incontinence pads utilized   silicone foam dressing in place   tubing/devices free from skin contact   pulse oximeter probe site changed   skin to device areas padded   transparent dressing maintained  Range of Motion: ROM (range of motion) performed     Problem: Mechanical Ventilation Invasive  Goal: Effective Communication  Outcome: Progressing  Intervention: Ensure Effective Communication  Recent Flowsheet Documentation  Taken 7/18/2025 0400 by Francisca Meneses RN  Trust Relationship/Rapport:   care explained   reassurance provided  Taken 7/18/2025 0000 by Francisca Meneses RN  Trust Relationship/Rapport:   care explained   reassurance provided  Taken 7/17/2025 2000 by Francisca Meneses RN  Trust Relationship/Rapport:   care explained   reassurance provided  Goal:  Optimal Device Function  Outcome: Progressing  Intervention: Optimize Device Care and Function  Recent Flowsheet Documentation  Taken 7/18/2025 0600 by Francisca Meneses RN  Oral Care:   suction provided   swabbed with antiseptic solution  Taken 7/18/2025 0400 by Francisca Meneses RN  Airway/Ventilation Management:   airway patency maintained   calming measures promoted   humidification applied   position adjusted   positive pressure ventilation provided   pulmonary hygiene promoted   oxygen therapy provided  Oral Care:   suction provided   swabbed with antiseptic solution  Taken 7/18/2025 0200 by Francisca Meneses RN  Oral Care:   suction provided   swabbed with antiseptic solution  Taken 7/18/2025 0000 by Francisca Meneses RN  Airway/Ventilation Management:   airway patency maintained   calming measures promoted   humidification applied   position adjusted   positive pressure ventilation provided   pulmonary hygiene promoted   oxygen therapy provided  Oral Care:   suction provided   swabbed with antiseptic solution  Taken 7/17/2025 2200 by Francisca Meneses RN  Oral Care:   suction provided   swabbed with antiseptic solution  Taken 7/17/2025 2100 by Francisca Meneses RN  Oral Care: suction provided  Taken 7/17/2025 2000 by Francisca Meneses RN  Airway/Ventilation Management:   airway patency maintained   calming measures promoted   humidification applied   position adjusted   positive pressure ventilation provided   pulmonary hygiene promoted   oxygen therapy provided  Oral Care:   suction provided   swabbed with antiseptic solution  Goal: Mechanical Ventilation Liberation  Outcome: Progressing  Intervention: Promote Extubation and Mechanical Ventilation Liberation  Recent Flowsheet Documentation  Taken 7/18/2025 0400 by Francisca Meneses RN  Medication Review/Management:   medications reviewed   high-risk medications identified  Environmental Support:   calm environment promoted   caregiver consistency promoted   environmental consistency  promoted   rest periods encouraged   distractions minimized  Taken 7/18/2025 0000 by Francisca Meneses RN  Medication Review/Management:   medications reviewed   high-risk medications identified  Environmental Support:   calm environment promoted   caregiver consistency promoted   environmental consistency promoted   rest periods encouraged   distractions minimized  Taken 7/17/2025 2000 by Francisca Meneses RN  Medication Review/Management:   medications reviewed   high-risk medications identified  Environmental Support:   calm environment promoted   caregiver consistency promoted   environmental consistency promoted   rest periods encouraged   distractions minimized  Goal: Optimal Nutrition Delivery  Outcome: Progressing  Goal: Absence of Device-Related Skin and Tissue Injury  Outcome: Progressing  Intervention: Maintain Skin and Tissue Health  Recent Flowsheet Documentation  Taken 7/18/2025 0400 by Francisca Meneses RN  Device Skin Pressure Protection:   absorbent pad utilized/changed   positioning supports utilized   tubing/devices free from skin contact   adhesive use limited   pressure points protected   skin-to-device areas padded  Taken 7/18/2025 0000 by Francisca Meneses RN  Device Skin Pressure Protection:   absorbent pad utilized/changed   positioning supports utilized   tubing/devices free from skin contact   adhesive use limited   pressure points protected   skin-to-device areas padded  Taken 7/17/2025 2000 by Francisca Meneses RN  Device Skin Pressure Protection:   absorbent pad utilized/changed   positioning supports utilized   tubing/devices free from skin contact   adhesive use limited   pressure points protected   skin-to-device areas padded  Goal: Absence of Ventilator-Induced Lung Injury  Outcome: Progressing  Intervention: Facilitate Lung-Protection Measures  Recent Flowsheet Documentation  Taken 7/18/2025 0400 by Francisca Meneses RN  Lung Protection Measures:   fluid excess minimized   optimal PEEP applied    ventilator synchrony promoted   lung compliance monitored   ventilator waveforms monitored  Taken 7/18/2025 0000 by Francisca Meneses RN  Lung Protection Measures:   fluid excess minimized   optimal PEEP applied   ventilator synchrony promoted   lung compliance monitored   ventilator waveforms monitored  Taken 7/17/2025 2000 by Francisca Meneses RN  Lung Protection Measures:   fluid excess minimized   optimal PEEP applied   ventilator synchrony promoted   lung compliance monitored   ventilator waveforms monitored  Intervention: Prevent Ventilator-Associated Pneumonia  Recent Flowsheet Documentation  Taken 7/18/2025 0600 by Francisca Meneses RN  Oral Care:   suction provided   swabbed with antiseptic solution  Head of Bed (HOB) Positioning: HOB at 30 degrees  Taken 7/18/2025 0400 by Francisca Meneses RN  Oral Care:   suction provided   swabbed with antiseptic solution  Head of Bed (HOB) Positioning: HOB at 30 degrees  Taken 7/18/2025 0200 by Francisca Meneses RN  Oral Care:   suction provided   swabbed with antiseptic solution  Head of Bed (HOB) Positioning: HOB at 30 degrees  Taken 7/18/2025 0000 by Francisca Meneses RN  Oral Care:   suction provided   swabbed with antiseptic solution  Head of Bed (HOB) Positioning: HOB at 30 degrees  Taken 7/17/2025 2200 by Francisca Meneses RN  Oral Care:   suction provided   swabbed with antiseptic solution  Head of Bed (HOB) Positioning: HOB at 30 degrees  Taken 7/17/2025 2100 by Francisca Meneses RN  Oral Care: suction provided  Taken 7/17/2025 2000 by Francisca Meneses RN  Oral Care:   suction provided   swabbed with antiseptic solution  Head of Bed (HOB) Positioning: HOB at 30 degrees     Problem: Comorbidity Management  Goal: Maintenance of Heart Failure Symptom Control  Outcome: Progressing  Intervention: Maintain Heart Failure Management  Recent Flowsheet Documentation  Taken 7/18/2025 0400 by Francisca Meneses RN  Medication Review/Management:   medications reviewed   high-risk medications identified  Taken  7/18/2025 0000 by Francisca Meneses RN  Medication Review/Management:   medications reviewed   high-risk medications identified  Taken 7/17/2025 2000 by Francisca Meneses RN  Medication Review/Management:   medications reviewed   high-risk medications identified  Goal: Blood Pressure in Desired Range  Outcome: Progressing  Intervention: Maintain Blood Pressure Management  Recent Flowsheet Documentation  Taken 7/18/2025 0400 by Francisca Meneses RN  Medication Review/Management:   medications reviewed   high-risk medications identified  Taken 7/18/2025 0000 by Francisca Meneses RN  Medication Review/Management:   medications reviewed   high-risk medications identified  Taken 7/17/2025 2000 by Francisca Meneses RN  Medication Review/Management:   medications reviewed   high-risk medications identified

## 2025-07-18 NOTE — PROGRESS NOTES
VAT RN at bedside to assess PICC site and PICC line. Results of RUE US reviewed. Negative for DVT, however pt does have superficial basilic vein thrombophlebitis along PICC line. PICC tip is in appropriate location. Dressing removed and site assessed per VAT. Site is without signs of infection, skin intact and WDL. When PICC is flushed no leaking noted at site with infusion. There is old serous drainage on dressing which was removed. VAT cleansed site and change dressing per protocol and gauze applied at insertion site. Dicussed findings with MD and bedside RN. In setting of DVT it is not the recommendation according to standards to remove functioning PICC line with tip in appropriate location. Recommendation is to treat thrombus per MD if appropriate. Overwire exchange of PICC if thought to be catheter malfunction is not recommended in setting of thrombus. Replacing PICC line in LUE would also not be in line with INS standards due to risk for thrombus formation with new PICC as well and limited vein viability/preservation. Ultimately if catheter integrity is in question and PICC is to be removed, recommendation would be for MD to place a CVC if central access is required. Discussed above with MD.

## 2025-07-19 LAB
ALBUMIN SERPL BCG-MCNC: 2.3 G/DL (ref 3.5–5.2)
ALP SERPL-CCNC: 128 U/L (ref 40–150)
ALT SERPL W P-5'-P-CCNC: 12 U/L (ref 0–70)
ANION GAP SERPL CALCULATED.3IONS-SCNC: 11 MMOL/L (ref 7–15)
AST SERPL W P-5'-P-CCNC: 24 U/L (ref 0–45)
BACTERIA SPT CULT: ABNORMAL
BILIRUB SERPL-MCNC: 0.7 MG/DL
BUN SERPL-MCNC: 43.7 MG/DL (ref 8–23)
CALCIUM SERPL-MCNC: 7.7 MG/DL (ref 8.8–10.4)
CHLORIDE SERPL-SCNC: 110 MMOL/L (ref 98–107)
CREAT SERPL-MCNC: 1.27 MG/DL (ref 0.67–1.17)
EGFRCR SERPLBLD CKD-EPI 2021: 58 ML/MIN/1.73M2
ERYTHROCYTE [DISTWIDTH] IN BLOOD BY AUTOMATED COUNT: 15.6 % (ref 10–15)
GLUCOSE BLDC GLUCOMTR-MCNC: 102 MG/DL (ref 70–99)
GLUCOSE BLDC GLUCOMTR-MCNC: 103 MG/DL (ref 70–99)
GLUCOSE BLDC GLUCOMTR-MCNC: 118 MG/DL (ref 70–99)
GLUCOSE BLDC GLUCOMTR-MCNC: 133 MG/DL (ref 70–99)
GLUCOSE BLDC GLUCOMTR-MCNC: 94 MG/DL (ref 70–99)
GLUCOSE SERPL-MCNC: 132 MG/DL (ref 70–99)
GRAM STAIN RESULT: ABNORMAL
GRAM STAIN RESULT: ABNORMAL
HCO3 SERPL-SCNC: 22 MMOL/L (ref 22–29)
HCT VFR BLD AUTO: 28.9 % (ref 40–53)
HGB BLD-MCNC: 9.7 G/DL (ref 13.3–17.7)
MAGNESIUM SERPL-MCNC: 2.6 MG/DL (ref 1.7–2.3)
MCH RBC QN AUTO: 29.4 PG (ref 26.5–33)
MCHC RBC AUTO-ENTMCNC: 33.6 G/DL (ref 31.5–36.5)
MCV RBC AUTO: 88 FL (ref 78–100)
PHOSPHATE SERPL-MCNC: 2.5 MG/DL (ref 2.5–4.5)
PHOSPHATE SERPL-MCNC: 3 MG/DL (ref 2.5–4.5)
PLATELET # BLD AUTO: 180 10E3/UL (ref 150–450)
POTASSIUM SERPL-SCNC: 3.5 MMOL/L (ref 3.4–5.3)
PROT SERPL-MCNC: 5.1 G/DL (ref 6.4–8.3)
RBC # BLD AUTO: 3.3 10E6/UL (ref 4.4–5.9)
SODIUM SERPL-SCNC: 143 MMOL/L (ref 135–145)
UFH PPP CHRO-ACNC: 0.59 IU/ML (ref ?–1.1)
WBC # BLD AUTO: 23.4 10E3/UL (ref 4–11)

## 2025-07-19 PROCEDURE — 250N000011 HC RX IP 250 OP 636: Performed by: INTERNAL MEDICINE

## 2025-07-19 PROCEDURE — 99233 SBSQ HOSP IP/OBS HIGH 50: CPT | Performed by: INTERNAL MEDICINE

## 2025-07-19 PROCEDURE — 250N000013 HC RX MED GY IP 250 OP 250 PS 637: Performed by: HOSPITALIST

## 2025-07-19 PROCEDURE — 250N000011 HC RX IP 250 OP 636: Performed by: STUDENT IN AN ORGANIZED HEALTH CARE EDUCATION/TRAINING PROGRAM

## 2025-07-19 PROCEDURE — 94640 AIRWAY INHALATION TREATMENT: CPT

## 2025-07-19 PROCEDURE — 999N000157 HC STATISTIC RCP TIME EA 10 MIN

## 2025-07-19 PROCEDURE — 999N000253 HC STATISTIC WEANING TRIALS

## 2025-07-19 PROCEDURE — 94003 VENT MGMT INPAT SUBQ DAY: CPT

## 2025-07-19 PROCEDURE — 250N000013 HC RX MED GY IP 250 OP 250 PS 637

## 2025-07-19 PROCEDURE — 82310 ASSAY OF CALCIUM: CPT | Performed by: STUDENT IN AN ORGANIZED HEALTH CARE EDUCATION/TRAINING PROGRAM

## 2025-07-19 PROCEDURE — 84100 ASSAY OF PHOSPHORUS: CPT | Performed by: INTERNAL MEDICINE

## 2025-07-19 PROCEDURE — 85027 COMPLETE CBC AUTOMATED: CPT | Performed by: STUDENT IN AN ORGANIZED HEALTH CARE EDUCATION/TRAINING PROGRAM

## 2025-07-19 PROCEDURE — 85520 HEPARIN ASSAY: CPT | Performed by: SURGERY

## 2025-07-19 PROCEDURE — 83735 ASSAY OF MAGNESIUM: CPT | Performed by: SURGERY

## 2025-07-19 PROCEDURE — 250N000009 HC RX 250

## 2025-07-19 PROCEDURE — 999N000259 HC STATISTIC EXTUBATION

## 2025-07-19 PROCEDURE — 120N000001 HC R&B MED SURG/OB

## 2025-07-19 RX ORDER — HYDROCORTISONE SODIUM SUCCINATE 100 MG/2ML
50 INJECTION INTRAMUSCULAR; INTRAVENOUS EVERY 8 HOURS
Status: COMPLETED | OUTPATIENT
Start: 2025-07-19 | End: 2025-07-20

## 2025-07-19 RX ORDER — HYDROCORTISONE SODIUM SUCCINATE 100 MG/2ML
25 INJECTION INTRAMUSCULAR; INTRAVENOUS ONCE
Status: COMPLETED | OUTPATIENT
Start: 2025-07-20 | End: 2025-07-20

## 2025-07-19 RX ORDER — DEXTROSE MONOHYDRATE 25 G/50ML
25-50 INJECTION, SOLUTION INTRAVENOUS
Status: DISCONTINUED | OUTPATIENT
Start: 2025-07-19 | End: 2025-07-19

## 2025-07-19 RX ORDER — LEVALBUTEROL INHALATION SOLUTION 1.25 MG/3ML
1.25 SOLUTION RESPIRATORY (INHALATION) EVERY 4 HOURS PRN
Status: DISCONTINUED | OUTPATIENT
Start: 2025-07-19 | End: 2025-08-07 | Stop reason: HOSPADM

## 2025-07-19 RX ORDER — NICOTINE POLACRILEX 4 MG
15-30 LOZENGE BUCCAL
Status: DISCONTINUED | OUTPATIENT
Start: 2025-07-19 | End: 2025-07-19

## 2025-07-19 RX ADMIN — ACETYLCYSTEINE 1 ML: 200 SOLUTION ORAL; RESPIRATORY (INHALATION) at 08:03

## 2025-07-19 RX ADMIN — PANTOPRAZOLE SODIUM 40 MG: 40 INJECTION, POWDER, FOR SOLUTION INTRAVENOUS at 08:53

## 2025-07-19 RX ADMIN — HYDROCORTISONE SODIUM SUCCINATE 75 MG: 100 INJECTION, POWDER, FOR SOLUTION INTRAMUSCULAR; INTRAVENOUS at 08:54

## 2025-07-19 RX ADMIN — HYDROCORTISONE SODIUM SUCCINATE 75 MG: 100 INJECTION, POWDER, FOR SOLUTION INTRAMUSCULAR; INTRAVENOUS at 05:51

## 2025-07-19 RX ADMIN — CEFTRIAXONE SODIUM 2 G: 2 INJECTION, POWDER, FOR SOLUTION INTRAMUSCULAR; INTRAVENOUS at 12:14

## 2025-07-19 RX ADMIN — LEVALBUTEROL HYDROCHLORIDE 1.25 MG: 1.25 SOLUTION RESPIRATORY (INHALATION) at 08:03

## 2025-07-19 RX ADMIN — PANTOPRAZOLE SODIUM 40 MG: 40 INJECTION, POWDER, FOR SOLUTION INTRAVENOUS at 21:13

## 2025-07-19 RX ADMIN — ATORVASTATIN CALCIUM 40 MG: 40 TABLET, FILM COATED ORAL at 08:53

## 2025-07-19 RX ADMIN — HEPARIN SODIUM 1550 UNITS/HR: 10000 INJECTION, SOLUTION INTRAVENOUS at 14:48

## 2025-07-19 RX ADMIN — SENNOSIDES AND DOCUSATE SODIUM 1 TABLET: 50; 8.6 TABLET ORAL at 17:39

## 2025-07-19 RX ADMIN — ACETAMINOPHEN 650 MG: 325 TABLET ORAL at 21:12

## 2025-07-19 RX ADMIN — HYDROCORTISONE SODIUM SUCCINATE 50 MG: 100 INJECTION, POWDER, FOR SOLUTION INTRAMUSCULAR; INTRAVENOUS at 17:30

## 2025-07-19 ASSESSMENT — ACTIVITIES OF DAILY LIVING (ADL)
ADLS_ACUITY_SCORE: 45
ADLS_ACUITY_SCORE: 44
ADLS_ACUITY_SCORE: 44
ADLS_ACUITY_SCORE: 45
ADLS_ACUITY_SCORE: 45
ADLS_ACUITY_SCORE: 44
ADLS_ACUITY_SCORE: 45
ADLS_ACUITY_SCORE: 49
ADLS_ACUITY_SCORE: 45
ADLS_ACUITY_SCORE: 49
ADLS_ACUITY_SCORE: 45
ADLS_ACUITY_SCORE: 45
ADLS_ACUITY_SCORE: 44
ADLS_ACUITY_SCORE: 49
ADLS_ACUITY_SCORE: 45
ADLS_ACUITY_SCORE: 45
ADLS_ACUITY_SCORE: 44

## 2025-07-19 NOTE — PLAN OF CARE
Problem: Adult Inpatient Plan of Care  Goal: Plan of Care Review  Description: The Plan of Care Review/Shift note should be completed every shift.  The Outcome Evaluation is a brief statement about your assessment that the patient is improving, declining, or no change.  This information will be displayed automatically on your shift  note.  Flowsheets (Taken 7/19/2025 1527)  Outcome Evaluation: Pt transferred from ICU to . Pt A&Ox4, follows commands. GAMBLE, reports some numbness in bilateral feet. VSS on 3L NC. Tele SR. Heparin gtt per protocol. Denies pain. Tolerated CC diet, SSI ordered. Mar d/c'd at 1530. No BM. Up to chair assist 2 w/ gait belt, unsteady on feet, PT following. RUE PICC in place, per hospitalist keep line due to hard stick. Art line d/c'd. Pitting edema in all extremities. Belongings sent with pt to recieving unit.  Plan of Care Reviewed With: patient  Overall Patient Progress: improving  Goal: Absence of Hospital-Acquired Illness or Injury  Intervention: Identify and Manage Fall Risk  Recent Flowsheet Documentation  Taken 7/19/2025 1200 by Aleyda Mcneil RN  Safety Promotion/Fall Prevention:   activity supervised   clutter free environment maintained   lighting adjusted   room near nurse's station   room organization consistent   safety round/check completed   supervised activity   room door open  Taken 7/19/2025 0800 by Aleyda Mcneil RN  Safety Promotion/Fall Prevention:   activity supervised   clutter free environment maintained   lighting adjusted   room near nurse's station   room organization consistent   safety round/check completed   supervised activity   room door open  Intervention: Prevent Skin Injury  Recent Flowsheet Documentation  Taken 7/19/2025 1400 by Aleyda Mcneil RN  Body Position:   turned   right  Taken 7/19/2025 1200 by Aleyda Mcneil RN  Body Position: weight shifting  Taken 7/19/2025 0800 by Aleyda Mcneil RN  Body Position: weight  shifting  Intervention: Prevent and Manage VTE (Venous Thromboembolism) Risk  Recent Flowsheet Documentation  Taken 7/19/2025 1200 by Aleyda Mcneil RN  VTE Prevention/Management: SCDs off (sequential compression devices)  Taken 7/19/2025 0800 by Aleyda Mcneil RN  VTE Prevention/Management: SCDs off (sequential compression devices)  Intervention: Prevent Infection  Recent Flowsheet Documentation  Taken 7/19/2025 1200 by Aleyda Mcneil RN  Infection Prevention:   environmental surveillance performed   equipment surfaces disinfected   personal protective equipment utilized   hand hygiene promoted   rest/sleep promoted   single patient room provided   cohorting utilized  Taken 7/19/2025 0800 by Aleyda Mcneil RN  Infection Prevention:   environmental surveillance performed   equipment surfaces disinfected   personal protective equipment utilized   hand hygiene promoted   rest/sleep promoted   single patient room provided   cohorting utilized  Goal: Optimal Comfort and Wellbeing  Intervention: Provide Person-Centered Care  Recent Flowsheet Documentation  Taken 7/19/2025 1200 by Aleyda Mcneil RN  Trust Relationship/Rapport:   care explained   reassurance provided   thoughts/feelings acknowledged  Taken 7/19/2025 0800 by Aleyda Mcneil RN  Trust Relationship/Rapport:   care explained   reassurance provided   thoughts/feelings acknowledged     Problem: Delirium  Goal: Optimal Coping  Intervention: Optimize Psychosocial Adjustment to Delirium  Recent Flowsheet Documentation  Taken 7/19/2025 1200 by Aleyda Mcneil RN  Supportive Measures:   positive reinforcement provided   relaxation techniques promoted  Family/Support System Care:   involvement promoted   presence promoted  Taken 7/19/2025 0800 by Aleyda Mcneil RN  Supportive Measures:   positive reinforcement provided   relaxation techniques promoted  Family/Support System Care:   involvement promoted   presence  promoted  Goal: Improved Behavioral Control  Intervention: Prevent and Manage Agitation  Recent Flowsheet Documentation  Taken 7/19/2025 1200 by Aleyda Mcneil RN  Environment Familiarity/Consistency: daily routine followed  Taken 7/19/2025 0800 by Aleyda Mcneil RN  Environment Familiarity/Consistency: daily routine followed  Intervention: Minimize Safety Risk  Recent Flowsheet Documentation  Taken 7/19/2025 1200 by Aleyda Mcneil RN  Enhanced Safety Measures:   pain management   review medications for side effects with activity   room near unit station   assistive devices when indicated   monitor patients coagulation values  Trust Relationship/Rapport:   care explained   reassurance provided   thoughts/feelings acknowledged  Taken 7/19/2025 0800 by Aleyda Mcneil RN  Enhanced Safety Measures:   pain management   review medications for side effects with activity   room near unit station   assistive devices when indicated   monitor patients coagulation values  Trust Relationship/Rapport:   care explained   reassurance provided   thoughts/feelings acknowledged  Goal: Improved Attention and Thought Clarity  Intervention: Maximize Cognitive Function  Recent Flowsheet Documentation  Taken 7/19/2025 1200 by Aleyda Mcneil RN  Sensory Stimulation Regulation:   care clustered   lighting decreased   quiet environment promoted  Reorientation Measures:   calendar in view   clock in view   reorientation provided  Taken 7/19/2025 0800 by Aleyda Mcneil RN  Sensory Stimulation Regulation:   care clustered   lighting decreased   quiet environment promoted  Reorientation Measures:   calendar in view   clock in view   reorientation provided     Problem: Risk for Delirium  Goal: Optimal Coping  Intervention: Optimize Psychosocial Adjustment to Delirium  Recent Flowsheet Documentation  Taken 7/19/2025 1200 by Aleyda Mcneil RN  Supportive Measures:   positive reinforcement provided    relaxation techniques promoted  Family/Support System Care:   involvement promoted   presence promoted  Taken 7/19/2025 0800 by Aleyda Mcneil RN  Supportive Measures:   positive reinforcement provided   relaxation techniques promoted  Family/Support System Care:   involvement promoted   presence promoted  Goal: Improved Behavioral Control  Intervention: Prevent and Manage Agitation  Recent Flowsheet Documentation  Taken 7/19/2025 1200 by Aleyda Mcneil RN  Environment Familiarity/Consistency: daily routine followed  Taken 7/19/2025 0800 by Aleyda Mcneil RN  Environment Familiarity/Consistency: daily routine followed  Intervention: Minimize Safety Risk  Recent Flowsheet Documentation  Taken 7/19/2025 1200 by Aleyda Mcneil RN  Enhanced Safety Measures:   pain management   review medications for side effects with activity   room near unit station   assistive devices when indicated   monitor patients coagulation values  Trust Relationship/Rapport:   care explained   reassurance provided   thoughts/feelings acknowledged  Taken 7/19/2025 0800 by Aleyda Mcneil RN  Enhanced Safety Measures:   pain management   review medications for side effects with activity   room near unit station   assistive devices when indicated   monitor patients coagulation values  Trust Relationship/Rapport:   care explained   reassurance provided   thoughts/feelings acknowledged  Goal: Improved Attention and Thought Clarity  Intervention: Maximize Cognitive Function  Recent Flowsheet Documentation  Taken 7/19/2025 1200 by Aleyda Mcneil RN  Sensory Stimulation Regulation:   care clustered   lighting decreased   quiet environment promoted  Reorientation Measures:   calendar in view   clock in view   reorientation provided  Taken 7/19/2025 0800 by Aleyda Mcneil RN  Sensory Stimulation Regulation:   care clustered   lighting decreased   quiet environment promoted  Reorientation Measures:   calendar in  view   clock in view   reorientation provided     Problem: Restraint, Nonviolent  Goal: Absence of Harm or Injury  Intervention: Implement Least Restrictive Safety Strategies  Recent Flowsheet Documentation  Taken 7/19/2025 1200 by Aleyda Mcneil RN  Medical Device Protection:   tubing secured   torso covered  Taken 7/19/2025 0800 by Aleyda Mcneil RN  Medical Device Protection:   tubing secured   torso covered  Intervention: Protect Dignity, Rights and Personal Wellbeing  Recent Flowsheet Documentation  Taken 7/19/2025 1200 by Aleyda Mcneil RN  Trust Relationship/Rapport:   care explained   reassurance provided   thoughts/feelings acknowledged  Taken 7/19/2025 0800 by Aleyda Mcneil RN  Trust Relationship/Rapport:   care explained   reassurance provided   thoughts/feelings acknowledged  Intervention: Protect Skin and Joint Integrity  Recent Flowsheet Documentation  Taken 7/19/2025 1400 by Aleyda Mcneil RN  Body Position:   turned   right  Taken 7/19/2025 1200 by Aleyda Mcneil RN  Body Position: weight shifting  Range of Motion: ROM (range of motion) performed  Taken 7/19/2025 0800 by Aleyda Mcneil RN  Body Position: weight shifting  Range of Motion: ROM (range of motion) performed     Problem: Mechanical Ventilation Invasive  Goal: Effective Communication  Intervention: Ensure Effective Communication  Recent Flowsheet Documentation  Taken 7/19/2025 1200 by Aleyda Mcneil RN  Communication Enhancement Strategies:   one-step directions provided   repetition utilized  Family/Support System Care:   involvement promoted   presence promoted  Trust Relationship/Rapport:   care explained   reassurance provided   thoughts/feelings acknowledged  Taken 7/19/2025 0800 by Aleyda Mcneil RN  Communication Enhancement Strategies:   one-step directions provided   repetition utilized  Family/Support System Care:   involvement promoted   presence promoted  Trust  Relationship/Rapport:   care explained   reassurance provided   thoughts/feelings acknowledged  Goal: Optimal Device Function  Intervention: Optimize Device Care and Function  Recent Flowsheet Documentation  Taken 7/19/2025 1400 by Aleyda Mcneil RN  Oral Care: oral rinse provided  Taken 7/19/2025 1200 by Aleyda Mcneil RN  Oral Care: oral rinse provided  Taken 7/19/2025 0800 by Aleyda Mcneil RN  Oral Care: oral rinse provided  Goal: Mechanical Ventilation Liberation  Intervention: Promote Extubation and Mechanical Ventilation Liberation  Recent Flowsheet Documentation  Taken 7/19/2025 1200 by Aleyda Mcneil RN  Medication Review/Management:   medications reviewed   high-risk medications identified  Environmental Support:   calm environment promoted   caregiver consistency promoted   environmental consistency promoted   rest periods encouraged   distractions minimized  Taken 7/19/2025 0800 by Aleyda Mcneil RN  Medication Review/Management:   medications reviewed   high-risk medications identified  Environmental Support:   calm environment promoted   caregiver consistency promoted   environmental consistency promoted   rest periods encouraged   distractions minimized  Goal: Absence of Ventilator-Induced Lung Injury  Intervention: Prevent Ventilator-Associated Pneumonia  Recent Flowsheet Documentation  Taken 7/19/2025 1400 by Aleyda Mcneil RN  Oral Care: oral rinse provided  Head of Bed (HOB) Positioning: HOB at 30 degrees  Taken 7/19/2025 1200 by Aleyda Mcneil RN  Oral Care: oral rinse provided  Head of Bed (HOB) Positioning: HOB at 30 degrees  Taken 7/19/2025 0800 by Aleyda Mcneil RN  Oral Care: oral rinse provided  Head of Bed (HOB) Positioning: HOB at 30 degrees     Problem: Comorbidity Management  Goal: Maintenance of Heart Failure Symptom Control  Intervention: Maintain Heart Failure Management  Recent Flowsheet Documentation  Taken 7/19/2025 1200 by Ewa  Aleyda BRUMFIELD RN  Medication Review/Management:   medications reviewed   high-risk medications identified  Taken 7/19/2025 0800 by Aleyda Mcneil RN  Medication Review/Management:   medications reviewed   high-risk medications identified  Goal: Blood Pressure in Desired Range  Intervention: Maintain Blood Pressure Management  Recent Flowsheet Documentation  Taken 7/19/2025 1200 by Aleyda Mcneil RN  Medication Review/Management:   medications reviewed   high-risk medications identified  Taken 7/19/2025 0800 by Aleyda Mcneil RN  Medication Review/Management:   medications reviewed   high-risk medications identified     Problem: Sepsis/Septic Shock  Goal: Optimal Coping  Intervention: Optimize Psychosocial Adjustment to Illness  Recent Flowsheet Documentation  Taken 7/19/2025 1200 by Aleyda Mcneil RN  Supportive Measures:   positive reinforcement provided   relaxation techniques promoted  Family/Support System Care:   involvement promoted   presence promoted  Taken 7/19/2025 0800 by Aleyda Mcneil RN  Supportive Measures:   positive reinforcement provided   relaxation techniques promoted  Family/Support System Care:   involvement promoted   presence promoted  Goal: Absence of Infection Signs and Symptoms  Intervention: Initiate Sepsis Management  Recent Flowsheet Documentation  Taken 7/19/2025 1200 by Aleyda Mcneil RN  Infection Prevention:   environmental surveillance performed   equipment surfaces disinfected   personal protective equipment utilized   hand hygiene promoted   rest/sleep promoted   single patient room provided   cohorting utilized  Taken 7/19/2025 0800 by Aleyda Mcneil RN  Infection Prevention:   environmental surveillance performed   equipment surfaces disinfected   personal protective equipment utilized   hand hygiene promoted   rest/sleep promoted   single patient room provided   cohorting utilized  Intervention: Promote Recovery  Recent Flowsheet  Documentation  Taken 7/19/2025 1200 by Aleyda Mcneil, RN  Activity Management: activity adjusted per tolerance  Taken 7/19/2025 0800 by Aleyda Mcneil, RN  Activity Management: activity adjusted per tolerance   Goal Outcome Evaluation:      Plan of Care Reviewed With: patient    Overall Patient Progress: improvingOverall Patient Progress: improving    Outcome Evaluation: Pt transferred from ICU to . Pt A&Ox4, follows commands. GAMBLE, reports some numbness in bilateral feet. VSS on 3L NC. Tele SR. Heparin gtt per protocol. Denies pain. Tolerated CC diet, SSI ordered. Mar d/c'd at 1530. No BM. Up to chair assist 2 w/ gait belt, unsteady on feet, PT following. RUE PICC in place, per hospitalist keep line due to hard stick. Art line d/c'd. Pitting edema in all extremities. Belongings sent with pt to recieving unit.

## 2025-07-19 NOTE — PROGRESS NOTES
Pt extubated to 10 LPM oxymask at 00:05 per MD order. No stridor noted, SpO2 98%. Will continue to follow.     ARMOND Granger, RRT

## 2025-07-19 NOTE — CONSULTS
CLINICAL NUTRITION SERVICES - BRIEF NOTE    REASON FOR ASSESSMENT  Kristofer Montana is a 80 year old male seen by Registered Dietitian for Provider Order - malnourished, assess for nutritional supplement     NEW FINDINGS   Received new consult for pt nutrition services already following for a positive malnutrition risk screen. Can see initial assessment note on 7/14. RD will continue to follow.     Monitoring/Evaluation  Will continue to monitor and evaluate per protocol.

## 2025-07-19 NOTE — PROGRESS NOTES
[facility]    ICU Progress Note       Date of Admission:  7/13/2025    Assessment: Critical Care   81 y/o male with PMHx HTN, HLD, HFrEF (LVEF 40%), CAD (remote PCI 2005), CVA (2013, no residual weakness/deficits per chart review)  admitted [facility]/13/[facility] E coli bacteremia thought to be caused by acute cholecystitis vs choledocholithiasis/cholangitis. Workup also revealed acute LLE DVT in mid - distal femoral as well as popliteal vein. Hospital course was complicated by atrial fibrillation with RVR, and aspiration event following planned EUS/ERCP 07/16/25. Attempted extubation following procedure but required immediate re-intubation after an aspiration event.      Plan: Critical Care           Respiratory:  Acute hypoxic respiratory failure - secondary to aspiration pneumonitis after aspiration event during EUS/ERCP. No positive culture. Extubated early 7/19.  - Supplemental O2 and pulmonary hygiene     Cardiovascular:  Septic shock - as discussed below, likely related to E coli bacteremia thought to be caused by acute cholecystitis vs choledocholithiasis/cholangitis. Vasopressors have been weaned off  - Wean stress dose steroids  HLD - on PTA statin  CAD - remote PCI (2005). Per GI, will need at least 72 hours post-sphincterotomy prior to restarting ASA given that it is not a critical med   - restarting 7/19  HTN - on ARB/metoprolol PTA, holding given shock state as above  AFib prior to transfer to ICU. Not seen here.      Hematologic:  Femoral/popliteal DVT - new diagnosis this admission. IR consulted for possible thrombectomy but does not meet criteria Therapeutic anticoagulation initiated - s/p sphincterotomy so bleeding risk exists but in the setting of acute DVT, risks of anticoagulation are outweighed by benefits. Per GI, OK to start therapeutic heparin gtt but no bolus on initiation or correction bolus - rate increases only. Currently therapeutic.    Thrombocytopenia - unclear etiology, but mild and resolved. Suspect some consumption and decreased production     Neurologic:  No acute issues but prior CVA     Gastrointestinal:    Choledocholithiasis with cholecystitis vs cholangitis - now s/p ERCP and stent/sphincterotomy by GI. Retained stone noted post procedurally, but GI feels this should pass on its own, now signed off. Surgery consulted for cholecystectomy - appreciate recommendations. Surgery does not feel that patient has cholecystitis, but feels that given his critical illness if there is concern for cholecystitis, a percutaneous cholecystostomy tube would be recommended. Will eventually need cholecystectomy following recovery from this hospitalization to be performed electively.   PPI for gastric ulcer ppx  - Swallow evaluation and advance diet if possible     Genitourinary/Renal:  FARNAZ - secondary to septic shock. Valdivia in place. Improving  Hypokalemia; improved  -Trial of removing valdivia catheter     Endocrine/Metabolic:  Hyperglycemia - stress hyperglycemia in setting of septic shock  and stress dose steroids, although likely with pre-diabetes given A1C.   - Wean steroids  - Glucose goal 120-180, ssi if needed     Musculoskeletal:  No acute concerns.      Integumentary:  Routine cares for skin surveillance - frequent turns, protective measures. OOB, mobilize as able     Infectious Disease:  Current antibiotics: cefepime, flagyl  Septic shock - due to E coli bacteremia (from blood cultures [facility] on 7/13) and aspiration pneumonitis. No new positive cultures at [facility]. Bacteremia presumed secondary to cholecystitis vs cholangitis. Surgery suspects cholecystitis less likely; cholangitis possible, although LFTs not impressively elevated. CT abdomen/pelvis with large loculated perihepatic/subdiaphragmatic fluid collection decreased size following ERCP and increased fluid in abdomen with relative hyperdensity compared to simple ascites.  Discussed with  "IR - too small to sample percutaneously. Discussed with ID and narrowed abx to ceftriaxone given clinical improvement, followup cultures.  - Continuing Ceftriaxone for initial E. Coli bacteremia                                            Criteria for Transfer to Floor/Discharge Planning: ICU    Lines/ tubes/ drains:  Mar Catheter: PRESENT, indication: ICU only: hourly urine output needed for patient care  Lines: PRESENT      PICC 07/16/25 Triple Lumen Right Basilic Pressors-Site Assessment: WDL  [REMOVED] Arterial Line 07/17/25 Radial-Site Assessment: WDL        Prophylaxis:  - DVT Prophylaxis: heparin gtt       [facility] ICU Rounding checklist       Assessment of central venous catheter access Will remove PICC line   Assessment of urinary catheter use Will discuss removal with nurse   DVT prophylaxis Therapeutic anticoagulation (see A/P)         Code Status: Full Code       Disposition:  -Transfer to floor    Licha Marquez MD  [facility]  Securely message with [employer] (more info)  Text page via [employer] Paging/Directory     Clinically Significant Risk Factors        # Hypokalemia: Lowest K = 3.3 mmol/L in last 2 days, will replace as needed   # Hyperchloremia: Highest Cl = 110 mmol/L in last 2 days, will monitor as appropriate          # Hypoalbuminemia: Lowest albumin = 2.1 g/dL at 7/18/2025  5:48 AM, will monitor as appropriate                # Overweight: Estimated body mass index is 29.45 kg/m  as calculated from the following:    Height as of this encounter: 1.778 m (5' 10\").    Weight as of this encounter: 93.1 kg (205 lb 4 oz).                  ______________________________________________________________________    Interval History   Extubated early this morning. Off pressors. Tells me he feels much better. Wondering what he needs to followup on.  service was in [employer]. 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    Physical Exam   Vital Signs: Temp: 97.9  F " (36.6  C) Temp src: Oral BP: 110/57 Pulse: 86   Resp: 14 SpO2: 95 % O2 Device: Nasal cannula Oxygen Delivery: 4 LPM  Weight: 205 lbs 3.97 oz    Oriented x 3  Calm  Line sites clean and dry  RRR  Clear,   Warm extremities    Data   I reviewed all medications, new labs and imaging results over the last 24 hours.  Arterial Blood Gases   Recent Labs   Lab 07/18/25  1616 07/18/25  1032 07/18/25  0549 07/17/25  2200   PH 7.39 7.40 7.38 7.37   PCO2 40 40 41 42   PO2 104 79* 96 84   HCO3 25 24 24 24       Complete Blood Count   Recent Labs   Lab 07/19/25  0429 07/18/25  1213 07/17/25  0417 07/16/25 1932   WBC 23.4* 26.3* 12.9* 10.5   HGB 9.7* 9.8* 12.4* 13.6    167 144* 149*       Basic Metabolic Panel  Recent Labs   Lab 07/19/25  0829 07/19/25  0433 07/19/25  0429 07/18/25  2046 07/18/25  0836 07/18/25  0548 07/17/25  0807 07/17/25  0417 07/16/25 1932   NA  --   --  143  --   --  138  --  139 138   POTASSIUM  --   --  3.5  --   --  3.3*  --  3.7 3.5   CHLORIDE  --   --  110*  --   --  105  --  102 101   CO2  --   --  22  --   --  22  --  21* 25   BUN  --   --  43.7*  --   --  47.7*  --  43.9* 40.8*   CR  --   --  1.27*  --   --  1.30*  --  1.39* 1.10   GLC 94 118* 132* 135*   < > 163*  168*   < > 182* 154*    < > = values in this interval not displayed.       Liver Function Tests  Recent Labs   Lab 07/19/25  0429 07/18/25  0548 07/17/25  0417 07/16/25 1932   AST 24 21 18 18   ALT 12 14 14 17   ALKPHOS 128 121 120 159*   BILITOTAL 0.7 0.7 1.1 1.3*   ALBUMIN 2.3* 2.1* 2.1* 2.5*     7-Day Micro Results       Collected Updated Procedure Result Status      07/17/2025 0003 07/19/2025 0838 Respiratory Aerobic Bacterial Culture with Gram Stain [[id number]]   (Abnormal)   Sputum from Endotracheal    Preliminary result Component Value   Culture Culture in progress  [P]    Gram Stain Result <25 PMNs/low power field  [P]     2+ Yeast  [P]                07/16/2025 2328 07/19/2025 0446 Blood Culture Peripheral blood (BC)  Hand, Left [[id number]]    Peripheral blood (BC) from Hand, Left    Preliminary result Component Value   Culture No growth after 2 days  [P]                07/16/2025 2322 07/19/2025 0446 Blood Culture Peripheral blood (BC) Hand, Left [[id number]]    Peripheral blood (BC) from Hand, Left    Preliminary result Component Value   Culture No growth after 2 days  [P]                07/16/2025 2301 07/17/2025 0637 Respiratory Panel PCR [[id number]]    Swab from Nasopharyngeal    Final result Component Value   Adenovirus Not Detected   Coronavirus Not Detected   This test detects Coronavirus 229E, HKU1, NL63 and OC43 but does not distinguish between them. It does not detect MERS ( Respiratory Syndrome), SARS (Severe Acute Respiratory Syndrome) or 2019-nCoV (Novel 2019) Coronavirus.   Human Metapneumovirus Not Detected   Human Rhin/Enterovirus Not Detected   Influenza A Not Detected   Influenza A, H1 Not Detected   Influenza A 2009 H1N1 Not Detected   Influenza A, H3 Not Detected   Influenza B Not Detected   Parainfluenza Virus 1 Not Detected   Parainfluenza Virus 2 Not Detected   Parainfluenza Virus 3 Not Detected   Parainfluenza Virus 4 Not Detected   Respiratory Syncytial Virus A Not Detected   Respiratory Syncytial Virus B Not Detected   Chlamydia Pneumoniae Not Detected   Mycoplasma Pneumoniae Not Detected            07/16/2025 2301 07/17/2025 0617 MRSA MSSA PCR, Nasal Swab [[id number]]    Swab from Nares, Bilateral    Final result Component Value   MRSA Target DNA Negative   SA Target DNA Negative            07/15/2025 1115 07/18/2025 1604 Blood Culture Peripheral blood (BC) Arm, Right [[id number]]    Peripheral blood (BC) from Arm, Right    Preliminary result Component Value   Culture No growth after 3 days  [P]                07/15/2025 1107 07/18/2025 1604 Blood Culture Peripheral blood (BC) Arm, Right [[id number]]    Peripheral blood (BC) from Arm, Right    Preliminary result Component  Value   Culture No growth after 3 days  [P]                          Pancreatic Enzymes  No lab results found in last 7 days.    Coagulation Profile  No lab results found in last 7 days.    IMAGING:  No results found for this or any previous visit (from the past 24 hours).

## 2025-07-19 NOTE — PLAN OF CARE
Goal Outcome Evaluation:       [id number]  Orientation: A&O x4   Aggression Stop Light: green   Activity: A2 GBW pivot to BSC; T/R q2h   Diet/BS Checks: mod carb, low sat fat; ACHS blood sugar checks- no replacement needed.   Tele:  NSR   IV Access/Drains: RUE triple lumen PICC with good blood return. X1 lumen infusing heparin at 1550 units/hr. AM recheck. X2 PIV SL.   Pain Management: 1/10 pain to abdomen- declined interventions. Requesting tylenol before bed.   Abnormal VS/Results: VSS on RA; WBC 23.4   Bowel/Bladder: due to void- valdivia removed at 1530. Per voiding protocol, needs bladder scan at 2130 if he does not void. Urinal at bedside. No BM since 7/15- PRN senna given x1.   Skin/Wounds: scattered bruising and abrasions. Blanchable redness to sacrum- mepilex in place. +2-3 edema to extremities.   Consults: SW, PT/OT  D/C Disposition: pending   Other Info: Transfer from ICU this shift. On phos and mag protocols- AM recheck.

## 2025-07-19 NOTE — PROGRESS NOTES
[facility]    Hospitalist Progress Note    Assessment & Plan   81 y/o male with PMHx HTN, HLD, HFrEF (LVEF 40%), CAD (remote PCI 2005), CVA (2013, no residual weakness/deficits per chart review)  [facility] bacteremia thought to be caused by acute cholecystitis vs choledocholithiasis/cholangitis. Workup also revealed acute LLE DVT in mid - distal femoral as well as popliteal vein. Hospital course was complicated by atrial fibrillation with RVR, and aspiration event following planned EUS/ERCP 07/16/25. Attempted extubation following procedure but required immediate re-intubation after an aspiration event.  Patient extubated and off pressors since early morning 7/19.  Solu-Cortef being weaned off.  Patient is back on aspirin per GI.  Antibiotics narrowed to Rocephin in the emergency room.  Taking oral diet.  Heparin drip for acute DVT and new A-fib that occurred just [facility].  Currently in normal sinus rhythm off amiodarone drip.    Acute hypoxic respiratory failure secondary to aspiration pneumonitis-resolved  Assessment-secondary to aspiration during EUS/ERCP.  No positive blood culture.  Extubated early 7/19.  Satting well.    Plan-mobilize, aggressive I-S, wean oxygen, on Rocephin day 7 both for aspiration pneumonitis but originally for E. coli bacteremia/concern for cholangitis, PPI for stress prophylaxis    Choledocholithiasis with concern for cholangitis.  Associated E. coli bacteremia with septic shock  Assessment-  -antibiotics initially for E. coli bacteremia.  Positive blood cultures at [facility].  Blood cultures [facility] no growth to date.  followed by general surgery.  Not thought to have cholecystitis.  General surgery signed off.  Followed by [facility] who is now signed off.  Status post ERCP and stent/sphincterotomy by GI.  Retained stone noted post procedurally.  GI feels this will pass on its own.  -  Per surgery if there is concern for cholecystitis in the future a percutaneous cholecystostomy tube would likely be recommended.  - Patient is doing well.  Day 7 of antibiotics with antibiotics steadily narrowed over the course of ICU stay at the direction of ID.  -Aspirin and heparin drip restarted at the direction of GI  -Has been afebrile.  White blood cell count trending down.    Plan-regular dark diet started 7/19 following extubation earlier in the day.  Continue Rocephin.  Discussed with ID length of therapy likely 10 days.    Septic shock-resolved  Assessment thought secondary to E. coli bacteremia thought secondary to cholangitis from choledocholithiasis.  Pressors weaned off early 7/19.  In talking with intensivist likely could have been weaned off 24 to 48 hours ago as a line may not have been entirely accurate and patient was on propofol.  Normotensive currently.  Metoprolol and losartan being held.  On day 7 of Rocephin with antibiotics steadily narrowed with input from infectious disease.  Status post ERCP with stenting.  Extubated.  -White blood cell count downtrending.  Afebrile.  Has been on Solu-Cortef.  Plan-continue Rocephin for now.  Day 7.  Complete 10 days antibiotics but can discuss with ID, weaning Solu-Cortef.  3 more doses ordered by intensivist then stop.  A.m. CBC    Acute femoral/popliteal DVT-new diagnosis this admission  - IR consulted for possible thrombectomy but does not meet criteria.  Initiated on heparin drip.  Temporarily held following sphincterotomy.  Back on heparin drip and aspirin per GI.  Hemoglobin down to 9 range but stable x 2.  No tere bleeding  -cont heparin drip for now.  A.m. hemoglobin.  Pharmacy liaison consult for DOAC coverage indication acute DVT (and afib)    Thrombocytopenia.  Resolved  - unclear etiology.  Resolved.  May have been consumptive and secondary to acute illness/septic shock    New onset atrial fibrillation with RVR, back in normal sinus  rhythm  Assessment-onset at [facility] prior to transfer.  Briefly on amiodarone drip.  Beta-blocker held on admission given septic shock/hypotension.  No prior history of A-fib.  He does have a history of stroke.  [id number] score of 6 possibly 7 if include low EF.  Denies a history of tere heart failure.  Has remained in normal sinus rhythm.  Unclear if has paroxysmal A-fib or new onset related to septic shock.    Plan-heparin drip resumed following ERCP at the direction of GI.  Transition to DOAC in the next 1 to 2 days if clinically stable.  Follow hemoglobin.  Resume beta-blocker in the next 12 to 24 hours if improves hemodynamically stable.  Telemetry.  Follow-up with PCP.  Follow-up with cardiology at the [facility].  Would likely benefit from heart monitor to evaluate future burden.      Coronary artery disease  Hypertension  History HFrEF (EF 40%)  Dyslipidemia  Assessment-remote PCI (2005).  On losartan and metoprolol at baseline.  Held during shock state.  On heparin drip for acute DVT (also has new onset A-fib).  Aspirin initially held back on aspirin, cleared by GI.  -- No signs of heart failure.  Weight up from admission from 83 to 93 kg  -Chest x-ray 7/18 showed no pulmonary edema    Plan-  -Restart beta-blocker once patient proves hemodynamically stable over the next 24 hours.  If becomes high per tensive or tacky overnight can restart beta-blocker tonight.  Follow daily weights.  If clearly elevated from baseline and hemodynamically stable consider gentle diuresis.  Restart statin      History of CVA-remote.  Fully recovered.  Continue statin.  On aspirin.    Lines-peripheral IV in place.  PICC line in place.  Arterial line removed 7/19.  Plan-patient is a difficult stick.  If peripheral IV deemed adequate by 7/20 and patient hemodynamically stable and clinically improving remove PICC line RUE---> order in place to remove at 0800 on 7/20 but to confirm with rounding hospitalist  "first    Mar catheter-present.  Being removed today on 7/19 per intensivist.  Denies any history of urinary retention.  Follow postvoid residual.  Bladder management protocol.    Prediabetes-  Hemoglobin A1c 5.8%.  On Solu-Cortef for 3 more doses.  Plan-low resistance insulin sliding scale with meals.  Follow for need of prandial aspart.  Diabetic/cardiac diet with PCP    DVT Prophylaxis: Patient on heparin drip for acute DVT also has new onset A-fib    Code Status: Full Code    Disposition: Transfer to [facility].  IMC not needed.    Outpatient disposition to be determined.  TCU versus home.  Physical therapy Occupational Therapy social work consult  Medically Ready for Discharge: Anticipated in 2-4 Days    Clinically Significant Risk Factors        # Hypokalemia: Lowest K = 3.3 mmol/L in last 2 days, will replace as needed   # Hyperchloremia: Highest Cl = 110 mmol/L in last 2 days, will monitor as appropriate          # Hypoalbuminemia: Lowest albumin = 2.1 g/dL at 7/18/2025  5:48 AM, will monitor as appropriate                # Overweight: Estimated body mass index is 29.45 kg/m  as calculated from the following:    Height as of this encounter: 1.778 m (5' 10\").    Weight as of this encounter: 93.1 kg (205 lb 4 oz).             Complex medical decision making.  Greater than 50-minute spent on patient care including charting, chart review, , transfer orders, care coordination with the intensivist and ICU nurse.    Kristofer Mack MD, MD  Text Page  (7am to 6pm)  Interval History   Shortly after midnight patient was extubated and weaned off pressors.  Once off propofol patient could be readily weaned off pressors.  Diet started.  Patient is awake alert feeling better.  Transfer to the hospitalist service today.  Okay for [facility] floor.  No bleeding issues.  Back on aspirin.  Mar to be removed today.  Day 7 of antibiotics.  Remains in normal sinus rhythm.    Currently patient states he feels well. "  No nausea vomiting.  No shortness of breath no chest pain.  He is up in chair visiting with his brother.      -Data reviewed today: I reviewed all new labs and imaging results over the last 24 hours. I personally reviewed laboratory studies and imaging studies last 24 hours    Physical Exam   Temp: 98.1  F (36.7  C) Temp src: Oral BP: 118/58 Pulse: 95   Resp: 20 SpO2: 95 % O2 Device: Nasal cannula Oxygen Delivery: 4 LPM  Vitals:    07/15/25 0537 07/16/25 0543 07/18/25 0400   Weight: 86.8 kg (191 lb 6.4 oz) 86.5 kg (190 lb 9.6 oz) 93.1 kg (205 lb 4 oz)     Vital Signs with Ranges  Temp:  [97.9  F (36.6  C)-98.8  F (37.1  C)] 98.1  F (36.7  C)  Pulse:  [49-95] 95  Resp:  [8-30] 20  BP: ()/(49-91) 118/58  MAP:  [54 mmHg-85 mmHg] 59 mmHg  Arterial Line BP: ()/(38-56) 114/40  FiO2 (%):  [40 %-50 %] 40 %  SpO2:  [93 %-100 %] 95 %  I/O last 3 completed shifts:  In: 1347.67 [I.V.:1217.67; NG/GT:30; IV Piggyback:100]  Out: 1130 [Urine:1130]    Constitutional: Up in chair no acute distress, nontoxic  Neck-normal JVP  Respiratory: Breathing easily, on nasal cannula.  Clear to auscultation bilaterally  Cardiovascular: Regular rate and rhythm no rubs gallops or murmurs appreciate  GI: Soft, nontender nondistended.  Skin/Integumen: Slight edema bilateral lower extremities.  No rash.  Line sites appear normal.  PICC line right upper extremity.  Peripheral IV in place  Neurologic-normal speech mentation.  Strength normal throughout.  Fully oriented awake alert.  Psychiatric-.  Pleasant.  Jovial    Medications   Current Facility-Administered Medications   Medication Dose Route Frequency Provider Last Rate Last Admin    [Held by provider] amiodarone (NEXTERONE) 1.8 mg/mL in dextrose 5% 200 mL ADULT STANDARD infusion  0.5 mg/min Intravenous Continuous Roselia Luciano APRN CNP   Stopped at 07/16/25 1847    heparin 25,000 units in 0.45% NaCl 250 mL ANTICOAGULANT infusion  0-5,000 Units/hr Intravenous Continuous Pura,  Reilly Daly MD 15.5 mL/hr at 07/19/25 0834 1,550 Units/hr at 07/19/25 0834    [Held by provider] norepinephrine (LEVOPHED) 4 mg in  mL infusion PREMIX  0.01-0.6 mcg/kg/min Intravenous Continuous Licha Marquez MD   Stopped at 07/18/25 1100    norepinephrine (LEVOPHED) 4 mg in  mL PERIPHERAL infusion  0.01-0.2 mcg/kg/min Intravenous Continuous Licha Marquez MD   Stopped at 07/19/25 0458    Patient is already receiving anticoagulation with heparin, enoxaparin (LOVENOX), warfarin (COUMADIN)  or other anticoagulant medication   Does not apply Continuous PRN Harris Sow MD        sodium chloride (PF) 0.9% PF flush 10-40 mL  10-40 mL Intracatheter Continuous Reilly Neves MD   20 mL at 07/17/25 1439    vasopressin 0.2 units/mL in NS (PITRESSIN) standard conc infusion  2.4 Units/hr Intravenous Continuous Reilly Neves MD   Stopped at 07/18/25 1555     Current Facility-Administered Medications   Medication Dose Route Frequency Provider Last Rate Last Admin    aspirin EC tablet 81 mg  81 mg Oral Daily Licha Marquez MD        atorvastatin (LIPITOR) tablet 40 mg  40 mg Oral or Feeding Tube Daily Tasha Ferraro APRN CNP   40 mg at 07/19/25 0853    cefTRIAXone (ROCEPHIN) 2 g vial to attach to  ml bag for ADULTS or NS 50 ml bag for PEDS  2 g Intravenous Q24H Reilly Neves MD   2 g at 07/18/25 1220    [START ON 7/20/2025] hydrocortisone sodium succinate PF (solu-CORTEF) injection 25 mg  25 mg Intravenous Once Licha Marquez MD        hydrocortisone sodium succinate PF (solu-CORTEF) injection 50 mg  50 mg Intravenous Q8H Licha Marquez MD        insulin aspart (NovoLOG) injection (RAPID ACTING)  1-7 Units Subcutaneous Q4H Reilly Neves MD   1 Units at 07/18/25 1656    [Held by provider] losartan (COZAAR) tablet 50 mg  50 mg Oral Daily Nikki Oneill MD        [Held by provider]  metoprolol succinate ER (TOPROL-XL) 24 hr half-tab 12.5 mg  12.5 mg Oral Daily Nikki Oneill MD   12.5 mg at 07/16/25 0904    pantoprazole (PROTONIX) injection 40 mg  40 mg Intravenous BID Aleyda Lee MD   40 mg at 07/19/25 0853    sodium chloride (PF) 0.9% PF flush 10-40 mL  10-40 mL Intracatheter Q7 Days Tasha Ferraro, APRN CNP   10 mL at 07/16/25 2244    sodium chloride (PF) 0.9% PF flush 10-40 mL  10-40 mL Intracatheter Daily Tasha Ferraro, JERI CNP   10 mL at 07/19/25 0855       Data   Recent Labs   Lab 07/19/25  1139 07/19/25  0829 07/19/25  0433 07/19/25  0429 07/18/25  1617 07/18/25  1213 07/18/25  0836 07/18/25  0548 07/17/25  0807 07/17/25  0417   WBC  --   --   --  23.4*  --  26.3*  --   --   --  12.9*   HGB  --   --   --  9.7*  --  9.8*  --   --   --  12.4*   MCV  --   --   --  88  --  88  --   --   --  87  86  86   PLT  --   --   --  180  --  167  --   --   --  144*   NA  --   --   --  143  --   --   --  138  --  139   POTASSIUM  --   --   --  3.5  --   --   --  3.3*  --  3.7   CHLORIDE  --   --   --  110*  --   --   --  105  --  102   CO2  --   --   --  22  --   --   --  22  --  21*   BUN  --   --   --  43.7*  --   --   --  47.7*  --  43.9*   CR  --   --   --  1.27*  --   --   --  1.30*  --  1.39*   ANIONGAP  --   --   --  11  --   --   --  11  --  16*   PAUL  --   --   --  7.7*  --   --   --  7.7*  --  7.4*   * 94 118* 132*   < >  --    < > 163*  168*   < > 182*   ALBUMIN  --   --   --  2.3*  --   --   --  2.1*  --  2.1*   PROTTOTAL  --   --   --  5.1*  --   --   --  4.9*  --  5.0*   BILITOTAL  --   --   --  0.7  --   --   --  0.7  --  1.1   ALKPHOS  --   --   --  128  --   --   --  121  --  120   ALT  --   --   --  12  --   --   --  14  --  14   AST  --   --   --  24  --   --   --  21  --  18    < > = values in this interval not displayed.       Imaging:   No results found for this or any previous visit (from the past 24 hours).

## 2025-07-20 ENCOUNTER — APPOINTMENT (OUTPATIENT)
Dept: SPEECH THERAPY | Facility: CLINIC | Age: 80
DRG: 853 | End: 2025-07-20
Attending: INTERNAL MEDICINE
Payer: MEDICARE

## 2025-07-20 ENCOUNTER — APPOINTMENT (OUTPATIENT)
Dept: PHYSICAL THERAPY | Facility: CLINIC | Age: 80
DRG: 853 | End: 2025-07-20
Attending: INTERNAL MEDICINE
Payer: MEDICARE

## 2025-07-20 ENCOUNTER — APPOINTMENT (OUTPATIENT)
Dept: OCCUPATIONAL THERAPY | Facility: CLINIC | Age: 80
DRG: 853 | End: 2025-07-20
Payer: MEDICARE

## 2025-07-20 LAB
ALBUMIN SERPL BCG-MCNC: 2.1 G/DL (ref 3.5–5.2)
ALP SERPL-CCNC: 150 U/L (ref 40–150)
ALT SERPL W P-5'-P-CCNC: 14 U/L (ref 0–70)
ANION GAP SERPL CALCULATED.3IONS-SCNC: 9 MMOL/L (ref 7–15)
AST SERPL W P-5'-P-CCNC: 30 U/L (ref 0–45)
BACTERIA SPEC CULT: NO GROWTH
BACTERIA SPEC CULT: NO GROWTH
BILIRUB SERPL-MCNC: 0.8 MG/DL
BUN SERPL-MCNC: 52 MG/DL (ref 8–23)
CALCIUM SERPL-MCNC: 7.6 MG/DL (ref 8.8–10.4)
CHLORIDE SERPL-SCNC: 109 MMOL/L (ref 98–107)
CREAT SERPL-MCNC: 1.23 MG/DL (ref 0.67–1.17)
EGFRCR SERPLBLD CKD-EPI 2021: 60 ML/MIN/1.73M2
ERYTHROCYTE [DISTWIDTH] IN BLOOD BY AUTOMATED COUNT: 15.8 % (ref 10–15)
GLUCOSE BLDC GLUCOMTR-MCNC: 110 MG/DL (ref 70–99)
GLUCOSE BLDC GLUCOMTR-MCNC: 111 MG/DL (ref 70–99)
GLUCOSE BLDC GLUCOMTR-MCNC: 112 MG/DL (ref 70–99)
GLUCOSE BLDC GLUCOMTR-MCNC: 114 MG/DL (ref 70–99)
GLUCOSE BLDC GLUCOMTR-MCNC: 89 MG/DL (ref 70–99)
GLUCOSE BLDC GLUCOMTR-MCNC: 97 MG/DL (ref 70–99)
GLUCOSE SERPL-MCNC: 104 MG/DL (ref 70–99)
HCO3 SERPL-SCNC: 23 MMOL/L (ref 22–29)
HCT VFR BLD AUTO: 24.8 % (ref 40–53)
HGB BLD-MCNC: 7.1 G/DL (ref 13.3–17.7)
HGB BLD-MCNC: 8.4 G/DL (ref 13.3–17.7)
MAGNESIUM SERPL-MCNC: 2.3 MG/DL (ref 1.7–2.3)
MCH RBC QN AUTO: 29.6 PG (ref 26.5–33)
MCHC RBC AUTO-ENTMCNC: 33.9 G/DL (ref 31.5–36.5)
MCV RBC AUTO: 87 FL (ref 78–100)
MCV RBC AUTO: 87 FL (ref 78–100)
PHOSPHATE SERPL-MCNC: 1.9 MG/DL (ref 2.5–4.5)
PHOSPHATE SERPL-MCNC: 2.6 MG/DL (ref 2.5–4.5)
PLATELET # BLD AUTO: 187 10E3/UL (ref 150–450)
POTASSIUM SERPL-SCNC: 3.6 MMOL/L (ref 3.4–5.3)
PROT SERPL-MCNC: 4.7 G/DL (ref 6.4–8.3)
RBC # BLD AUTO: 2.84 10E6/UL (ref 4.4–5.9)
SODIUM SERPL-SCNC: 141 MMOL/L (ref 135–145)
UFH PPP CHRO-ACNC: 0.63 IU/ML (ref ?–1.1)
WBC # BLD AUTO: 15.8 10E3/UL (ref 4–11)

## 2025-07-20 PROCEDURE — 99233 SBSQ HOSP IP/OBS HIGH 50: CPT | Performed by: INTERNAL MEDICINE

## 2025-07-20 PROCEDURE — 120N000001 HC R&B MED SURG/OB

## 2025-07-20 PROCEDURE — 97535 SELF CARE MNGMENT TRAINING: CPT | Mod: GO

## 2025-07-20 PROCEDURE — 250N000011 HC RX IP 250 OP 636: Performed by: INTERNAL MEDICINE

## 2025-07-20 PROCEDURE — 85014 HEMATOCRIT: CPT | Performed by: INTERNAL MEDICINE

## 2025-07-20 PROCEDURE — 999N000040 HC STATISTIC CONSULT NO CHARGE VASC ACCESS

## 2025-07-20 PROCEDURE — 80053 COMPREHEN METABOLIC PANEL: CPT | Performed by: STUDENT IN AN ORGANIZED HEALTH CARE EDUCATION/TRAINING PROGRAM

## 2025-07-20 PROCEDURE — 85018 HEMOGLOBIN: CPT | Performed by: INTERNAL MEDICINE

## 2025-07-20 PROCEDURE — 85520 HEPARIN ASSAY: CPT | Performed by: INTERNAL MEDICINE

## 2025-07-20 PROCEDURE — 97166 OT EVAL MOD COMPLEX 45 MIN: CPT | Mod: GO

## 2025-07-20 PROCEDURE — 250N000013 HC RX MED GY IP 250 OP 250 PS 637: Performed by: INTERNAL MEDICINE

## 2025-07-20 PROCEDURE — 92610 EVALUATE SWALLOWING FUNCTION: CPT | Mod: GN | Performed by: COUNSELOR

## 2025-07-20 PROCEDURE — 250N000011 HC RX IP 250 OP 636: Performed by: STUDENT IN AN ORGANIZED HEALTH CARE EDUCATION/TRAINING PROGRAM

## 2025-07-20 PROCEDURE — 97110 THERAPEUTIC EXERCISES: CPT | Mod: GP

## 2025-07-20 PROCEDURE — 83735 ASSAY OF MAGNESIUM: CPT | Performed by: SURGERY

## 2025-07-20 PROCEDURE — 84100 ASSAY OF PHOSPHORUS: CPT | Performed by: INTERNAL MEDICINE

## 2025-07-20 RX ADMIN — CEFTRIAXONE SODIUM 2 G: 2 INJECTION, POWDER, FOR SOLUTION INTRAMUSCULAR; INTRAVENOUS at 12:40

## 2025-07-20 RX ADMIN — PANTOPRAZOLE SODIUM 40 MG: 40 INJECTION, POWDER, FOR SOLUTION INTRAVENOUS at 20:21

## 2025-07-20 RX ADMIN — ATORVASTATIN CALCIUM 40 MG: 40 TABLET, FILM COATED ORAL at 08:55

## 2025-07-20 RX ADMIN — HEPARIN SODIUM 1550 UNITS/HR: 10000 INJECTION, SOLUTION INTRAVENOUS at 06:25

## 2025-07-20 RX ADMIN — HYDROCORTISONE SODIUM SUCCINATE 25 MG: 100 INJECTION, POWDER, FOR SOLUTION INTRAMUSCULAR; INTRAVENOUS at 16:44

## 2025-07-20 RX ADMIN — HYDROCORTISONE SODIUM SUCCINATE 50 MG: 100 INJECTION, POWDER, FOR SOLUTION INTRAMUSCULAR; INTRAVENOUS at 02:05

## 2025-07-20 RX ADMIN — ASPIRIN 81 MG: 81 TABLET, DELAYED RELEASE ORAL at 08:56

## 2025-07-20 RX ADMIN — CALCIUM CARBONATE (ANTACID) CHEW TAB 500 MG 1000 MG: 500 CHEW TAB at 13:18

## 2025-07-20 RX ADMIN — CALCIUM CARBONATE (ANTACID) CHEW TAB 500 MG 1000 MG: 500 CHEW TAB at 22:46

## 2025-07-20 RX ADMIN — POTASSIUM & SODIUM PHOSPHATES POWDER PACK 280-160-250 MG 2 PACKET: 280-160-250 PACK at 08:55

## 2025-07-20 RX ADMIN — POTASSIUM & SODIUM PHOSPHATES POWDER PACK 280-160-250 MG 2 PACKET: 280-160-250 PACK at 12:40

## 2025-07-20 RX ADMIN — POTASSIUM & SODIUM PHOSPHATES POWDER PACK 280-160-250 MG 2 PACKET: 280-160-250 PACK at 16:44

## 2025-07-20 RX ADMIN — ACETAMINOPHEN 650 MG: 325 TABLET ORAL at 22:46

## 2025-07-20 RX ADMIN — PANTOPRAZOLE SODIUM 40 MG: 40 INJECTION, POWDER, FOR SOLUTION INTRAVENOUS at 08:55

## 2025-07-20 ASSESSMENT — ACTIVITIES OF DAILY LIVING (ADL)
ADLS_ACUITY_SCORE: 44
ADLS_ACUITY_SCORE: 48
DEPENDENT_IADLS:: INDEPENDENT
ADLS_ACUITY_SCORE: 44
ADLS_ACUITY_SCORE: 48
ADLS_ACUITY_SCORE: 44
ADLS_ACUITY_SCORE: 52
ADLS_ACUITY_SCORE: 44
ADLS_ACUITY_SCORE: 48
ADLS_ACUITY_SCORE: 44
ADLS_ACUITY_SCORE: 52
ADLS_ACUITY_SCORE: 48
ADLS_ACUITY_SCORE: 44

## 2025-07-20 NOTE — DISCHARGE INSTRUCTIONS
HEALTHCARE DIRECTIVE:   For information on completing a healthcare directive (all adults should complete one) please visit [email or web address]   A direct link to the forms you can print and guides for how to complete them can be found at: [email or web address]     To make it valid, please either 1) have it signed by 2 witnesses or 2) have it signed by a notary.  Then make copies to provide to your Primary Care Provider and any other care providers.            Some additional resources:      Web: [email or web address]   Phone: [phone]   [employer] is a nonprofit serving seniors and individuals with disabilities across [place] [facility] area. Our grocery assistance, home support and transportation services provide help with in-home tasks and chores.    _________________________________________________________________________________________    Meals on Wheels    [facility] call Phone: [phone]             Who is eligible to receive Meals on Wheels?  To anyone - both on a long-term basis and temporarily if you are recovering from surgery or illness.  How much do meals cost? Is financial assistance available? We ask for a modest contribution toward your meals, but the price is based on need. Meals may be authorized as part of [place]'s home- and community-based services Medicaid waiver program, and other subsidy programs can also help cover the cost of meal delivery service. If you have questions about funding options, call us.    Are diabetic and other diet-specific meal options available?  Meals on Wheels happily offers low-sugar, low-sodium meal options, as well as vegetarian options. Simply specify your dietary needs when you sign up for meals.  Are culturally specific meals available?  Kosher, Burundian/Halal meals and other culturally specific meal options are available in many areas. You can specify  cultural meal preferences when you sign up for meals online or when you call us.    _________________________________________________________________________________________  [employer]   Web: [email or web address]/  Phone: [phone]          Other resources:   call to reach someone who can connect you with more resources!   _________________________________________________________________________________________                _________________________________________________________________________________________       adults should complete one) please visit [email or web address]   A direct link to the forms you can print and guides for how to complete them can be found at: [email or web address]     To make it valid, please either 1) have it signed by 2 witnesses or 2) have it signed by a notary.  Then make copies to provide to your Primary Care Provider and any other care providers.            Some additional resources:      Web: [email or web address]   Phone: [phone]   [employer] is a nonprofit serving seniors and individuals with disabilities across [place] area. Our grocery assistance, home support and transportation services provide help with in-home tasks and chores.    _________________________________________________________________________________________    Meals on Wheels    [facility] call Phone: [phone]             Who is eligible to receive Meals on Wheels?  To anyone - both on a long-term basis and temporarily if you are recovering from surgery or illness.  How much do meals cost? Is financial assistance available? We ask for a modest contribution toward your meals, but the price is based on need. Meals may be authorized as part of [place]'s home- and community-based services Medicaid waiver program, and other subsidy programs can also help cover the cost of meal delivery service. If you have questions about funding options, call us.    Are diabetic and other diet-specific meal options available?  Meals on Wheels happily offers low-sugar, low-sodium meal options, as well as vegetarian options. Simply specify your dietary needs when you sign up for meals.  Are culturally specific meals available?  Kosher, Argentine/Halal meals and other culturally specific meal options are available in many areas. You can specify cultural meal preferences when you sign up for meals online or when you call  us.    _________________________________________________________________________________________  [employer]   Web: [email or web address]://[email or web address]/  Phone: [phone]          Other resources:   call to reach someone who can connect you with more resources!   _________________________________________________________________________________________                _________________________________________________________________________________________

## 2025-07-20 NOTE — PROGRESS NOTES
07/20/25 1025   Appointment Info   Signing Clinician's Name / Credentials (OT) Flor Neville, OTR/L   Living Environment   People in Home alone   Current Living Arrangements apartment   Home Accessibility stairs to enter home   Number of Stairs, Main Entrance greater than 10 stairs   Stair Railings, Main Entrance railings safe and in good condition;railing on right side (ascending)   Transportation Anticipated car, drives self   Living Environment Comments Pt lives alone in apartment, has tub shower w/ no shower  chair or grab bars   Self-Care   Usual Activity Tolerance good   Current Activity Tolerance poor   Equipment Currently Used at Home none   Fall history within last six months no   Activity/Exercise/Self-Care Comment Pt reports baseline independence w/ ADL tasks and mobility w/out AD   Instrumental Activities of Daily Living (IADL)   IADL Comments Pt typically independent, driving   General Information   Onset of Illness/Injury or Date of Surgery 07/13/25   Referring Physician Kristofer Mack MD   Additional Occupational Profile Info/Pertinent History of Current Problem 81 y/o male with PMHx HTN, HLD, HFrEF (LVEF 40%), CAD (remote PCI 2005), CVA (2013, no residual weakness/deficits per chart review)  [facility] bacte[facility] thought to be caused by acute cholecystitis vs choledocholithiasis/cholangitis. Workup also revealed acute LLE DVT in mid - distal femoral as well as popliteal vein. Hospital course was complicated by atrial fibrillation with RVR, and aspiration event following planned EUS/ERCP 07/16/25. Attempted extubation following procedure but required immediate re-intubation after an aspiration event.  Patient extubated and off pressors since early morning 7/19.   Existing Precautions/Restrictions fall;oxygen therapy device and L/min  (3L NC)   Cognitive Status Examination   Orientation Status orientation to person, place and time   Cognitive Status  Comments Pt AOx4 and following commands throughout session, will continue to monitor   Pain Assessment   Patient Currently in Pain No   Range of Motion Comprehensive   Comment, General Range of Motion BUE near WFL   Strength Comprehensive (MMT)   Comment, General Manual Muscle Testing (MMT) Assessment 3/5 in BUE, generalized weakness   Coordination   Upper Extremity Coordination Left UE impaired;Right UE impaired   Fine Motor Coordination impaired d/t weakness, swelling in hands   Transfers   Transfers sit-stand transfer   Sit-Stand Transfer   Sit-Stand [place] (Transfers) minimum assist (75% patient effort);verbal cues   Assistive Device (Sit-Stand Transfers) walker, standard   Balance   Balance Comments unsteady in standing, Min A w/ FWW for standing marching   Activities of Daily Living   BADL Assessment/Intervention bathing;lower body dressing;upper body dressing;grooming;toileting   Bathing Assessment/Intervention   [place] Level (Bathing) maximum assist (25% patient effort)   Comment, (Bathing) per clinical judgement   Upper Body Dressing Assessment/Training   [place] Level (Upper Body Dressing) minimum assist (75% patient effort)   Comment, (Upper Body Dressing) per clinical judgement   Lower Body Dressing Assessment/Training   [place] Level (Lower Body Dressing) maximum assist (25% patient effort)   Grooming Assessment/Training   [place] Level (Grooming) minimum assist (75% patient effort)   Toileting   [place] Level (Toileting) moderate assist (50% patient effort)   Comment, (Toileting) per clinical judgement   Clinical Impression   Criteria for Skilled Therapeutic Interventions Met (OT) Yes, treatment indicated   OT Diagnosis impaired I/ADL independence   OT Problem List-Impairments impacting ADL problems related to;activity tolerance impaired;balance;mobility;range of motion (ROM);strength   Assessment of Occupational Performance 3-5 Performance Deficits   Identified  Performance Deficits dressing, bathing, functional mobility, toileting   Planned Therapy Interventions (OT) ADL retraining;IADL retraining;ROM;strengthening;transfer training;home program guidelines;risk factor education;progressive activity/exercise;cognition   Clinical Decision Making Complexity (OT) detailed assessment/moderate complexity   Risk & Benefits of therapy have been explained evaluation/treatment results reviewed;care plan/treatment goals reviewed;risks/benefits reviewed;current/potential barriers reviewed;participants voiced agreement with care plan;participants included;patient   OT Total Evaluation Time   OT Eval, Moderate Complexity Minutes (79717) 8   OT Goals   Therapy Frequency (OT) 5 times/week   OT Predicted Duration/Target Date for Goal Attainment 07/27/25   OT Goals Hygiene/Grooming;Upper Body Dressing;Lower Body Dressing;Toilet Transfer/Toileting;Transfers;OT Goal 1;Cognition   OT: Hygiene/Grooming supervision/stand-by assist;within precautions   OT: Upper Body Dressing Supervision/stand-by assist;including set-up/clothing retrieval   OT: Lower Body Dressing Minimal assist;using adaptive equipment;including set-up/clothing retrieval   OT: Transfer with assistive device;Supervision/stand-by assist   OT: Toilet Transfer/Toileting Supervision/stand-by assist;toilet transfer;cleaning and garment management;using adaptive equipment   OT: Cognitive Patient/caregiver will verbalize understanding of cognitive assessment results/recommendations as needed for safe discharge planning   OT: Goal 1 Pt will complete BUE HEP to progress strength/activity tolerance for increased I/ADL independence   Interventions   Interventions Quick Adds Self-Care/Home Management   Self-Care/Home Management   Self-Care/Home Mgmt/ADL, Compensatory, Meal Prep Minutes (82920) 12   Symptoms Noted During/After Treatment (Meal Preparation/Planning Training) fatigue;significant change in vital signs;shortness of breath    Treatment Detail/Skilled Intervention Pt in chair upon arrival, agreeable to OT. Pt on 3L NC at rest, SpO2 in low 90s, HR in 110s. Pt stood from chair w/ Min A and VC, inc time needed to come to full upright stand. Pt then engaged in standing marching ~40sec to progress act tolerance/strength for I/ADL tasks. Pt increasingly fatigued and SOB, returned to sitting. HR up to 137 w/ activity, took a few minutes to recover. Cues for PLB. Encouraged pt to try additional activity but he declines at this time. Min A for seated g/h tasks, needs assist d/t BUE weakness and edema in hands. Left w/ all needs met in chair, alarm on and RN updated.   OT Discharge Planning   OT Plan progress transfers w/ Ax2 to chair/BSC, toileting/dressing, seated g/h, monitor HR w/ activity, monitor cog   OT Discharge Recommendation (DC Rec) Transitional Care Facility   OT Rationale for DC Rec Pt well below baseline, limited by decreased activity tolerance, weakness, cognitive deficits, inc O2 needs, edema. Pt typically living alone and was previously independent, now Ax2 and very limited tolerance for activity. Will need ongoing rehab at TCU to progress I/ADL independence prior to returning home.   OT Brief overview of current status Goals of therapy will be to address safe mobility and make recs for d/c to next level of care. Pt and RN will continue to follow all falls risk precautions as documented by RN staff while hospitalized. Rec Ax2 pivots w/ nursing   OT Total Distance Amb During Session (feet) 0   Total Session Time   Timed Code Treatment Minutes 12   Total Session Time (sum of timed and untimed services) 20

## 2025-07-20 NOTE — PLAN OF CARE
Goal Outcome Evaluation:            0700-1930  Orientation: A&O x4   Aggression Stop Light: green   Activity: A2 GBW pivot to BSC  Diet/BS Checks: mod carb, low sat fat; ACHS blood sugar checks- no replacement needed.   Tele:  NSR   IV Access/Drains: RUE triple lumen PICC with good blood return. Dressing changed this shiftX2 PIV SL.   Pain Management: denies  Abnormal VS/Results: VSS on RA; Phos protocols- replaced this shift. Recheck at 2000. Mag protocols- recheck malini.  Bowel/Bladder: Occasionally incontinent. Voiding well. X2 large loose BM this shift. Second BM joanne- MD notified. Heparin drip stopped. Q6 hgb checks ordered- first check at 2200.   Skin/Wounds: scattered bruising and abrasions. Blanchable redness to sacrum- mepilex in place. +2-3 edema to extremities.   Consults: SW, PT/OT  D/C Disposition: pending   Other Info:

## 2025-07-20 NOTE — PLAN OF CARE
[id number]    Orientation: A&O x4  Aggression Stop Light: Green  Activity: A2 GBW w/ pivot to BSC per report, not OOB this shift.   Diet/BS Checks: Cardiac and Mod CHO diet. ACHS BS checks  Tele: NSR  IV Access/Drains: R PICC infusing heparin @ 1550units/hr. R PIV SL  Pain Management: denies  Abnormal VS/Results: VSS on 0.5L NC  Bowel/Bladder: Continent of b/b, using bedside urinal  Skin/Wounds: Blanchable redness to coccyx- mepi in place, scattered bruising and abrasions  Consults: Nutrition, PT/OT, SW  D/C Disposition: pending     Other Info:   - Mg & Phos replacement protocol, AM recheck  - Hep Xa, AM recheck  - Tylenol given HS

## 2025-07-20 NOTE — PROGRESS NOTES
"Clinical Swallow Evaluation  [facility]     07/20/25 1400   Appointment Info   Signing Clinician's Name / Credentials (SLP) Melissa Cabrera MS, CCC-SLP   General Information   Onset of Illness/Injury or Date of Surgery 07/13/25   Referring Physician Keyana Mayorga MD   Pertinent History of Current Problem Per chart review: \"Kristofer Montana is a 80 year old male with a medical history of HTN, HLD, HFrEF LVEF 40%, CAD with remote PCI in 2005, stroke (2013)  who [facility] 2/2 acute cholecystitis and choledocholithiasis, including e coli bacteremia and FARNAZ on cefepime and Flagyl.  Also found to have acute LLE DVT in the mid and distal femoral vein, nonocclusive DVT left popliteal vein started on heparin infusion. Course c/b afib w/ RVR s/p amiodarone. Underwent planned EUS/ERCP 07/16/25 complicated by large volume emesis/ aspiration event at beginning of case, attempt at extubation but required immediate re-intubation post procedure for acute hypoxic respiratory failure. Admitted to ICU for further management.\" Swallow evaluation ordered after RN observed some difficulty swallowing   General Observations Pt alert and agreeable to evaluation. Reports esophageal dysphagia symptoms, GERD at baseline apparently   Type of Evaluation   Type of Evaluation Swallow Evaluation   Oral Motor   Oral Musculature generally intact   Structural Abnormalities none present   Mucosal Quality good   Dentition (Oral Motor)   Dentition (Oral Motor) natural dentition;adequate dentition   Facial Symmetry (Oral Motor)   Facial Symmetry (Oral Motor) WNL   Lip Function (Oral Motor)   Lip Range of Motion (Oral Motor) WNL   Lip Strength (Oral Motor) WFL   Tongue Function (Oral Motor)   Tongue ROM (Oral Motor) WNL   Tongue Strength (Oral Motor) WFL   Jaw Function (Oral Motor)   Jaw Function (Oral Motor) WNL   Facial Sensation   Facial Sensation WNL   Cough/Swallow/Gag Reflex (Oral Motor) " "  Volitional Throat Clear/Cough (Oral Motor) WNL   Volitional Swallow (Oral Motor) WNL   Vocal Quality/Secretion Management (Oral Motor)   Vocal Quality (Oral Motor) WFL   Secretion Management (Oral Motor) WNL   General Swallowing Observations   Current Diet/Method of Nutritional Intake (General Swallowing Observations, NIS) regular diet;thin liquids (level 0)   Respiratory Support nasal cannula  (3 lpm)   Past History of Dysphagia none reported or in chart review   Swallowing Evaluation Clinical swallow evaluation   Clinical Swallow Evaluation   Feeding Assistance set up only required   Additional evaluation(s) completed today No   Clinical Swallow Evaluation Textures Trialed thin liquids;soft & bite-sized;solid foods   Clinical Swallow Eval: Thin Liquid Texture Trial   Mode of Presentation, Thin Liquids straw;self-fed   Volume of Liquid or Food Presented 4 oz   Oral Phase of Swallow WFL   Pharyngeal Phase of Swallow intact   Diagnostic Statement No overt s/sx of aspiration with all trials   Clinical Swallow Eval: Soft & Bite Sized   Mode of Presentation self-fed   Volume Presented 5 bites   Oral Phase WFL   Pharyngeal Phase intact   Diagnostic Statement No overt s/sx of aspiration with all trials. Patient reporting chewing is \"slower than usual\"   Clinical Swallow Evaluation: Solid Food Texture Trial   Mode of Presentation self-fed   Volume Presented one cracker   Oral Phase WFL   Pharyngeal Phase intact   Diagnostic Statement No overt s/sx of aspiration with all trials   Esophageal Phase of Swallow   Patient reports or presents with symptoms of esophageal dysphagia Yes   Esophageal comments \"burning/pain with liquids. TUMS helps\"   Swallowing Recommendations   Diet Consistency Recommendations regular diet;thin liquids (level 0)   Supervision Level for Intake patient independent   Mode of Delivery Recommendations bolus size, small;slow rate of intake   Swallowing Maneuver Recommendations alternate food and liquid " intake   Recommended Feeding/Eating Techniques (Swallow Eval) maintain upright sitting position for eating;maintain upright posture during/after eating for 30 minutes   Medication Administration Recommendations, Swallowing (SLP) per pt preference   Instrumental Assessment Recommendations instrumental evaluation not recommended at this time   Clinical Impression   Criteria for Skilled Therapeutic Interventions Met (SLP Eval) Evaluation only   SLP Diagnosis No appreciable oral or pharyngeal dysphagia. Suspect esophageal dysphagia per pt report of symptoms   Risks & Benefits of therapy have been explained evaluation/treatment results reviewed;current/potential barriers reviewed;participants voiced agreement with care plan;participants included;patient   Clinical Impression Comments Clinical swallow evaluation completed per physician order. No appreciable oropharyngeal dysphagia at this time. Recommend regular solids and thin liquids. No overt s/sx of aspiration with all textures trialed. Patient describing esophageal dysphagia symptoms which are typically managed with over-the-counter daily medications. He does report that chewing/swallowing feels a bit more labored, however is very aware of limitations and make menu choices accordingly. Education for role/purpose of SLP, reducing risk of aspiration/safe swallow strategies, diet levels/recommendations, etc. with patient verbalizing understanding and agreement   SLP Total Evaluation Time   Eval: oral/pharyngeal swallow function, clinical swallow Minutes (45665) 15   SLP Goals   Therapy Frequency (SLP Eval) one time eval and treatment only   SLP Discharge Planning   SLP Plan DC SLP   SLP Discharge Recommendation home   SLP Rationale for DC Rec pt at baseline diet   SLP Brief overview of current status  recommend regular solids and thin liquids; suspect esophageal dysphagia/dysfunction   SLP Time and Intention   Total Session Time (sum of timed and untimed services) 15

## 2025-07-20 NOTE — CONSULTS
Care Management Initial Consult    General Information  Assessment completed with: Daily Kristofer  Type of CM/SW Visit: Initial Assessment    Primary Care Provider verified and updated as needed: Yes   Readmission within the last 30 days: no previous admission in last 30 days      Reason for Consult: discharge planning  Advance Care Planning: Advance Care Planning Reviewed: no concerns identified          Communication Assessment  Patient's communication style: spoken language (English or Bilingual)    Hearing Difficulty or Deaf: no   Wear Glasses or Blind: yes    Cognitive  Cognitive/Neuro/Behavioral: WDL  Level of Consciousness: alert  Arousal Level: opens eyes spontaneously  Orientation: oriented x 4  Mood/Behavior: behavior appropriate to situation  Best Language: 0 - No aphasia  Speech: clear    Living Environment:   People in home: alone     Current living Arrangements: apartment      Able to return to prior arrangements: yes       Family/Social Support:  Care provided by: self  Provides care for: no one  Marital Status:   Support system: Sibling(s)          Description of Support System: Supportive, Involved         Current Resources:   Patient receiving home care services: No        Community Resources: None  Equipment currently used at home: none  Supplies currently used at home: None    Employment/Financial:  Employment Status: retired        Financial Concerns: none   Referral to Financial Worker: No       Does the patient's insurance plan have a 3 day qualifying hospital stay waiver?  No    Lifestyle & Psychosocial Needs:  Social Drivers of Health     Food Insecurity: Low Risk  (7/13/2025)    Food Insecurity     Within the past 12 months, did you worry that your food would run out before you got money to buy more?: No     Within the past 12 months, did the food you bought just not last and you didn t have money to get more?: No   Depression: Not at risk (5/8/2024)    PHQ-2     PHQ-2 Score: 0    Housing Stability: Low Risk  (7/13/2025)    Housing Stability     Do you have housing? : Yes     Are you worried about losing your housing?: No   Tobacco Use: Low Risk  (7/14/2025)    Patient History     Smoking Tobacco Use: Never     Smokeless Tobacco Use: Never     Passive Exposure: Past   Financial Resource Strain: Low Risk  (7/13/2025)    Financial Resource Strain     Within the past 12 months, have you or your family members you live with been unable to get utilities (heat, electricity) when it was really needed?: No   Alcohol Use: Not on file   Transportation Needs: Low Risk  (7/13/2025)    Transportation Needs     Within the past 12 months, has lack of transportation kept you from medical appointments, getting your medicines, non-medical meetings or appointments, work, or from getting things that you need?: No   Physical Activity: Not on file   Interpersonal Safety: Low Risk  (7/13/2025)    Interpersonal Safety     Do you feel physically and emotionally safe where you currently live?: Yes     Within the past 12 months, have you been hit, slapped, kicked or otherwise physically hurt by someone?: No     Within the past 12 months, have you been humiliated or emotionally abused in other ways by your partner or ex-partner?: No   Stress: Not on file   Social Connections: Not on file   Health Literacy: Not on file       Functional Status:  Prior to admission patient needed assistance:   Dependent ADLs:: Independent  Dependent IADLs:: Independent       Mental Health Status:  Mental Health Status: No Current Concerns       Chemical Dependency Status:  Chemical Dependency Status: No Current Concerns             Values/Beliefs:  Spiritual, Cultural Beliefs, Yarsanism Practices, Values that affect care: no               Discussed  Partnership in Safe Discharge Planning  document with patient/family: Yes: Kristofer    Additional Information:  Writer met with patient to introduce self and role in discharge planning. Peter  verified his address, phone number, PCP, and insurance. He lives alone and independently in an apartment in [place], and does not use any assistive devices for ambulation. Writer explained that OT recommended TCU stay, and that PT would also likely re-assess for TCU as well. He is agreeable to TCU, asking if insurance covers his stay. Writer explained that medicare A covers 21 days. He has never needed rehab in the past and is unfamiliar, writer gave him a list and referred him to medicare's website, and told him we will check back for 2-3 options for discharge. His brother will be able to transport him    Next Steps: get TCU choices, secure TCU bed    Daisy Pastor RN Care Coordinator  [facility]  [phone]

## 2025-07-21 LAB
ABO + RH BLD: NORMAL
ALBUMIN SERPL BCG-MCNC: 2.1 G/DL (ref 3.5–5.2)
ALP SERPL-CCNC: 148 U/L (ref 40–150)
ALT SERPL W P-5'-P-CCNC: 17 U/L (ref 0–70)
ANION GAP SERPL CALCULATED.3IONS-SCNC: 10 MMOL/L (ref 7–15)
AST SERPL W P-5'-P-CCNC: 34 U/L (ref 0–45)
BILIRUB SERPL-MCNC: 1.4 MG/DL
BLD GP AB SCN SERPL QL: NEGATIVE
BLD PROD TYP BPU: NORMAL
BLOOD COMPONENT TYPE: NORMAL
BUN SERPL-MCNC: 54.6 MG/DL (ref 8–23)
CALCIUM SERPL-MCNC: 7.4 MG/DL (ref 8.8–10.4)
CHLORIDE SERPL-SCNC: 110 MMOL/L (ref 98–107)
CODING SYSTEM: NORMAL
CREAT SERPL-MCNC: 1.23 MG/DL (ref 0.67–1.17)
CROSSMATCH: NORMAL
EGFRCR SERPLBLD CKD-EPI 2021: 60 ML/MIN/1.73M2
ERYTHROCYTE [DISTWIDTH] IN BLOOD BY AUTOMATED COUNT: 15.6 % (ref 10–15)
GLUCOSE BLDC GLUCOMTR-MCNC: 121 MG/DL (ref 70–99)
GLUCOSE BLDC GLUCOMTR-MCNC: 130 MG/DL (ref 70–99)
GLUCOSE BLDC GLUCOMTR-MCNC: 88 MG/DL (ref 70–99)
GLUCOSE BLDC GLUCOMTR-MCNC: 95 MG/DL (ref 70–99)
GLUCOSE SERPL-MCNC: 107 MG/DL (ref 70–99)
HCO3 SERPL-SCNC: 24 MMOL/L (ref 22–29)
HCT VFR BLD AUTO: 19.9 % (ref 40–53)
HGB BLD-MCNC: 6.7 G/DL (ref 13.3–17.7)
HGB BLD-MCNC: 7.3 G/DL (ref 13.3–17.7)
HGB BLD-MCNC: 7.7 G/DL (ref 13.3–17.7)
ISSUE DATE AND TIME: NORMAL
MAGNESIUM SERPL-MCNC: 2.2 MG/DL (ref 1.7–2.3)
MCH RBC QN AUTO: 29.6 PG (ref 26.5–33)
MCHC RBC AUTO-ENTMCNC: 33.7 G/DL (ref 31.5–36.5)
MCV RBC AUTO: 87 FL (ref 78–100)
MCV RBC AUTO: 87 FL (ref 78–100)
MCV RBC AUTO: 88 FL (ref 78–100)
PHOSPHATE SERPL-MCNC: 2.6 MG/DL (ref 2.5–4.5)
PLATELET # BLD AUTO: 162 10E3/UL (ref 150–450)
POTASSIUM SERPL-SCNC: 3.5 MMOL/L (ref 3.4–5.3)
PROT SERPL-MCNC: 4.4 G/DL (ref 6.4–8.3)
RBC # BLD AUTO: 2.26 10E6/UL (ref 4.4–5.9)
SODIUM SERPL-SCNC: 144 MMOL/L (ref 135–145)
SPECIMEN EXP DATE BLD: NORMAL
UNIT ABO/RH: NORMAL
UNIT NUMBER: NORMAL
UNIT STATUS: NORMAL
UNIT TYPE ISBT: 6200
WBC # BLD AUTO: 14.1 10E3/UL (ref 4–11)

## 2025-07-21 PROCEDURE — 85027 COMPLETE CBC AUTOMATED: CPT | Performed by: INTERNAL MEDICINE

## 2025-07-21 PROCEDURE — P9016 RBC LEUKOCYTES REDUCED: HCPCS

## 2025-07-21 PROCEDURE — 82310 ASSAY OF CALCIUM: CPT | Performed by: INTERNAL MEDICINE

## 2025-07-21 PROCEDURE — 84100 ASSAY OF PHOSPHORUS: CPT | Performed by: INTERNAL MEDICINE

## 2025-07-21 PROCEDURE — 250N000013 HC RX MED GY IP 250 OP 250 PS 637: Performed by: INTERNAL MEDICINE

## 2025-07-21 PROCEDURE — 86923 COMPATIBILITY TEST ELECTRIC: CPT

## 2025-07-21 PROCEDURE — 83735 ASSAY OF MAGNESIUM: CPT | Performed by: INTERNAL MEDICINE

## 2025-07-21 PROCEDURE — 86901 BLOOD TYPING SEROLOGIC RH(D): CPT | Performed by: HOSPITALIST

## 2025-07-21 PROCEDURE — 120N000001 HC R&B MED SURG/OB

## 2025-07-21 PROCEDURE — 86923 COMPATIBILITY TEST ELECTRIC: CPT | Performed by: STUDENT IN AN ORGANIZED HEALTH CARE EDUCATION/TRAINING PROGRAM

## 2025-07-21 PROCEDURE — 99233 SBSQ HOSP IP/OBS HIGH 50: CPT | Performed by: STUDENT IN AN ORGANIZED HEALTH CARE EDUCATION/TRAINING PROGRAM

## 2025-07-21 PROCEDURE — 250N000011 HC RX IP 250 OP 636: Performed by: INTERNAL MEDICINE

## 2025-07-21 PROCEDURE — 85018 HEMOGLOBIN: CPT | Performed by: INTERNAL MEDICINE

## 2025-07-21 RX ADMIN — PANTOPRAZOLE SODIUM 40 MG: 40 INJECTION, POWDER, FOR SOLUTION INTRAVENOUS at 08:22

## 2025-07-21 RX ADMIN — ATORVASTATIN CALCIUM 40 MG: 40 TABLET, FILM COATED ORAL at 08:22

## 2025-07-21 RX ADMIN — METOPROLOL SUCCINATE 12.5 MG: 25 TABLET, EXTENDED RELEASE ORAL at 08:21

## 2025-07-21 RX ADMIN — CEFTRIAXONE SODIUM 2 G: 2 INJECTION, POWDER, FOR SOLUTION INTRAMUSCULAR; INTRAVENOUS at 13:17

## 2025-07-21 RX ADMIN — PANTOPRAZOLE SODIUM 40 MG: 40 INJECTION, POWDER, FOR SOLUTION INTRAVENOUS at 20:50

## 2025-07-21 ASSESSMENT — ACTIVITIES OF DAILY LIVING (ADL)
ADLS_ACUITY_SCORE: 48

## 2025-07-21 NOTE — PLAN OF CARE
[id number]    Orientation: A&O x4   Aggression Stop Light: Green   Activity: A1-2 GBW pivot to BSC  Diet/BS Checks: NPO since MN. ACHS BS checks.   Tele: Sinus Tach  IV Access/Drains: RUE triple lumen PICC, blood return noted.  Pain Management: denies  Abnormal VS/Results: VSS on 1L NC Bowel/Bladder: Continent of b/b  Skin/Wounds: Scattered bruising and abrasions. Blanchable redness to coccyx.    Consults: SW, PT/OT, GI   D/C Disposition: pending     Other Info:   - Possible EGD 7/21  - Mg & Phos replacement protocol, recheck tomorrow 7/22  - q6h Hgb checks: 7.1, 6.7 - MD notified. See Provider notification. Type and screen collected, waiting for results. Blood consent signed and in front of pt chart.

## 2025-07-21 NOTE — PROVIDER NOTIFICATION
MD Notification    Notified Person: MD    Notified Person Name: Dr. Woodard Corina    Notification Date/Time: 7/20/25 @ 2141    Notification Interaction: Jimyera Page    Purpose of Notification: Pt had a black tarry stool earlier today, heparin gtt was discontinued and q6h Hgb checks ordered. This evening, he had a brownish/maroon BM, Hgb recheck @ 2125 resulted 7.1. I will continue to monitor, any other recommendations?    Orders Received: continue to monitor

## 2025-07-21 NOTE — PROVIDER NOTIFICATION
MD Notification    Notified Person: MD    Notified Person Name:  Bg Ann    Notification Date/Time: 7/21/25 @ 0612    Notification Interaction: Vocera Page    Purpose of Notification:  Pt has new GIB as of yesterday. Hgb collected this morning was 6.7. A blood consent needs to be signed. There is a blank one in front of the pt chart.     Vitals: T: 98.9, HR: 93, /58, RR 18, O2: 93% on 1L NC (Baseline per patient)     Bloody stool. Last BM was 7/21/25 around 0530     Orders Received: 1 unit PRBCs & Type and screen ordered    Comments:

## 2025-07-21 NOTE — PROGRESS NOTES
[facility]    Medicine Progress Note - Hospitalist Service    Date of Admission:  7/13/2025    Assessment & Plan     Kristofer Montana is a 81 y/o male with Hx of HTN, HLD, HFrEF (LVEF 40%), CAD (remote PCI 2005), CVA (2013, no residual weakness/deficits) [facility] with 2 days of fever, chills and RUQ pain.     He was in septic shock with mild hypotension, WBC 17 and lactate of 5.5. CT C/A/P showed choledocholithiasis and mild bile duct dilation and possible acute cholecystitis. He was also found to have occlusive DVT in L femoral vein, nonocclusive DVT in L popliteal vein. He was started on iv heparin and transferred to [facility] for [facility].     [facility] E [facility]     On 7/14, he went into A fib with RVR and was started on amiodarone infusion. Concerted to NSR    On 7/15, he developed acute hypoxic resp failure and was started on BIPAP. Weaned off by 7/16    He underwent EUS/ERCP with sphincterotomy with removal of stones on 7/16. No stent was placed. He had an aspiration event during the procedure. He was intubated and admitted to ICU    CT 7/17 showed residual 0.3 cm gallstone in CBD, persistent mild CBD dilatation of 1.1 cm. Mild dilatation of small bowel loops, likely ileus. Per GI, small CBD stone is expected to pass on its own and no intervention was recommeneded    He was extubated on 7/19 and was transferred to hospitalist service    Per gen surg no intervention planned at this time. Cholecystostomy tube should be considered if concern for acute cholecystitis. Cholecystectomy should be considered when patient recovers from aspiration pneumonia    On 7/20, had an episode of melena and noted Hb drop from 9.7 to 8.4       Septic shock due to E coli bacteremia secondary to choledocholithiasis with ascending cholangitis, s/p EUS/ERCP with sphincterotomy with removal of stones on 7/16  Possible acute cholecystitis  -weaned off Vasopressors and stress  dose steroids  -on 7/17 found to have retained 0.3 cm CBD stone which is expected to pass in its own and no intervention was recommended by [facility]. Signed off   -Gen surgery did not feel patient had acute cholecystitis. Recommended percutaneous cholecystostomy tube if needed. Will eventually need elective cholecystectomy once patient recovers from acute illness.   -CT A/P had shown large loculated perihepatic/subdiaphragmatic fluid collection which decreased in size following ERCP, but increased fluid in abdomen with relative hyperdensity compared to simple ascites.  IR felt this was too small to sample percutaneously.   -ID following. On IV ceftriazxone    Melena, 7/20/25  Acute blood loss anemia - due to GI bleed vs post sphincterotomy bleed due to anticoagulation   -had 1 episode of melena on 7/20. Hb has been declining since 7/18.   -Hb 13 - 12.4 - 9.8 - 8.4  -will hold IV heparin and aspirin, continue IV protonix BID  -re-consult [facility]  -monitor Hb. Transfuse of below 7      Aspiration pneumonia   Acute hypoxic respiratory failure - secondary to aspiration pneumonitis on 7/16 during EUS/ERCP  -Extubated on 7/19. Weaned off supplemental oxygen on 7/20.   -continue IV ceftriaxone     Mild FARNAZ - secondary to septic shock  -baseline Cr 1. Cr peaked at 1.39. now improved to 1.23. Monitor     Hypokalemia  -replace per protocol    Paroxysmal atrial fibrillation, converted to NSR with IV amiodarone infusion  -amiodarone discontinued. Now on low dose toprol XL  -hold IV heparin on 7/20 due to concern for GI bleed     Left Femoral/popliteal DVT, diagnosed 7/13  -IR did not recommend thrombectomy.  -on IV heparin infusion, hold as above   -may need to consider IVC filter if unable to safely anticoagulate     Hyperlipidemia  -continue atorvastatin    CAD s/p  remote PCI (2005)  -aspirin was held for ERCP, resumed on 7/19  -hold again on 7/20 due to declining Hb    Essential HTN -   -PTA antihypertensives on hold. Resume as  "able    Mild Thrombocytopenia - due to severe sepsis  -now resolved    Chronic systolic CHF with reduced EF of 40%  -appears euvolemic. Monitor volume status   -Weight up from admission from 83 to 93 kg   -Chest x-ray 7/18 showed no pulmonary edema      Prediabetes, HbA1c 5.8%, with stress and steroid induced hyperglycemia  -has completed stress dose steroids  -on sliding scale insulin        Diet: Combination Diet Moderate Consistent Carb (60 g CHO per Meal) Diet; Low Saturated Fat Na <2400mg Diet  Room Service    DVT Prophylaxis: Pneumatic Compression Devices  Mar Catheter: Not present  Lines: PRESENT      PICC 07/16/25 Triple Lumen Right Basilic Pressors-Site Assessment: Drainage      Cardiac Monitoring: ACTIVE order. Indication: ICU  Code Status: Full Code      Clinically Significant Risk Factors          # Hyperchloremia: Highest Cl = 110 mmol/L in last 2 days, will monitor as appropriate          # Hypoalbuminemia: Lowest albumin = 2.1 g/dL at 7/20/2025  6:26 AM, will monitor as appropriate                # Obesity: Estimated body mass index is 30.15 kg/m  as calculated from the following:    Height as of this encounter: 1.778 m (5' 10\").    Weight as of this encounter: 95.3 kg (210 lb 1.6 oz).      # Financial/Environmental Concerns: none         Social Drivers of Health            Disposition Plan         Medically Ready for Discharge: Anticipated in 2-4 Days             Keyana Mayorga MD  Hospitalist Service  [facility]  Securely message with [facility] (more info)  Text page via [employer] Paging/Directory   ______________________________________________________________________    Interval History     Reports he feels ok. Tolerating diet  Denies shortness of breath  Reports odynophagia   RN reported episode of melena on 7/20 late afternoon     Physical Exam   Vital Signs: Temp: 99.4  F (37.4  C) Temp src: Oral BP: 115/63 Pulse: 103   Resp: 19 SpO2: 95 % O2 Device: Nasal cannula Oxygen " Delivery: 3 LPM  Weight: 210 lbs 1.57 oz    Constitutional - awake and alert, resting in bed, in no acute distress  Cardiovascular - regular rate and rhythm, no murmurs, no edema  Pulmonary - lungs are clear to auscultation bilaterally, no wheezing or rhonchi  GI - abdomen is soft, nontender, nondistended, no hepatosplenomegaly or masses  Integumentary - skin is warm and dry, no rashes or ulcers  Neurological - awake, alert and oriented x3.  Moving all 4 extremities, normal speech, no focal deficits    Medical Decision Making       60 MINUTES SPENT BY ME on the date of service doing chart review, history, exam, documentation & further activities per the note.      Data     I have personally reviewed the following data over the past 24 hrs:    15.8 (H)  \   7.1 (L)   / 187     141 109 (H) 52.0 (H) /  114 (H)   3.6 23 1.23 (H) \     ALT: 14 AST: 30 AP: 150 TBILI: 0.8   ALB: 2.1 (L) TOT PROTEIN: 4.7 (L) LIPASE: N/A       Imaging results reviewed over the past 24 hrs:   No results found for this or any previous visit (from the past 24 hours).

## 2025-07-21 NOTE — SIGNIFICANT EVENT
Significant Event Note    Time of event: 6:39 AM July 21, 2025    Description of event:  Patient with a GI bleed was found to be anemic in the morning with a hemoglobin of 6.7.    Plan:  1 unit PRBCs ordered  Transfusion consent obtained  Type and screen ordered    Discussed with: bedside nurse    Srinivas Ann MD

## 2025-07-21 NOTE — PLAN OF CARE
Goal Outcome Evaluation:      Plan of Care Reviewed With: patient, sibling    Overall Patient Progress: improvingOverall Patient Progress: improving    Shift: 1362-5381 7/21/2025    Orientation: AO x4  Pain: Denied   Vitals/Tele: VSS 1L NC  IV Access/drains: R Triple Lumen PICC; 2x PIV SL  Diet: Regular BS ACHS  Telemetry: Sinus Rhythm   Mobility: A1-2 GBW pivot to BSC;   GI/: Continent; Voiding in urinal; No BM   Wound/Skin: Scattered bruising and abrasions. Blanchable redness to coccyx.    Consults: GI; CMSW  Discharge Plan: Pending       See Flow sheets for assessment

## 2025-07-21 NOTE — PROGRESS NOTES
[facility]    Medicine Progress Note - Hospitalist Service    Date of Admission:  7/13/2025    Assessment & Plan     Kristofer Montana is a 79 y/o male with Hx of HTN, HLD, HFrEF (LVEF 40%), CAD (remote PCI 2005), CVA (2013, no residual weakness/deficits) who [facility] with 2 days of fever, chills and RUQ pain.      He was in septic shock with mild hypotension, WBC 17 and lactate of 5.5. CT C/A/P showed choledocholithiasis and mild bile duct dilation and possible acute cholecystitis. He was also found to have occlusive DVT in L femoral vein, nonocclusive DVT in L popliteal vein. He was started on iv heparin [facility]      [facility]. On 7/14, he went into A fib with RVR and was started on amiodarone infusion. Concerted to NSR. On 7/15, he developed acute hypoxic resp failure and was started on BIPAP. Weaned off by 7/16.     He underwent EUS/ERCP with sphincterotomy with removal of stones on 7/16. No stent was placed. He had an aspiration event during the procedure. He was intubated and admitted to ICU.     CT 7/17 showed residual 0.3 cm gallstone in CBD, persistent mild CBD dilatation of 1.1 cm. Mild dilatation of small bowel loops, likely ileus. Per GI, small CBD stone is expected to pass on its own and no intervention was recommended. He was extubated on 7/19 and was transferred to hospitalist service.     Per gen surg no intervention planned at this time. Cholecystostomy tube should be considered if concern for acute cholecystitis. Cholecystectomy should be considered when patient recovers from aspiration pneumonia.     On 7/20, had an episode of melena and noted Hb drop from 9.7 to 8.4 to 6.7 on 7/21. Disposition now pending management of suspected GI bleed.       Septic shock due to E coli bacteremia secondary to choledocholithiasis with ascending cholangitis, s/p EUS/ERCP with sphincterotomy with removal of  stones on 7/16  Possible acute cholecystitis  Now weaned off Vasopressors and stress dose steroids. On 7/17 found to have retained 0.3 cm CBD stone which is expected to pass in its own and no intervention was recommended by [facility]. Signed off   - Gen surgery did not feel patient had acute cholecystitis. Recommended percutaneous cholecystostomy tube if needed. Will eventually need elective cholecystectomy once patient recovers from acute illness.   - CT A/P 7/16 had shown large loculated perihepatic/subdiaphragmatic fluid collection which decreased in size following ERCP, but increased fluid in abdomen with relative hyperdensity compared to simple ascites.  IR felt this was too small to sample percutaneously. Also pneumobilia status post ERCP.      Melena, 7/20/25  Acute blood loss anemia - due to GI bleed vs post sphincterotomy bleed due to anticoagulation   H ad 1 episode of melena on 7/20. Hb has been declining since 7/18. No recurrence of melena overnight 7/20-7/21.   - Hb 13 - 12.4 - 9.8 - 8.4 - 6.8  - Will hold IV heparin and aspirin, continue IV protonix BID  - Re-consult [facility] 7/21  - Monitor Hb. Transfuse of below 7, transfusing 1 unit AM 7/21  - Continue to trend hemoglobin     Aspiration pneumonia   Acute hypoxic respiratory failure - secondary to aspiration pneumonitis on 7/16 during EUS/ERCP  Extubated on 7/19. Weaned off supplemental oxygen on 7/20. Respiratory culture growing candida glabrata, suspected contaminant.   - Received cefepime from 7/14 - 7/17, Ceftriaxone 7/21 to present, continue for now     Mild FARNAZ - secondary to septic shock  Baseline Cr 1. Cr peaked at 1.39. now improved to 1.23. Monitor      Hypokalemia  - Replace per protocol     Paroxysmal atrial fibrillation, converted to NSR with IV amiodarone infusion  - Amiodarone discontinued. Now on low dose toprol XL  - Hold IV heparin on 7/20 due to concern for GI bleed      Left Femoral/popliteal DVT, diagnosed 7/13  - IR did not recommend  "thrombectomy.  - IV Heparin currently on hold due to anemia   - May need to consider IVC filter if unable to safely anticoagulate      Hyperlipidemia  - Continue atorvastatin     CAD s/p  remote PCI (2005)  - Aspirin was held for ERCP, resumed on 7/19, held again 7/20 due to declining hemoglobin      Essential HTN -   - PTA antihypertensives on hold. Resume as able     Mild Thrombocytopenia - due to severe sepsis  Now resolved     Chronic systolic CHF with reduced EF of 40%  Appears euvolemic. Monitor volume status. Weight up from admission from 83 to 93 kg   - Chest x-ray 7/18 showed no pulmonary edema     Prediabetes, HbA1c 5.8%, with stress and steroid induced hyperglycemia  Has completed stress dose steroids  - On sliding scale insulin          Diet: Room Service  NPO for Procedure/Surgery per Anesthesia Guidelines Except for: Meds; Clear liquids before procedure/surgery: ADULT (Age GREATER than or Equal to 18 years) - Clear liquids 2 hours before procedure/surgery    DVT Prophylaxis: On hold due to concern for GI bleed   Mar Catheter: Not present  Lines: PRESENT      PICC 07/16/25 Triple Lumen Right Basilic Pressors-Site Assessment: WDL      Cardiac Monitoring: ACTIVE order. Indication: ICU  Code Status: Full Code      Clinically Significant Risk Factors          # Hyperchloremia: Highest Cl = 110 mmol/L in last 2 days, will monitor as appropriate          # Hypoalbuminemia: Lowest albumin = 2.1 g/dL at 7/21/2025  5:48 AM, will monitor as appropriate                # Obesity: Estimated body mass index is 30.15 kg/m  as calculated from the following:    Height as of this encounter: 1.778 m (5' 10\").    Weight as of this encounter: 95.3 kg (210 lb 1.6 oz).        # Financial/Environmental Concerns: none         Social Drivers of Health            Disposition Plan     Medically Ready for Discharge: Anticipated in 2-4 Days         Scott Mcnamara, DO  Hospitalist Service  [facility] " [facility]  Securely message with [employer] (more info)  Text page via [facility] Paging/Directory   ______________________________________________________________________    Interval History     - No acute events overnight  - Today the patient reports to be doing fair  - No melena since last night  - Requesting a work note  - Denies abdominal pain, fevers, chills, nausea, or vomiting  - No dizziness or lightheadedness  - No other acute concerns     Physical Exam   Vital Signs: Temp: 98  F (36.7  C) Temp src: Oral BP: 116/66 Pulse: 89   Resp: 18 SpO2: 94 % O2 Device: Nasal cannula Oxygen Delivery: 1 LPM  Weight: 210 lbs 1.57 oz    Constitutional: awake, alert, cooperative, no apparent distress, and appears stated age. Pale   Eyes: Lids and lashes normal, pupils equal, round and reactive to light   ENT: Normocephalic, without obvious abnormality, atraumatic   Respiratory: No increased work of breathing, good air exchange, clear to auscultation bilaterally, no crackles or wheezing  Cardiovascular: Normal apical impulse, regular rate and rhythm, normal S1 and S2, no S3 or S4, and no murmur noted, 1-2+ bilteral lower extremity pitting edema   GI: No scars, normal bowel sounds, soft, non-distended, non-tender, no masses palpated, no hepatosplenomegally  Skin: no redness, warmth, or swelling  Musculoskeletal: No deformities   Neurologic: Awake, alert, oriented to name, place and time.  Cranial nerves II-XII are grossly intact.  Motor is 5 out of 5 bilaterally.     Neuropsychiatric: General: normal, calm, and normal eye contact    Medical Decision Making       52 MINUTES SPENT BY ME on the date of service doing chart review, history, exam, documentation & further activities per the note.      Data   ------------------------- PAST 24 HR DATA REVIEWED -----------------------------------------------    I have personally reviewed the following data over the past 24 hrs:    14.1 (H)  \   6.7 (LL)   / 162     144 110 (H) 54.6 (H) /  95    3.5 24 1.23 (H) \     ALT: 17 AST: 34 AP: 148 TBILI: 1.4 (H)   ALB: 2.1 (L) TOT PROTEIN: 4.4 (L) LIPASE: N/A       Imaging results reviewed over the past 24 hrs:   No results found for this or any previous visit (from the past 24 hours).

## 2025-07-21 NOTE — PROGRESS NOTES
Care Management Follow Up    Length of Stay (days): 8    Expected Discharge Date: 07/23/2025     Concerns to be Addressed: all concerns addressed in this encounter, discharge planning     Patient plan of care discussed at interdisciplinary rounds: Yes    Anticipated Discharge Disposition: Transitional Care              Anticipated Discharge Services: None  Anticipated Discharge DME: Walker, Oxygen    Patient/family educated on Medicare website which has current facility and service quality ratings: yes  Education Provided on the Discharge Plan: Yes  Patient/Family in Agreement with the Plan: yes    Referrals Placed by CM/SW:    Private pay costs discussed: Not applicable    Discussed  Partnership in Safe Discharge Planning  document with patient/family: No     Handoff Completed: No, handoff not indicated or clinically appropriate    Additional Information:  Pt still agreeable to TCU. Has SNF list and is willing to provide choices closer to discharge.     Next Steps: get choices    TATA Abebe

## 2025-07-21 NOTE — PROGRESS NOTES
"CLINICAL NUTRITION SERVICES - REASSESSMENT NOTE       Registered Dietitian Interventions:  1) Will send a chocolate Ensure at 10am and 2pm  2) Encouraged po intake of meals for energy/recovery           CURRENT NUTRITION ORDERS  Diet: Regular          Room Service with Assist        CURRENT INTAKE/TOLERANCE  Chart reviewed    7/20: SLP --> Clinical swallow evaluation completed per physician order. No appreciable oropharyngeal dysphagia at this time. Recommend regular solids and thin liquids.     Reviewed meal orders - pt ordering 2-3 small meals/day    Visited with pt   He just finished eating some cereal, milk and juice  Tells me that his appetite is fair - \"sitting in the hospital\" - and that he is ordering meals  Likes Ensure and is agreeable to chocolate Ensure between meals  Friends have brought pt some juice-type drinks       NEW FINDINGS  GI symptoms:   7/20: BM x3    7/20: + melena --> new GIB    Skin/wounds:   Scattered bruising and abrasions. Blanchable redness to coccyx.     1-2+ bilteral lower extremity pitting edema     Nutrition-relevant labs:   7/21: Na 144           K 3.5           Mg 2.2           Phos 2.6    Nutrition-relevant medications: Reviewed    Weight:  Records reflect wt is up ~25# over the past week  07/20/25 0631 95.3 kg (210 lb 1.6 oz) Bed scale   07/18/25 0400 93.1 kg (205 lb 4 oz) Bed scale   07/16/25 0543 86.5 kg (190 lb 9.6 oz) Standing scale   07/15/25 0537 86.8 kg (191 lb 6.4 oz) --   07/14/25 0130 86.4 kg (190 lb 7.6 oz) Standing scale   07/13/25 2110 83.9 kg (184 lb 14.4 oz) Standing scale       ASSESSED NUTRITION NEEDS  Dosing Weight: 83.9 kg, based on admission wt (7/13)  Estimated Energy Needs: 1550-4245 kcals/day (25 - 30 kcals/kg)  Justification: Maintenance  Estimated Protein Needs: 100-125 grams protein/day (1.2 - 1.5 grams of pro/kg)  Justification: Maintenance  Estimated Fluid Needs: 3203-2836 mL/day (1 mL/kcal)  Justification: Maintenance    MALNUTRITION (7/21)  % " Intake: </= 50% for >/= 5 days (severe) - pt was NPO/clear liquid diet 7/13-7/19, ordering small meals  % Weight Loss: None noted  Subcutaneous Fat Loss: None observed  Muscle Loss: Temples (temporalis muscle): Mild/Moderate  Fluid Accumulation/Edema: Moderate to severe, 2-4+ - 1-2+ bilteral lower extremity pitting edema   Malnutrition Diagnosis: Moderate malnutrition in the context of acute illness or injury  Malnutrition Present on Admission: No    EVALUATION OF THE PROGRESS TOWARD GOALS   Previous Goals (7/14)  PO intake >50% meals TID.   Evaluation: Met      Previous Nutrition Diagnosis (7/14)  No nutrition diagnosis at this time   Evaluation: Declining    NUTRITION DIAGNOSIS  Predicted inadequate nutrient intake cals/pro related to fair appetite as evidenced by pt eating small meals past few days, has been NPO/Clear Liquids 7/13-7/19    INTERVENTIONS  1) Will send a chocolate Ensure at 10am and 2pm  2) Encouraged po intake of meals for energy/recovery    GOALS  Pt to consume >50% meals and take 100% supplements     MONITORING/EVALUATION  Progress toward goals will be monitored and evaluated per policy.

## 2025-07-21 NOTE — CONSULTS
GASTROENTEROLOGY CONSULTATION     Kristofer Montana  [address]  [place] [address]  80 year old male    Admission Date/Time: 7/13/2025  Primary Care Provider: [facility], [facility]    We were asked to see the patient in consultation by Dr. Mayorga for evaluation of melena and enemia.      PAST MEDICAL HISTORY:   Patient Active Problem List    Diagnosis Date Noted    Choledocholithiasis 07/13/2025     Priority: Medium    Cholecystitis 07/13/2025     Priority: Medium    DVT (deep venous thrombosis) (H) 07/13/2025     Priority: Medium       HPI:  Kristofer Montana is a 80 year old male with a history of HTN, hyperlipdemeia, HFrEF (LVEF 40%), and CAD [facility] 2 days of fever, chills, and RUQ pain. He was in septic shock (cultures grew E.coli). On 7/14 he went into afib with RVR and was converted back to NSR with amiodarone.     CTAP showed choledocholithiasis and possible acute cholecystitis. He was also found to have occlusive and nonocclusive DVTs, and he was started on heparin and transferred to [facility] for ERCP. EUS/ERCP with sphincterotomy was performed on 7/16 and had an aspiration event and was intubated and admitted to ICU. CT found likely ileus , new small volume intraperitoneael fluid, and new lobar atelectasis. He was extubated 7/19.     On 7/20, he had an episode of melena and noted Hgb drop for 9.7 to 8.4 and [facility] consulted.  Repeat Hgb today 6.7. 1 unit PRBCs ordered. WBC 14.1, decreased from before. BUN 54.6. He reports bowel movement today appeared normal. He is not experiencing any abdominal discomfort, nausea, or vomiting. He declines NSAID use at home.       ROS: A comprehensive ten point review of systems was negative aside from those in mentioned in the HPI.    MEDICATIONS:   Prior to Admission medications    Medication Sig Start Date End Date Taking? Authorizing Provider   aspirin 81 MG EC tablet Take 81 mg by mouth daily.   Yes Unknown, Entered By History  "  atorvastatin (LIPITOR) 40 MG tablet Take 40 mg by mouth daily. 11/29/23  Yes Reported, Patient   losartan (COZAAR) 50 MG tablet Take 50 mg by mouth daily. 11/29/23  Yes Reported, Patient   metoprolol succinate ER (TOPROL XL) 25 MG 24 hr tablet Take 25 mg by mouth daily. 12/6/23  Yes Reported, Patient   multivitamin w/minerals (MULTI-VITAMIN) tablet Take 1 tablet by mouth daily.   Yes Unknown, Entered By History   omeprazole (PRILOSEC OTC) 20 MG EC tablet Take 20 mg by mouth daily.   Yes Unknown, Entered By History       ALLERGIES:   Allergies   Allergen Reactions    Amoxicillin Diarrhea       SOCIAL HISTORY:  Social History     Tobacco Use    Smoking status: Never     Passive exposure: Past (spouse smoked 3 packs a day for 23 days)    Smokeless tobacco: Never       FAMILY HISTORY:  No family history on file.    PHYSICAL EXAM:   General  NAD lying in bed.  /66 (BP Location: Left arm)   Pulse 89   Temp 98  F (36.7  C) (Oral)   Resp 18   Ht 1.778 m (5' 10\")   Wt 95.3 kg (210 lb 1.6 oz)   SpO2 94%   BMI 30.15 kg/m      EYES: anicteric  RESPIRATORY: ctab, breathing unlabored  CARDIOVASCULAR: rrr  GASTROINTESTINAL: soft, nontender, nondistended, bowel sounds normoactive.  SKIN/HAIR/NAILS: no jaundice  NEUROLOGIC: alert and oriented        ADDITIONAL COMMENTS:   I reviewed the patient's new clinical lab test results.   Recent Labs   Lab Test 07/21/25  0548 07/20/25 2125 07/20/25 0626 07/19/25  0429   WBC 14.1*  --  15.8* 23.4*   HGB 6.7* 7.1* 8.4* 9.7*   MCV 88 87 87 88     --  187 180     Recent Labs   Lab Test 07/21/25  0548 07/20/25  0626 07/19/25  0429   POTASSIUM 3.5 3.6 3.5   CHLORIDE 110* 109* 110*   CO2 24 23 22   BUN 54.6* 52.0* 43.7*   ANIONGAP 10 9 11     Recent Labs   Lab Test 07/21/25  0548 07/20/25  0626 07/19/25  0429 07/17/25  0417 07/16/25  2327   ALBUMIN 2.1* 2.1* 2.3*   < >  --    BILITOTAL 1.4* 0.8 0.7   < >  --    ALT 17 14 12   < >  --    AST 34 30 24   < >  --    PROTEIN  --   " --   --   --  30*    < > = values in this interval not displayed.       IMAGING     Noncontrast CTAP 7/16/25  IMPRESSION:   1.  Pneumobilia, within expected status post ERCP. Residual 0.3 cm gallstone in the common duct. Persistent mild common duct dilatation measuring 1.1 cm.  2.  Contrast within the gallbladder. No definite extraluminal contrast. However, new small volume intraperitoneal free fluid measuring slightly higher than simple fluid attenuation, which may be due to dilute contrast or other complicated fluid (blood   products or proteinaceous contents). Consider correlation with fluid sampling. If there is clinical concern for bile leak, HIDA may be considered.  3.  No intraperitoneal free air.  4.  Mild dilatation of nondependent small bowel loops, likely ileus. No transition point to suggest obstruction.  5.  New near complete lobar atelectasis of the left lower lobe. Unchanged small right pleural effusion with adjacent compressive atelectasis.    ENDOSCOPIC EVALUATION     EUS 7/16/25  Impression:                 - Large hematin (altered blood/coffee-ground-like material) in the gastric antrum and in the gastric body, suctioned. Unable to identify source of bleed due to residual blood in stomach   - Duodenal diverticulum   - Cholelithiasis and choledocholithiasis   - Possible GOO.     ERCP 7/16/25  Impression:                 - The major papilla was on the rim of a diverticulum.   - The entire main bile duct was mildly dilated, with a stone causing an obstruction.   - Choledocholithiasis was found. Complete removal was accomplished by biliary sphincterotomy and balloon extraction.   - A biliary sphincterotomy was performed.   - The biliary pancreatic junction was successfully dilated.   - The biliary tree was swept.     ASSESSMENT AND PLAN     Active Problems:    Sepsis      Choledocholithiasis s/p sphincterotomy      DVT on heparin    Melena    Acute anemia    Assessment: 80 year old male with a history  of HTN, hyperlipdemeia, HFrEF (LVEF 40%), and CAD [facility] 2 days of fever, chills, and RUQ pain. He was in septic shock (E coli) and also found to have choledocholithiasis. EUS/ERCP with sphincterotomy performed, followed by aspiration even which required intubation for 3 days.     On 7/20 he had an episode of melena with a Hgb drop 8.4 to 7.1 Repeat this AM was 6.7 and 1 unit PRBCs ordered. This may be an upper GI bleed, particularly given hematin findings on EUS 7/16, but could also be a post-sphincterotomy bleed as he is newly anticoagulated on heparin. LFTs normal.     He may benefit from EGD with ERCP scope to evaluate for UGIB/post-sphincterotomy bleed if anemia worsens or he has another episode of melena. Discussed with biliary provider. However, given aspiration event with last endoscopic procedure followed by 3 days of intubation, would like to avoid this if possible. Given that heparin was discontinued followed by normal BM this morning, we will continue to watch and patient should remain off heparin.      Respiratory status seems sufficient for MAC if procedure is needed. Breathing is unlabored and he is CTAB. He had nasal cannula in place however it does not appear oxygen was actively flowing. Nurse reports more for comfort. He is not on oxygen at home.     PLAN:  -Can resume regular diet today  -Continue to hold heparin  -Continue BID PPI  -NPO at midnight in case of procedure tomorrow.   -Follow Hgb, transfuse if <7    33 minutes of total time was spent providing patient care, including patient evaluation, reviewing documentation/ test results, and .     I discussed the patient's findings and plan with Dr. Hutchinson.    Bruna Stroud, CHANS, PAMelyssaC  [facility]  Weekdays after 5 pm and weekends: [phone]

## 2025-07-22 ENCOUNTER — APPOINTMENT (OUTPATIENT)
Dept: OCCUPATIONAL THERAPY | Facility: CLINIC | Age: 80
End: 2025-07-22
Attending: INTERNAL MEDICINE
Payer: MEDICARE

## 2025-07-22 ENCOUNTER — APPOINTMENT (OUTPATIENT)
Dept: PHYSICAL THERAPY | Facility: CLINIC | Age: 80
DRG: 853 | End: 2025-07-22
Attending: INTERNAL MEDICINE
Payer: MEDICARE

## 2025-07-22 LAB
ALBUMIN SERPL BCG-MCNC: 2.2 G/DL (ref 3.5–5.2)
ALP SERPL-CCNC: 193 U/L (ref 40–150)
ALT SERPL W P-5'-P-CCNC: 23 U/L (ref 0–70)
ANION GAP SERPL CALCULATED.3IONS-SCNC: 8 MMOL/L (ref 7–15)
AST SERPL W P-5'-P-CCNC: 42 U/L (ref 0–45)
BACTERIA SPEC CULT: NO GROWTH
BACTERIA SPEC CULT: NO GROWTH
BILIRUB SERPL-MCNC: 0.6 MG/DL
BUN SERPL-MCNC: 39.8 MG/DL (ref 8–23)
CALCIUM SERPL-MCNC: 7.4 MG/DL (ref 8.8–10.4)
CHLORIDE SERPL-SCNC: 113 MMOL/L (ref 98–107)
CREAT SERPL-MCNC: 1.08 MG/DL (ref 0.67–1.17)
EGFRCR SERPLBLD CKD-EPI 2021: 70 ML/MIN/1.73M2
ERYTHROCYTE [DISTWIDTH] IN BLOOD BY AUTOMATED COUNT: 15.8 % (ref 10–15)
GLUCOSE BLDC GLUCOMTR-MCNC: 138 MG/DL (ref 70–99)
GLUCOSE BLDC GLUCOMTR-MCNC: 92 MG/DL (ref 70–99)
GLUCOSE BLDC GLUCOMTR-MCNC: 93 MG/DL (ref 70–99)
GLUCOSE BLDC GLUCOMTR-MCNC: 97 MG/DL (ref 70–99)
GLUCOSE BLDC GLUCOMTR-MCNC: 98 MG/DL (ref 70–99)
GLUCOSE SERPL-MCNC: 107 MG/DL (ref 70–99)
HCO3 SERPL-SCNC: 25 MMOL/L (ref 22–29)
HCT VFR BLD AUTO: 21.5 % (ref 40–53)
HGB BLD-MCNC: 7.2 G/DL (ref 13.3–17.7)
HGB BLD-MCNC: 7.2 G/DL (ref 13.3–17.7)
HGB BLD-MCNC: 7.8 G/DL (ref 13.3–17.7)
MAGNESIUM SERPL-MCNC: 2.1 MG/DL (ref 1.7–2.3)
MCH RBC QN AUTO: 29 PG (ref 26.5–33)
MCHC RBC AUTO-ENTMCNC: 33.5 G/DL (ref 31.5–36.5)
MCV RBC AUTO: 87 FL (ref 78–100)
MCV RBC AUTO: 87 FL (ref 78–100)
MCV RBC AUTO: 88 FL (ref 78–100)
PHOSPHATE SERPL-MCNC: 2.7 MG/DL (ref 2.5–4.5)
PLATELET # BLD AUTO: 154 10E3/UL (ref 150–450)
POTASSIUM SERPL-SCNC: 3.8 MMOL/L (ref 3.4–5.3)
PROT SERPL-MCNC: 4.5 G/DL (ref 6.4–8.3)
RBC # BLD AUTO: 2.48 10E6/UL (ref 4.4–5.9)
SODIUM SERPL-SCNC: 146 MMOL/L (ref 135–145)
WBC # BLD AUTO: 14.9 10E3/UL (ref 4–11)

## 2025-07-22 PROCEDURE — 99233 SBSQ HOSP IP/OBS HIGH 50: CPT | Performed by: STUDENT IN AN ORGANIZED HEALTH CARE EDUCATION/TRAINING PROGRAM

## 2025-07-22 PROCEDURE — 85027 COMPLETE CBC AUTOMATED: CPT | Performed by: STUDENT IN AN ORGANIZED HEALTH CARE EDUCATION/TRAINING PROGRAM

## 2025-07-22 PROCEDURE — 83735 ASSAY OF MAGNESIUM: CPT | Performed by: INTERNAL MEDICINE

## 2025-07-22 PROCEDURE — 250N000011 HC RX IP 250 OP 636: Performed by: INTERNAL MEDICINE

## 2025-07-22 PROCEDURE — 82040 ASSAY OF SERUM ALBUMIN: CPT | Performed by: INTERNAL MEDICINE

## 2025-07-22 PROCEDURE — 85018 HEMOGLOBIN: CPT | Performed by: STUDENT IN AN ORGANIZED HEALTH CARE EDUCATION/TRAINING PROGRAM

## 2025-07-22 PROCEDURE — 250N000013 HC RX MED GY IP 250 OP 250 PS 637: Performed by: INTERNAL MEDICINE

## 2025-07-22 PROCEDURE — 84100 ASSAY OF PHOSPHORUS: CPT | Performed by: INTERNAL MEDICINE

## 2025-07-22 PROCEDURE — 250N000013 HC RX MED GY IP 250 OP 250 PS 637: Performed by: STUDENT IN AN ORGANIZED HEALTH CARE EDUCATION/TRAINING PROGRAM

## 2025-07-22 PROCEDURE — 82247 BILIRUBIN TOTAL: CPT | Performed by: INTERNAL MEDICINE

## 2025-07-22 PROCEDURE — 120N000001 HC R&B MED SURG/OB

## 2025-07-22 PROCEDURE — 97530 THERAPEUTIC ACTIVITIES: CPT | Mod: GO | Performed by: OCCUPATIONAL THERAPIST

## 2025-07-22 PROCEDURE — 97530 THERAPEUTIC ACTIVITIES: CPT | Mod: GP

## 2025-07-22 RX ADMIN — CEFTRIAXONE SODIUM 2 G: 2 INJECTION, POWDER, FOR SOLUTION INTRAMUSCULAR; INTRAVENOUS at 13:33

## 2025-07-22 RX ADMIN — Medication 1 LOZENGE: at 14:09

## 2025-07-22 RX ADMIN — METOPROLOL SUCCINATE 12.5 MG: 25 TABLET, EXTENDED RELEASE ORAL at 08:49

## 2025-07-22 RX ADMIN — ATORVASTATIN CALCIUM 40 MG: 40 TABLET, FILM COATED ORAL at 08:49

## 2025-07-22 RX ADMIN — Medication 1 LOZENGE: at 07:55

## 2025-07-22 RX ADMIN — PANTOPRAZOLE SODIUM 40 MG: 40 INJECTION, POWDER, FOR SOLUTION INTRAVENOUS at 08:49

## 2025-07-22 RX ADMIN — PANTOPRAZOLE SODIUM 40 MG: 40 INJECTION, POWDER, FOR SOLUTION INTRAVENOUS at 20:40

## 2025-07-22 ASSESSMENT — ACTIVITIES OF DAILY LIVING (ADL)
ADLS_ACUITY_SCORE: 47
ADLS_ACUITY_SCORE: 48
ADLS_ACUITY_SCORE: 47
ADLS_ACUITY_SCORE: 48
ADLS_ACUITY_SCORE: 47
ADLS_ACUITY_SCORE: 48
ADLS_ACUITY_SCORE: 47
ADLS_ACUITY_SCORE: 48
ADLS_ACUITY_SCORE: 48
ADLS_ACUITY_SCORE: 47
ADLS_ACUITY_SCORE: 48
ADLS_ACUITY_SCORE: 47

## 2025-07-22 NOTE — PLAN OF CARE
7/21/25  [id number]    Orientation: AO x4  Pain: Denied   Vitals/Tele: VSS 1L NC  IV Access/drains: R Triple Lumen PICC; 2x PIV SL  Diet: NPO for possible EGD today 7/22  Telemetry: Sinus Rhythm   Mobility: A1-2 GBW pivot to BSC;   GI/: Continent; Voiding in urinal; 1x BM   Wound/Skin: Scattered bruising and abrasions. Blanchable redness to coccyx.    Consults: GI; CMSW  Discharge Plan: Pending   Note:  Hgb 7.2.  Unit draw every 6 hours from PICC line. Incontinent of urine 1x this shift.

## 2025-07-22 NOTE — PROGRESS NOTES
"    GASTROENTEROLOGY PROGRESS NOTE    CC: melena, anemia    SUBJECTIVE:  Feeling well. Possible dark stool this AM but not black.     OBJECTIVE:  General Appearance:  NAD sitting in chair.  /70 (BP Location: Left arm)   Pulse 80   Temp 98.2  F (36.8  C) (Oral)   Resp 16   Ht 1.778 m (5' 10\")   Wt 95.3 kg (210 lb 1.6 oz)   SpO2 94%   BMI 30.15 kg/m      Patient Vitals for the past 72 hrs:   Weight   07/20/25 0631 95.3 kg (210 lb 1.6 oz)       PHYSICAL EXAM    RESPIRATORY: unlabored  GASTROINTESTINAL: soft, nontender, nondistended  SKIN/HAIR/NAILS: no jaundice        Additional Comments:  ROS, FH, SH: See initial GI consult for details.    I have reviewed the patient's new clinical lab results:    Recent Labs   Lab Test 07/22/25  1028 07/22/25  0415 07/21/25  2142 07/21/25  1437 07/21/25  0548 07/20/25  2125 07/20/25  0626   WBC  --  14.9*  --   --  14.1*  --  15.8*   HGB 7.2* 7.2* 7.3*   < > 6.7*   < > 8.4*   MCV 87 87 87   < > 88   < > 87   PLT  --  154  --   --  162  --  187    < > = values in this interval not displayed.     Recent Labs   Lab Test 07/22/25  0415 07/21/25  0548 07/20/25  0626   POTASSIUM 3.8 3.5 3.6   CHLORIDE 113* 110* 109*   CO2 25 24 23   BUN 39.8* 54.6* 52.0*   ANIONGAP 8 10 9     Recent Labs   Lab Test 07/22/25  0415 07/21/25  0548 07/20/25  0626 07/17/25  0417 07/16/25  2327   ALBUMIN 2.2* 2.1* 2.1*   < >  --    BILITOTAL 0.6 1.4* 0.8   < >  --    ALT 23 17 14   < >  --    AST 42 34 30   < >  --    PROTEIN  --   --   --   --  30*    < > = values in this interval not displayed.         IMAGING     Noncontrast CTAP 7/16/25  IMPRESSION:   1.  Pneumobilia, within expected status post ERCP. Residual 0.3 cm gallstone in the common duct. Persistent mild common duct dilatation measuring 1.1 cm.  2.  Contrast within the gallbladder. No definite extraluminal contrast. However, new small volume intraperitoneal free fluid measuring slightly higher than simple fluid attenuation, which may be " due to dilute contrast or other complicated fluid (blood   products or proteinaceous contents). Consider correlation with fluid sampling. If there is clinical concern for bile leak, HIDA may be considered.  3.  No intraperitoneal free air.  4.  Mild dilatation of nondependent small bowel loops, likely ileus. No transition point to suggest obstruction.  5.  New near complete lobar atelectasis of the left lower lobe. Unchanged small right pleural effusion with adjacent compressive atelectasis.       ENDOSCOPIC EVALUATION     EUS 7/16/25  Impression:                 - Large hematin (altered blood/coffee-ground-like material) in the gastric antrum and in the gastric body, suctioned. Unable to identify source of bleed due to residual blood in stomach   - Duodenal diverticulum   - Cholelithiasis and choledocholithiasis   - Possible GOO.      ERCP 7/16/25  Impression:                 - The major papilla was on the rim of a diverticulum.   - The entire main bile duct was mildly dilated, with a stone causing an obstruction.   - Choledocholithiasis was found. Complete removal was accomplished by biliary sphincterotomy and balloon extraction.   - A biliary sphincterotomy was performed.   - The biliary pancreatic junction was successfully dilated.   - The biliary tree was swept.     ASSESSMENT AND PLAN       Active Problems:    Melena    Anemia  Active Problems:    Sepsis      Choledocholithiasis s/p sphincterotomy      DVT on heparin    Melena    Acute anemia     Assessment: 80 year old male with a history of HTN, hyperlipdemeia, HFrEF (LVEF 40%), and CAD who [facility] 2 days of fever, chills, and RUQ pain. He was in septic shock (E coli) and also found to have choledocholithiasis. EUS/ERCP with sphincterotomy performed, followed by aspiration even which required intubation for 3 days.      On 7/20 he had an episode of melena with a Hgb drop 8.4 to 7.1, with increase to 7.7 after 1 unit transfused. Hgb  slighly decrease at 7.2 but stable since this AM. Concern was UGIB or post-sphinterotomy bleed. Previously discussed case with biliary on 7/21 and plan was to follow closely, with hesitation for repeat EGD given recent aspiration event.     No signs of continued GIB as of today. He had dark but not black stool. If patient continues to have no evidence GIB or significant drops in Hgb, can restart heparin tomorrow. He can resume regular diet.     PLAN:  -Can resume regular diet today  -Continue to hold heparin until tomorrow, can restart 7/23 if not signs GIB or significant drop in hgb rest of today  -Continue BID PPI  -Follow Hgb, transfuse if <7    27 minutes of total time was spent providing patient care, including patient evaluation, reviewing documentation/test results, and .     I will update Dr. Hutchinson, GI staff  CHAN CristinaS, PA-C  [facility]  [phone]

## 2025-07-22 NOTE — PROVIDER NOTIFICATION
MD Notification    Notified Person: MD    Notified Person Name: Dr Stroud    Notification Date/Time: 1453 7/22/25    Notification Interaction: oj    Purpose of Notification: Hey, im paging regarding the patient. He is wondering if we are still doing EGD or rounding on the patient. He has been NPO since [place] and is getting worrisome    Orders Received: pending    Comments:

## 2025-07-22 NOTE — PROGRESS NOTES
Care Management Follow Up    Length of Stay (days): 9    Expected Discharge Date: 07/24/2025     Concerns to be Addressed: all concerns addressed in this encounter, discharge planning     Patient plan of care discussed at interdisciplinary rounds: Yes    Anticipated Discharge Disposition: Transitional Care              Anticipated Discharge Services: None  Anticipated Discharge DME: Walker, Oxygen    Patient/family educated on Medicare website which has current facility and service quality ratings: yes  Education Provided on the Discharge Plan: Yes  Patient/Family in Agreement with the Plan: yes    Referrals Placed by CM/SW:    Private pay costs discussed: Not applicable    Discussed  Partnership in Safe Discharge Planning  document with patient/family: No     Handoff Completed: No, handoff not indicated or clinically appropriate    Additional Information:  SW met with pt at bedside and discussed TCU choices from list. Pt's first choice is [facility]. [facility], [facility], and [facility] in [place] are other choices that were discussed incase there is no availability at [facility]. SW stated they would place referrals to those facilities. Sent referrals via DOD.    Next Steps: secure TCU    ROSAURA Dixon  Casual Acute   [facility]   Available via Roomish

## 2025-07-22 NOTE — PROGRESS NOTES
[facility]    Medicine Progress Note - Hospitalist Service    Date of Admission:  7/13/2025    Assessment & Plan     Kristofer Montana is a 79 y/o male with Hx of HTN, HLD, HFrEF (LVEF 40%), CAD (remote PCI 2005), CVA (2013, no residual weakness/deficits) who [facility] with 2 days of fever, chills and RUQ pain.      He was in septic shock with mild hypotension, WBC 17 and lactate of 5.5. CT C/A/P showed choledocholithiasis and mild bile duct dilation and possible acute cholecystitis. He was also found to have occlusive DVT in L femoral vein, nonocclusive DVT in L popliteal vein. He was started on iv heparin and transferred to [facility].      [facility]. On 7/14, he went into A fib with RVR and was started on amiodarone infusion. Concerted to NSR. On 7/15, he developed acute hypoxic resp failure and was started on BIPAP. Weaned off by 7/16.     He underwent EUS/ERCP with sphincterotomy with removal of stones on 7/16. No stent was placed. He had an aspiration event during the procedure. He was intubated and admitted to ICU.     CT 7/17 showed residual 0.3 cm gallstone in CBD, persistent mild CBD dilatation of 1.1 cm. Mild dilatation of small bowel loops, likely ileus. Per GI, small CBD stone is expected to pass on its own and no intervention was recommended. He was extubated on 7/19 and was transferred to hospitalist service.     Per gen surg no intervention planned at this time. Cholecystostomy tube should be considered if concern for acute cholecystitis. Cholecystectomy should be considered when patient recovers from aspiration pneumonia.     On 7/20, had an episode of melena and noted Hb drop from 9.7 to 8.4 to 6.7 on 7/21. Disposition now pending management of suspected GI bleed.       Septic shock due to E coli bacteremia secondary to choledocholithiasis with ascending cholangitis, s/p EUS/ERCP with sphincterotomy with removal of  stones on 7/16  Possible acute cholecystitis  Now weaned off Vasopressors and stress dose steroids. On 7/17 found to have retained 0.3 cm CBD stone which is expected to pass in its own and no intervention was recommended by [facility]. Signed off. Gen surgery did not feel patient had acute cholecystitis. Recommended percutaneous cholecystostomy tube if needed. Will eventually need elective cholecystectomy once patient recovers from acute illness. CT A/P 7/16 had shown large loculated perihepatic/subdiaphragmatic fluid collection which decreased in size following ERCP, but increased fluid in abdomen with relative hyperdensity compared to simple ascites.  IR felt this was too small to sample percutaneously. Also pneumobilia status post ERCP.   - Monitor for recurrent or worsening abdominal pain      Melena, 7/20/25  Acute blood loss anemia - due to GI bleed vs post sphincterotomy bleed due to anticoagulation   Had 1 episode of melena on 7/20. Hb has been declining since 7/18. No recurrence of melena overnight 7/20-7/21.   - Hb 13 - 12.4 - 9.8 - 8.4 - 6.8 - (1 unit RBC) - 7.7 - 7.3 - 7.2 - 7.2  - Will hold IV heparin and aspirin, continue IV protonix BID  - Re-consult [facility] 7/21, recommendations appreciated   - Monitor Hb. Transfuse of below 7, transfusing 1 unit AM 7/21  - Continue to trend hemoglobin, changed to Q12H on 7/22     Aspiration pneumonia   Acute hypoxic respiratory failure - secondary to aspiration pneumonitis on 7/16 during EUS/ERCP  Extubated on 7/19. Weaned off supplemental oxygen on 7/20. Respiratory culture growing candida glabrata, suspected contaminant.   - Received cefepime from 7/14 - 7/17, Ceftriaxone 7/21 to present, continue for now     Mild FARNAZ - secondary to septic shock  Baseline Cr 1. Cr peaked at 1.39. now improved to 1.23. Monitor      Hypokalemia  - Replace per protocol     Paroxysmal atrial fibrillation, converted to NSR with IV amiodarone infusion  - Amiodarone discontinued. Now on low dose  "toprol XL  - Hold IV heparin on 7/20 due to concern for GI bleed      Left Femoral/popliteal DVT, diagnosed 7/13  - IR did not recommend thrombectomy.  - IV Heparin currently on hold due to anemia   - May need to consider IVC filter if unable to safely anticoagulate      Hyperlipidemia  - Continue atorvastatin     CAD s/p  remote PCI (2005)  - Aspirin was held for ERCP, resumed on 7/19, held again 7/20 due to declining hemoglobin      Essential HTN -   - PTA antihypertensives on hold. Resume as able     Mild Thrombocytopenia - due to severe sepsis  Now resolved     Chronic systolic CHF with reduced EF of 40%  Appears euvolemic. Monitor volume status. Weight up from admission from 83 to 93 kg   - Chest x-ray 7/18 showed no pulmonary edema     Prediabetes, HbA1c 5.8%, with stress and steroid induced hyperglycemia  Has completed stress dose steroids  - On sliding scale insulin          Diet: Room Service  Snacks/Supplements Adult: Other; chocolate Ensure at 10am and 2pm  (RD); Between Meals  Regular Diet Adult    DVT Prophylaxis: On hold due to concern for GI bleed   Mar Catheter: Not present  Lines: PRESENT      PICC 07/16/25 Triple Lumen Right Basilic Pressors-Site Assessment: WDL      Cardiac Monitoring: ACTIVE order. Indication: ICU  Code Status: Full Code      Clinically Significant Risk Factors         # Hypernatremia: Highest Na = 146 mmol/L in last 2 days, will monitor as appropriate  # Hyperchloremia: Highest Cl = 113 mmol/L in last 2 days, will monitor as appropriate          # Hypoalbuminemia: Lowest albumin = 2.1 g/dL at 7/21/2025  5:48 AM, will monitor as appropriate                # Obesity: Estimated body mass index is 30.15 kg/m  as calculated from the following:    Height as of this encounter: 1.778 m (5' 10\").    Weight as of this encounter: 95.3 kg (210 lb 1.6 oz).   # Moderate Malnutrition: based on nutrition assessment and treatment provided per dietitian's recommendations.      # " Financial/Environmental Concerns: none         Social Drivers of Health            Disposition Plan     Medically Ready for Discharge: Anticipated in 2-4 Days         Scott Mcnamara DO  Hospitalist Service  [facility]  Securely message with [employer] (more info)  Text page via [employer] Paging/Directory   ______________________________________________________________________    Interval History     - No acute events overnight  - Patient wondering if he can eat or not  - Had a good meal yesterday  - Hemoglobin stable at low 7's   - Had a BM this morning and thinks there may have been some blood in it  - No other acute concerns     Physical Exam   Vital Signs: Temp: 98  F (36.7  C) Temp src: Oral BP: 131/78 Pulse: 98   Resp: 16 SpO2: 96 % O2 Device: Nasal cannula Oxygen Delivery: 1 LPM  Weight: 210 lbs 1.57 oz    Constitutional: awake, alert, cooperative, no apparent distress, and appears stated age. Pale   Eyes: Lids and lashes normal, pupils equal, round and reactive to light   ENT: Normocephalic, without obvious abnormality, atraumatic   Respiratory: No increased work of breathing, good air exchange, clear to auscultation bilaterally, no crackles or wheezing  Cardiovascular: Normal apical impulse, regular rate and rhythm, normal S1 and S2, no S3 or S4, and no murmur noted, 1-2+ bilteral lower extremity pitting edema   GI: No scars, normal bowel sounds, soft, non-distended, non-tender, no masses palpated, no hepatosplenomegally  Skin: no redness, warmth, or swelling  Musculoskeletal: No deformities   Neurologic: Awake, alert, oriented to name, place and time.  Cranial nerves II-XII are grossly intact.  Motor is 5 out of 5 bilaterally.     Neuropsychiatric: General: normal, calm, and normal eye contact    Medical Decision Making       34 MINUTES SPENT BY ME on the date of service doing chart review, history, exam, documentation & further activities per the note.      Data    ------------------------- PAST 24 HR DATA REVIEWED -----------------------------------------------    I have personally reviewed the following data over the past 24 hrs:    14.9 (H)  \   7.2 (L)   / 154     146 (H) 113 (H) 39.8 (H) /  92   3.8 25 1.08 \     ALT: 23 AST: 42 AP: 193 (H) TBILI: 0.6   ALB: 2.2 (L) TOT PROTEIN: 4.5 (L) LIPASE: N/A       Imaging results reviewed over the past 24 hrs:   No results found for this or any previous visit (from the past 24 hours).

## 2025-07-22 NOTE — PROGRESS NOTES
[id number]    Orientation: AO x4  Pain: Denied   Vitals/Tele: VSS 1L NC  IV Access/drains: R Triple Lumen PICC; 2x PIV SL  Diet: Regular BS ACHS  Telemetry: Sinus Rhythm   Mobility: A1-2 GBW pivot to BSC;   GI/: Continent; Voiding in urinal; No BM   Wound/Skin: Scattered bruising and abrasions. Blanchable redness to coccyx.    Consults: GI; CMSW  Discharge Plan: Pending   Note:  Hgb 7.3.  Unit draw every 6 hours from PICC line.

## 2025-07-22 NOTE — PLAN OF CARE
Goal Outcome Evaluation:       1030-9482  Orientation: AOx4  Aggression Stop Light: green  Activity: A1  Diet/BS Checks: mod carb now  Tele:  NSR  IV Access/Drains: PICC SL With good blood return  Pain Management: denies  Abnormal VS/Results: HGB 7.2  Bowel/Bladder: cont, had dark bm with red streaks  Skin/Wounds: scattered bruising, edema on extremities  Consults: GI,ot,pt  D/C Disposition: pending  Other Info: EGD not done today, denies nausea, numbness and tingling. Hgb checks q6. PRN Lozenges given

## 2025-07-22 NOTE — PROGRESS NOTES
7/21/25  [id number]    Orientation: AO x4  Pain: Denied   Vitals/Tele: VSS 1L NC  IV Access/drains: R Triple Lumen PICC; 2x PIV SL  Diet: Regular BS ACHS  Telemetry: Sinus Rhythm   Mobility: A1-2 GBW pivot to BSC;   GI/: Continent; Voiding in urinal; No BM   Wound/Skin: Scattered bruising and abrasions. Blanchable redness to coccyx.    Consults: GI; CMSW  Discharge Plan: Pending   Note:  Hgb 7.2.  Unit draw every 6 hours from PICC line. Possible EGD today 7/22.

## 2025-07-23 ENCOUNTER — APPOINTMENT (OUTPATIENT)
Dept: OCCUPATIONAL THERAPY | Facility: CLINIC | Age: 80
End: 2025-07-23
Attending: INTERNAL MEDICINE
Payer: MEDICARE

## 2025-07-23 ENCOUNTER — APPOINTMENT (OUTPATIENT)
Dept: PHYSICAL THERAPY | Facility: CLINIC | Age: 80
DRG: 853 | End: 2025-07-23
Attending: INTERNAL MEDICINE
Payer: MEDICARE

## 2025-07-23 LAB
ALBUMIN SERPL BCG-MCNC: 2.1 G/DL (ref 3.5–5.2)
ALP SERPL-CCNC: 216 U/L (ref 40–150)
ALT SERPL W P-5'-P-CCNC: 28 U/L (ref 0–70)
ANION GAP SERPL CALCULATED.3IONS-SCNC: 6 MMOL/L (ref 7–15)
AST SERPL W P-5'-P-CCNC: 45 U/L (ref 0–45)
BILIRUB SERPL-MCNC: 0.7 MG/DL
BUN SERPL-MCNC: 28.2 MG/DL (ref 8–23)
CALCIUM SERPL-MCNC: 7.5 MG/DL (ref 8.8–10.4)
CHLORIDE SERPL-SCNC: 113 MMOL/L (ref 98–107)
CREAT SERPL-MCNC: 1.03 MG/DL (ref 0.67–1.17)
EGFRCR SERPLBLD CKD-EPI 2021: 74 ML/MIN/1.73M2
ERYTHROCYTE [DISTWIDTH] IN BLOOD BY AUTOMATED COUNT: 16.1 % (ref 10–15)
GLUCOSE BLDC GLUCOMTR-MCNC: 106 MG/DL (ref 70–99)
GLUCOSE BLDC GLUCOMTR-MCNC: 124 MG/DL (ref 70–99)
GLUCOSE BLDC GLUCOMTR-MCNC: 132 MG/DL (ref 70–99)
GLUCOSE BLDC GLUCOMTR-MCNC: 87 MG/DL (ref 70–99)
GLUCOSE BLDC GLUCOMTR-MCNC: 90 MG/DL (ref 70–99)
GLUCOSE SERPL-MCNC: 105 MG/DL (ref 70–99)
HCO3 SERPL-SCNC: 26 MMOL/L (ref 22–29)
HCT VFR BLD AUTO: 21.5 % (ref 40–53)
HGB BLD-MCNC: 7.1 G/DL (ref 13.3–17.7)
HGB BLD-MCNC: 7.2 G/DL (ref 13.3–17.7)
HGB BLD-MCNC: 7.2 G/DL (ref 13.3–17.7)
MAGNESIUM SERPL-MCNC: 1.9 MG/DL (ref 1.7–2.3)
MCH RBC QN AUTO: 29.9 PG (ref 26.5–33)
MCHC RBC AUTO-ENTMCNC: 33.5 G/DL (ref 31.5–36.5)
MCV RBC AUTO: 89 FL (ref 78–100)
PHOSPHATE SERPL-MCNC: 2.3 MG/DL (ref 2.5–4.5)
PLATELET # BLD AUTO: 161 10E3/UL (ref 150–450)
POTASSIUM SERPL-SCNC: 3.7 MMOL/L (ref 3.4–5.3)
PROT SERPL-MCNC: 4.5 G/DL (ref 6.4–8.3)
RBC # BLD AUTO: 2.41 10E6/UL (ref 4.4–5.9)
SODIUM SERPL-SCNC: 145 MMOL/L (ref 135–145)
UFH PPP CHRO-ACNC: 0.37 IU/ML (ref ?–1.1)
WBC # BLD AUTO: 12.2 10E3/UL (ref 4–11)

## 2025-07-23 PROCEDURE — 82040 ASSAY OF SERUM ALBUMIN: CPT | Performed by: INTERNAL MEDICINE

## 2025-07-23 PROCEDURE — 120N000001 HC R&B MED SURG/OB

## 2025-07-23 PROCEDURE — 250N000013 HC RX MED GY IP 250 OP 250 PS 637: Performed by: INTERNAL MEDICINE

## 2025-07-23 PROCEDURE — 85027 COMPLETE CBC AUTOMATED: CPT | Performed by: STUDENT IN AN ORGANIZED HEALTH CARE EDUCATION/TRAINING PROGRAM

## 2025-07-23 PROCEDURE — 85520 HEPARIN ASSAY: CPT | Performed by: STUDENT IN AN ORGANIZED HEALTH CARE EDUCATION/TRAINING PROGRAM

## 2025-07-23 PROCEDURE — 97116 GAIT TRAINING THERAPY: CPT | Mod: GP

## 2025-07-23 PROCEDURE — 97110 THERAPEUTIC EXERCISES: CPT | Mod: GO

## 2025-07-23 PROCEDURE — 84100 ASSAY OF PHOSPHORUS: CPT | Performed by: INTERNAL MEDICINE

## 2025-07-23 PROCEDURE — 99232 SBSQ HOSP IP/OBS MODERATE 35: CPT | Performed by: STUDENT IN AN ORGANIZED HEALTH CARE EDUCATION/TRAINING PROGRAM

## 2025-07-23 PROCEDURE — 250N000011 HC RX IP 250 OP 636: Performed by: INTERNAL MEDICINE

## 2025-07-23 PROCEDURE — 97530 THERAPEUTIC ACTIVITIES: CPT | Mod: GP

## 2025-07-23 PROCEDURE — 85018 HEMOGLOBIN: CPT | Performed by: STUDENT IN AN ORGANIZED HEALTH CARE EDUCATION/TRAINING PROGRAM

## 2025-07-23 PROCEDURE — 250N000013 HC RX MED GY IP 250 OP 250 PS 637: Performed by: STUDENT IN AN ORGANIZED HEALTH CARE EDUCATION/TRAINING PROGRAM

## 2025-07-23 PROCEDURE — 83735 ASSAY OF MAGNESIUM: CPT | Performed by: INTERNAL MEDICINE

## 2025-07-23 RX ORDER — MAGNESIUM OXIDE 400 MG/1
400 TABLET ORAL EVERY 4 HOURS
Status: COMPLETED | OUTPATIENT
Start: 2025-07-23 | End: 2025-07-23

## 2025-07-23 RX ADMIN — PANTOPRAZOLE SODIUM 40 MG: 40 INJECTION, POWDER, FOR SOLUTION INTRAVENOUS at 08:20

## 2025-07-23 RX ADMIN — POTASSIUM & SODIUM PHOSPHATES POWDER PACK 280-160-250 MG 1 PACKET: 280-160-250 PACK at 12:45

## 2025-07-23 RX ADMIN — ACETAMINOPHEN 650 MG: 325 TABLET ORAL at 22:00

## 2025-07-23 RX ADMIN — POTASSIUM & SODIUM PHOSPHATES POWDER PACK 280-160-250 MG 1 PACKET: 280-160-250 PACK at 08:20

## 2025-07-23 RX ADMIN — CALCIUM CARBONATE (ANTACID) CHEW TAB 500 MG 1000 MG: 500 CHEW TAB at 21:55

## 2025-07-23 RX ADMIN — Medication 1 LOZENGE: at 18:10

## 2025-07-23 RX ADMIN — Medication 1 LOZENGE: at 04:30

## 2025-07-23 RX ADMIN — Medication 400 MG: at 08:20

## 2025-07-23 RX ADMIN — CEFTRIAXONE SODIUM 2 G: 2 INJECTION, POWDER, FOR SOLUTION INTRAMUSCULAR; INTRAVENOUS at 12:45

## 2025-07-23 RX ADMIN — ATORVASTATIN CALCIUM 40 MG: 40 TABLET, FILM COATED ORAL at 08:20

## 2025-07-23 RX ADMIN — Medication 400 MG: at 12:45

## 2025-07-23 RX ADMIN — PANTOPRAZOLE SODIUM 40 MG: 40 INJECTION, POWDER, FOR SOLUTION INTRAVENOUS at 20:48

## 2025-07-23 RX ADMIN — POTASSIUM & SODIUM PHOSPHATES POWDER PACK 280-160-250 MG 1 PACKET: 280-160-250 PACK at 16:31

## 2025-07-23 RX ADMIN — METOPROLOL SUCCINATE 12.5 MG: 25 TABLET, EXTENDED RELEASE ORAL at 08:20

## 2025-07-23 ASSESSMENT — ACTIVITIES OF DAILY LIVING (ADL)
ADLS_ACUITY_SCORE: 47

## 2025-07-23 NOTE — PLAN OF CARE
Goal Outcome Evaluation:       1405-9708  Orientation: AOx4  Aggression Stop Light: green  Activity: A1  Diet/BS Checks: regular- bs checks  Tele:  NSR  IV Access/Drains: PICC infusing Heparin gtt. Recheck Xa scheduled for next shift.  Pain Management: denies  Abnormal VS/Results: HGB 7.2, 7.1 - q12 checks  Bowel/Bladder: cont, no bm  Skin/Wounds: scattered bruusing, edema on extremities  Consults: GI,ot,pt  D/C Disposition: pending  Other Info: denies nausea, numbness and tingling. Replaced Mag and Phosph, recheck in AM

## 2025-07-23 NOTE — PROGRESS NOTES
"GASTROENTEROLOGY PROGRESS NOTE     SUBJECTIVE:  No acute events. No further melena. Overall feels he is improving.      OBJECTIVE:  /58 (BP Location: Left arm)   Pulse 78   Temp 98.4  F (36.9  C) (Oral)   Resp 18   Ht 1.778 m (5' 10\")   Wt 92.8 kg (204 lb 9.4 oz)   SpO2 94%   BMI 29.36 kg/m    Temp (24hrs), Av.4  F (36.9  C), Min:98  F (36.7  C), Max:98.8  F (37.1  C)    Patient Vitals for the past 72 hrs:   Weight   25 0557 92.8 kg (204 lb 9.4 oz)       Intake/Output Summary (Last 24 hours) at 2025 1358  Last data filed at 2025 0558  Gross per 24 hour   Intake --   Output 600 ml   Net -600 ml        PHYSICAL EXAM  GENERAL: No obvious distress  EYES: No scleral icterus  ABDOMEN: Non-distended. Positive bowel sounds. Soft. Non-tender.  SKIN: No jaundice  NEUROLOGIC: Alert and oriented. No asterixis.   PSYCHIATRIC: Normal affect     Additional Comments:  ROS, FH, SH: See initial GI consult for details.     I have reviewed the patient's new clinical lab results:     Recent Labs   Lab Test 25  0610 25  1839 25  1028 25  0415 25  1437 25  0548   WBC 12.2*  --   --  14.9*  --  14.1*   HGB 7.2*  7.2* 7.8* 7.2* 7.2*   < > 6.7*   MCV 89  89 88 87 87   < > 88     --   --  154  --  162    < > = values in this interval not displayed.     Recent Labs   Lab Test 25  0610 25  0415 25  0548   POTASSIUM 3.7 3.8 3.5   CHLORIDE 113* 113* 110*   CO2    BUN 28.2* 39.8* 54.6*   ANIONGAP 6* 8 10     Recent Labs   Lab Test 25  0610 25  0415 25  0548 25  0417 25  2327   ALBUMIN 2.1* 2.2* 2.1*   < >  --    BILITOTAL 0.7 0.6 1.4*   < >  --    ALT 28 23 17   < >  --    AST 45 42 34   < >  --    PROTEIN  --   --   --   --  30*    < > = values in this interval not displayed.       IMAGING:   CTAP w/o contrast 2025  IMPRESSION:   1.  Pneumobilia, within expected status post ERCP. Residual 0.3 cm gallstone in " the common duct. Persistent mild common duct dilatation measuring 1.1 cm.  2.  Contrast within the gallbladder. No definite extraluminal contrast. However, new small volume intraperitoneal free fluid measuring slightly higher than simple fluid attenuation, which may be due to dilute contrast or other complicated fluid (blood   products or proteinaceous contents). Consider correlation with fluid sampling. If there is clinical concern for bile leak, HIDA may be considered.  3.  No intraperitoneal free air.  4.  Mild dilatation of nondependent small bowel loops, likely ileus. No transition point to suggest obstruction.  5.  New near complete lobar atelectasis of the left lower lobe. Unchanged small right pleural effusion with adjacent compressive atelectasis.    ENDOSCOPIC EVALUATION:  EUS 7/16/25  Impression:                 - Large hematin (altered blood/coffee-ground-like material) in the gastric antrum and in the gastric body, suctioned. Unable to identify source of bleed due to residual blood in stomach   - Duodenal diverticulum   - Cholelithiasis and choledocholithiasis   - Possible GOO.      ERCP 7/16/25  Impression:                 - The major papilla was on the rim of a diverticulum.   - The entire main bile duct was mildly dilated, with a stone causing an obstruction.   - Choledocholithiasis was found. Complete removal was accomplished by biliary sphincterotomy and balloon extraction.   - A biliary sphincterotomy was performed.   - The biliary pancreatic junction was successfully dilated.   - The biliary tree was swept.     ASSESSMENT:    Choledocholithiasis s/p sphincterotomy c/b aspiration event and post sphincterotomy bleed. EUS/ERCP on 7/16 with sphincterotomy. Unfortunately patient aspirated and required intubation for 3 days. On 7/20, patient had melena and hgb dropped from 8.4 to 7.7. There was concern for acute UGIB or post sphincterotomy bleed. Case was discussed with biliary on 7/21 with plans to  follow closely and hold off on EGD due to recent aspiration. Patient has not had any further signs of GI bleeding and hgb has remained stable.     PLAN:   - Okay to restart Heparin  - Monitor hgb, transfuse prn  - PPI BID    Discussed patient findings and plan with Dr. Hutchinson.     Total time: 25 minutes of total time was spent providing patient care, including patient evaluation, reviewing documentation/test results, and .     Heidi Patel PA-C  [facility] ([facility])

## 2025-07-23 NOTE — PLAN OF CARE
Goal Outcome Evaluation:      7/22/25      Orientation: A/Ox4  Aggression Stop Light: green  Activity: A1 GBW  Diet/BS Checks: Reg, /90  Tele:  NSR  IV Access/Drains: R PICC-SL with good blood return, R PIV SL, L PIV SL  Pain Management: denies  Abnormal VS/Results: VSS, RA, Hgb q12-7.8/7.2  Bowel/Bladder: cont, uses urinal-voiding adequately, no bm this sift  Skin/Wounds: scattered bruising  Consults: GI/OT/PT  D/C Disposition: pending    Other Info:   On Mag and Phos -Protocol-awaiting results

## 2025-07-23 NOTE — PROGRESS NOTES
[facility]    Medicine Progress Note - Hospitalist Service    Date of Admission:  7/13/2025    Assessment & Plan     Kristofer Montana is a 79 y/o male with Hx of HTN, HLD, HFrEF (LVEF 40%), CAD (remote PCI 2005), CVA (2013, no residual weakness/deficits) [facility] with 2 days of fever, chills and RUQ pain.      He was in septic shock with mild hypotension, WBC 17 and lactate of 5.5. CT C/A/P showed choledocholithiasis and mild bile duct dilation and possible acute cholecystitis. He was also found to have occlusive DVT in L femoral vein, nonocclusive DVT in L popliteal vein. He was started on iv heparin and transferred to [facility] for ERCP.      [facility] grew E coli. On 7/14, he went into A fib with RVR and was started on amiodarone infusion. Concerted to NSR. On 7/15, he developed acute hypoxic resp failure and was started on BIPAP. Weaned off by 7/16.     He underwent EUS/ERCP with sphincterotomy with removal of stones on 7/16. No stent was placed. He had an aspiration event during the procedure. He was intubated and admitted to ICU.     CT 7/17 showed residual 0.3 cm gallstone in CBD, persistent mild CBD dilatation of 1.1 cm. Mild dilatation of small bowel loops, likely ileus. Per GI, small CBD stone is expected to pass on its own and no intervention was recommended. He was extubated on 7/19 and was transferred to hospitalist service.     Per gen surg no intervention planned at this time. Cholecystostomy tube should be considered if concern for acute cholecystitis. Cholecystectomy should be considered when patient recovers from aspiration pneumonia.     On 7/20, the patient had an episode of melena and noted Hb drop from 9.7 to 8.4 to 6.7 on 7/21. Heparin infusion for atrial fibrillation has been on hold since 7/20. Received one unit RBC transfusion on 7/21. Hemoglobin has remained stable at low 7's since 7/21 but patient had some blood in stool  again on 7/22. Anticipate medically stable for discharge in 2-4.     Melena, 7/20/25  Acute blood loss anemia - due to GI bleed vs post sphincterotomy bleed due to anticoagulation   Had 1 episode of melena on 7/20. Hb has been declining since 7/18. One episode of brown stool with a couple drops of blood on 7/22. Overall tolerating a regular diet.   - Hb 13 - 12.4 - 9.8 - 8.4 - 6.8 - (1 unit RBC) - 7.7 - 7.3 - 7.2 - 7.2 - 7.8 - 7.2.  - Will hold IV heparin and aspirin, continue IV protonix BID  - Re-consult [facility] 7/21, recommendations appreciated   - Monitor Hb. Transfuse of below 7, transfused 1 unit AM 7/21  - Continue to trend hemoglobin, changed to Q12H on 7/22      Septic shock due to E coli bacteremia secondary to choledocholithiasis with ascending cholangitis, s/p EUS/ERCP with sphincterotomy with removal of stones on 7/16  Possible acute cholecystitis  Now weaned off Vasopressors and stress dose steroids. On 7/17 found to have retained 0.3 cm CBD stone which is expected to pass in its own and no intervention was recommended by [facility]. Signed off. Gen surgery did not feel patient had acute cholecystitis. Recommended percutaneous cholecystostomy tube if needed. Will eventually need elective cholecystectomy once patient recovers from acute illness. CT A/P 7/16 had shown large loculated perihepatic/subdiaphragmatic fluid collection which decreased in size following ERCP, but increased fluid in abdomen with relative hyperdensity compared to simple ascites.  IR felt this was too small to sample percutaneously. Also pneumobilia status post ERCP.   - Monitor for recurrent or worsening abdominal pain   - Received cefepime from 7/14 - 7/17, Ceftriaxone 7/18 to present, continue for now given bacteremia, anticipating 14 days total of antibiotics from positive [facility] blood culture on 7/13     Aspiration pneumonia   Acute hypoxic respiratory failure - secondary to aspiration pneumonitis on 7/16 during EUS/ERCP  Extubated on  7/19. Weaned off supplemental oxygen on 7/20. Respiratory culture growing candida glabrata, suspected contaminant.   - Received cefepime from 7/14 - 7/17, Ceftriaxone 7/18 to present, continue for now given bacteremia, anticipating 14 days total of antibiotics from positive [facility] blood culture on 7/13    Mild FARNAZ - secondary to septic shock  Baseline Cr 1. Cr peaked at 1.39. now improved to 1.23. Monitor      Hypokalemia  - Replace per protocol     Paroxysmal atrial fibrillation, converted to NSR with IV amiodarone infusion  - Amiodarone discontinued. Now on low dose toprol XL  - Hold IV heparin since 7/20 due to concern for GI bleed, continuing to hold 7/23 due to report of blood in stool 7/22   - Eventually would start on DOAC once hemoglobin stable and no evidence of GI bleeding      Left Femoral/popliteal DVT, diagnosed 7/13  - IR did not recommend thrombectomy.  - IV Heparin currently on hold due to anemia   - May need to consider IVC filter if unable to safely anticoagulate      Hyperlipidemia  - Continue atorvastatin     CAD s/p  remote PCI (2005)  - Aspirin was held for ERCP, resumed on 7/19, held again 7/20 due to declining hemoglobin      Essential HTN -   - PTA antihypertensives on hold. Resume as able     Mild Thrombocytopenia - due to severe sepsis  Now resolved     Chronic systolic CHF with reduced EF of 40%  Appears euvolemic. Monitor volume status. Weight up from admission from 83 to 93 kg   - Chest x-ray 7/18 showed no pulmonary edema     Prediabetes, HbA1c 5.8%, with stress and steroid induced hyperglycemia  Has completed stress dose steroids  - On sliding scale insulin          Diet: Room Service  Snacks/Supplements Adult: Other; chocolate Ensure at 10am and 2pm  (RD); Between Meals  Regular Diet Adult    DVT Prophylaxis: On hold due to concern for GI bleed   Mar Catheter: Not present  Lines: PRESENT      PICC 07/16/25 Triple Lumen Right Basilic Pressors-Site Assessment: WDL      Cardiac  "Monitoring: ACTIVE order. Indication: ICU  Code Status: Full Code      Clinically Significant Risk Factors         # Hypernatremia: Highest Na = 146 mmol/L in last 2 days, will monitor as appropriate  # Hyperchloremia: Highest Cl = 113 mmol/L in last 2 days, will monitor as appropriate          # Hypoalbuminemia: Lowest albumin = 2.1 g/dL at 7/23/2025  6:10 AM, will monitor as appropriate                # Overweight: Estimated body mass index is 29.36 kg/m  as calculated from the following:    Height as of this encounter: 1.778 m (5' 10\").    Weight as of this encounter: 92.8 kg (204 lb 9.4 oz).   # Moderate Malnutrition: based on nutrition assessment and treatment provided per dietitian's recommendations.      # Financial/Environmental Concerns: none         Social Drivers of Health            Disposition Plan     Medically Ready for Discharge: Anticipated in 2-4 Days         Scott Mcnamara,   Hospitalist Service  [facility]  Securely message with [employer] (more info)  Text page via [employer] Paging/Directory   ______________________________________________________________________    Interval History     - No acute events overnight  - Patient reports a brown stool yesterday with a couple drops of red blood in the toilet bowl  - Did not require blood transfusion 7/22  - Hgb this AM 7.2  - He went for multiple walks on 7/22  - Overall feels like his swelling is improving  - Feels like his infection has been improving.     Physical Exam   Vital Signs: Temp: 98.4  F (36.9  C) Temp src: Oral BP: 129/58 Pulse: 78   Resp: 18 SpO2: 94 % O2 Device: None (Room air) Oxygen Delivery: 1/2 LPM  Weight: 204 lbs 9.39 oz    Constitutional: awake, alert, cooperative, no apparent distress, and appears stated age. Pale   Eyes: Lids and lashes normal, pupils equal, round and reactive to light   ENT: Normocephalic, without obvious abnormality, atraumatic   Respiratory: No increased work of breathing, good air " exchange, clear to auscultation bilaterally, no crackles or wheezing  Cardiovascular: Normal apical impulse, regular rate and rhythm, normal S1 and S2, no S3 or S4, and no murmur noted, 1+ bilateral lower extremity edema. Swelling in arms/hands, slightly improved  GI: No scars, normal bowel sounds, soft, non-distended, non-tender, no masses palpated, no hepatosplenomegally  Skin: no redness, warmth, or swelling  Musculoskeletal: No deformities   Neurologic: Awake, alert, oriented to name, place and time.  Cranial nerves II-XII are grossly intact.  Motor is 5 out of 5 bilaterally.     Neuropsychiatric: General: normal, calm, and normal eye contact    Medical Decision Making       46 MINUTES SPENT BY ME on the date of service doing chart review, history, exam, documentation & further activities per the note.      Data   ------------------------- PAST 24 HR DATA REVIEWED -----------------------------------------------    I have personally reviewed the following data over the past 24 hrs:    12.2 (H)  \   7.2 (L); 7.2 (L)   / 161     145 113 (H) 28.2 (H) /  87   3.7 26 1.03 \     ALT: 28 AST: 45 AP: 216 (H) TBILI: 0.7   ALB: 2.1 (L) TOT PROTEIN: 4.5 (L) LIPASE: N/A       Imaging results reviewed over the past 24 hrs:   No results found for this or any previous visit (from the past 24 hours).

## 2025-07-23 NOTE — PROGRESS NOTES
Care Management Follow Up    Length of Stay (days): 10    Expected Discharge Date: 07/24/2025     Concerns to be Addressed: all concerns addressed in this encounter, discharge planning     Patient plan of care discussed at interdisciplinary rounds: Yes    Anticipated Discharge Disposition: Transitional Care              Anticipated Discharge Services: None  Anticipated Discharge DME: Walker, Oxygen    Patient/family educated on Medicare website which has current facility and service quality ratings: yes  Education Provided on the Discharge Plan: Yes  Patient/Family in Agreement with the Plan: yes    Referrals Placed by CM/SW:    Private pay costs discussed: Not applicable    Discussed  Partnership in Safe Discharge Planning  document with patient/family: No     Handoff Completed: No, handoff not indicated or clinically appropriate    Additional Information:  SW followed up on pending referrals to ask about bed availability.     [facility]: Left VM to Tor in admission requested a call back     [facility]: SW spoke to Torri in admissions. Referral decline due to no bed availability.     [facility]: Left VM to admissions and requested a call back.    Valerie in [facility] have accepted pt for when he is medically ready.    Next Steps: Secure TCU bed    ROSAURA Dixon  Casual Acute   [facility]   Available via [employer]

## 2025-07-24 VITALS
HEIGHT: 70 IN | BODY MASS INDEX: 28.92 KG/M2 | SYSTOLIC BLOOD PRESSURE: 130 MMHG | HEART RATE: 87 BPM | WEIGHT: 202 LBS | DIASTOLIC BLOOD PRESSURE: 72 MMHG | OXYGEN SATURATION: 97 % | RESPIRATION RATE: 16 BRPM | TEMPERATURE: 100.9 F

## 2025-07-24 LAB
ALBUMIN SERPL BCG-MCNC: 2 G/DL (ref 3.5–5.2)
ALP SERPL-CCNC: 268 U/L (ref 40–150)
ALT SERPL W P-5'-P-CCNC: 52 U/L (ref 0–70)
ANION GAP SERPL CALCULATED.3IONS-SCNC: 9 MMOL/L (ref 7–15)
AST SERPL W P-5'-P-CCNC: 77 U/L (ref 0–45)
BILIRUB SERPL-MCNC: 0.7 MG/DL
BLD PROD TYP BPU: NORMAL
BLOOD COMPONENT TYPE: NORMAL
BUN SERPL-MCNC: 23.6 MG/DL (ref 8–23)
CALCIUM SERPL-MCNC: 7 MG/DL (ref 8.8–10.4)
CHLORIDE SERPL-SCNC: 106 MMOL/L (ref 98–107)
CODING SYSTEM: NORMAL
CREAT SERPL-MCNC: 1.01 MG/DL (ref 0.67–1.17)
CROSSMATCH: NORMAL
EGFRCR SERPLBLD CKD-EPI 2021: 76 ML/MIN/1.73M2
ERYTHROCYTE [DISTWIDTH] IN BLOOD BY AUTOMATED COUNT: 16.5 % (ref 10–15)
GLUCOSE BLDC GLUCOMTR-MCNC: 103 MG/DL (ref 70–99)
GLUCOSE SERPL-MCNC: 120 MG/DL (ref 70–99)
HCO3 SERPL-SCNC: 25 MMOL/L (ref 22–29)
HCT VFR BLD AUTO: 19.9 % (ref 40–53)
HGB BLD-MCNC: 6.6 G/DL (ref 13.3–17.7)
HGB BLD-MCNC: 8.4 G/DL (ref 13.3–17.7)
ISSUE DATE AND TIME: NORMAL
MAGNESIUM SERPL-MCNC: 1.6 MG/DL (ref 1.7–2.3)
MCH RBC QN AUTO: 29.5 PG (ref 26.5–33)
MCHC RBC AUTO-ENTMCNC: 33.2 G/DL (ref 31.5–36.5)
MCV RBC AUTO: 87 FL (ref 78–100)
MCV RBC AUTO: 89 FL (ref 78–100)
PHOSPHATE SERPL-MCNC: 2 MG/DL (ref 2.5–4.5)
PLATELET # BLD AUTO: 160 10E3/UL (ref 150–450)
POTASSIUM SERPL-SCNC: 3.6 MMOL/L (ref 3.4–5.3)
PROT SERPL-MCNC: 4.4 G/DL (ref 6.4–8.3)
RBC # BLD AUTO: 2.24 10E6/UL (ref 4.4–5.9)
SODIUM SERPL-SCNC: 140 MMOL/L (ref 135–145)
UFH PPP CHRO-ACNC: 0.3 IU/ML (ref ?–1.1)
UFH PPP CHRO-ACNC: 0.8 IU/ML (ref ?–1.1)
UNIT ABO/RH: NORMAL
UNIT NUMBER: NORMAL
UNIT STATUS: NORMAL
UNIT TYPE ISBT: 6200
WBC # BLD AUTO: 10.8 10E3/UL (ref 4–11)

## 2025-07-24 PROCEDURE — 250N000011 HC RX IP 250 OP 636: Performed by: INTERNAL MEDICINE

## 2025-07-24 PROCEDURE — 85520 HEPARIN ASSAY: CPT | Performed by: STUDENT IN AN ORGANIZED HEALTH CARE EDUCATION/TRAINING PROGRAM

## 2025-07-24 PROCEDURE — 250N000011 HC RX IP 250 OP 636: Performed by: STUDENT IN AN ORGANIZED HEALTH CARE EDUCATION/TRAINING PROGRAM

## 2025-07-24 PROCEDURE — P9016 RBC LEUKOCYTES REDUCED: HCPCS | Performed by: STUDENT IN AN ORGANIZED HEALTH CARE EDUCATION/TRAINING PROGRAM

## 2025-07-24 PROCEDURE — 84100 ASSAY OF PHOSPHORUS: CPT | Performed by: STUDENT IN AN ORGANIZED HEALTH CARE EDUCATION/TRAINING PROGRAM

## 2025-07-24 PROCEDURE — 120N000001 HC R&B MED SURG/OB

## 2025-07-24 PROCEDURE — 82040 ASSAY OF SERUM ALBUMIN: CPT | Performed by: INTERNAL MEDICINE

## 2025-07-24 PROCEDURE — 83735 ASSAY OF MAGNESIUM: CPT | Performed by: STUDENT IN AN ORGANIZED HEALTH CARE EDUCATION/TRAINING PROGRAM

## 2025-07-24 PROCEDURE — 250N000013 HC RX MED GY IP 250 OP 250 PS 637: Performed by: INTERNAL MEDICINE

## 2025-07-24 PROCEDURE — 85014 HEMATOCRIT: CPT

## 2025-07-24 PROCEDURE — 250N000013 HC RX MED GY IP 250 OP 250 PS 637: Performed by: STUDENT IN AN ORGANIZED HEALTH CARE EDUCATION/TRAINING PROGRAM

## 2025-07-24 PROCEDURE — 99233 SBSQ HOSP IP/OBS HIGH 50: CPT | Performed by: STUDENT IN AN ORGANIZED HEALTH CARE EDUCATION/TRAINING PROGRAM

## 2025-07-24 PROCEDURE — 85018 HEMOGLOBIN: CPT | Performed by: STUDENT IN AN ORGANIZED HEALTH CARE EDUCATION/TRAINING PROGRAM

## 2025-07-24 PROCEDURE — 82247 BILIRUBIN TOTAL: CPT | Performed by: INTERNAL MEDICINE

## 2025-07-24 RX ORDER — MAGNESIUM OXIDE 400 MG/1
400 TABLET ORAL EVERY 4 HOURS
Status: COMPLETED | OUTPATIENT
Start: 2025-07-24 | End: 2025-07-24

## 2025-07-24 RX ADMIN — Medication 1 LOZENGE: at 11:20

## 2025-07-24 RX ADMIN — Medication 400 MG: at 13:27

## 2025-07-24 RX ADMIN — CEFTRIAXONE SODIUM 2 G: 2 INJECTION, POWDER, FOR SOLUTION INTRAMUSCULAR; INTRAVENOUS at 11:15

## 2025-07-24 RX ADMIN — Medication 1 LOZENGE: at 07:27

## 2025-07-24 RX ADMIN — HEPARIN SODIUM 1550 UNITS/HR: 10000 INJECTION, SOLUTION INTRAVENOUS at 11:15

## 2025-07-24 RX ADMIN — ACETAMINOPHEN 650 MG: 325 TABLET ORAL at 07:24

## 2025-07-24 RX ADMIN — POTASSIUM & SODIUM PHOSPHATES POWDER PACK 280-160-250 MG 1 PACKET: 280-160-250 PACK at 17:34

## 2025-07-24 RX ADMIN — METOPROLOL SUCCINATE 12.5 MG: 25 TABLET, EXTENDED RELEASE ORAL at 08:22

## 2025-07-24 RX ADMIN — ACETAMINOPHEN 650 MG: 325 TABLET ORAL at 20:23

## 2025-07-24 RX ADMIN — PANTOPRAZOLE SODIUM 40 MG: 40 INJECTION, POWDER, FOR SOLUTION INTRAVENOUS at 20:24

## 2025-07-24 RX ADMIN — Medication 400 MG: at 17:34

## 2025-07-24 RX ADMIN — POTASSIUM & SODIUM PHOSPHATES POWDER PACK 280-160-250 MG 1 PACKET: 280-160-250 PACK at 20:24

## 2025-07-24 RX ADMIN — Medication 1 LOZENGE: at 03:24

## 2025-07-24 RX ADMIN — ATORVASTATIN CALCIUM 40 MG: 40 TABLET, FILM COATED ORAL at 08:22

## 2025-07-24 RX ADMIN — POTASSIUM & SODIUM PHOSPHATES POWDER PACK 280-160-250 MG 1 PACKET: 280-160-250 PACK at 13:27

## 2025-07-24 RX ADMIN — PANTOPRAZOLE SODIUM 40 MG: 40 INJECTION, POWDER, FOR SOLUTION INTRAVENOUS at 08:22

## 2025-07-24 RX ADMIN — Medication 1 LOZENGE: at 19:31

## 2025-07-24 ASSESSMENT — ACTIVITIES OF DAILY LIVING (ADL)
ADLS_ACUITY_SCORE: 47

## 2025-07-24 NOTE — PROGRESS NOTES
Hospitalist service cross-cover note:     Paged regarding morning hemoglobin 6.6 g/dl, trend as below  Recent Labs   Lab 07/24/25  0542 07/23/25  1811 07/23/25  0610 07/22/25  1839 07/22/25  1028   HGB 6.6* 7.1* 7.2*  7.2* 7.8* 7.2*      No stools overnight. Transfuse per conditional orders, heparin gtt held.     Hank Bond MD   Hospitalist

## 2025-07-24 NOTE — PROGRESS NOTES
"  GASTROENTEROLOGY PROGRESS NOTE     IMPRESSION:  1.  Choledocholithiasis status post enterotomy complicated with aspiration event and likely post sphincterotomy bleed-EUS/ERCP on  with sphincterotomy.  Patient did have an aspiration event required intubation for 3 days.  On , the patient developed melena and decreased hemoglobin.  He has recent DVT diagnosed , thus was on heparin.  This was stopped and hemoglobin stabilized over the next 2 days.  Mild drop in hemoglobin this morning.  Patient is having brown stools.    RECOMMENDATIONS:  -Continue to give heparin today, monitor hemoglobin and for any GI bleeding.  - N.p.o. at midnight, in case upper endoscopy with ERCP scope is needed tomorrow.  - Patient is scheduled for upper endoscopy with ERCP scope (our biliary service) at around 3 PM tomorrow.  Certainly, if he continues to have brown stools and a stable hemoglobin, then this procedure could be canceled.  - Continue pantoprazole 40 mg twice a day    Fredis Hutchinson MD  [facility]  [phone]      ________________________________________________________________________      SUBJECTIVE: Patient reports that he just had a brown stool.  No other concerns.       OBJECTIVE:  /71 (BP Location: Left arm)   Pulse 85   Temp 100.3  F (37.9  C) (Oral)   Resp 18   Ht 1.778 m (5' 10\")   Wt 91.6 kg (202 lb)   SpO2 97%   BMI 28.98 kg/m    Temp (24hrs), Av.8  F (37.7  C), Min:98.6  F (37  C), Max:100.9  F (38.3  C)    Patient Vitals for the past 72 hrs:   Weight   25 0632 91.6 kg (202 lb)   25 0557 92.8 kg (204 lb 9.4 oz)        PHYSICAL EXAM  GEN: Alert, NAD.    HRT: reg  LUNGS: CTA  ABD: +BS, non-tender, non-distended, no rebound or guarding.    Additional Data:  I have reviewed the patient's new clinical lab results:     Recent Labs   Lab Test 25  0542 25  1811 25  0610 25  1028 25  0415   WBC 10.8  --  12.2*  --  14.9*   HGB 6.6* " 7.1* 7.2*  7.2*   < > 7.2*   MCV 89 89 89  89   < > 87     --  161  --  154    < > = values in this interval not displayed.     Recent Labs   Lab Test 07/24/25  0542 07/24/25  0320 07/23/25 2237 07/23/25  0731 07/23/25  0610 07/22/25  0722 07/22/25 0415     --   --   --  145  --  146*   POTASSIUM 3.6  --   --   --  3.7  --  3.8   CHLORIDE 106  --   --   --  113*  --  113*   CO2 25  --   --   --  26  --  25   BUN 23.6*  --   --   --  28.2*  --  39.8*   CR 1.01  --   --   --  1.03  --  1.08   ANIONGAP 9  --   --   --  6*  --  8   PAUL 7.0*  --   --   --  7.5*  --  7.4*   * 103* 132*   < > 105*   < > 107*    < > = values in this interval not displayed.     Recent Labs   Lab Test 07/24/25  0542 07/23/25  0610 07/22/25 0415 07/17/25 0417 07/16/25 2327   ALBUMIN 2.0* 2.1* 2.2*   < >  --    BILITOTAL 0.7 0.7 0.6   < >  --    ALT 52 28 23   < >  --    AST 77* 45 42   < >  --    PROTEIN  --   --   --   --  30*    < > = values in this interval not displayed.

## 2025-07-24 NOTE — PLAN OF CARE
[id number]    Orientation: A&O x4   Aggression Stop Light: Green   Activity: A1 GBW, up to BSC  Diet/BS Checks: Regular, ACHS BS checks.  Tele: NSR  IV Access/Drains: RUE triple lumen PICC, blood return noted, 2 PIV SL.   Pain Management: denies  Abnormal VS/Results: VSS on RA  Bowel/Bladder: Continent of b/b. No BM this shift  Skin/Wounds: Scattered bruising  Consults: SW, PT/OT, GI   D/C Disposition: pending     Other Info:   - Mg & Phos replacement protocol, AM recheck  - q12h hgb checks 6.6 - MD notified. Started transfusing 1 unit PRBC  - Hep Xa 0.37, 0.8

## 2025-07-24 NOTE — PROGRESS NOTES
[facility]    Medicine Progress Note - Hospitalist Service    Date of Admission:  7/13/2025    Assessment & Plan     Kristofer Montana is a 81 y/o male with Hx of HTN, HLD, HFrEF (LVEF 40%), CAD (remote PCI 2005), CVA (2013, no residual weakness/deficits) [facility] with 2 days of fever, chills and RUQ pain.      He was in septic shock with mild hypotension, WBC 17 and lactate of 5.5. CT C/A/P showed choledocholithiasis and mild bile duct dilation and possible acute cholecystitis. He was also found to have occlusive DVT in L femoral vein, nonocclusive DVT in L popliteal vein. He was started on iv heparin and transferred to [facility]CP.      [facility]. On 7/14, he went into A fib with RVR and was started on amiodarone infusion. Concerted to NSR. On 7/15, he developed acute hypoxic resp failure and was started on BIPAP. Weaned off by 7/16.     He underwent EUS/ERCP with sphincterotomy with removal of stones on 7/16. No stent was placed. He had an aspiration event during the procedure. He was intubated and admitted to ICU.     CT 7/17 showed residual 0.3 cm gallstone in CBD, persistent mild CBD dilatation of 1.1 cm. Mild dilatation of small bowel loops, likely ileus. Per GI, small CBD stone is expected to pass on its own and no intervention was recommended. He was extubated on 7/19 and was transferred to hospitalist service.     Per gen surg no intervention planned at this time. Cholecystostomy tube should be considered if concern for acute cholecystitis. Cholecystectomy should be considered when patient recovers from aspiration pneumonia.     On 7/20, the patient had an episode of melena and noted Hb drop from 9.7 to 8.4 to 6.7 on 7/21. Heparin infusion for atrial fibrillation has been on hold since 7/20. Received one unit RBC transfusion on 7/21. Hemoglobin has remained stable at low 7's since 7/21 but patient had some blood in stool  again on 7/22. Attempted to resume heparin infusion and aspirin again on 7/23 and hemoglobin dropped to 6.6 AM 7/24. Heparin infusion and aspirin on hold again 7/24. Receiving another 1 unit RBC AM 7/24.     Melena, 7/20/25  Acute blood loss anemia - due to GI bleed vs post sphincterotomy bleed due to anticoagulation   Had 1 episode of melena on 7/20. Hb has been declining since 7/18. One episode of brown stool with a couple drops of blood on 7/22. Overall tolerating a regular diet.   *Hb trend since admission 13 - 12.4 - 9.8 - 8.4 - 6.8 - (1 unit RBC) - 7.7 - 7.3 - 7.2 - 7.2 - 7.8 - 7.2 - 7.1 - 6.6 - (1 unit RBC) - recheck pending  - Will hold IV heparin and aspirin, continue IV protonix BID  - Will reach out to [facility] AM 7/24, Addednum, discussed with Dr. Hutchinson, recommending continuing heparin infusion 7/24 with NPO at midnight 7/25 and holding heparin AM 7/25 at 8 am with likely EGD with biliary specialist PM 7/25. Please contact provider if patient has obvious episode of melena or blood in stool 7/24, but will otherwise plan to continue heparin and aspirin. Resumed regular diet for 7/24.   - NPO besides meds pending GI recommendations   - Monitor Hb. Transfuse of below 7, transfused 1 unit AM 7/21, and 1 unit AM 7/24   - Continue to trend hemoglobin, changed to Q12H on 7/22      Septic shock due to E coli bacteremia secondary to choledocholithiasis with ascending cholangitis, s/p EUS/ERCP with sphincterotomy with removal of stones on 7/16  Possible acute cholecystitis  Now weaned off Vasopressors and stress dose steroids. On 7/17 found to have retained 0.3 cm CBD stone which is expected to pass in its own and no intervention was recommended by [facility]. Signed off. Gen surgery did not feel patient had acute cholecystitis. Recommended percutaneous cholecystostomy tube if needed. Will eventually need elective cholecystectomy once patient recovers from acute illness. CT A/P 7/16 had shown large loculated  perihepatic/subdiaphragmatic fluid collection which decreased in size following ERCP, but increased fluid in abdomen with relative hyperdensity compared to simple ascites.  IR felt this was too small to sample percutaneously. Also pneumobilia status post ERCP.   - Monitor for recurrent or worsening abdominal pain   - Received cefepime from 7/14 - 7/17, Ceftriaxone 7/18 to present, continue for now given bacteremia, anticipating 14 days total of antibiotics from [facility] on [facility]/13     Aspiration pneumonia   Acute hypoxic respiratory failure - secondary to aspiration pneumonitis on 7/16 during EUS/ERCP  Extubated on 7/19. Weaned off supplemental oxygen on 7/20. Respiratory culture growing candida glabrata, suspected contaminant.   - Received cefepime from 7/14 - 7/17, Ceftriaxone 7/18 to present, continue for now given bacteremia, anticipating 14 days total of antibiotics from positive [facility] blood culture on 7/13    Mild FARNAZ - secondary to septic shock, improved   Baseline Cr 1. Cr peaked at 1.39. now improved to 1.23. Monitor      Hypokalemia  - Replace per protocol     Paroxysmal atrial fibrillation, converted to NSR with IV amiodarone infusion  - Amiodarone discontinued. Now on low dose toprol XL  - Hold IV heparin AM 7/24 due to concern for GI bleed   - Eventually would start on DOAC once hemoglobin stable and no evidence of GI bleeding      Left Femoral/popliteal DVT, diagnosed 7/13  - IR did not recommend thrombectomy.  - IV Heparin currently on hold due to anemia   - May need to consider IVC filter if unable to safely anticoagulate      Hyperlipidemia  - Continue atorvastatin     CAD s/p  remote PCI (2005)  - Aspirin was held for ERCP, resumed on 7/19, currently on hold again due to acute anemia      Essential HTN -   - PTA antihypertensives on hold. Resume as able     Mild Thrombocytopenia - due to severe sepsis  Now resolved     Chronic systolic CHF with reduced EF of 40%  Appears euvolemic.  "Monitor volume status. Weight up from admission from 83 to 93 kg   - Chest x-ray 7/18 showed no pulmonary edema     Prediabetes, HbA1c 5.8%, with stress and steroid induced hyperglycemia  Has completed stress dose steroids  - On sliding scale insulin, has not needed, may stop glucose checks once able to eat again           Diet: Room Service  Snacks/Supplements Adult: Other; chocolate Ensure at 10am and 2pm  (RD); Between Meals  Regular Diet Adult    DVT Prophylaxis: On hold due to concern for GI bleed   Mar Catheter: Not present  Lines: PRESENT      PICC 07/16/25 Triple Lumen Right Basilic Pressors-Site Assessment: WDL      Cardiac Monitoring: ACTIVE order. Indication: ICU  Code Status: Full Code      Clinically Significant Risk Factors          # Hyperchloremia: Highest Cl = 113 mmol/L in last 2 days, will monitor as appropriate        # Hypomagnesemia: Lowest Mg = 1.6 mg/dL in last 2 days, will replace as needed   # Hypoalbuminemia: Lowest albumin = 2 g/dL at 7/24/2025  5:42 AM, will monitor as appropriate                # Overweight: Estimated body mass index is 28.98 kg/m  as calculated from the following:    Height as of this encounter: 1.778 m (5' 10\").    Weight as of this encounter: 91.6 kg (202 lb).   # Moderate Malnutrition: based on nutrition assessment and treatment provided per dietitian's recommendations.      # Financial/Environmental Concerns: none         Social Drivers of Health            Disposition Plan     Medically Ready for Discharge: Anticipated in 2-4 Days         Scott Mcnamara,   Hospitalist Service  [facility]  Securely message with [employer] (more info)  Text page via [email or web address] Paging/Directory   ______________________________________________________________________    Interval History     - AM hemoglobin dropped to 6.6  - Patient reports No bowel movements yesterday  - He lacks an appetite this morning  - Had a brief episode of right upper quadrant abdominal " pain which has since resolved  - No nausea or vomiting   - His upper and lower extremity     Physical Exam   Vital Signs: Temp: 98.6  F (37  C) Temp src: Oral BP: 121/67 Pulse: 93   Resp: 18 SpO2: 96 % O2 Device: None (Room air)    Weight: 202 lbs 0 oz    Constitutional: awake, alert, cooperative, no apparent distress, and appears stated age. Pale   Eyes: Lids and lashes normal, pupils equal, round and reactive to light   ENT: Normocephalic, without obvious abnormality, atraumatic   Respiratory: No increased work of breathing, good air exchange, clear to auscultation bilaterally, no crackles or wheezing  Cardiovascular: Normal apical impulse, regular rate and rhythm, normal S1 and S2, no S3 or S4, and no murmur noted, 1+ bilateral lower extremity edema extending to knee. Swelling in arms/hands, slightly improved  GI: Normal bowel sounds, soft, non-distended, non-tender, no masses palpated, no hepatosplenomegally  Skin: no redness, warmth, or swelling  Musculoskeletal: No deformities   Neurologic: Awake, alert, oriented to name, place and time.  Cranial nerves II-XII are grossly intact.  Motor is 5 out of 5 bilaterally.     Neuropsychiatric: General: normal, calm, and normal eye contact    Medical Decision Making       46 MINUTES SPENT BY ME on the date of service doing chart review, history, exam, documentation & further activities per the note.      Data   ------------------------- PAST 24 HR DATA REVIEWED -----------------------------------------------    I have personally reviewed the following data over the past 24 hrs:    10.8  \   6.6 (LL)   / 160     140 106 23.6 (H) /  120 (H)   3.6 25 1.01 \     ALT: 52 AST: 77 (H) AP: 268 (H) TBILI: 0.7   ALB: 2.0 (L) TOT PROTEIN: 4.4 (L) LIPASE: N/A       Imaging results reviewed over the past 24 hrs:   No results found for this or any previous visit (from the past 24 hours).

## 2025-07-24 NOTE — PLAN OF CARE
Goal Outcome Evaluation:       [id number]  Orientation: AOx4  Aggression Stop Light: green  Activity: A1 gb/w- up to chair   Diet/BS Checks: regular  Tele:  NSR  IV Access/Drains: PICC.Heparin gtt restarted today. Recheck Xa at midnight  Pain Management: denies  Abnormal VS/Results: HGB 8.4. Mag and Phosph replaced during shift  Bowel/Bladder: cont, 2 soft bm- no blood noted  Skin/Wounds: scattered bruising, edema on extremities  Consults: GI,ot,pt  D/C Disposition: pending  Other Info: denies nausea, numbness and tingling. Hgb was 6.6 at shift change, 1 unit packed RBC given at start of shift, tolerated well. PRN Lozenges and Tylenol given per request. Plan for pt to be NPO at [facility] for upper Endo w ERCP scope @ 3pm. Stop Heparin gtt on 7/25 @0800

## 2025-07-24 NOTE — PROVIDER NOTIFICATION
MD Notification    Notified Person: MD    Notified Person Name: Dr. Hank Bond    Notification Date/Time: 7/24/25 @ 0625    Notification Interaction: Vocera Page    Purpose of Notification: Pt hgb 6.6, already have conditional orders to transfuse. Will transfuse unit of blood and continue to monitor any other recommendations?     Orders Received: Pause heparin    Comments:

## 2025-07-24 NOTE — PROGRESS NOTES
Care Management Follow Up    Length of Stay (days): 11    Expected Discharge Date: 07/25/2025     Concerns to be Addressed: all concerns addressed in this encounter, discharge planning     Patient plan of care discussed at interdisciplinary rounds: Yes    Anticipated Discharge Disposition: Transitional Care              Anticipated Discharge Services: None  Anticipated Discharge DME: Walker, Oxygen    Patient/family educated on Medicare website which has current facility and service quality ratings: yes  Education Provided on the Discharge Plan: Yes  Patient/Family in Agreement with the Plan: yes    Referrals Placed by CM/SW:    Private pay costs discussed: Not applicable    Discussed  Partnership in Safe Discharge Planning  document with patient/family: No     Handoff Completed: No, handoff not indicated or clinically appropriate    Additional Information:  Writer called [facility] and [facility] to get updates on bed availability. Left messages with both facilities requesting a call back.     Writer met with patient at bedside, patient stated his brother spoke with someone at [facility] and they would accept the patient if they have beds available. Patient had a business card but couldn't find it at the time. Patient stated if he found it he would let writer know.      ROSAURA AMADO  [facility]  INPATIENT CARE COORDINATION

## 2025-07-25 ENCOUNTER — ANESTHESIA EVENT (OUTPATIENT)
Dept: GASTROENTEROLOGY | Facility: CLINIC | Age: 80
End: 2025-07-25

## 2025-07-25 ENCOUNTER — APPOINTMENT (OUTPATIENT)
Dept: PHYSICAL THERAPY | Facility: CLINIC | Age: 80
End: 2025-07-25
Attending: INTERNAL MEDICINE
Payer: MEDICARE

## 2025-07-25 ENCOUNTER — ANESTHESIA (OUTPATIENT)
Dept: GASTROENTEROLOGY | Facility: CLINIC | Age: 80
End: 2025-07-25

## 2025-07-25 LAB
ALBUMIN SERPL BCG-MCNC: 1.9 G/DL (ref 3.5–5.2)
ALP SERPL-CCNC: 278 U/L (ref 40–150)
ALT SERPL W P-5'-P-CCNC: 49 U/L (ref 0–70)
ANION GAP SERPL CALCULATED.3IONS-SCNC: 7 MMOL/L (ref 7–15)
AST SERPL W P-5'-P-CCNC: 57 U/L (ref 0–45)
BILIRUB SERPL-MCNC: 1 MG/DL
BUN SERPL-MCNC: 18.2 MG/DL (ref 8–23)
CALCIUM SERPL-MCNC: 7.1 MG/DL (ref 8.8–10.4)
CHLORIDE SERPL-SCNC: 106 MMOL/L (ref 98–107)
CREAT SERPL-MCNC: 0.97 MG/DL (ref 0.67–1.17)
EGFRCR SERPLBLD CKD-EPI 2021: 80 ML/MIN/1.73M2
GLUCOSE SERPL-MCNC: 86 MG/DL (ref 70–99)
HCO3 SERPL-SCNC: 24 MMOL/L (ref 22–29)
HGB BLD-MCNC: 7.7 G/DL (ref 13.3–17.7)
HGB BLD-MCNC: 8.7 G/DL (ref 13.3–17.7)
MAGNESIUM SERPL-MCNC: 1.7 MG/DL (ref 1.7–2.3)
MCV RBC AUTO: 88 FL (ref 78–100)
MCV RBC AUTO: 89 FL (ref 78–100)
PHOSPHATE SERPL-MCNC: 2.2 MG/DL (ref 2.5–4.5)
POTASSIUM SERPL-SCNC: 3.6 MMOL/L (ref 3.4–5.3)
PROT SERPL-MCNC: 4.6 G/DL (ref 6.4–8.3)
SODIUM SERPL-SCNC: 137 MMOL/L (ref 135–145)
UFH PPP CHRO-ACNC: 0.36 IU/ML (ref ?–1.1)
UFH PPP CHRO-ACNC: 0.51 IU/ML (ref ?–1.1)

## 2025-07-25 PROCEDURE — 84100 ASSAY OF PHOSPHORUS: CPT | Performed by: STUDENT IN AN ORGANIZED HEALTH CARE EDUCATION/TRAINING PROGRAM

## 2025-07-25 PROCEDURE — 250N000011 HC RX IP 250 OP 636: Performed by: INTERNAL MEDICINE

## 2025-07-25 PROCEDURE — 250N000013 HC RX MED GY IP 250 OP 250 PS 637: Performed by: STUDENT IN AN ORGANIZED HEALTH CARE EDUCATION/TRAINING PROGRAM

## 2025-07-25 PROCEDURE — 250N000013 HC RX MED GY IP 250 OP 250 PS 637: Performed by: INTERNAL MEDICINE

## 2025-07-25 PROCEDURE — 85018 HEMOGLOBIN: CPT | Performed by: PHYSICIAN ASSISTANT

## 2025-07-25 PROCEDURE — 85520 HEPARIN ASSAY: CPT | Performed by: STUDENT IN AN ORGANIZED HEALTH CARE EDUCATION/TRAINING PROGRAM

## 2025-07-25 PROCEDURE — 80053 COMPREHEN METABOLIC PANEL: CPT | Performed by: INTERNAL MEDICINE

## 2025-07-25 PROCEDURE — 97530 THERAPEUTIC ACTIVITIES: CPT | Mod: GP

## 2025-07-25 PROCEDURE — 97116 GAIT TRAINING THERAPY: CPT | Mod: GP

## 2025-07-25 PROCEDURE — 250N000009 HC RX 250: Performed by: STUDENT IN AN ORGANIZED HEALTH CARE EDUCATION/TRAINING PROGRAM

## 2025-07-25 PROCEDURE — 250N000011 HC RX IP 250 OP 636: Performed by: STUDENT IN AN ORGANIZED HEALTH CARE EDUCATION/TRAINING PROGRAM

## 2025-07-25 PROCEDURE — 120N000001 HC R&B MED SURG/OB

## 2025-07-25 PROCEDURE — 83735 ASSAY OF MAGNESIUM: CPT | Performed by: STUDENT IN AN ORGANIZED HEALTH CARE EDUCATION/TRAINING PROGRAM

## 2025-07-25 PROCEDURE — 258N000003 HC RX IP 258 OP 636: Performed by: STUDENT IN AN ORGANIZED HEALTH CARE EDUCATION/TRAINING PROGRAM

## 2025-07-25 PROCEDURE — 85018 HEMOGLOBIN: CPT | Performed by: STUDENT IN AN ORGANIZED HEALTH CARE EDUCATION/TRAINING PROGRAM

## 2025-07-25 PROCEDURE — 99232 SBSQ HOSP IP/OBS MODERATE 35: CPT | Performed by: STUDENT IN AN ORGANIZED HEALTH CARE EDUCATION/TRAINING PROGRAM

## 2025-07-25 RX ORDER — MAGNESIUM SULFATE HEPTAHYDRATE 40 MG/ML
2 INJECTION, SOLUTION INTRAVENOUS ONCE
Status: COMPLETED | OUTPATIENT
Start: 2025-07-25 | End: 2025-07-25

## 2025-07-25 RX ORDER — ASPIRIN 81 MG/1
81 TABLET ORAL DAILY
Status: DISCONTINUED | OUTPATIENT
Start: 2025-07-25 | End: 2025-08-07 | Stop reason: HOSPADM

## 2025-07-25 RX ORDER — HEPARIN SODIUM 10000 [USP'U]/100ML
0-5000 INJECTION, SOLUTION INTRAVENOUS CONTINUOUS
Status: DISPENSED | OUTPATIENT
Start: 2025-07-25 | End: 2025-08-02

## 2025-07-25 RX ADMIN — HEPARIN SODIUM 1550 UNITS/HR: 10000 INJECTION, SOLUTION INTRAVENOUS at 13:28

## 2025-07-25 RX ADMIN — PANTOPRAZOLE SODIUM 40 MG: 40 INJECTION, POWDER, FOR SOLUTION INTRAVENOUS at 20:26

## 2025-07-25 RX ADMIN — PANTOPRAZOLE SODIUM 40 MG: 40 INJECTION, POWDER, FOR SOLUTION INTRAVENOUS at 08:08

## 2025-07-25 RX ADMIN — MAGNESIUM SULFATE HEPTAHYDRATE 2 G: 40 INJECTION, SOLUTION INTRAVENOUS at 09:05

## 2025-07-25 RX ADMIN — HEPARIN SODIUM 1550 UNITS/HR: 10000 INJECTION, SOLUTION INTRAVENOUS at 03:43

## 2025-07-25 RX ADMIN — SODIUM PHOSPHATE, MONOBASIC, MONOHYDRATE AND SODIUM PHOSPHATE, DIBASIC, ANHYDROUS 15 MMOL: 142; 276 INJECTION, SOLUTION INTRAVENOUS at 09:05

## 2025-07-25 RX ADMIN — CEFTRIAXONE SODIUM 2 G: 2 INJECTION, POWDER, FOR SOLUTION INTRAMUSCULAR; INTRAVENOUS at 13:30

## 2025-07-25 RX ADMIN — ATORVASTATIN CALCIUM 40 MG: 40 TABLET, FILM COATED ORAL at 08:08

## 2025-07-25 RX ADMIN — ASPIRIN 81 MG: 81 TABLET, DELAYED RELEASE ORAL at 13:31

## 2025-07-25 RX ADMIN — METOPROLOL SUCCINATE 12.5 MG: 25 TABLET, EXTENDED RELEASE ORAL at 08:08

## 2025-07-25 ASSESSMENT — ACTIVITIES OF DAILY LIVING (ADL)
ADLS_ACUITY_SCORE: 43
ADLS_ACUITY_SCORE: 46
ADLS_ACUITY_SCORE: 43
ADLS_ACUITY_SCORE: 47
ADLS_ACUITY_SCORE: 47
ADLS_ACUITY_SCORE: 46
ADLS_ACUITY_SCORE: 47
ADLS_ACUITY_SCORE: 46
ADLS_ACUITY_SCORE: 46
ADLS_ACUITY_SCORE: 47
ADLS_ACUITY_SCORE: 46
ADLS_ACUITY_SCORE: 47
ADLS_ACUITY_SCORE: 47
ADLS_ACUITY_SCORE: 46
ADLS_ACUITY_SCORE: 47
ADLS_ACUITY_SCORE: 47
ADLS_ACUITY_SCORE: 43
ADLS_ACUITY_SCORE: 47

## 2025-07-25 NOTE — PROGRESS NOTES
[facility]    Medicine Progress Note - Hospitalist Service    Date of Admission:  7/13/2025    Assessment & Plan     Kristofer Montana is a 79 y/o male with Hx of HTN, HLD, HFrEF (LVEF 40%), CAD (remote PCI 2005), CVA (2013, no residual weakness/deficits) [facility] with 2 days of fever, chills and RUQ pain.      He was in septic shock with mild hypotension, WBC 17 and lactate of 5.5. CT C/A/P showed choledocholithiasis and mild bile duct dilation and possible acute cholecystitis. He was also found to have occlusive DVT in L femoral vein, nonocclusive DVT in L popliteal vein. He was started on iv heparin and transferred to [facility].      [facility] E [facility]. On 7/14, he went into A fib with RVR and was started on amiodarone infusion. Concerted to NSR. On 7/15, he developed acute hypoxic resp failure and was started on BIPAP. Weaned off by 7/16.     He underwent EUS/ERCP with sphincterotomy with removal of stones on 7/16. No stent was placed. He had an aspiration event during the procedure. He was intubated and admitted to ICU.     CT 7/17 showed residual 0.3 cm gallstone in CBD, persistent mild CBD dilatation of 1.1 cm. Mild dilatation of small bowel loops, likely ileus. Per GI, small CBD stone is expected to pass on its own and no intervention was recommended. He was extubated on 7/19 and was transferred to hospitalist service.     Per gen surg no intervention planned at this time. Cholecystostomy tube should be considered if concern for acute cholecystitis. Cholecystectomy should be considered when patient recovers from aspiration pneumonia.     On 7/20, the patient had an episode of melena and noted Hb drop from 9.7 to 8.4 to 6.7 on 7/21. Heparin infusion for atrial fibrillation has been on hold since 7/20. Received one unit RBC transfusion on 7/21. Hemoglobin has remained stable at low 7's since 7/21 but patient had some blood in stool  again on 7/22.     Attempted to resume heparin infusion and aspirin again on 7/23 and hemoglobin dropped to 6.6 AM 7/24. Heparin infusion and aspirin on hold again 7/24. Receiving another 1 unit RBC AM 7/24. Had two brown stools on 7/24. Hemoglobin improved to 8.4 after blood transfusion. Tentatively planning for EGD with GI on 7/25. Holding heparin gtt. NPO pending procedure.     Melena, 7/20/25  Acute blood loss anemia - due to GI bleed vs post sphincterotomy bleed due to anticoagulation   Had 1 episode of melena on 7/20. Hb has been declining since 7/18. One episode of brown stool with a couple drops of blood on 7/22. Overall tolerating a regular diet.   *Hb trend since admission 13 - 12.4 - 9.8 - 8.4 - 6.8 - (1 unit RBC) - 7.7 - 7.3 - 7.2 - 7.2 - 7.8 - 7.2 - 7.1 - 6.6 - (1 unit RBC) - 8.4  - Hold heparin gtt pending EGD tentatively 7/25  - NPO pending tentative EGD  - Continue to trend hemoglobin, Q12H  - Likely to transition to DOAC once hgb stable while on heparin gtt    Septic shock due to E coli bacteremia secondary to choledocholithiasis with ascending cholangitis, s/p EUS/ERCP with sphincterotomy with removal of stones on 7/16  Possible acute cholecystitis  Now weaned off Vasopressors and stress dose steroids. On 7/17 found to have retained 0.3 cm CBD stone which is expected to pass in its own and no intervention was recommended by [facility]. Signed off. Gen surgery did not feel patient had acute cholecystitis. Recommended percutaneous cholecystostomy tube if needed. Will eventually need elective cholecystectomy once patient recovers from acute illness. CT A/P 7/16 had shown large loculated perihepatic/subdiaphragmatic fluid collection which decreased in size following ERCP, but increased fluid in abdomen with relative hyperdensity compared to simple ascites.  IR felt this was too small to sample percutaneously. Also pneumobilia status post ERCP.   - Monitor for recurrent or worsening abdominal pain   -  Received cefepime from 7/14 - 7/17, Ceftriaxone 7/18 to present, continue for now given bacteremia, anticipating 14 days total of antibiotics from positive [facility] blood culture on 7/13  - Will need to discuss with general surgery timing of cholecystectomy      Aspiration pneumonia   Acute hypoxic respiratory failure - secondary to aspiration pneumonitis on 7/16 during EUS/ERCP  Extubated on 7/19. Weaned off supplemental oxygen on 7/20. Respiratory culture growing candida glabrata, suspected contaminant.   - Received cefepime from 7/14 - 7/17, Ceftriaxone 7/18 to present, continue for now given bacteremia, anticipating 14 days total of antibiotics from positive [facility] blood culture on 7/13    Mild FARNAZ - secondary to septic shock, improved   Baseline Cr 1. Cr peaked at 1.39. now improved. Monitor      Hypokalemia  - Replace per protocol     Paroxysmal atrial fibrillation, converted to NSR with IV amiodarone infusion  - Amiodarone discontinued. Now on low dose toprol XL  - Hold IV heparin AM 7/24 due to concern for GI bleed   - Eventually would start on DOAC once hemoglobin stable and no evidence of GI bleeding      Left Femoral/popliteal DVT, diagnosed 7/13  - IR did not recommend thrombectomy.  - IV Heparin currently on hold due to anemia   - May need to consider IVC filter if unable to safely anticoagulate      Hyperlipidemia  - Continue atorvastatin     CAD s/p  remote PCI (2005)  - Aspirin was held for ERCP, resumed on 7/19, currently on hold again due to acute anemia      Essential HTN -   - PTA antihypertensives on hold. Resume as able     Mild Thrombocytopenia - due to severe sepsis  Now resolved     Chronic systolic CHF with reduced EF of 40%  Appears euvolemic. Monitor volume status. Weight up from admission from 83 to 93 kg   - Chest x-ray 7/18 showed no pulmonary edema     Prediabetes, HbA1c 5.8%, with stress and steroid induced hyperglycemia  Has completed stress dose steroids  - has not required sliding scale  "insulin so currently on hold           Diet: Room Service  Snacks/Supplements Adult: Other; chocolate Ensure at 10am and 2pm  (RD); Between Meals  NPO for Procedure/Surgery per Anesthesia Guidelines Except for: Meds; Clear liquids before procedure/surgery: ADULT (Age GREATER than or Equal to 18 years) - Clear liquids 2 hours before procedure/surgery    DVT Prophylaxis: On hold due to concern for GI bleed   Mar Catheter: Not present  Lines: PRESENT      PICC 07/16/25 Triple Lumen Right Basilic Pressors-Site Assessment: WDL      Cardiac Monitoring: ACTIVE order. Indication: ICU  Code Status: Full Code      Clinically Significant Risk Factors             # Hypomagnesemia: Lowest Mg = 1.6 mg/dL in last 2 days, will replace as needed   # Hypoalbuminemia: Lowest albumin = 1.9 g/dL at 7/25/2025  6:12 AM, will monitor as appropriate                # Overweight: Estimated body mass index is 28.98 kg/m  as calculated from the following:    Height as of this encounter: 1.778 m (5' 10\").    Weight as of this encounter: 91.6 kg (202 lb).   # Moderate Malnutrition: based on nutrition assessment and treatment provided per dietitian's recommendations.      # Financial/Environmental Concerns: none         Social Drivers of Health            Disposition Plan     Medically Ready for Discharge: Anticipated in 2-4 Days         Scott Mcnamara DO  Hospitalist Service  [facility]  Securely message with [employer] (more info)  Text page via [employer] Paging/Directory   ______________________________________________________________________    Interval History     - Two brown stools on 7/24  - No acute events overnight  - Currently planning for EGD today  - Patient reporting improvement of his leg swelling and arm swelling  - No other acute concerns     Physical Exam   Vital Signs: Temp: 99.6  F (37.6  C) Temp src: Oral BP: 114/62 Pulse: 81   Resp: 16 SpO2: 95 % O2 Device: None (Room air)    Weight: 202 lbs 0 " oz    Constitutional: awake, alert, cooperative, no apparent distress, and appears stated age. Pale   Eyes: Lids and lashes normal, pupils equal, round and reactive to light   ENT: Normocephalic, without obvious abnormality, atraumatic   Respiratory: No increased work of breathing, good air exchange, clear to auscultation bilaterally, no crackles or wheezing  Cardiovascular: Normal apical impulse, regular rate and rhythm, normal S1 and S2, no S3 or S4, and no murmur noted, 1+ bilateral lower extremity edema extending to knee. Swelling in arms/hands, slightly improved  GI: Normal bowel sounds, soft, non-distended, non-tender, no masses palpated, no hepatosplenomegally  Skin: no redness, warmth, or swelling  Musculoskeletal: No deformities   Neurologic: Awake, alert, oriented to name, place and time.  Cranial nerves II-XII are grossly intact.  Motor is 5 out of 5 bilaterally.     Neuropsychiatric: General: normal, calm, and normal eye contact    Medical Decision Making       43 MINUTES SPENT BY ME on the date of service doing chart review, history, exam, documentation & further activities per the note.      Data   ------------------------- PAST 24 HR DATA REVIEWED -----------------------------------------------    I have personally reviewed the following data over the past 24 hrs:    N/A  \   8.4 (L)   / N/A     137 106 18.2 /  86   3.6 24 0.97 \     ALT: 49 AST: 57 (H) AP: 278 (H) TBILI: 1.0   ALB: 1.9 (L) TOT PROTEIN: 4.6 (L) LIPASE: N/A       Imaging results reviewed over the past 24 hrs:   No results found for this or any previous visit (from the past 24 hours).

## 2025-07-25 NOTE — PROGRESS NOTES
"GASTROENTEROLOGY PROGRESS NOTE     INTERVAL HISTORY   Feeling well this am  No dark or bloody stool yesterday  Has not had BM yet today but feels he will need to soon  NPO for possible procedure  Heparin remains on hold     OBJECTIVE:  General Appearance:  Male pt resting in hospital bed, NAD   /62 (BP Location: Left arm, Patient Position: Semi-Turner's, Cuff Size: Adult Regular)   Pulse 81   Temp 99.6  F (37.6  C) (Oral)   Resp 16   Ht 1.778 m (5' 10\")   Wt 91.6 kg (202 lb)   SpO2 95%   BMI 28.98 kg/m    Temp (24hrs), Av.4  F (37.4  C), Min:98.3  F (36.8  C), Max:100.9  F (38.3  C)    Patient Vitals for the past 72 hrs:   Weight   25 0632 91.6 kg (202 lb)   25 0557 92.8 kg (204 lb 9.4 oz)       Intake/Output Summary (Last 24 hours) at 2025 0933  Last data filed at 2025 0800  Gross per 24 hour   Intake 540 ml   Output 1200 ml   Net -660 ml        PHYSICAL EXAM     Constitutional: alert and no distress   Respiratory: Breathing non-labored  Abdomen: Soft, non-tender  SKIN: Pale            Additional Comments:  ROS, FH, SH: See initial GI consult for details.     I have reviewed the patient's new clinical lab results:     Recent Labs   Lab Test 25  1726 25  0542 25  1811 25  0610 25  1028 25  0415   WBC  --  10.8  --  12.2*  --  14.9*   HGB 8.4* 6.6* 7.1* 7.2*  7.2*   < > 7.2*   MCV 87 89 89 89  89   < > 87   PLT  --  160  --  161  --  154    < > = values in this interval not displayed.     Recent Labs   Lab Test 25  0612 25  0542 25  0610   POTASSIUM 3.6 3.6 3.7   CHLORIDE 106 106 113*   CO2    BUN 18.2 23.6* 28.2*   ANIONGAP 7 9 6*     Recent Labs   Lab Test 25  0612 25  0542 25  0610 25  0417 25  2327   ALBUMIN 1.9* 2.0* 2.1*   < >  --    BILITOTAL 1.0 0.7 0.7   < >  --    ALT 49 52 28   < >  --    AST 57* 77* 45   < >  --    PROTEIN  --   --   --   --  30*    < > = values in this " interval not displayed.           ASSESSMENT & PLAN  80 year old male with PMH of HTN, HLD, HFrEF, CAD, CVA who [facility] with fevers, chills, RUQ pain. Found to have choledocholithiasis  and E. Coli bacteremia with occlusive DVT of L femoral vein, non-occlusive DVT in L popliteal vein.     Choledocholithiasis: Underwent EUS/ERCP with sphincterotomy with removal of stones 7/16. Procedure c/b aspiration event with PNA requiring admission to ICU and intubation. CT 7/17 with residual 0.3cm gallstone in CBD with persistent mild dilation of CBD at 1.1cm.   Anemia, melena: Noted to have episode of melena and drop in Hgb 7/20. Again with some blood in stool on 7/22. 7/23 Heparin and ASA resumed with drop in hgb requiring transfusion but with brown stools.  Hgb not yet drawn this am. Tentative plans for EGD to assess for GIB, however, if hgb remains stable on labs this am and no overt evidence of GIB will hold off on scope.     Recommendations  If hgb stable on am labs and no further overt evidence of GIB will hold of on endoscopic evaluation  NPO  Continue to hold heparin for now  Monitor hgb, stool, transfuse PRN   PPI BID     ADDENDUM: Hgb stable on recheck. BM this am brown- no melena or hematochezia.    PLAN  --Regular Diet   --Follow hgb, stool, transfuse PRN   --Ok to resume anticoagulation  --GI will no longer follow. Please call back with any questions or concerned.     Reviewed with Dr. Hutchinson, GI staff        Time spent: 12 minutes, greater than 50% of the visit was spent in counseling/coordination of care.     Monica Blake PA-C  [facility] ([facility])  Office: [phone]

## 2025-07-25 NOTE — PROGRESS NOTES
A&OX4, VSS on RA.Tele was SR. Denies pain, n&V. EGD cancelled today as Hgb stable at 8.7, 1 BM w/o signs of bleeding. Up AX1 with GB and walker. On regular diet with good appetite. R PICC with triple lumen. Hep gtt restarted at 1550, recheck in the AM. Mg++ and Phos replaced, recheck tomorrow AM. Continues on IV Rocephin. Continue to monitor.

## 2025-07-25 NOTE — PLAN OF CARE
Goal Outcome Evaluation:  Orientation: AOx4  Aggression Stop Light: green  Activity: A1g/w  Diet/BS Checks: NPO   Tele: NSR  IV Access/Drains: PICC infusing Heparin 1550 units/hr. 2nd check goal rate-recheck tomorrow am. Heparin needs to be stopped today at 0800. Hgb: 8.4, transfuse <7  Pain Management: denies, tylenol given for temp 100.8,   Abnormal VS/Results: Mag and phos protocol- Hgb: 8.4, transfuse <7  Bowel/Bladder: cont, urinal bedside no BM  Skin/Wounds: Scattered bruising  Consults: GI, OT, PT, CM/SW  D/C Disposition: pending, will be discharging to TCU  Other Info: PRN Lozenges given x1. Plan for pt to have upper Endo w ERCP scope @ 3pm. Heparin needs to be stopped today at 0800.

## 2025-07-25 NOTE — PROGRESS NOTES
"CLINICAL NUTRITION SERVICES - REASSESSMENT NOTE         Recommendations:  Continue Ensure BID between meals - modify flavor to vanilla  Encouraged good po intake of meals         INFORMATION OBTAINED  Assessed patient in room.    CURRENT NUTRITION ORDERS  Diet: Regular          Room Service with Assist    Snacks/Supplements: Chocolate Ensure at 10am and 2pm      CURRENT INTAKE/TOLERANCE  Pt currently NPO for possible procedure today    Visited with pt this morning  \"I just had a BM and there was no blood\"  He is hopeful that he can avoid any procedures today  He has been ordering 3 small meals/day and eating 100%  Likes the Ensure and is drinking them - would like to try the vanilla flavor     NEW FINDINGS  GI symptoms: No dark or bloody stool yesterday.  Has not had BM yet today but feels he will need to soon      Skin/wounds: Scattered bruising       Nutrition-relevant labs:   7/25: Na 137           K 3.6           Mg 1.7           Phos 2.2 (L)           Hgb 8.7    Nutrition-relevant medications: Reviewed    Weight:   07/24/25 0632 91.6 kg (202 lb) Standing scale   07/23/25 0557 92.8 kg (204 lb 9.4 oz) Bed scale   07/20/25 0631 95.3 kg (210 lb 1.6 oz) Bed scale   07/18/25 0400 93.1 kg (205 lb 4 oz) Bed scale   07/16/25 0543 86.5 kg (190 lb 9.6 oz) Standing scale   07/15/25 0537 86.8 kg (191 lb 6.4 oz) --   07/14/25 0130 86.4 kg (190 lb 7.6 oz) Standing scale   07/13/25 2110 83.9 kg (184 lb 14.4 oz) Standing scale       ASSESSED NUTRITION NEEDS  Dosing Weight: 83.9 kg, based on admission wt (7/13)  Estimated Energy Needs: 2815-8201 kcals/day (25 - 30 kcals/kg)  Justification: Maintenance  Estimated Protein Needs: 100-125 grams protein/day (1.2 - 1.5 grams of pro/kg)  Justification: Maintenance  Estimated Fluid Needs: 6563-1055 mL/day (1 mL/kcal)  Justification: Maintenance    MALNUTRITION (7/21)  % Intake: </= 50% for >/= 5 days (severe) - pt was NPO/clear liquid diet 7/13-7/19, ordering small meals  % Weight Loss: " None noted  Subcutaneous Fat Loss: None observed  Muscle Loss: Temples (temporalis muscle): Mild/Moderate  Fluid Accumulation/Edema: Moderate to severe, 2-4+ - 1-2+ bilteral lower extremity pitting edema   Malnutrition Diagnosis: Moderate malnutrition in the context of acute illness or injury  Malnutrition Present on Admission: No    EVALUATION OF THE PROGRESS TOWARD GOALS   Previous Goals (7/21)  Pt to consume >50% meals and take 100% supplements   Evaluation: Met      Previous Nutrition Diagnosis (7/21)  Predicted inadequate nutrient intake cals/pro related to fair appetite as evidenced by pt eating small meals past few days, has been NPO/Clear Liquids 7/13-7/19   Evaluation: Improving    NUTRITION DIAGNOSIS  No nutrition diagnosis at this time       INTERVENTIONS  Continue Ensure BID between meals - modify flavor to vanilla  Encouraged good po intake of meals    GOALS  Pt to consume 75% meals and 100% supplements     MONITORING/EVALUATION  Progress toward goals will be monitored and evaluated per policy.

## 2025-07-26 ENCOUNTER — APPOINTMENT (OUTPATIENT)
Dept: ULTRASOUND IMAGING | Facility: CLINIC | Age: 80
DRG: 853 | End: 2025-07-26
Attending: STUDENT IN AN ORGANIZED HEALTH CARE EDUCATION/TRAINING PROGRAM
Payer: MEDICARE

## 2025-07-26 LAB
ALBUMIN SERPL BCG-MCNC: 1.9 G/DL (ref 3.5–5.2)
ALP SERPL-CCNC: 319 U/L (ref 40–150)
ALT SERPL W P-5'-P-CCNC: 48 U/L (ref 0–70)
ANION GAP SERPL CALCULATED.3IONS-SCNC: 7 MMOL/L (ref 7–15)
AST SERPL W P-5'-P-CCNC: 56 U/L (ref 0–45)
BILIRUB SERPL-MCNC: 1 MG/DL
BUN SERPL-MCNC: 18.4 MG/DL (ref 8–23)
CALCIUM SERPL-MCNC: 7.2 MG/DL (ref 8.8–10.4)
CHLORIDE SERPL-SCNC: 108 MMOL/L (ref 98–107)
CREAT SERPL-MCNC: 1.06 MG/DL (ref 0.67–1.17)
EGFRCR SERPLBLD CKD-EPI 2021: 72 ML/MIN/1.73M2
ERYTHROCYTE [DISTWIDTH] IN BLOOD BY AUTOMATED COUNT: 15.9 % (ref 10–15)
GLUCOSE SERPL-MCNC: 101 MG/DL (ref 70–99)
HCO3 SERPL-SCNC: 24 MMOL/L (ref 22–29)
HCT VFR BLD AUTO: 21.7 % (ref 40–53)
HGB BLD-MCNC: 7.2 G/DL (ref 13.3–17.7)
HGB BLD-MCNC: 7.9 G/DL (ref 13.3–17.7)
MAGNESIUM SERPL-MCNC: 1.7 MG/DL (ref 1.7–2.3)
MCH RBC QN AUTO: 29.4 PG (ref 26.5–33)
MCHC RBC AUTO-ENTMCNC: 33.2 G/DL (ref 31.5–36.5)
MCV RBC AUTO: 88 FL (ref 78–100)
MCV RBC AUTO: 89 FL (ref 78–100)
PHOSPHATE SERPL-MCNC: 2 MG/DL (ref 2.5–4.5)
PLATELET # BLD AUTO: 208 10E3/UL (ref 150–450)
POTASSIUM SERPL-SCNC: 3.4 MMOL/L (ref 3.4–5.3)
PROT SERPL-MCNC: 4.6 G/DL (ref 6.4–8.3)
RBC # BLD AUTO: 2.45 10E6/UL (ref 4.4–5.9)
SODIUM SERPL-SCNC: 139 MMOL/L (ref 135–145)
UFH PPP CHRO-ACNC: 0.2 IU/ML (ref ?–1.1)
UFH PPP CHRO-ACNC: 0.55 IU/ML (ref ?–1.1)
WBC # BLD AUTO: 9.5 10E3/UL (ref 4–11)

## 2025-07-26 PROCEDURE — 250N000013 HC RX MED GY IP 250 OP 250 PS 637: Performed by: INTERNAL MEDICINE

## 2025-07-26 PROCEDURE — 84100 ASSAY OF PHOSPHORUS: CPT | Performed by: STUDENT IN AN ORGANIZED HEALTH CARE EDUCATION/TRAINING PROGRAM

## 2025-07-26 PROCEDURE — 250N000013 HC RX MED GY IP 250 OP 250 PS 637: Performed by: STUDENT IN AN ORGANIZED HEALTH CARE EDUCATION/TRAINING PROGRAM

## 2025-07-26 PROCEDURE — 999N000040 HC STATISTIC CONSULT NO CHARGE VASC ACCESS

## 2025-07-26 PROCEDURE — 36415 COLL VENOUS BLD VENIPUNCTURE: CPT | Performed by: INTERNAL MEDICINE

## 2025-07-26 PROCEDURE — 84155 ASSAY OF PROTEIN SERUM: CPT | Performed by: INTERNAL MEDICINE

## 2025-07-26 PROCEDURE — 85520 HEPARIN ASSAY: CPT | Performed by: STUDENT IN AN ORGANIZED HEALTH CARE EDUCATION/TRAINING PROGRAM

## 2025-07-26 PROCEDURE — 250N000011 HC RX IP 250 OP 636: Performed by: INTERNAL MEDICINE

## 2025-07-26 PROCEDURE — 93976 VASCULAR STUDY: CPT

## 2025-07-26 PROCEDURE — 83735 ASSAY OF MAGNESIUM: CPT | Performed by: STUDENT IN AN ORGANIZED HEALTH CARE EDUCATION/TRAINING PROGRAM

## 2025-07-26 PROCEDURE — 99232 SBSQ HOSP IP/OBS MODERATE 35: CPT | Performed by: STUDENT IN AN ORGANIZED HEALTH CARE EDUCATION/TRAINING PROGRAM

## 2025-07-26 PROCEDURE — 36415 COLL VENOUS BLD VENIPUNCTURE: CPT | Performed by: STUDENT IN AN ORGANIZED HEALTH CARE EDUCATION/TRAINING PROGRAM

## 2025-07-26 PROCEDURE — 120N000001 HC R&B MED SURG/OB

## 2025-07-26 PROCEDURE — 250N000011 HC RX IP 250 OP 636: Performed by: STUDENT IN AN ORGANIZED HEALTH CARE EDUCATION/TRAINING PROGRAM

## 2025-07-26 PROCEDURE — 85018 HEMOGLOBIN: CPT | Performed by: STUDENT IN AN ORGANIZED HEALTH CARE EDUCATION/TRAINING PROGRAM

## 2025-07-26 PROCEDURE — 85018 HEMOGLOBIN: CPT | Performed by: INTERNAL MEDICINE

## 2025-07-26 RX ORDER — MAGNESIUM OXIDE 400 MG/1
400 TABLET ORAL EVERY 4 HOURS
Status: COMPLETED | OUTPATIENT
Start: 2025-07-26 | End: 2025-07-26

## 2025-07-26 RX ADMIN — Medication 400 MG: at 09:10

## 2025-07-26 RX ADMIN — HEPARIN SODIUM 1550 UNITS/HR: 10000 INJECTION, SOLUTION INTRAVENOUS at 06:24

## 2025-07-26 RX ADMIN — Medication 400 MG: at 12:23

## 2025-07-26 RX ADMIN — METOPROLOL SUCCINATE 12.5 MG: 25 TABLET, EXTENDED RELEASE ORAL at 09:09

## 2025-07-26 RX ADMIN — POTASSIUM & SODIUM PHOSPHATES POWDER PACK 280-160-250 MG 1 PACKET: 280-160-250 PACK at 12:23

## 2025-07-26 RX ADMIN — ATORVASTATIN CALCIUM 40 MG: 40 TABLET, FILM COATED ORAL at 09:10

## 2025-07-26 RX ADMIN — POTASSIUM & SODIUM PHOSPHATES POWDER PACK 280-160-250 MG 1 PACKET: 280-160-250 PACK at 20:02

## 2025-07-26 RX ADMIN — POTASSIUM & SODIUM PHOSPHATES POWDER PACK 280-160-250 MG 1 PACKET: 280-160-250 PACK at 09:09

## 2025-07-26 RX ADMIN — CEFTRIAXONE SODIUM 2 G: 2 INJECTION, POWDER, FOR SOLUTION INTRAMUSCULAR; INTRAVENOUS at 12:23

## 2025-07-26 RX ADMIN — ASPIRIN 81 MG: 81 TABLET, DELAYED RELEASE ORAL at 09:10

## 2025-07-26 RX ADMIN — PANTOPRAZOLE SODIUM 40 MG: 40 INJECTION, POWDER, FOR SOLUTION INTRAVENOUS at 09:09

## 2025-07-26 RX ADMIN — PANTOPRAZOLE SODIUM 40 MG: 40 INJECTION, POWDER, FOR SOLUTION INTRAVENOUS at 20:02

## 2025-07-26 ASSESSMENT — ACTIVITIES OF DAILY LIVING (ADL)
ADLS_ACUITY_SCORE: 41
ADLS_ACUITY_SCORE: 41
ADLS_ACUITY_SCORE: 43
ADLS_ACUITY_SCORE: 41
ADLS_ACUITY_SCORE: 43
ADLS_ACUITY_SCORE: 41
ADLS_ACUITY_SCORE: 43
ADLS_ACUITY_SCORE: 41
ADLS_ACUITY_SCORE: 43
ADLS_ACUITY_SCORE: 41
ADLS_ACUITY_SCORE: 43
ADLS_ACUITY_SCORE: 43
ADLS_ACUITY_SCORE: 41
ADLS_ACUITY_SCORE: 43

## 2025-07-26 NOTE — PLAN OF CARE
DATE & TIME: 7/25/25 3pm-11pm    Cognitive Concerns/ Orientation : A&Ox4   BEHAVIOR & AGGRESSION TOOL COLOR: green  ABNL VS/O2: VSS/Room air  MOBILITY: SBA  PAIN MANAGMENT: denies  DIET: regular  BOWEL/BLADDER: continent  ABNL LAB/BG: Hgb and hep 10a pending on a heparin drip at 1550units/hr  DRAIN/DEVICES: R PICC/L PIV  TELEMETRY RHYTHM: SR with BBB  D/C DATE: pending

## 2025-07-26 NOTE — PROGRESS NOTES
[facility]    Medicine Progress Note - Hospitalist Service    Date of Admission:  7/13/2025    Assessment & Plan     Kristofer Montana is a 81 y/o male with Hx of HTN, HLD, HFrEF (LVEF 40%), CAD (remote PCI 2005), CVA (2013, no residual weakness/deficits) who [facility] with 2 days of fever, chills and RUQ pain.      He was in septic shock with mild hypotension, WBC 17 and lactate of 5.5. CT C/A/P showed choledocholithiasis and mild bile duct dilation and possible acute cholecystitis. He was also found to have occlusive DVT in L femoral vein, nonocclusive DVT in L popliteal vein. He was started on iv heparin and transferred to [facility] for ERCP.      [facility] grew E coli. On 7/14, he went into A fib with RVR and was started on amiodarone infusion. Concerted to NSR. On 7/15, he developed acute hypoxic resp failure and was started on BIPAP. Weaned off by 7/16.     He underwent EUS/ERCP with sphincterotomy with removal of stones on 7/16. No stent was placed. He had an aspiration event during the procedure. He was intubated and admitted to ICU.     CT 7/17 showed residual 0.3 cm gallstone in CBD, persistent mild CBD dilatation of 1.1 cm. Mild dilatation of small bowel loops, likely ileus. Per GI, small CBD stone is expected to pass on its own and no intervention was recommended. He was extubated on 7/19 and was transferred to hospitalist service.     Per gen surg no intervention planned at this time. Cholecystostomy tube should be considered if concern for acute cholecystitis. Cholecystectomy should be considered when patient recovers from aspiration pneumonia.     On 7/20, the patient had an episode of melena and noted Hb drop from 9.7 to 8.4 to 6.7 on 7/21. Heparin infusion for atrial fibrillation has been on hold since 7/20. Received one unit RBC transfusion on 7/21. Hemoglobin has remained stable at low 7's since 7/21 but patient had some blood in stool  again on 7/22.     Attempted to resume heparin infusion and aspirin again on 7/23 and hemoglobin dropped to 6.6 AM 7/24. Heparin infusion and aspirin on hold again 7/24. Receiving another 1 unit RBC AM 7/24. Had two brown stools on 7/24. Hemoglobin improved to 8.4 after blood transfusion. GI cleared for anti-coagulation. Had one dark stool again 7/25. Hgb AM 7/26 is 7.2.     Melena, 7/20/25  Acute blood loss anemia - due to GI bleed vs post sphincterotomy bleed due to anticoagulation   Had 1 episode of melena on 7/20. Hb has been declining since 7/18. One episode of brown stool with a couple drops of blood on 7/22. Overall tolerating a regular diet.   *Hb trend since admission 13 - 12.4 - 9.8 - 8.4 - 6.8 - (1 unit RBC) - 7.7 - 7.3 - 7.2 - 7.2 - 7.8 - 7.2 - 7.1 - 6.6 - (1 unit RBC) - 8.4 - 8.7 - 7.7 - 7.2  - Hold heparin gtt pending EGD tentatively 7/25  - NPO pending tentative EGD  - Continue to trend hemoglobin, Q12H  - Likely to transition to DOAC once hgb stable while on heparin gtt    Septic shock due to E coli bacteremia secondary to choledocholithiasis with ascending cholangitis, s/p EUS/ERCP with sphincterotomy with removal of stones on 7/16  Possible acute cholecystitis  Now weaned off Vasopressors and stress dose steroids. On 7/17 found to have retained 0.3 cm CBD stone which is expected to pass in its own and no intervention was recommended by [facility]. Signed off. Gen surgery did not feel patient had acute cholecystitis. Recommended percutaneous cholecystostomy tube if needed. Will eventually need elective cholecystectomy once patient recovers from acute illness. CT A/P 7/16 had shown large loculated perihepatic/subdiaphragmatic fluid collection which decreased in size following ERCP, but increased fluid in abdomen with relative hyperdensity compared to simple ascites.  IR felt this was too small to sample percutaneously. Also pneumobilia status post ERCP.   - Monitor for recurrent or worsening abdominal pain    - Received cefepime from 7/14 - 7/17, Ceftriaxone 7/18 to present, continue for now given bacteremia, anticipating 14 days total of antibiotics from [facility] blood culture on 7/13  - Obtaining RUQ abdominal US 7/26. NPO after breakfast      Aspiration pneumonia   Acute hypoxic respiratory failure - secondary to aspiration pneumonitis on 7/16 during EUS/ERCP  Extubated on 7/19. Weaned off supplemental oxygen on 7/20. Respiratory culture growing candida glabrata, suspected contaminant.   - Received cefepime from 7/14 - 7/17, Ceftriaxone 7/18 to present, continue for now given bacteremia, anticipating 14 days total of antibiotics from positive [facility] blood culture on 7/13    Mild FARNAZ - secondary to septic shock, improved   Baseline Cr 1. Cr peaked at 1.39. now improved. Monitor      Hypokalemia  - Replace per protocol     Paroxysmal atrial fibrillation, converted to NSR with IV amiodarone infusion  - Amiodarone discontinued. Now on low dose toprol XL  - Hold IV heparin AM 7/24 due to concern for GI bleed   - Eventually would start on DOAC once hemoglobin stable and no evidence of GI bleeding      Left Femoral/popliteal DVT, diagnosed 7/13  - IR did not recommend thrombectomy.  - IV Heparin currently on hold due to anemia   - May need to consider IVC filter if unable to safely anticoagulate      Hyperlipidemia  - Continue atorvastatin     CAD s/p  remote PCI (2005)  - Aspirin was held for ERCP, resumed on 7/19, currently on hold again due to acute anemia      Essential HTN -   - PTA antihypertensives on hold. Resume as able     Mild Thrombocytopenia - due to severe sepsis  Now resolved     Chronic systolic CHF with reduced EF of 40%  Appears euvolemic. Monitor volume status. Weight up from admission from 83 to 93 kg   - Chest x-ray 7/18 showed no pulmonary edema     Prediabetes, HbA1c 5.8%, with stress and steroid induced hyperglycemia  Has completed stress dose steroids  - has not required sliding scale insulin so  "currently on hold           Diet: Room Service  Snacks/Supplements Adult: Other; Vanilla Ensure at 10am and 2pm  (RD); Between Meals  Regular Diet Adult    DVT Prophylaxis:heparin gtt, aspirin   Mar Catheter: Not present  Lines: PRESENT      PICC 07/16/25 Triple Lumen Right Basilic Pressors-Site Assessment: WDL      Cardiac Monitoring: ACTIVE order. Indication: ICU  Code Status: Full Code      Clinically Significant Risk Factors          # Hyperchloremia: Highest Cl = 108 mmol/L in last 2 days, will monitor as appropriate          # Hypoalbuminemia: Lowest albumin = 1.9 g/dL at 7/26/2025  6:18 AM, will monitor as appropriate                # Overweight: Estimated body mass index is 29.96 kg/m  as calculated from the following:    Height as of this encounter: 1.778 m (5' 10\").    Weight as of this encounter: 94.7 kg (208 lb 12.8 oz).   # Moderate Malnutrition: based on nutrition assessment and treatment provided per dietitian's recommendations.      # Financial/Environmental Concerns: none         Social Drivers of Health            Disposition Plan     Medically Ready for Discharge: Anticipated in 2-4 Days         Scott Mcnamara,   Hospitalist Service  [facility]  Securely message with [employer] (more info)  Text page via [employer] Paging/Directory   ______________________________________________________________________    Interval History     - No acute events overnight  - Patient reports one dark stool on 7/25  - He denies abdominal pain  - Reports fluctuation in temperature when checked, but denies feeling febrile  - No nausea or vomiting  - No other acute concerns at this time     Physical Exam   Vital Signs: Temp: 99.8  F (37.7  C) Temp src: Oral BP: 114/58 Pulse: 82   Resp: 18 SpO2: 96 % O2 Device: None (Room air)    Weight: 208 lbs 12.8 oz    Constitutional: awake, alert, cooperative, no apparent distress, and appears stated age. Pale   Eyes: Lids and lashes normal, pupils equal, " round and reactive to light   ENT: Normocephalic, without obvious abnormality, atraumatic   Respiratory: No increased work of breathing, good air exchange, clear to auscultation bilaterally, no crackles or wheezing  Cardiovascular: Normal apical impulse, regular rate and rhythm, normal S1 and S2, no S3 or S4, and no murmur noted, 1+ bilateral lower extremity edema extending to knee. Swelling in arms/hands, slightly improved  GI: Normal bowel sounds, soft, non-distended, non-tender, no masses palpated, no hepatosplenomegally  Skin: no redness, warmth, or swelling  Musculoskeletal: No deformities   Neurologic: Awake, alert, oriented to name, place and time.  Cranial nerves II-XII are grossly intact.  Motor is 5 out of 5 bilaterally.     Neuropsychiatric: General: normal, calm, and normal eye contact    Medical Decision Making       47 MINUTES SPENT BY ME on the date of service doing chart review, history, exam, documentation & further activities per the note.      Data   ------------------------- PAST 24 HR DATA REVIEWED -----------------------------------------------    I have personally reviewed the following data over the past 24 hrs:    9.5  \   7.2 (L)   / 208     139 108 (H) 18.4 /  101 (H)   3.4 24 1.06 \     ALT: 48 AST: 56 (H) AP: 319 (H) TBILI: 1.0   ALB: 1.9 (L) TOT PROTEIN: 4.6 (L) LIPASE: N/A       Imaging results reviewed over the past 24 hrs:   No results found for this or any previous visit (from the past 24 hours).

## 2025-07-26 NOTE — PROGRESS NOTES
Admitting Diagnosis: Choledocholithiasis [K80.50]  Cholecystitis [K81.9]  DVT (deep venous thrombosis) (H) [I82.409]   Vitals VSS  Pain 0/10. Taking n/a PRN. Last dose n/a  A&Ox4  Voiding Yes  Mobility SBA  Tele SR  CMS Intact  Lung Sounds Clear on RA via n/a  GI Active, 1 black BM today; provider was notified.   Dressing N/A    Orders Placed This Encounter      NPO for Medical/Clinical Reasons Except for: Meds       Pt is NPO, has abdominal ultrasound at 4pm today.  Heparin infusing at 1700unit/hr, lab recheck at 1500.

## 2025-07-26 NOTE — PROVIDER NOTIFICATION
MD Notification    Notified Person: MD    Notified Person Name: Edmond     Notification Date/Time: 7/25/25 @ 2720    Notification Interaction: Vocera     Purpose of Notification: pts hgb came back at 7.7, heparin infusing. Any recommendations?     Orders Received: MD aware     Comments:

## 2025-07-26 NOTE — PLAN OF CARE
Orientation: A&Ox4  Aggression Stop Light: green   Activity: A1 GBW  Diet/BS Checks: regular   Tele:  SR  IV Access/Drains: L PIV SL, R PICC infusing heparin @ 1550 unit(s)/hr   Pain Management: denies   Abnormal VS/Results: VSS on RA Tmax 100.2- refused tylenol   Hgb: 7.7   Bowel/Bladder: continent   Skin/Wounds: scattered bruising   Consults: GI, PT/OT, SW   D/C Disposition: pending   Other Info:   -hgb recheck and Xa pending

## 2025-07-26 NOTE — PROGRESS NOTES
VAT consulted for PICC dressing change for increased drainage. When at bedside to complete dressing change VAT RN found that pt's PICC line was retracted out 15 cm and exposed catheter open to air. Unsure how long PICC had been exposed and pulled out because dressing change completed as documented by the bedside RN and no visible catheter length was noted on LDA flowsheet. Day RN unaware PICC had become retracted. Insertion visible catheter had been charted as 5 cm, PICC now out 15 cm. CXR reviewed. PICC is no longer central access and out of correct positioning with exposed catheter open to air. PICC removed by VAT due to infection risk and having been retracted out of SVC as well as pt having had thrombophlebitis associated with this PICC line. Pt has functioning PIV at this time and is not requiring central access. Bedside RN and MD updated.

## 2025-07-27 ENCOUNTER — APPOINTMENT (OUTPATIENT)
Dept: CT IMAGING | Facility: CLINIC | Age: 80
DRG: 853 | End: 2025-07-27
Attending: STUDENT IN AN ORGANIZED HEALTH CARE EDUCATION/TRAINING PROGRAM
Payer: MEDICARE

## 2025-07-27 LAB
ALBUMIN SERPL BCG-MCNC: 2 G/DL (ref 3.5–5.2)
ALP SERPL-CCNC: 424 U/L (ref 40–150)
ALT SERPL W P-5'-P-CCNC: 72 U/L (ref 0–70)
ANION GAP SERPL CALCULATED.3IONS-SCNC: 9 MMOL/L (ref 7–15)
AST SERPL W P-5'-P-CCNC: 101 U/L (ref 0–45)
BILIRUB SERPL-MCNC: 0.9 MG/DL
BUN SERPL-MCNC: 16.1 MG/DL (ref 8–23)
CALCIUM SERPL-MCNC: 7.4 MG/DL (ref 8.8–10.4)
CHLORIDE SERPL-SCNC: 106 MMOL/L (ref 98–107)
CREAT SERPL-MCNC: 1.05 MG/DL (ref 0.67–1.17)
EGFRCR SERPLBLD CKD-EPI 2021: 73 ML/MIN/1.73M2
ERYTHROCYTE [DISTWIDTH] IN BLOOD BY AUTOMATED COUNT: 15.9 % (ref 10–15)
GLUCOSE SERPL-MCNC: 104 MG/DL (ref 70–99)
HCO3 SERPL-SCNC: 23 MMOL/L (ref 22–29)
HCT VFR BLD AUTO: 23.2 % (ref 40–53)
HGB BLD-MCNC: 7.5 G/DL (ref 13.3–17.7)
HGB BLD-MCNC: 7.6 G/DL (ref 13.3–17.7)
HGB BLD-MCNC: 7.6 G/DL (ref 13.3–17.7)
MAGNESIUM SERPL-MCNC: 1.7 MG/DL (ref 1.7–2.3)
MCH RBC QN AUTO: 29.1 PG (ref 26.5–33)
MCHC RBC AUTO-ENTMCNC: 32.8 G/DL (ref 31.5–36.5)
MCV RBC AUTO: 89 FL (ref 78–100)
PHOSPHATE SERPL-MCNC: 2.1 MG/DL (ref 2.5–4.5)
PLATELET # BLD AUTO: 239 10E3/UL (ref 150–450)
POTASSIUM SERPL-SCNC: 3.6 MMOL/L (ref 3.4–5.3)
PROT SERPL-MCNC: 5 G/DL (ref 6.4–8.3)
RBC # BLD AUTO: 2.61 10E6/UL (ref 4.4–5.9)
SODIUM SERPL-SCNC: 138 MMOL/L (ref 135–145)
WBC # BLD AUTO: 7.8 10E3/UL (ref 4–11)

## 2025-07-27 PROCEDURE — 120N000001 HC R&B MED SURG/OB

## 2025-07-27 PROCEDURE — 85014 HEMATOCRIT: CPT

## 2025-07-27 PROCEDURE — 250N000013 HC RX MED GY IP 250 OP 250 PS 637: Performed by: INTERNAL MEDICINE

## 2025-07-27 PROCEDURE — 250N000011 HC RX IP 250 OP 636: Performed by: INTERNAL MEDICINE

## 2025-07-27 PROCEDURE — 83735 ASSAY OF MAGNESIUM: CPT | Performed by: STUDENT IN AN ORGANIZED HEALTH CARE EDUCATION/TRAINING PROGRAM

## 2025-07-27 PROCEDURE — 250N000011 HC RX IP 250 OP 636: Performed by: STUDENT IN AN ORGANIZED HEALTH CARE EDUCATION/TRAINING PROGRAM

## 2025-07-27 PROCEDURE — 250N000009 HC RX 250: Performed by: STUDENT IN AN ORGANIZED HEALTH CARE EDUCATION/TRAINING PROGRAM

## 2025-07-27 PROCEDURE — 250N000013 HC RX MED GY IP 250 OP 250 PS 637: Performed by: STUDENT IN AN ORGANIZED HEALTH CARE EDUCATION/TRAINING PROGRAM

## 2025-07-27 PROCEDURE — 74177 CT ABD & PELVIS W/CONTRAST: CPT

## 2025-07-27 PROCEDURE — 36415 COLL VENOUS BLD VENIPUNCTURE: CPT | Performed by: INTERNAL MEDICINE

## 2025-07-27 PROCEDURE — 99233 SBSQ HOSP IP/OBS HIGH 50: CPT | Performed by: STUDENT IN AN ORGANIZED HEALTH CARE EDUCATION/TRAINING PROGRAM

## 2025-07-27 PROCEDURE — 80053 COMPREHEN METABOLIC PANEL: CPT | Performed by: INTERNAL MEDICINE

## 2025-07-27 PROCEDURE — 36415 COLL VENOUS BLD VENIPUNCTURE: CPT

## 2025-07-27 PROCEDURE — 85018 HEMOGLOBIN: CPT | Performed by: INTERNAL MEDICINE

## 2025-07-27 PROCEDURE — 84100 ASSAY OF PHOSPHORUS: CPT | Performed by: STUDENT IN AN ORGANIZED HEALTH CARE EDUCATION/TRAINING PROGRAM

## 2025-07-27 RX ORDER — IOPAMIDOL 755 MG/ML
104 INJECTION, SOLUTION INTRAVASCULAR ONCE
Status: COMPLETED | OUTPATIENT
Start: 2025-07-27 | End: 2025-07-27

## 2025-07-27 RX ORDER — GUAIFENESIN 600 MG/1
600 TABLET, EXTENDED RELEASE ORAL 2 TIMES DAILY PRN
Status: DISCONTINUED | OUTPATIENT
Start: 2025-07-27 | End: 2025-08-07 | Stop reason: HOSPADM

## 2025-07-27 RX ORDER — PIPERACILLIN SODIUM, TAZOBACTAM SODIUM 4; .5 G/20ML; G/20ML
4.5 INJECTION, POWDER, LYOPHILIZED, FOR SOLUTION INTRAVENOUS EVERY 6 HOURS
Status: DISCONTINUED | OUTPATIENT
Start: 2025-07-27 | End: 2025-08-07 | Stop reason: HOSPADM

## 2025-07-27 RX ORDER — MAGNESIUM OXIDE 400 MG/1
400 TABLET ORAL EVERY 4 HOURS
Status: COMPLETED | OUTPATIENT
Start: 2025-07-27 | End: 2025-07-27

## 2025-07-27 RX ADMIN — GUAIFENESIN 600 MG: 600 TABLET, EXTENDED RELEASE ORAL at 16:42

## 2025-07-27 RX ADMIN — PIPERACILLIN AND TAZOBACTAM 4.5 G: 4; .5 INJECTION, POWDER, FOR SOLUTION INTRAVENOUS at 21:29

## 2025-07-27 RX ADMIN — IOPAMIDOL 104 ML: 755 INJECTION, SOLUTION INTRAVENOUS at 12:05

## 2025-07-27 RX ADMIN — POTASSIUM & SODIUM PHOSPHATES POWDER PACK 280-160-250 MG 1 PACKET: 280-160-250 PACK at 13:54

## 2025-07-27 RX ADMIN — Medication 400 MG: at 13:54

## 2025-07-27 RX ADMIN — POTASSIUM & SODIUM PHOSPHATES POWDER PACK 280-160-250 MG 1 PACKET: 280-160-250 PACK at 17:54

## 2025-07-27 RX ADMIN — SODIUM CHLORIDE 72 ML: 9 INJECTION, SOLUTION INTRAVENOUS at 12:06

## 2025-07-27 RX ADMIN — GUAIFENESIN 600 MG: 600 TABLET, EXTENDED RELEASE ORAL at 21:57

## 2025-07-27 RX ADMIN — Medication 400 MG: at 08:39

## 2025-07-27 RX ADMIN — METOPROLOL SUCCINATE 12.5 MG: 25 TABLET, EXTENDED RELEASE ORAL at 08:10

## 2025-07-27 RX ADMIN — POTASSIUM & SODIUM PHOSPHATES POWDER PACK 280-160-250 MG 1 PACKET: 280-160-250 PACK at 08:39

## 2025-07-27 RX ADMIN — ATORVASTATIN CALCIUM 40 MG: 40 TABLET, FILM COATED ORAL at 08:10

## 2025-07-27 RX ADMIN — PANTOPRAZOLE SODIUM 40 MG: 40 INJECTION, POWDER, FOR SOLUTION INTRAVENOUS at 08:10

## 2025-07-27 RX ADMIN — PANTOPRAZOLE SODIUM 40 MG: 40 INJECTION, POWDER, FOR SOLUTION INTRAVENOUS at 21:29

## 2025-07-27 RX ADMIN — CEFTRIAXONE SODIUM 2 G: 2 INJECTION, POWDER, FOR SOLUTION INTRAMUSCULAR; INTRAVENOUS at 13:54

## 2025-07-27 ASSESSMENT — ACTIVITIES OF DAILY LIVING (ADL)
ADLS_ACUITY_SCORE: 41
ADLS_ACUITY_SCORE: 40
ADLS_ACUITY_SCORE: 36
ADLS_ACUITY_SCORE: 40
ADLS_ACUITY_SCORE: 36
ADLS_ACUITY_SCORE: 40
ADLS_ACUITY_SCORE: 36
ADLS_ACUITY_SCORE: 40
ADLS_ACUITY_SCORE: 36
ADLS_ACUITY_SCORE: 40
ADLS_ACUITY_SCORE: 36
ADLS_ACUITY_SCORE: 36
ADLS_ACUITY_SCORE: 40
ADLS_ACUITY_SCORE: 36

## 2025-07-27 NOTE — PROGRESS NOTES
[facility]    Medicine Progress Note - Hospitalist Service    Date of Admission:  7/13/2025    Assessment & Plan     Kristofer Montana is a 81 y/o male with Hx of HTN, HLD, HFrEF (LVEF 40%), CAD (remote PCI 2005), CVA (2013, no residual weakness/deficits) who [facility] with 2 days of fever, chills and RUQ pain.      CT C/A/P showed choledocholithiasis and mild bile duct dilation and possible acute cholecystitis. He was also found to have occlusive DVT in L femoral vein, nonocclusive DVT in L popliteal vein. He was started on iv heparin and transferred to [facility] for [facility]CP.      [facility] grew E coli. On 7/14, he went into A fib with RVR and was started on amiodarone infusion. Concerted to NSR. On 7/15, he developed acute hypoxic resp failure and was started on BIPAP. Weaned off by 7/16.     He underwent EUS/ERCP with sphincterotomy with removal of stones on 7/16. No stent was placed. He had an aspiration event during the procedure. He was intubated and admitted to ICU.     CT 7/17 showed residual 0.3 cm gallstone in CBD, persistent mild CBD dilatation of 1.1 cm. Mild dilatation of small bowel loops, likely ileus. Per GI, small CBD stone is expected to pass on its own and no intervention was recommended. He was extubated on 7/19 and was transferred to hospitalist service.     Per gen surg no intervention planned at this time. Cholecystostomy tube should be considered if concern for acute cholecystitis. Cholecystectomy should be considered when patient recovers from aspiration pneumonia.     On 7/20, the patient had an episode of melena and noted Hb drop from 9.7 to 8.4 to 6.7 on 7/21. Heparin infusion for paroxysmal atrial fibrillation and DVT has been on hold since 7/20. Received one unit RBC transfusion on 7/21.  Attempted to resume heparin infusion and aspirin again on 7/23 and hemoglobin dropped to 6.6 AM 7/24. Received another 1 unit RBC AM 7/24.  Attempted to resume heparin infusion and aspirin again but patient has had persistent melena since that time.     An abdominal US was obtained 7/26. Difficult exam due to bowel gas. Heterogeneous fluid collection over the right lobe of the liver is indeterminant. Concern for possible subcapsular hematoma versus more loculated ascites. Mild ascites and small right pleural effusion. Normal abdominal liver duplex.    Obtaining repeat [facility] consultation and CT abdomen pelvis w/ IV contrast on 7/27.    Melena,  Acute blood loss anemia - due to GI bleed vs post sphincterotomy bleed due to anticoagulation   Loculated ascites versus subcapsular hematoma  Patient has been having intermittent melena since admission and while on therapeutic anticoagulation. Abdominal US on 7/26 concerning for loculated ascites versus subcapsular hematoma.  *Hb trend since admission 13 - 12.4 - 9.8 - 8.4 - 6.8 - (1 unit RBC) - 7.7 - 7.3 - 7.2 - 7.2 - 7.8 - 7.2 - 7.1 - 6.6 - (1 unit RBC) - 8.4 - 8.7 - 7.7 - 7.2 - 7.9 - 7.6   - Heparin gtt and aspirin held PM 7/26 after patient had another melanotic stool   - Follow up CT abdomen/pelvis w/ IV contrast   - NPO midnight 7/28 for likely EGD  - [facility] consulted, recommendations appreciated  - Continue to trend hemoglobin, Q12H  - Likely to transition to DOAC once hgb stable while on heparin gtt plus aspirin    Septic shock due to E coli bacteremia secondary to choledocholithiasis with ascending cholangitis, s/p EUS/ERCP with sphincterotomy with removal of stones on 7/16  Possible acute cholecystitis  Mild ascites   Fluid collection over liver, subcapsular hematoma versus loculated ascites   Now weaned off Vasopressors and stress dose steroids. On 7/17 found to have retained 0.3 cm CBD stone which is expected to pass in its own and no intervention was recommended by [facility]. Signed off. Gen surgery did not feel patient had acute cholecystitis. Recommended percutaneous cholecystostomy tube if needed. Will  eventually need elective cholecystectomy once patient recovers from acute illness. CT A/P 7/16 had shown large loculated perihepatic/subdiaphragmatic fluid collection which decreased in size following ERCP, but increased fluid in abdomen with relative hyperdensity compared to simple ascites.  IR felt this was too small to sample percutaneously. Also pneumobilia status post ERCP.   - Monitor for recurrent or worsening abdominal pain   - Received cefepime from 7/14 - 7/17, Flagyl 7/14 - 7/18, and Ceftriaxone 7/18 to 7/27  - Last day of antibiotics 7/27, completed two weeks for E. Coli bacteremia  - Consider diagnostic paracentesis and repeat blood cultures if concern for persistent infection after stopping antibiotics  - Follow up CT abdomen pelvis w/ contrast on 7/27   - Per gen surg no intervention planned at this time. Cholecystostomy tube should be considered if concern for acute cholecystitis. Cholecystectomy should be considered when patient recovers from aspiration pneumonia.     Aspiration pneumonia versus CAP   Acute hypoxic respiratory failure - secondary to aspiration pneumonitis on 7/16 during EUS/ERCP  Extubated on 7/19. Weaned off supplemental oxygen on 7/20. Respiratory culture growing candida glabrata, suspected contaminant.   - Received cefepime from 7/14 - 7/17, Flagyl 7/14 - 7/18, and Ceftriaxone 7/18 to present, continue for now given bacteremia, anticipating 14 days total of antibiotics from [facility] blood culture on 7/13    Mild FARNAZ - secondary to septic shock, improved   Baseline Cr 1. Cr peaked at 1.39. now improved. Monitor      Hypokalemia  Hypophosphatemia  Hypomagnesemia   - Replace per protocol     Paroxysmal atrial fibrillation, converted to NSR with IV amiodarone infusion  - Amiodarone discontinued. Now on low dose toprol XL  - Hold IV heparin AM 7/24 due to concern for GI bleed   - Eventually would start on DOAC once hemoglobin stable and no evidence of GI bleeding      Left  "Femoral/popliteal DVT, diagnosed 7/13  - IR did not recommend thrombectomy.  - IV Heparin currently on hold due to anemia   - May need to consider IVC filter prior to discharge if unable to safely anticoagulate      Hyperlipidemia  - Continue atorvastatin     CAD s/p  remote PCI (2005)  - Aspirin was held for ERCP, resumed on 7/19, currently on hold again due to acute anemia      Essential HTN -   - PTA antihypertensives on hold. Resume as able     Mild Thrombocytopenia - due to severe sepsis  Now resolved     Chronic systolic CHF with reduced EF of 40%  Appears euvolemic. Monitor volume status. Weight up from admission from 83 to 93 kg   - Chest x-ray 7/18 showed no pulmonary edema  - May need to consider diuresis if swelling not improving or if patient becomes hypoxic      Prediabetes, HbA1c 5.8%, with stress and steroid induced hyperglycemia  Has completed stress dose steroids  - has not required sliding scale insulin so currently on hold           Diet: Room Service  Snacks/Supplements Adult: Other; Vanilla Ensure at 10am and 2pm  (RD); Between Meals  Regular Diet Adult  NPO for Procedure/Surgery per Anesthesia Guidelines Except for: Meds; Clear liquids before procedure/surgery: ADULT (Age GREATER than or Equal to 18 years) - Clear liquids 2 hours before procedure/surgery    DVT Prophylaxis:heparin gtt, aspirin   Mar Catheter: Not present  Lines: None       Cardiac Monitoring: ACTIVE order. Indication: ICU  Code Status: Full Code      Clinically Significant Risk Factors          # Hyperchloremia: Highest Cl = 108 mmol/L in last 2 days, will monitor as appropriate          # Hypoalbuminemia: Lowest albumin = 1.9 g/dL at 7/26/2025  6:18 AM, will monitor as appropriate                # Overweight: Estimated body mass index is 29.96 kg/m  as calculated from the following:    Height as of this encounter: 1.778 m (5' 10\").    Weight as of this encounter: 94.7 kg (208 lb 12.8 oz).   # Moderate Malnutrition: based on " nutrition assessment and treatment provided per dietitian's recommendations.      # Financial/Environmental Concerns: none         Social Drivers of Health            Disposition Plan     Medically Ready for Discharge: Anticipated in 2-4 Days         Scott Mcnamara DO  Hospitalist Service  [facility]  Securely message with [employer] (more info)  Text page via [facility] Paging/Directory   ______________________________________________________________________    Interval History     - No acute events overnight  - Continues to have melanotic stools  - Starting to feel a bit lightheaded when ambulating  - Denies nausea, vomiting, abdominal pain, fevers, or chills  - No other acute concerns at this time.    Physical Exam   Vital Signs: Temp: 99.1  F (37.3  C) Temp src: Oral BP: 125/69 Pulse: 91   Resp: 18 SpO2: 95 % O2 Device: None (Room air)    Weight: 208 lbs 12.8 oz    Constitutional: awake, alert, cooperative, no apparent distress, and appears stated age. Pale   Eyes: Lids and lashes normal, pupils equal, round and reactive to light   ENT: Normocephalic, without obvious abnormality, atraumatic   Respiratory: No increased work of breathing, good air exchange, clear to auscultation bilaterally, no crackles or wheezing  Cardiovascular: Normal apical impulse, regular rate and rhythm, normal S1 and S2, no S3 or S4, and no murmur noted, 1+ bilateral lower extremity edema extending to knee. Swelling in arms/hands, slightly improved  GI: Normal bowel sounds, soft, non-distended, non-tender, no masses palpated, no hepatosplenomegally  Skin: no redness, warmth, or swelling  Musculoskeletal: No deformities   Neurologic: Awake, alert, oriented to name, place and time.  Cranial nerves II-XII are grossly intact.  Motor is 5 out of 5 bilaterally.     Neuropsychiatric: General: normal, calm, and normal eye contact    Medical Decision Making       52 MINUTES SPENT BY ME on the date of service doing chart review,  history, exam, documentation & further activities per the note.      Data   ------------------------- PAST 24 HR DATA REVIEWED -----------------------------------------------    I have personally reviewed the following data over the past 24 hrs:    7.8  \   7.6 (L); 7.6 (L)   / 239     138 106 16.1 /  104 (H)   3.6 23 1.05 \     ALT: 72 (H) AST: 101 (H) AP: 424 (H) TBILI: 0.9   ALB: 2.0 (L) TOT PROTEIN: 5.0 (L) LIPASE: N/A       Imaging results reviewed over the past 24 hrs:   Recent Results (from the past 24 hours)   US Abdomen Limited w Abdomen Doppler Limited    Narrative    EXAM: US ABDOMEN LIMITED W ABDOMEN DOPPLER LIMITED  LOCATION: [facility]  DATE: 7/26/2025    INDICATION: evaluate gall bladder, portal vein, look for ascites. Choledocholithiasis. Status post ERCP 716.  COMPARISON: CT 7/16/2025.  TECHNIQUE: Limited abdominal ultrasound. Color flow with spectral Doppler and waveform analysis performed.     FINDINGS: Overall difficult study due to bowel gas.    GALLBLADDER: Poorly seen due to bowel gas.     BILE DUCTS: No biliary dilatation. The common duct measures 7 mm.    LIVER: Fluid pocket adjacent to the right lobe of the liver causing some slight liver distortion. On prior CT there was ascites in this location this appears almost more subcapsular. No focal mass.     RIGHT KIDNEY: No hydronephrosis. 5 cm cyst requiring no follow-up.     PANCREAS: Poorly seen due to bowel gas.    Mild ascites and small right pleural effusion.    ABDOMINAL DUPLEX: The hepatic artery, hepatic veins, IVC, portal veins, and splenic vein are patent with flow in the normal direction.       Impression    IMPRESSION:  1.  Very difficult exam due to bowel gas.    2.  Heterogeneous fluid collection over the right lobe of the liver is indeterminant. Concern for possible subcapsular hematoma versus more loculated ascites. Consider follow-up CT with IV contrast.    3.  Mild ascites and small right pleural  effusion.  4.  Normal abdominal liver duplex.

## 2025-07-27 NOTE — PROVIDER NOTIFICATION
"Paged via Zilyo text to on call-    \"pls review abd US results done this katharine. poss hematoma?? pt on asa and hep gtt some black known BMs earlier on the day. Hgb been trending down. has NO repeat Hgb if able to order. low grade temps in the katharine, BPs ok\"    Orders received-    Stop hep gtt/aspirin, hgb q12h, monitor VS            "

## 2025-07-27 NOTE — PROVIDER NOTIFICATION
Patient is requesting robitussin for his cough can we get some ordered please?     Ordered     Addendum:  Patient had a black soft stool this morning no blood in it.     GI reconsulted for EGD
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
## 2025-07-27 NOTE — SIGNIFICANT EVENT
Significant Event Note    Time of event: 6:15 PM July 27, 2025    Description of event:  I was notified of the following results:    CT Abd/Pelvis:  Large subcapsular fluid collection along the lateral right hepatic lobe, measuring up to 10 cm in greatest thickness. This demonstrates no evidence of active bleeding on this single portal venous phase of contrast. Although there is no internal high attenuation to suggest overt evidence of blood products, acute hemorrhage would be difficult to exclude, particularly given the presence of heterogeneous internal echoes on yesterday's sonographic examination.    Radiology recommended obtaining a dedicated CTA GI bleed for further evaluation of the fluid around the liver.    Plan:  CTA GI Bleed protocol ordered.    Discussed with: bedside nurse    Yamilet Arriaga MD

## 2025-07-27 NOTE — PLAN OF CARE
Goal Outcome Evaluation:         DATE & TIME: 7/26/25 3pm-11pm                   Cognitive Concerns/ Orientation : A&Ox4   BEHAVIOR & AGGRESSION TOOL COLOR: green  ABNL VS/O2: Room air, temp max 100.6  MOBILITY: A1  PAIN MANAGMENT: denies  DIET: NPO for abs US, then regular  BOWEL/BLADDER: continent, BM-  ABNL LAB/BG: Hgb 7.2->7.9 (q12H),  heparin drip at 1700units/hr until 2112  DRAIN/DEVICES: L PIV  TELEMETRY RHYTHM: SR with BBB  Other: abnormal abd US results and hep gtt/aspirin on hold  D/C DATE: pending

## 2025-07-27 NOTE — PLAN OF CARE
Goal Outcome Evaluation:  Pt is Aox4, up SBA, denies pain. IV abx continued. GI following, CT of abdomen done this shift. Phos and mag protocols replaced both, redraws in morning. Discharge pending.

## 2025-07-27 NOTE — PLAN OF CARE
Orientation: A&Ox4  Aggression Stop Light: green   Activity: SBA GBW  Diet/BS Checks: regular   Tele:  SR  IV Access/Drains: L PIV SL  Pain Management: denies   Abnormal VS/Results: VSS on RA   Hgb: 7.9  Bowel/Bladder: incontinent at times   Skin/Wounds: scattered bruising/ scabbing   Consults: GI, PT/OT, SW   D/C Disposition: pending   Other Info:

## 2025-07-27 NOTE — SIGNIFICANT EVENT
Significant Event Note    Time of event: 8:55 PM July 26, 2025    Description of event:  I was notified of the following:    --Hgb downtrending  --Patient with recurrent melena    Plan:  Hold Heparin and Aspirin  H/H q12 hours.  Monitor vitals    Discussed with: bedside nurse    Yamilet Arriaga MD

## 2025-07-28 ENCOUNTER — APPOINTMENT (OUTPATIENT)
Dept: CT IMAGING | Facility: CLINIC | Age: 80
DRG: 853 | End: 2025-07-28
Attending: INTERNAL MEDICINE
Payer: MEDICARE

## 2025-07-28 LAB
ALBUMIN SERPL BCG-MCNC: 1.9 G/DL (ref 3.5–5.2)
ALP SERPL-CCNC: 454 U/L (ref 40–150)
ALT SERPL W P-5'-P-CCNC: 85 U/L (ref 0–70)
ANION GAP SERPL CALCULATED.3IONS-SCNC: 10 MMOL/L (ref 7–15)
AST SERPL W P-5'-P-CCNC: 111 U/L (ref 0–45)
BILIRUB SERPL-MCNC: 1 MG/DL
BUN SERPL-MCNC: 12.9 MG/DL (ref 8–23)
CALCIUM SERPL-MCNC: 7.4 MG/DL (ref 8.8–10.4)
CHLORIDE SERPL-SCNC: 106 MMOL/L (ref 98–107)
CREAT SERPL-MCNC: 1.02 MG/DL (ref 0.67–1.17)
EGFRCR SERPLBLD CKD-EPI 2021: 75 ML/MIN/1.73M2
ERYTHROCYTE [DISTWIDTH] IN BLOOD BY AUTOMATED COUNT: 15.5 % (ref 10–15)
GLUCOSE SERPL-MCNC: 95 MG/DL (ref 70–99)
HCO3 SERPL-SCNC: 22 MMOL/L (ref 22–29)
HCT VFR BLD AUTO: 21.7 % (ref 40–53)
HGB BLD-MCNC: 7.2 G/DL (ref 13.3–17.7)
HGB BLD-MCNC: 7.4 G/DL (ref 13.3–17.7)
MAGNESIUM SERPL-MCNC: 1.6 MG/DL (ref 1.7–2.3)
MCH RBC QN AUTO: 29.3 PG (ref 26.5–33)
MCHC RBC AUTO-ENTMCNC: 33.2 G/DL (ref 31.5–36.5)
MCV RBC AUTO: 88 FL (ref 78–100)
MCV RBC AUTO: 89 FL (ref 78–100)
PHOSPHATE SERPL-MCNC: 2.4 MG/DL (ref 2.5–4.5)
PLATELET # BLD AUTO: 241 10E3/UL (ref 150–450)
POTASSIUM SERPL-SCNC: 3.6 MMOL/L (ref 3.4–5.3)
PROT SERPL-MCNC: 5 G/DL (ref 6.4–8.3)
RBC # BLD AUTO: 2.46 10E6/UL (ref 4.4–5.9)
SODIUM SERPL-SCNC: 138 MMOL/L (ref 135–145)
WBC # BLD AUTO: 6.8 10E3/UL (ref 4–11)

## 2025-07-28 PROCEDURE — 250N000009 HC RX 250: Performed by: HOSPITALIST

## 2025-07-28 PROCEDURE — 83735 ASSAY OF MAGNESIUM: CPT | Performed by: STUDENT IN AN ORGANIZED HEALTH CARE EDUCATION/TRAINING PROGRAM

## 2025-07-28 PROCEDURE — 85018 HEMOGLOBIN: CPT | Performed by: INTERNAL MEDICINE

## 2025-07-28 PROCEDURE — 85027 COMPLETE CBC AUTOMATED: CPT | Performed by: STUDENT IN AN ORGANIZED HEALTH CARE EDUCATION/TRAINING PROGRAM

## 2025-07-28 PROCEDURE — 80053 COMPREHEN METABOLIC PANEL: CPT | Performed by: INTERNAL MEDICINE

## 2025-07-28 PROCEDURE — 120N000001 HC R&B MED SURG/OB

## 2025-07-28 PROCEDURE — 36415 COLL VENOUS BLD VENIPUNCTURE: CPT | Performed by: STUDENT IN AN ORGANIZED HEALTH CARE EDUCATION/TRAINING PROGRAM

## 2025-07-28 PROCEDURE — 250N000009 HC RX 250: Performed by: INTERNAL MEDICINE

## 2025-07-28 PROCEDURE — 84100 ASSAY OF PHOSPHORUS: CPT | Performed by: STUDENT IN AN ORGANIZED HEALTH CARE EDUCATION/TRAINING PROGRAM

## 2025-07-28 PROCEDURE — 250N000011 HC RX IP 250 OP 636: Performed by: HOSPITALIST

## 2025-07-28 PROCEDURE — 250N000011 HC RX IP 250 OP 636: Performed by: INTERNAL MEDICINE

## 2025-07-28 PROCEDURE — 250N000013 HC RX MED GY IP 250 OP 250 PS 637: Performed by: INTERNAL MEDICINE

## 2025-07-28 PROCEDURE — 250N000013 HC RX MED GY IP 250 OP 250 PS 637: Performed by: STUDENT IN AN ORGANIZED HEALTH CARE EDUCATION/TRAINING PROGRAM

## 2025-07-28 PROCEDURE — 250N000011 HC RX IP 250 OP 636: Performed by: STUDENT IN AN ORGANIZED HEALTH CARE EDUCATION/TRAINING PROGRAM

## 2025-07-28 PROCEDURE — 36415 COLL VENOUS BLD VENIPUNCTURE: CPT | Performed by: INTERNAL MEDICINE

## 2025-07-28 PROCEDURE — 99222 1ST HOSP IP/OBS MODERATE 55: CPT | Performed by: SURGERY

## 2025-07-28 PROCEDURE — 258N000003 HC RX IP 258 OP 636: Performed by: HOSPITALIST

## 2025-07-28 PROCEDURE — 74174 CTA ABD&PLVS W/CONTRAST: CPT

## 2025-07-28 PROCEDURE — 99232 SBSQ HOSP IP/OBS MODERATE 35: CPT | Performed by: HOSPITALIST

## 2025-07-28 RX ORDER — IOPAMIDOL 755 MG/ML
127 INJECTION, SOLUTION INTRAVASCULAR ONCE
Status: COMPLETED | OUTPATIENT
Start: 2025-07-28 | End: 2025-07-28

## 2025-07-28 RX ORDER — MAGNESIUM SULFATE HEPTAHYDRATE 40 MG/ML
2 INJECTION, SOLUTION INTRAVENOUS ONCE
Status: COMPLETED | OUTPATIENT
Start: 2025-07-28 | End: 2025-07-28

## 2025-07-28 RX ADMIN — IOPAMIDOL 127 ML: 755 INJECTION, SOLUTION INTRAVENOUS at 13:52

## 2025-07-28 RX ADMIN — METOPROLOL SUCCINATE 12.5 MG: 25 TABLET, EXTENDED RELEASE ORAL at 11:13

## 2025-07-28 RX ADMIN — ATORVASTATIN CALCIUM 40 MG: 40 TABLET, FILM COATED ORAL at 11:13

## 2025-07-28 RX ADMIN — SODIUM CHLORIDE 82 ML: 9 INJECTION, SOLUTION INTRAVENOUS at 13:52

## 2025-07-28 RX ADMIN — PIPERACILLIN AND TAZOBACTAM 4.5 G: 4; .5 INJECTION, POWDER, FOR SOLUTION INTRAVENOUS at 16:39

## 2025-07-28 RX ADMIN — SODIUM PHOSPHATE, MONOBASIC, MONOHYDRATE AND SODIUM PHOSPHATE, DIBASIC, ANHYDROUS 15 MMOL: 142; 276 INJECTION, SOLUTION INTRAVENOUS at 08:40

## 2025-07-28 RX ADMIN — Medication 1 LOZENGE: at 21:39

## 2025-07-28 RX ADMIN — PIPERACILLIN AND TAZOBACTAM 4.5 G: 4; .5 INJECTION, POWDER, FOR SOLUTION INTRAVENOUS at 08:51

## 2025-07-28 RX ADMIN — PIPERACILLIN AND TAZOBACTAM 4.5 G: 4; .5 INJECTION, POWDER, FOR SOLUTION INTRAVENOUS at 03:10

## 2025-07-28 RX ADMIN — MAGNESIUM SULFATE HEPTAHYDRATE 2 G: 40 INJECTION, SOLUTION INTRAVENOUS at 07:43

## 2025-07-28 RX ADMIN — PANTOPRAZOLE SODIUM 40 MG: 40 INJECTION, POWDER, FOR SOLUTION INTRAVENOUS at 08:51

## 2025-07-28 RX ADMIN — PIPERACILLIN AND TAZOBACTAM 4.5 G: 4; .5 INJECTION, POWDER, FOR SOLUTION INTRAVENOUS at 21:42

## 2025-07-28 RX ADMIN — PANTOPRAZOLE SODIUM 40 MG: 40 INJECTION, POWDER, FOR SOLUTION INTRAVENOUS at 21:41

## 2025-07-28 ASSESSMENT — ACTIVITIES OF DAILY LIVING (ADL)
ADLS_ACUITY_SCORE: 38
ADLS_ACUITY_SCORE: 38
ADLS_ACUITY_SCORE: 36
ADLS_ACUITY_SCORE: 36
ADLS_ACUITY_SCORE: 38
ADLS_ACUITY_SCORE: 36
ADLS_ACUITY_SCORE: 38
ADLS_ACUITY_SCORE: 36
ADLS_ACUITY_SCORE: 38
ADLS_ACUITY_SCORE: 36
ADLS_ACUITY_SCORE: 38
ADLS_ACUITY_SCORE: 36
ADLS_ACUITY_SCORE: 38

## 2025-07-28 NOTE — CONSULTS
[facility]    General Surgery Consultation    Date of Admission:  7/13/2025  Date of Consult (When I saw the patient): 07/28/25    Assessment & Plan   Kristofer Montana is a 80 year old male who was admitted on 7/13/2025. I was asked to see the patient for possible cholecystitis.  Patient presented to the hospital with probable cholangitis.  Underwent ERCP and stone extraction.  Developed bleeding afterwards.  Recent CT scan showed a sizable subcapsular fluid collection on the right side of the liver.  Unknown if this is infectious, seroma, hematoma, or possible communication with the gallbladder.  Patient is going down for CT angiogram to look for signs of bleeding.  We will have to discuss appropriate time for cholecystectomy.  Patient favors having it done during this hospital admission.    Active Problems:    Choledocholithiasis    Cholecystitis    DVT (deep venous thrombosis) (H)      Scott Novoa MD, MD    Code Status    Full Code    Reason for Consult   Reason for consult: Possible cholecystitis, subcapsular hepatic fluid collection    Primary Care Physician   [facility]    Chief Complaint   Abdominal pain    History of Present Illness   Kristofer Montana is a 80 year old male who presents with abdominal pain.  He presented a little over a week ago.  Was found to have probable choledocholithiasis.  Underwent ERCP with stone extraction.  Had been doing okay but developed a GI bleed requiring transfusion.  Most recent CT scan showed a subcapsular fluid collection on the liver and we are asked to help evaluate.    Past Medical History   I have reviewed this patient's medical history and updated it with pertinent information if needed.   Past Medical History:   Diagnosis Date    CAD (coronary artery disease)     History of CVA (cerebrovascular accident)     HTN (hypertension)     Ischemic cardiomyopathy        Past Surgical History   I have reviewed this patient's surgical  history and updated it with pertinent information if needed.  Past Surgical History:   Procedure Laterality Date    ENDOSCOPIC RETROGRADE CHOLANGIOPANCREATOGRAM N/A 7/16/2025    Procedure: ENDOSCOPIC RETROGRADE CHOLANGIOPANCREATOGRAPHY, SHPYNCTEROTOMY, DILATION, STONE EXTRACTION;  Surgeon: Aleyda Lee MD;  Location:  OR    ENDOSCOPIC ULTRASOUND UPPER GASTROINTESTINAL TRACT (GI) N/A 7/16/2025    Procedure: ENDOSCOPIC ULTRASOUND, ESOPHAGOSCOPY / UPPER GASTROINTESTINAL TRACT (GI);  Surgeon: Aleyda Lee MD;  Location:  OR       Prior to Admission Medications   Prior to Admission Medications   Prescriptions Last Dose Informant Patient Reported? Taking?   aspirin 81 MG EC tablet 7/12/2025  Yes Yes   Sig: Take 81 mg by mouth daily.   atorvastatin (LIPITOR) 40 MG tablet 7/12/2025  Yes Yes   Sig: Take 40 mg by mouth daily.   losartan (COZAAR) 50 MG tablet 7/12/2025  Yes Yes   Sig: Take 50 mg by mouth daily.   metoprolol succinate ER (TOPROL XL) 25 MG 24 hr tablet 7/12/2025  Yes Yes   Sig: Take 25 mg by mouth daily.   multivitamin w/minerals (MULTI-VITAMIN) tablet Past Week  Yes Yes   Sig: Take 1 tablet by mouth daily.   omeprazole (PRILOSEC OTC) 20 MG EC tablet 7/12/2025  Yes Yes   Sig: Take 20 mg by mouth daily.      Facility-Administered Medications: None     Allergies   Allergies   Allergen Reactions    Amoxicillin Diarrhea       Social History   I have reviewed this patient's social history and updated it with pertinent information if needed. Kristofer Montana  reports that he has never smoked. He has been exposed to tobacco smoke. He has never used smokeless tobacco.    Family History   Family history reviewed with patient and is noncontributory.    Review of Systems   The 10 point Review of Systems is negative other than noted in the HPI or here.  Does not complain of abdominal pain or nausea    Physical Exam   Temp: 98.6  F (37  C) Temp src: Oral BP: 116/66 Pulse: 89   Resp: 18 SpO2: 94 % O2  Device: None (Room air)    Vital Signs with Ranges  Temp:  [98.6  F (37  C)-99  F (37.2  C)] 98.6  F (37  C)  Pulse:  [85-90] 89  Resp:  [16-18] 18  BP: (114-136)/(60-68) 116/66  SpO2:  [94 %-97 %] 94 %  208 lbs 12.8 oz    Pleasant older gentleman in no distress.  Patient has a pleasant affect and communicates well.   Pupils equal round and reactive to light.   No cervical lymphadenopathy or thyromegaly.   Lung fields clear, breathing comfortably.   Heart normal sinus rhythm.  No murmurs rubs or gallops.  Abdomen soft, nontender, nondistended.  Skin warm, dry.  No obvious rashes or lesions.      Data   CT scan personally reviewed by me.  Possible communication between the fluid collection in the dome of the gallbladder.

## 2025-07-28 NOTE — PROGRESS NOTES
Brief Provider Note:    Reviewed CT findings concerning for subcapsular fluid collection, cannot exclude hemorrhage. Peritoneal enhancement could be suggestive of peritonitis. Distended gallbladder with mild pericholecystic inflammatory changes which may be sequela of recent ERCP, though acute cholecystitis difficult to exclude. Patient completed a course of ceftriaxone today. Has reported amoxicillin allergy causing diarrhea.     Plan:  - Appreciate Dr. Arriaga ordering CTA GI bleed protocol  - Continue serial hemoglobin checks Q12h or sooner if change in hemodynamic stability  - Empiric Zosyn in setting of possible peritonitis and cholecystitis   - Appreciate GI recommendations regarding CT findings, tentative plan for EGD in AM, NPO at midnight  - Will re-consult general surgery to comment on possible cholecystitis, although subcapsular fluid collection may complicate gallbladder procedure    Scott Mcnamara, DO

## 2025-07-28 NOTE — PROGRESS NOTES
Shift: [id number]    Summary/diagnosis: Fevers, abd pain. Gallstones status post ERCP - aspirated during procedure and intubated in ICU. Extubated 7/19. L DVT. IV Heparin on hold for suspected GI bleed.   VS/O2: VSS on room air  Pain: Denies  Orientation: A&Ox4  Activity: SBA with walker  GI/: Voiding in urinal. BM x1 overnight, stool formed, brown in color.  Diet: NPO since midnight  Drains/Devices: PIV SL  Skin: Scattered bruises  Tests/Procedures/Consults: Abd CT completed yesterday. Based off results, CTA GI bleed, Zosyn, and gen surgery consult ordered. Plan for EGD today.  Discharge: TBD  Other:

## 2025-07-28 NOTE — PROGRESS NOTES
"[facility]  [facility]  Gastroenterology Progress note    Interval History:      No further melena.  Patient reports brown stool this am.  Results of CT noted, plan for CTA today to further evaluate for signs of active bleeding.      Vital Signs:      /66 (BP Location: Right arm)   Pulse 85   Temp 99  F (37.2  C) (Oral)   Resp 16   Ht 1.778 m (5' 10\")   Wt 94.7 kg (208 lb 12.8 oz)   SpO2 95%   BMI 29.96 kg/m    Temp (24hrs), Av.9  F (37.2  C), Min:98.7  F (37.1  C), Max:99  F (37.2  C)    Patient Vitals for the past 72 hrs:   Weight   25 0707 94.7 kg (208 lb 12.8 oz)   25 0642 94.7 kg (208 lb 12.8 oz)       Intake/Output Summary (Last 24 hours) at 2025 0734  Last data filed at 2025 0606  Gross per 24 hour   Intake 580 ml   Output 2125 ml   Net -1545 ml         Constitutional: NAD, comfortable  Cardiovascular: RRR, normal S1, S2   Respiratory: CTAB  Abdomen: soft, non-tender, nondistended    Additional Comments:  ROS, FH, SH: See initial GI consult for details.    Laboratory Data:  Recent Labs   Lab Test 25  0551 25  1839 25  0702 25  2231 25  0618   WBC 6.8  --  7.8  --  9.5   HGB 7.2* 7.5* 7.6*  7.6*   < > 7.2*   MCV 88 89 89  89   < > 89     --  239  --  208    < > = values in this interval not displayed.     Recent Labs   Lab Test 25  0551 25  0702 25  0618    138 139   POTASSIUM 3.6 3.6 3.4   CHLORIDE 106 106 108*   CO2 22 23 24   BUN 12.9 16.1 18.4   CR 1.02 1.05 1.06   ANIONGAP 10 9 7   PAUL 7.4* 7.4* 7.2*     Recent Labs   Lab Test 25  0551 25  0702 25  0618 25  0548 25  0417 25  2327   ALBUMIN 1.9* 2.0* 1.9*   < > 2.1*  --    BILITOTAL 1.0 0.9 1.0   < > 1.1  --    DBIL  --   --   --   --  0.58*  --    ALT 85* 72* 48   < > 14  --    * 101* 56*   < > 18  --    ALKPHOS 454* 424* 319*   < > 120  --    PROTEIN  --   --   --   --   --  30* "    < > = values in this interval not displayed.         Assessment:  81 yo male with presented 7/13 for fever, chills, and RUQ abdominal pain.  Found to have choledocholithiasis and E. Coli bacteremia with occlusive DVT of the left femoral vein, non-occlusive DVT in the left popliteal vein.  He underwent EUS/ERCP with sphincterotomy and removal of stones 7/16.  Procedure complicated by aspiration event with pneumonia requiring intubation and admission to ICU.  CT 7/17 with residual 0.3 cm gallstone in the CBD with persistent mild dilation of the CBD at 1.1 cm.  He was noted to have melena and a drop in hemoglobin 7/20.  He was again noted to have blood in the stool on 7/22.  Heparin and aspirin were resumed 7/23 with a drop in hemoglobin requiring transfusion but no signs of active bleeding.    We are now reconsulted for an episode of melena.  Hemoglobin downtrending since transfusion 7/24, currently 7.2.  US 7/26 was difficult due to bowel gas.  Heterogenous fluid collection over the right lobe of the liver is indeterminate.  Concern for possible subcapsular hematoma vs more loculated ascites.  Mild ascites and small right pleural effusion.  Normal duplex.    CT A/P with large subcapsular fluid collection along the lateral right hepatic lobe, measuring up to 10 cm in greatest thickness.  No evidence of active bleeding, however, acute hemorrhage is difficult to exclude.  Small volume ascites, with subtle peritoneal enhancement involving fluid at the dependent pelvis, possibly secondary to developing peritonitis.  Mildly distended gallbladder, with mild pericholecystic inflammatory change.  May reflect sequela of recent ERCP.  Diffuse fatty infiltration of the pancreas, with subtle peripancreatic inflammatory fat stranding.  Multifocal bronchiolitis.  Small right pleural effusion.  Ca 7.4.  Mg 1.6.  Albumin 1.9.  , ALT 85, alk phos 454, total bilirubin 1.0.  Hemoglobin 7.2.  Platelets 241.  Patient reports no  further evidence of active GI bleeding.  Bowel movement this am was brown with no melena or hematochezia.  Plan:  -  CTA GI bleed ordered to further evaluate for active bleeding around the liver.  -  Monitor H/H; transfuse prn.  -  NPO.  -  Pantoprazole 40 mg BID.  -  Continue to hold heparin and aspirin.  -  Hold off on scheduling EGD given no active bleed and scheduled for CTA.    Total Time Spent: 15 minutes      AMI Matta  [facility]  Office:  [phone] call if needed after 5PM  Cell:  [phone], not available after 5PM at this number

## 2025-07-28 NOTE — PROGRESS NOTES
[facility]    Medicine Progress Note - Hospitalist Service    Date of Admission:  7/13/2025    Assessment & Plan     Kristofer Montana is a 79 y/o male with Hx of HTN, HLD, HFrEF (LVEF 40%), CAD (remote PCI 2005), CVA (2013, no residual weakness/deficits) [facility] with 2 days of fever, chills and RUQ pain.      CT C/A/P showed choledocholithiasis and mild bile duct dilation and possible acute cholecystitis. He was also found to have occlusive DVT in L femoral vein, nonocclusive DVT in L popliteal vein. He was started on iv heparin and transferred to [facility].      [facility] grew E coli. On 7/14, he went into A fib with RVR and was started on amiodarone infusion. Concerted to NSR. On 7/15, he developed acute hypoxic resp failure and was started on BIPAP. Weaned off by 7/16.     He underwent EUS/ERCP with sphincterotomy with removal of stones on 7/16. No stent was placed. He had an aspiration event during the procedure. He was intubated and admitted to ICU.     CT 7/17 showed residual 0.3 cm gallstone in CBD, persistent mild CBD dilatation of 1.1 cm. Mild dilatation of small bowel loops, likely ileus. Per GI, small CBD stone is expected to pass on its own and no intervention was recommended. He was extubated on 7/19 and was transferred to hospitalist service.     Per gen surg no intervention planned at this time. Cholecystostomy tube should be considered if concern for acute cholecystitis. Cholecystectomy should be considered when patient recovers from aspiration pneumonia.     On 7/20, the patient had an episode of melena and noted Hb drop from 9.7 to 8.4 to 6.7 on 7/21. Heparin infusion for paroxysmal atrial fibrillation and DVT has been on hold since 7/20. Received one unit RBC transfusion on 7/21.  Attempted to resume heparin infusion and aspirin again on 7/23 and hemoglobin dropped to 6.6 AM 7/24. Received another 1 unit RBC AM 7/24.  Attempted to resume heparin infusion and aspirin again but patient has had persistent melena since that time.      An abdominal US was obtained 7/26. Difficult exam due to bowel gas. Heterogeneous fluid collection over the right lobe of the liver is indeterminant. Concern for possible subcapsular hematoma versus more loculated ascites. Mild ascites and small right pleural effusion. Normal abdominal liver duplex.        Melena,  Acute blood loss anemia - due to GI bleed vs post sphincterotomy bleed due to anticoagulation   Loculated ascites versus subcapsular hematoma  Patient has been having intermittent melena since admission and while on therapeutic anticoagulation. Abdominal US on 7/26 concerning for loculated ascites versus subcapsular hematoma.  *Hb trend since admission 13 - 12.4 - 9.8 - 8.4 - 6.8 - (1 unit RBC) - 7.7 - 7.3 - 7.2 - 7.2 - 7.8 - 7.2 - 7.1 - 6.6 - (1 unit RBC) - 8.4 - 8.7 - 7.7 - 7.2 - 7.9 - 7.6 -7.2  - Heparin gtt and aspirin held PM 7/26 after patient had another melanotic stool   - CT scan of the abdomen and pelvis from 7/28 was concerning for large subcapsular fluid collection along the lateral right hepatic lobe measuring 10 cm in greatest thickness and they could not exclude active hemorrhage, small volume ascites, mildly distended gallbladder with mild pericholecystic inflammatory changes and may reflect sequela of recent ERCP but cannot exclude cholecystitis, diffuse fatty infiltration of the pancreas with subtle peripancreatic inflammatory fat stranding, multifocal bronchiolitis and small right pleural effusion  -CTA GI has been ordered to rule out active bleeding and was delayed given that patient had received contrast on 7/27 and given that he was hemodynamically stable as per radiologist it was wise to wait for 24 hours to prevent contrast load  -No evidence of any black stool and hemodynamically has been stable and [facility] will hold on for EGD for now and will wait on the  results of the CTA GI bleed scan  - Continue to trend hemoglobin, Q12H  - Likely to transition to DOAC once hgb stable while on heparin gtt plus aspirin     Septic shock -resolved due to E coli bacteremia secondary to choledocholithiasis with ascending cholangitis, s/p EUS/ERCP with sphincterotomy with removal of stones on 7/16  Possible acute cholecystitis  Mild ascites   Fluid collection over liver, subcapsular hematoma versus loculated ascites   -Now weaned off Vasopressors and stress dose steroids.  - On 7/17 found to have retained 0.3 cm CBD stone which is expected to pass in its own and no intervention was recommended by [facility].   - Gen surgery did not feel patient had acute cholecystitis. Recommended percutaneous cholecystostomy tube if needed. Will eventually need elective cholecystectomy once patient recovers from acute illness.   -CT A/P 7/16 had shown large loculated perihepatic/subdiaphragmatic fluid collection which decreased in size following ERCP, but increased fluid in abdomen with relative hyperdensity compared to simple ascites.  IR felt this was too small to sample percutaneously. Also pneumobilia status post ERCP.   - Received cefepime from 7/14 - 7/17, Flagyl 7/14 - 7/18, and Ceftriaxone 7/18 to 7/27  - Last day of antibiotics 7/27, completed two weeks for E. Coli bacteremia  - CT scan of the abdomen and pelvis as above concerning for large fluid collection around the subcapsular fluid around the lateral right hepatic lobe measuring up to 10 cm and patient was started on IV Zosyn  -On exam patient does not have RUQ tenderness   -CTA GI bleed scan is ordered and results are pending  -Seen by surgery and discussed with Preeti and we will see what the report of the CTA GI bleed is and then proceed based on that     Aspiration pneumonia versus CAP   Acute hypoxic respiratory failure - secondary to aspiration pneumonitis on 7/16 during EUS/ERCP-resolved  -Extubated on 7/19. Weaned off supplemental  oxygen on 7/20. Respiratory culture growing candida glabrata, suspected contaminant.   - Received cefepime from 7/14 - 7/17, Flagyl 7/14 - 7/18, and Ceftriaxone 7/18 to present, continue for now given bacteremia, anticipating 14 days total of antibiotics from [facility] on [facility]13     Mild FARNAZ - secondary to septic shock, improved   -Baseline Cr 1. Cr peaked at 1.39.   - Creatinine seems stable at 1.02     Hypokalemia-resolved  Hypophosphatemia  Hypomagnesemia   - Replace per protocol     Paroxysmal atrial fibrillation, converted to NSR with IV amiodarone infusion  - Amiodarone discontinued. Now on low dose toprol XL  - Hold IV heparin AM 7/24 due to concern for GI bleed   - Eventually would start on DOAC once hemoglobin stable and no evidence of GI bleeding      Left Femoral/popliteal DVT, diagnosed 7/13  - IR did not recommend thrombectomy.  - IV Heparin currently on hold due to anemia   - May need to consider IVC filter prior to discharge if unable to safely anticoagulate      Hyperlipidemia  - Continue atorvastatin     CAD s/p  remote PCI (2005)  - Aspirin was held for ERCP, resumed on 7/19, currently on hold again due to acute anemia      Essential HTN -   - PTA antihypertensives on hold. Resume as able     Mild Thrombocytopenia - due to severe sepsis  Now resolved     Chronic systolic CHF with reduced EF of 40%  Appears euvolemic. Monitor volume status. Weight up from admission from 83 to 93 kg   - Chest x-ray 7/18 showed no pulmonary edema  - May need to consider diuresis if swelling not improving or if patient becomes hypoxic      Prediabetes, HbA1c 5.8%, with stress and steroid induced hyperglycemia  Has completed stress dose steroids  - has not required sliding scale insulin so currently on hold           Diet: Room Service  Snacks/Supplements Adult: Other; Vanilla Ensure at 10am and 2pm  (RD); Between Meals  NPO for Procedure/Surgery per Anesthesia Guidelines Except for: Meds; Clear liquids  "before procedure/surgery: ADULT (Age GREATER than or Equal to 18 years) - Clear liquids 2 hours before procedure/surgery    DVT Prophylaxis: Pneumatic Compression Devices  Mar Catheter: Not present  Lines: None     Cardiac Monitoring: ACTIVE order. Indication: ICU  Code Status: Full Code      Clinically Significant Risk Factors             # Hypomagnesemia: Lowest Mg = 1.6 mg/dL in last 2 days, will replace as needed   # Hypoalbuminemia: Lowest albumin = 1.9 g/dL at 7/28/2025  5:51 AM, will monitor as appropriate                # Overweight: Estimated body mass index is 29.96 kg/m  as calculated from the following:    Height as of this encounter: 1.778 m (5' 10\").    Weight as of this encounter: 94.7 kg (208 lb 12.8 oz).   # Moderate Malnutrition: based on nutrition assessment and treatment provided per dietitian's recommendations.    # Financial/Environmental Concerns: none         Social Drivers of Health            Disposition Plan     Medically Ready for Discharge: Anticipated in 2-4 Days, will see what the results of the CTA GI bleed is             Curt Wahl MD  Hospitalist Service  [facility]  Securely message with [employer] (more info)  Text page via [employer] Paging/Directory     This note was completed in part using dictation via the Dragon voice recognition software. Some word and grammatical errors may occur and must be interpreted in the appropriate clinical context. If there are any questions pertaining to this issue, please contact me for further clarification.    ______________________________________________________________________    Interval History     -Reviewed events of overnight and-no evidence of any fever overnight and he has been on room  - No evidence of any blood in the stool and no melena  - Denies any epigastric discomfort  - He is aware that he is waiting for the CTA GI bleed    Physical Exam   Vital Signs: Temp: 98.6  F (37  C) Temp src: Oral BP: 116/66 Pulse: " 89   Resp: 18 SpO2: 94 % O2 Device: None (Room air)    Weight: 208 lbs 12.8 oz        General: here x 3  HEENT: Head is atraumatic, normocephalic.    Neck: Neck is supple she is  Respiratory: Lungs are cShe near to auscultation bilaterally with no wheeze or crackles   Cardiovascular: Regular rate , S1 and S2 normal with no murmer or rubs or gallops  Abdomen:   soft , non tender , non distended and bowel sound present   Skin: No skin rashes or lesions to inspection or palpation.  Neurologic:  no focal deficit  Musculoskeletal: Normal Range of motion over upper and lower extremities bilaterally   Psychiatric: cooperative      Medical Decision Making       Time spent in care of patient is 45 minutes and I reviewed labs and discussed plan of care in detail with patient and nursing staff      Data     I have personally reviewed the following data over the past 24 hrs:    6.8  \   7.2 (L)   / 241     138 106 12.9 /  95   3.6 22 1.02 \     ALT: 85 (H) AST: 111 (H) AP: 454 (H) TBILI: 1.0   ALB: 1.9 (L) TOT PROTEIN: 5.0 (L) LIPASE: N/A       Imaging results reviewed over the past 24 hrs:   Recent Results (from the past 24 hours)   CT Abdomen Pelvis w Contrast    Narrative    EXAM: CT ABDOMEN PELVIS W CONTRAST  LOCATION: [facility]  DATE: 7/27/2025    INDICATION: Indeterminate perihepatic fluid collection on ultrasound 7/26/2025; anemia.  COMPARISON: CT abdomen pelvis 7/16/2025; right upper quadrant ultrasound 7/26/2025.  TECHNIQUE: CT scan of the abdomen and pelvis was performed following injection of IV contrast. Multiplanar reformats were obtained. Dose reduction techniques were used.  CONTRAST: 104 mL Isovue-370.    FINDINGS:    LOWER CHEST: Multifocal bronchiolitis, predominantly involving the lingula and left lower lobe. Mild to moderate dependent atelectatic change, greatest within the right lower lobe. There is some associated compression from an adjacent small right pleural    effusion.    Mild left ventricular enlargement. LAD stent. Atherosclerotic disease of coronary arteries and visualized thoracic aorta. Tiny hiatal hernia.    HEPATOBILIARY: Prominent mass effect upon the lateral right hepatic lobe, due to a large subcapsular fluid collection. This fluid collection measures simple fluid attenuation and demonstrates no evidence of active bleeding on this single portal venous   phase of contrast. In greatest thickness, this collection measures up to 10 cm, series 3 image 46. Small simple cyst of hepatic segment 4; no further follow-up recommended.      Gallbladder is mildly distended, with mild pericholecystic inflammatory change. This may reflect the sequela of recent ERCP, though acute cholecystitis difficult to exclude.    No intrahepatic or intrahepatic biliary ductal dilatation. Small amount of pneumobilia within the common duct, consistent with a recent ERCP.    PANCREAS: Diffuse fatty infiltration and subtle peripancreatic inflammatory fat stranding; correlation with laboratory markers is recommended to exclude an acute pancreatitis.    SPLEEN: Enhances normally. Normal size.    ADRENAL GLANDS: Normal.    KIDNEYS: Both kidneys enhance symmetrically, without hydronephrosis. Large, simple right renal cyst; no further follow-up recommended.    No nephroureterolithiasis.    Urinary bladder is unremarkable.    PELVIC ORGANS: Mild prostatomegaly.    BOWEL: No evidence of acute gastrointestinal inflammation or obstruction. Colonic and duodenal diverticulosis. Normal appendix.    Small volume ascites, with subtle peritoneal enhancement involving fluid at the dependent pelvis, which may be secondary to a developing peritonitis. No intraperitoneal free air.    LYMPH NODES: No suspicious abdominal or pelvic lymphadenopathy.    VASCULATURE: No abdominal aortic aneurysm. Mild atheromatous disease.    MUSCULOSKELETAL: No suspicious abnormality. Prominent lumbar levoscoliosis. Multilevel  degenerative change of the lumbar and visualized thoracic spine.    OTHER: No additionally significant abnormalities.      Impression    IMPRESSION:   1.  Large subcapsular fluid collection along the lateral right hepatic lobe, measuring up to 10 cm in greatest thickness. This demonstrates no evidence of active bleeding on this single portal venous phase of contrast. Although there is no internal high   attenuation to suggest overt evidence of blood products, acute hemorrhage would be difficult to exclude, particularly given the presence of heterogeneous internal echoes on yesterday's sonographic examination.   2.  Small volume ascites, with subtle peritoneal enhancement involving fluid at the dependent pelvis, which may be secondary to a developing peritonitis.  3.  Mildly distended gallbladder, with mild pericholecystic inflammatory change. This may reflect the sequela of recent ERCP, though acute cholecystitis difficult to exclude.  4.  Diffuse fatty infiltration of the pancreas, with subtle peripancreatic inflammatory fat stranding. Correlation with laboratory markers is recommended to exclude an acute pancreatitis.  5.  Multifocal bronchiolitis, predominantly involving the lingula and left lower lobe.  6.  Small right pleural effusion.  7.  Mild prostatomegaly.    Impression 1, 3, and 4 discussed verbally via telephone with Dr. Arriaga at 6:15 PM on 7/27/2025.

## 2025-07-28 NOTE — PROGRESS NOTES
Chart update    Reviewed the results of the CTAGI bleed and there s no evidence of any hemorrhage and questionable concern for cholecystitis and other presence of ascites. Discussed with the general surgery team and we will start him on clear and make him Np after midnight. Discuss with patient and the nurse. Patient is in agreement with line of care. We will see how your response to diet.

## 2025-07-29 ENCOUNTER — ANESTHESIA (OUTPATIENT)
Dept: SURGERY | Facility: CLINIC | Age: 80
End: 2025-07-29
Payer: MEDICARE

## 2025-07-29 ENCOUNTER — ANESTHESIA EVENT (OUTPATIENT)
Dept: SURGERY | Facility: CLINIC | Age: 80
End: 2025-07-29
Payer: MEDICARE

## 2025-07-29 LAB
ABO + RH BLD: NORMAL
ALBUMIN SERPL BCG-MCNC: 2.2 G/DL (ref 3.5–5.2)
ALP SERPL-CCNC: 495 U/L (ref 40–150)
ALT SERPL W P-5'-P-CCNC: 78 U/L (ref 0–70)
ANION GAP SERPL CALCULATED.3IONS-SCNC: 11 MMOL/L (ref 7–15)
AST SERPL W P-5'-P-CCNC: 92 U/L (ref 0–45)
BILIRUB SERPL-MCNC: 1.3 MG/DL
BLD GP AB SCN SERPL QL: NEGATIVE
BUN SERPL-MCNC: 10.5 MG/DL (ref 8–23)
CALCIUM SERPL-MCNC: 7.5 MG/DL (ref 8.8–10.4)
CHLORIDE SERPL-SCNC: 103 MMOL/L (ref 98–107)
CREAT SERPL-MCNC: 1.08 MG/DL (ref 0.67–1.17)
EGFRCR SERPLBLD CKD-EPI 2021: 70 ML/MIN/1.73M2
GLUCOSE SERPL-MCNC: 104 MG/DL (ref 70–99)
HCO3 SERPL-SCNC: 23 MMOL/L (ref 22–29)
HGB BLD-MCNC: 7.8 G/DL (ref 13.3–17.7)
HGB BLD-MCNC: 8.7 G/DL (ref 13.3–17.7)
MAGNESIUM SERPL-MCNC: 1.7 MG/DL (ref 1.7–2.3)
MAGNESIUM SERPL-MCNC: 1.8 MG/DL (ref 1.7–2.3)
MCV RBC AUTO: 89 FL (ref 78–100)
MCV RBC AUTO: 90 FL (ref 78–100)
PHOSPHATE SERPL-MCNC: 2.5 MG/DL (ref 2.5–4.5)
POTASSIUM SERPL-SCNC: 3.6 MMOL/L (ref 3.4–5.3)
PROT SERPL-MCNC: 5.6 G/DL (ref 6.4–8.3)
SODIUM SERPL-SCNC: 137 MMOL/L (ref 135–145)
SPECIMEN EXP DATE BLD: NORMAL

## 2025-07-29 PROCEDURE — 999N000141 HC STATISTIC PRE-PROCEDURE NURSING ASSESSMENT: Performed by: SURGERY

## 2025-07-29 PROCEDURE — 250N000009 HC RX 250: Performed by: STUDENT IN AN ORGANIZED HEALTH CARE EDUCATION/TRAINING PROGRAM

## 2025-07-29 PROCEDURE — 99232 SBSQ HOSP IP/OBS MODERATE 35: CPT | Performed by: HOSPITALIST

## 2025-07-29 PROCEDURE — 36415 COLL VENOUS BLD VENIPUNCTURE: CPT | Performed by: INTERNAL MEDICINE

## 2025-07-29 PROCEDURE — 250N000009 HC RX 250: Performed by: NURSE ANESTHETIST, CERTIFIED REGISTERED

## 2025-07-29 PROCEDURE — 360N000080 HC SURGERY LEVEL 7, PER MIN: Performed by: SURGERY

## 2025-07-29 PROCEDURE — 88304 TISSUE EXAM BY PATHOLOGIST: CPT | Mod: 26 | Performed by: PATHOLOGY

## 2025-07-29 PROCEDURE — 85018 HEMOGLOBIN: CPT | Performed by: INTERNAL MEDICINE

## 2025-07-29 PROCEDURE — 250N000011 HC RX IP 250 OP 636: Performed by: PHYSICIAN ASSISTANT

## 2025-07-29 PROCEDURE — 87205 SMEAR GRAM STAIN: CPT | Performed by: SURGERY

## 2025-07-29 PROCEDURE — P9045 ALBUMIN (HUMAN), 5%, 250 ML: HCPCS | Mod: JZ | Performed by: STUDENT IN AN ORGANIZED HEALTH CARE EDUCATION/TRAINING PROGRAM

## 2025-07-29 PROCEDURE — 87106 FUNGI IDENTIFICATION YEAST: CPT | Performed by: INTERNAL MEDICINE

## 2025-07-29 PROCEDURE — 47562 LAPAROSCOPIC CHOLECYSTECTOMY: CPT | Performed by: SURGERY

## 2025-07-29 PROCEDURE — 250N000013 HC RX MED GY IP 250 OP 250 PS 637: Performed by: INTERNAL MEDICINE

## 2025-07-29 PROCEDURE — 84100 ASSAY OF PHOSPHORUS: CPT | Performed by: HOSPITALIST

## 2025-07-29 PROCEDURE — 710N000009 HC RECOVERY PHASE 1, LEVEL 1, PER MIN: Performed by: SURGERY

## 2025-07-29 PROCEDURE — 83735 ASSAY OF MAGNESIUM: CPT | Performed by: INTERNAL MEDICINE

## 2025-07-29 PROCEDURE — 120N000001 HC R&B MED SURG/OB

## 2025-07-29 PROCEDURE — 250N000011 HC RX IP 250 OP 636: Performed by: NURSE ANESTHETIST, CERTIFIED REGISTERED

## 2025-07-29 PROCEDURE — 250N000009 HC RX 250: Performed by: SURGERY

## 2025-07-29 PROCEDURE — 36415 COLL VENOUS BLD VENIPUNCTURE: CPT | Performed by: PHYSICIAN ASSISTANT

## 2025-07-29 PROCEDURE — 47562 LAPAROSCOPIC CHOLECYSTECTOMY: CPT | Mod: AS | Performed by: PHYSICIAN ASSISTANT

## 2025-07-29 PROCEDURE — 87106 FUNGI IDENTIFICATION YEAST: CPT | Performed by: SURGERY

## 2025-07-29 PROCEDURE — 250N000011 HC RX IP 250 OP 636: Performed by: SURGERY

## 2025-07-29 PROCEDURE — 85018 HEMOGLOBIN: CPT | Performed by: PHYSICIAN ASSISTANT

## 2025-07-29 PROCEDURE — 250N000011 HC RX IP 250 OP 636: Performed by: STUDENT IN AN ORGANIZED HEALTH CARE EDUCATION/TRAINING PROGRAM

## 2025-07-29 PROCEDURE — 86901 BLOOD TYPING SEROLOGIC RH(D): CPT | Performed by: STUDENT IN AN ORGANIZED HEALTH CARE EDUCATION/TRAINING PROGRAM

## 2025-07-29 PROCEDURE — 82310 ASSAY OF CALCIUM: CPT | Performed by: INTERNAL MEDICINE

## 2025-07-29 PROCEDURE — 272N000001 HC OR GENERAL SUPPLY STERILE: Performed by: SURGERY

## 2025-07-29 PROCEDURE — 250N000025 HC SEVOFLURANE, PER MIN: Performed by: SURGERY

## 2025-07-29 PROCEDURE — 258N000001 HC RX 258: Performed by: SURGERY

## 2025-07-29 PROCEDURE — 250N000013 HC RX MED GY IP 250 OP 250 PS 637: Performed by: PHYSICIAN ASSISTANT

## 2025-07-29 PROCEDURE — S2900 ROBOTIC SURGICAL SYSTEM: HCPCS | Performed by: SURGERY

## 2025-07-29 PROCEDURE — 258N000003 HC RX IP 258 OP 636: Performed by: STUDENT IN AN ORGANIZED HEALTH CARE EDUCATION/TRAINING PROGRAM

## 2025-07-29 PROCEDURE — 0FT44ZZ RESECTION OF GALLBLADDER, PERCUTANEOUS ENDOSCOPIC APPROACH: ICD-10-PCS | Performed by: SURGERY

## 2025-07-29 PROCEDURE — 86923 COMPATIBILITY TEST ELECTRIC: CPT | Performed by: PHYSICIAN ASSISTANT

## 2025-07-29 PROCEDURE — 250N000011 HC RX IP 250 OP 636: Performed by: INTERNAL MEDICINE

## 2025-07-29 PROCEDURE — 83735 ASSAY OF MAGNESIUM: CPT | Performed by: HOSPITALIST

## 2025-07-29 PROCEDURE — 88304 TISSUE EXAM BY PATHOLOGIST: CPT | Mod: TC | Performed by: SURGERY

## 2025-07-29 PROCEDURE — 370N000017 HC ANESTHESIA TECHNICAL FEE, PER MIN: Performed by: SURGERY

## 2025-07-29 PROCEDURE — 87181 SC STD AGAR DILUTION PER AGT: CPT | Performed by: SURGERY

## 2025-07-29 PROCEDURE — 8E0W4CZ ROBOTIC ASSISTED PROCEDURE OF TRUNK REGION, PERCUTANEOUS ENDOSCOPIC APPROACH: ICD-10-PCS | Performed by: SURGERY

## 2025-07-29 RX ORDER — AMOXICILLIN 250 MG
1 CAPSULE ORAL 2 TIMES DAILY
Status: DISCONTINUED | OUTPATIENT
Start: 2025-07-29 | End: 2025-08-07 | Stop reason: HOSPADM

## 2025-07-29 RX ORDER — POLYETHYLENE GLYCOL 3350 17 G/17G
17 POWDER, FOR SOLUTION ORAL DAILY PRN
Status: DISCONTINUED | OUTPATIENT
Start: 2025-07-30 | End: 2025-08-07 | Stop reason: HOSPADM

## 2025-07-29 RX ORDER — MAGNESIUM OXIDE 400 MG/1
400 TABLET ORAL EVERY 4 HOURS
Status: COMPLETED | OUTPATIENT
Start: 2025-07-29 | End: 2025-07-30

## 2025-07-29 RX ORDER — PROPOFOL 10 MG/ML
INJECTION, EMULSION INTRAVENOUS PRN
Status: DISCONTINUED | OUTPATIENT
Start: 2025-07-29 | End: 2025-07-29

## 2025-07-29 RX ORDER — BISACODYL 10 MG
10 SUPPOSITORY, RECTAL RECTAL DAILY PRN
Status: DISCONTINUED | OUTPATIENT
Start: 2025-08-01 | End: 2025-08-07 | Stop reason: HOSPADM

## 2025-07-29 RX ORDER — VASOPRESSIN IN 0.9 % NACL 2 UNIT/2ML
SYRINGE (ML) INTRAVENOUS PRN
Status: DISCONTINUED | OUTPATIENT
Start: 2025-07-29 | End: 2025-07-29

## 2025-07-29 RX ORDER — HYDROMORPHONE HCL IN WATER/PF 6 MG/30 ML
0.2 PATIENT CONTROLLED ANALGESIA SYRINGE INTRAVENOUS
Status: DISCONTINUED | OUTPATIENT
Start: 2025-07-29 | End: 2025-08-07 | Stop reason: HOSPADM

## 2025-07-29 RX ORDER — ONDANSETRON 2 MG/ML
INJECTION INTRAMUSCULAR; INTRAVENOUS PRN
Status: DISCONTINUED | OUTPATIENT
Start: 2025-07-29 | End: 2025-07-29

## 2025-07-29 RX ORDER — DEXAMETHASONE SODIUM PHOSPHATE 4 MG/ML
INJECTION, SOLUTION INTRA-ARTICULAR; INTRALESIONAL; INTRAMUSCULAR; INTRAVENOUS; SOFT TISSUE PRN
Status: DISCONTINUED | OUTPATIENT
Start: 2025-07-29 | End: 2025-07-29

## 2025-07-29 RX ORDER — CEFAZOLIN SODIUM/WATER 2 G/20 ML
2 SYRINGE (ML) INTRAVENOUS
Status: COMPLETED | OUTPATIENT
Start: 2025-07-29 | End: 2025-07-29

## 2025-07-29 RX ORDER — LIDOCAINE HYDROCHLORIDE 20 MG/ML
INJECTION, SOLUTION INFILTRATION; PERINEURAL PRN
Status: DISCONTINUED | OUTPATIENT
Start: 2025-07-29 | End: 2025-07-29

## 2025-07-29 RX ORDER — FENTANYL CITRATE 50 UG/ML
INJECTION, SOLUTION INTRAMUSCULAR; INTRAVENOUS PRN
Status: DISCONTINUED | OUTPATIENT
Start: 2025-07-29 | End: 2025-07-29

## 2025-07-29 RX ORDER — EPHEDRINE SULFATE 50 MG/ML
INJECTION, SOLUTION INTRAMUSCULAR; INTRAVENOUS; SUBCUTANEOUS PRN
Status: DISCONTINUED | OUTPATIENT
Start: 2025-07-29 | End: 2025-07-29

## 2025-07-29 RX ORDER — HYDROMORPHONE HCL IN WATER/PF 6 MG/30 ML
0.4 PATIENT CONTROLLED ANALGESIA SYRINGE INTRAVENOUS
Status: DISCONTINUED | OUTPATIENT
Start: 2025-07-29 | End: 2025-08-07 | Stop reason: HOSPADM

## 2025-07-29 RX ORDER — SODIUM CHLORIDE, SODIUM LACTATE, POTASSIUM CHLORIDE, CALCIUM CHLORIDE 600; 310; 30; 20 MG/100ML; MG/100ML; MG/100ML; MG/100ML
INJECTION, SOLUTION INTRAVENOUS CONTINUOUS
Status: DISCONTINUED | OUTPATIENT
Start: 2025-07-29 | End: 2025-07-29 | Stop reason: HOSPADM

## 2025-07-29 RX ORDER — MAGNESIUM HYDROXIDE 1200 MG/15ML
LIQUID ORAL PRN
Status: DISCONTINUED | OUTPATIENT
Start: 2025-07-29 | End: 2025-07-29 | Stop reason: HOSPADM

## 2025-07-29 RX ORDER — OXYCODONE HYDROCHLORIDE 5 MG/1
10 TABLET ORAL EVERY 4 HOURS PRN
Status: DISCONTINUED | OUTPATIENT
Start: 2025-07-29 | End: 2025-08-07 | Stop reason: HOSPADM

## 2025-07-29 RX ORDER — OXYCODONE HYDROCHLORIDE 5 MG/1
5 TABLET ORAL EVERY 4 HOURS PRN
Status: DISCONTINUED | OUTPATIENT
Start: 2025-07-29 | End: 2025-08-07 | Stop reason: HOSPADM

## 2025-07-29 RX ORDER — CEFAZOLIN SODIUM/WATER 2 G/20 ML
2 SYRINGE (ML) INTRAVENOUS SEE ADMIN INSTRUCTIONS
Status: DISCONTINUED | OUTPATIENT
Start: 2025-07-29 | End: 2025-07-29 | Stop reason: HOSPADM

## 2025-07-29 RX ORDER — BUPIVACAINE HYDROCHLORIDE 5 MG/ML
INJECTION, SOLUTION PERINEURAL PRN
Status: DISCONTINUED | OUTPATIENT
Start: 2025-07-29 | End: 2025-07-29 | Stop reason: HOSPADM

## 2025-07-29 RX ORDER — INDOCYANINE GREEN AND WATER 25 MG
2.5 KIT INJECTION ONCE
Status: COMPLETED | OUTPATIENT
Start: 2025-07-29 | End: 2025-07-29

## 2025-07-29 RX ORDER — AMOXICILLIN 250 MG
2 CAPSULE ORAL 2 TIMES DAILY
Status: DISCONTINUED | OUTPATIENT
Start: 2025-07-29 | End: 2025-08-07 | Stop reason: HOSPADM

## 2025-07-29 RX ADMIN — FENTANYL CITRATE 100 MCG: 50 INJECTION INTRAMUSCULAR; INTRAVENOUS at 14:35

## 2025-07-29 RX ADMIN — PIPERACILLIN AND TAZOBACTAM 4.5 G: 4; .5 INJECTION, POWDER, FOR SOLUTION INTRAVENOUS at 08:21

## 2025-07-29 RX ADMIN — PHENYLEPHRINE HYDROCHLORIDE 0.5 MCG/KG/MIN: 10 INJECTION INTRAVENOUS at 14:36

## 2025-07-29 RX ADMIN — Medication 0.5 UNITS: at 15:09

## 2025-07-29 RX ADMIN — SUCCINYLCHOLINE CHLORIDE 100 MG: 20 INJECTION, SOLUTION INTRAMUSCULAR; INTRAVENOUS; PARENTERAL at 14:35

## 2025-07-29 RX ADMIN — PANTOPRAZOLE SODIUM 40 MG: 40 INJECTION, POWDER, FOR SOLUTION INTRAVENOUS at 20:57

## 2025-07-29 RX ADMIN — ROCURONIUM BROMIDE 10 MG: 50 INJECTION, SOLUTION INTRAVENOUS at 15:10

## 2025-07-29 RX ADMIN — Medication 5 MG: at 14:54

## 2025-07-29 RX ADMIN — PROPOFOL 20 MG: 10 INJECTION, EMULSION INTRAVENOUS at 15:57

## 2025-07-29 RX ADMIN — METOPROLOL SUCCINATE 12.5 MG: 25 TABLET, EXTENDED RELEASE ORAL at 08:15

## 2025-07-29 RX ADMIN — Medication 2 G: at 14:38

## 2025-07-29 RX ADMIN — Medication 5 MG: at 14:57

## 2025-07-29 RX ADMIN — ATORVASTATIN CALCIUM 40 MG: 40 TABLET, FILM COATED ORAL at 08:15

## 2025-07-29 RX ADMIN — Medication 0.5 UNITS: at 15:27

## 2025-07-29 RX ADMIN — ONDANSETRON 4 MG: 2 INJECTION INTRAMUSCULAR; INTRAVENOUS at 15:54

## 2025-07-29 RX ADMIN — DEXAMETHASONE SODIUM PHOSPHATE 4 MG: 4 INJECTION, SOLUTION INTRA-ARTICULAR; INTRALESIONAL; INTRAMUSCULAR; INTRAVENOUS; SOFT TISSUE at 14:37

## 2025-07-29 RX ADMIN — ALBUMIN (HUMAN): 12.5 SOLUTION INTRAVENOUS at 14:52

## 2025-07-29 RX ADMIN — Medication 200 MG: at 16:11

## 2025-07-29 RX ADMIN — PHENYLEPHRINE HYDROCHLORIDE 100 MCG: 10 INJECTION INTRAVENOUS at 14:41

## 2025-07-29 RX ADMIN — PROPOFOL 150 MG: 10 INJECTION, EMULSION INTRAVENOUS at 14:35

## 2025-07-29 RX ADMIN — PHENYLEPHRINE HYDROCHLORIDE 100 MCG: 10 INJECTION INTRAVENOUS at 14:36

## 2025-07-29 RX ADMIN — Medication 2.5 MG: at 12:49

## 2025-07-29 RX ADMIN — PIPERACILLIN AND TAZOBACTAM 4.5 G: 4; .5 INJECTION, POWDER, FOR SOLUTION INTRAVENOUS at 02:43

## 2025-07-29 RX ADMIN — SENNOSIDES AND DOCUSATE SODIUM 1 TABLET: 50; 8.6 TABLET ORAL at 21:00

## 2025-07-29 RX ADMIN — PHENYLEPHRINE HYDROCHLORIDE 100 MCG: 10 INJECTION INTRAVENOUS at 14:43

## 2025-07-29 RX ADMIN — PANTOPRAZOLE SODIUM 40 MG: 40 INJECTION, POWDER, FOR SOLUTION INTRAVENOUS at 08:15

## 2025-07-29 RX ADMIN — PIPERACILLIN AND TAZOBACTAM 4.5 G: 4; .5 INJECTION, POWDER, FOR SOLUTION INTRAVENOUS at 18:17

## 2025-07-29 RX ADMIN — Medication 400 MG: at 22:29

## 2025-07-29 RX ADMIN — Medication 1 UNITS: at 14:56

## 2025-07-29 RX ADMIN — Medication 0.5 UNITS: at 15:53

## 2025-07-29 RX ADMIN — SODIUM CHLORIDE, SODIUM LACTATE, POTASSIUM CHLORIDE, AND CALCIUM CHLORIDE: .6; .31; .03; .02 INJECTION, SOLUTION INTRAVENOUS at 12:33

## 2025-07-29 RX ADMIN — LIDOCAINE HYDROCHLORIDE 100 MG: 20 INJECTION, SOLUTION INFILTRATION; PERINEURAL at 14:35

## 2025-07-29 ASSESSMENT — ACTIVITIES OF DAILY LIVING (ADL)
ADLS_ACUITY_SCORE: 38

## 2025-07-29 ASSESSMENT — ENCOUNTER SYMPTOMS: DYSRHYTHMIAS: 1

## 2025-07-29 NOTE — CARE PLAN
07/29/25 1800   Fall Event   Patient Assessed By provider   Name of Provider Notified Gigi Lou   Fall Prevention Plan Updated Yes

## 2025-07-29 NOTE — PROGRESS NOTES
Care Management Follow Up    Length of Stay (days): 16    Expected Discharge Date: 07/31/2025     Concerns to be Addressed: all concerns addressed in this encounter, discharge planning     Patient plan of care discussed at interdisciplinary rounds: Yes    Anticipated Discharge Disposition: Transitional Care              Anticipated Discharge Services: None  Anticipated Discharge DME: Walker, Oxygen    Patient/family educated on Medicare website which has current facility and service quality ratings: yes  Education Provided on the Discharge Plan: Yes  Patient/Family in Agreement with the Plan: yes    Referrals Placed by CM/SW:    Private pay costs discussed: Not applicable    Discussed  Partnership in Safe Discharge Planning  document with patient/family: No     Handoff Completed: No, handoff not indicated or clinically appropriate    Additional Information:  SW called pending TCU referrals to check if facilities are able to accept.     SW left  for [facility] requesting a call back    Next Steps: secure placement    ROSAURA rOo    [facility]

## 2025-07-29 NOTE — ANESTHESIA POSTPROCEDURE EVALUATION
Patient: Kristofer Montana    Procedure: Procedure(s):  CHOLECYSTECTOMY, ROBOT-ASSISTED, LAPAROSCOPIC, WITHOUT CHOLANGIOGRAM AND WASHOUT OF INTRA-ABDOMINAL ABSCESS       Anesthesia Type:  General    Note:     Postop Pain Control: Uneventful            Sign Out: Well controlled pain   PONV: No   Neuro/Psych: Uneventful            Sign Out: Acceptable/Baseline neuro status   Airway/Respiratory: Uneventful            Sign Out: Acceptable/Baseline resp. status   CV/Hemodynamics: Uneventful            Sign Out: Acceptable CV status; No obvious hypovolemia; No obvious fluid overload   Other NRE: NONE   DID A NON-ROUTINE EVENT OCCUR? No           Last vitals:  Vitals Value Taken Time   /66 07/29/25 17:05   Temp 36.1  C (97  F) 07/29/25 17:05   Pulse 85 07/29/25 17:11   Resp 33 07/29/25 17:11   SpO2 97 % 07/29/25 17:11   Vitals shown include unfiled device data.    Electronically Signed By: Joan Dennis MD  July 29, 2025  5:50 PM

## 2025-07-29 NOTE — ANESTHESIA CARE TRANSFER NOTE
Patient: Kristofer Montana    Procedure: Procedure(s):  CHOLECYSTECTOMY, ROBOT-ASSISTED, LAPAROSCOPIC, WITHOUT CHOLANGIOGRAM AND WASHOUT OF INTRA-ABDOMINAL ABSCESS       Diagnosis: Cholecystitis [K81.9]  Diagnosis Additional Information: No value filed.    Anesthesia Type:   General     Note:    Oropharynx: oropharynx clear of all foreign objects  Level of Consciousness: awake  Oxygen Supplementation: face mask    Independent Airway: airway patency satisfactory and stable  Dentition: dentition unchanged  Vital Signs Stable: post-procedure vital signs reviewed and stable  Report to RN Given: handoff report given  Patient transferred to: PACU    Handoff Report: Identifed the Patient, Identified the Reponsible Provider, Reviewed the pertinent medical history, Discussed the surgical course, Reviewed Intra-OP anesthesia mangement and issues during anesthesia, Set expectations for post-procedure period and Allowed opportunity for questions and acknowledgement of understanding  Vitals:  Vitals Value Taken Time   BP     Temp     Pulse 84 07/29/25 16:20   Resp 51 07/29/25 16:20   SpO2 98 % 07/29/25 16:20   Vitals shown include unfiled device data.    Electronically Signed By: JERI Man CRNA  July 29, 2025  4:21 PM

## 2025-07-29 NOTE — PLAN OF CARE
Goal Outcome Evaluation:      Plan of Care Reviewed With: patient    Overall Patient Progress: no changeOverall Patient Progress: no change    Shift: [id number] 7/28/2025    Orientation: AO x4  Aggression Stop Light: green  Vitals/Tele: VSS RA  Pain: Denied   IV Access/drains: L PIV SL   Abnormal VS/Results: Phos and Mag replaced, redraw in the AM; HgB 7.2 transfuse <7;   Diet: Clears; NPO at Midnight   Mobility: SBA walker  GI/: Continent; urinal at bedside  Wound/Skin: Scattered Bruising   Consults: GI/GenSurgery  Discharge Plan: Pending  Additional Info: CTA GI completed today, does not appear to have any hemorrhage; Zoysn q6; EGD canceled today; Possible Cholecystectomy tomorrow       See Flow sheets for assessment

## 2025-07-29 NOTE — PROGRESS NOTES
Surgery  Patient feels tolerably well, tolerated clear liquids last night.  Labs continue to show elevated transaminases and now a mildly elevated bilirubin.  CT scan from yesterday showed showed continued inflammatory changes around the gallbladder.  After discussion with the patient, he is interested in cholecystectomy prior to discharge.  We will get it on the schedule this afternoon.  Cristóbal Novoa MD  General Surgery, Office [phone]

## 2025-07-29 NOTE — PLAN OF CARE
Goal Outcome Evaluation:      Orientation: A/Ox4  Aggression Stop Light: green  Activity: SBA with Walker  Diet/BS Checks: NPO at [place]  Tele:  SR with BBB  IV Access/Drains: L PIV SL int with Abx  Pain Management: denies  Abnormal VS/Results: VSS,RA, Hgb 7.4  Bowel/Bladder: cont b/b, uses urinal at bedside, BM 1x-no bleeding noted  Skin/Wounds: scattered bruising, trace edema hands and BLE  Consults: IR/GenSx/GI  D/C Disposition: pending    Other Info:   - CTA GI bleed done- negative for GI Hemorrhage  -Plan for cholecystectomy in AM  -On Mag/phos Protocol-rechecks this AM

## 2025-07-29 NOTE — BRIEF OP NOTE
[facility]    Brief Operative Note    Pre-operative diagnosis: Cholecystitis [K81.9]  Post-operative diagnosis Same as pre-operative diagnosis    Procedure: CHOLECYSTECTOMY, ROBOT-ASSISTED, LAPAROSCOPIC, WITHOUT CHOLANGIOGRAM AND WASHOUT OF INTRA-ABDOMINAL ABSCESS, N/A - Abdomen    Surgeon: Surgeons and Role:     * Scott Novoa MD - Primary     * Betty Conroy PA-C - Assisting  Anesthesia: General   Estimated Blood Loss: 25 mL from 7/29/2025  2:26 PM to 7/29/2025  4:17 PM      Drains: [place] x 2, right lateral-most drain in abscess cavity superior to right lobe of liver, medially adjacent drain in paracolic gutter  Specimens:   ID Type Source Tests Collected by Time Destination   1 : Gallbladder and contents Tissue Gallbladder SURGICAL PATHOLOGY EXAM Scott Novoa MD 7/29/2025  3:39 PM    A : Gallbladder contents Abscess Other ANAEROBIC BACTERIAL CULTURE ROUTINE, GRAM STAIN, FUNGAL OR YEAST CULTURE ROUTINE Scott Novoa MD 7/29/2025  3:38 PM      Findings:   Large abscess cavity superior to right lobe of liver, this was tere bile-tinged pus. >1300 ml of purulent abscess suctioned at the start of surgery. Tip of gallbladder fundus with perforation and connection to this abscess. Cystic duct and artery clipped and gallbladder removed without complication. Irrigation and washout of abscess cavity. Two ANANDA drains placed.  Complications: None.  Implants: * No implants in log *

## 2025-07-29 NOTE — ANESTHESIA PREPROCEDURE EVALUATION
Anesthesia Pre-Procedure Evaluation    Patient: Kristofer Montana   MRN: [id number] : 1945          Procedure : Procedure(s):  CHOLECYSTECTOMY, ROBOT-ASSISTED, LAPAROSCOPIC, WITHOUT CHOLANGIOGRAM         Past Medical History:   Diagnosis Date    CAD (coronary artery disease)     History of CVA (cerebrovascular accident)     HTN (hypertension)     Ischemic cardiomyopathy       Past Surgical History:   Procedure Laterality Date    ENDOSCOPIC RETROGRADE CHOLANGIOPANCREATOGRAM N/A 2025    Procedure: ENDOSCOPIC RETROGRADE CHOLANGIOPANCREATOGRAPHY, SHPYNCTEROTOMY, DILATION, STONE EXTRACTION;  Surgeon: Aleyda Lee MD;  Location:  OR    ENDOSCOPIC ULTRASOUND UPPER GASTROINTESTINAL TRACT (GI) N/A 2025    Procedure: ENDOSCOPIC ULTRASOUND, ESOPHAGOSCOPY / UPPER GASTROINTESTINAL TRACT (GI);  Surgeon: Aleyda Lee MD;  Location:  OR      Allergies   Allergen Reactions    Amoxicillin Diarrhea      Social History     Tobacco Use    Smoking status: Never     Passive exposure: Past (spouse smoked 3 packs a day for 23 days)    Smokeless tobacco: Never   Substance Use Topics    Alcohol use: Not on file      Wt Readings from Last 1 Encounters:   25 92.1 kg (203 lb 1.6 oz)        Anesthesia Evaluation   Pt has had prior anesthetic. Type: General.    No history of anesthetic complications       ROS/MED HX  ENT/Pulmonary:       Neurologic:     (+)          CVA,  without deficits,                    Cardiovascular:     (+) Dyslipidemia hypertension- -  CAD -  - -      CHF                  dysrhythmias (Afib RVR 7/15/25, procedure postponed. On amiodarone gtt now in SR), a-fib,        Previous cardiac testing   Echo: Date: 7/15/25 Results:  Interpretation Summary     1. The left ventricle is normal in size. Biplane LVEF is 45%. Anteroseptal and  apical hypokinesis.  2. The right ventricle is normal in structure, function and size.  3. No valve disease.     Outside echo  reported EF 35%  "with anteroseptal hypokinesis.    Stress Test:  Date: Results:    ECG Reviewed:  Date: Results:    Cath:  Date: Results:      METS/Exercise Tolerance: 4 - Raking leaves, gardening    Hematologic:     (+) History of blood clots,               Musculoskeletal:       GI/Hepatic:     (+)          cholecystitis/cholelithiasis,          Renal/Genitourinary:       Endo:       Psychiatric/Substance Use:       Infectious Disease:       Malignancy:       Other:              Physical Exam  Airway  Mallampati: II  TM distance: >3 FB  Neck ROM: full  Mouth opening: >= 4 cm    Cardiovascular - normal exam   Dental   (+) Modest Abnormalities - crowns, retainers, 1 or 2 missing teeth      Pulmonary - normal exam      Neurological - normal exam  He appears awake, alert and oriented x3.    Other Findings       OUTSIDE LABS:  CBC:   Lab Results   Component Value Date    WBC 6.8 07/28/2025    WBC 7.8 07/27/2025    HGB 7.8 (L) 07/29/2025    HGB 7.4 (L) 07/28/2025    HCT 21.7 (L) 07/28/2025    HCT 23.2 (L) 07/27/2025     07/28/2025     07/27/2025     BMP:   Lab Results   Component Value Date     07/29/2025     07/28/2025    POTASSIUM 3.6 07/29/2025    POTASSIUM 3.6 07/28/2025    CHLORIDE 103 07/29/2025    CHLORIDE 106 07/28/2025    CO2 23 07/29/2025    CO2 22 07/28/2025    BUN 10.5 07/29/2025    BUN 12.9 07/28/2025    CR 1.08 07/29/2025    CR 1.02 07/28/2025     (H) 07/29/2025    GLC 95 07/28/2025     COAGS: No results found for: \"PTT\", \"INR\", \"FIBR\"  POC: No results found for: \"BGM\", \"HCG\", \"HCGS\"  HEPATIC:   Lab Results   Component Value Date    ALBUMIN 2.2 (L) 07/29/2025    PROTTOTAL 5.6 (L) 07/29/2025    ALT 78 (H) 07/29/2025    AST 92 (H) 07/29/2025    ALKPHOS 495 (H) 07/29/2025    BILITOTAL 1.3 (H) 07/29/2025     OTHER:   Lab Results   Component Value Date    PH 7.39 07/18/2025    LACT 0.9 07/18/2025    A1C 5.8 (H) 07/17/2025    PAUL 7.5 (L) 07/29/2025    PHOS 2.5 07/29/2025    MAG 1.8 07/29/2025    " "TSH 3.74 07/14/2025       Anesthesia Plan    ASA Status:  3      NPO Status: NPO Appropriate   Anesthesia Type: General.  Airway: oral.  Induction: intravenous.  Maintenance: Balanced.   Techniques and Equipment:     - Airway:  Planned airway equipment includes video laryngoscope.     - Monitoring Plan: standard ASA monitoring     Consents    Anesthesia Plan(s) and associated risks, benefits, and realistic alternatives discussed. Questions answered and patient/representative(s) expressed understanding.     - Discussed:     - Discussed with:  Patient        - Pt is DNR/DNI Status: no DNR          Postoperative Care    Pain management: multimodal analgesia.     Comments:                   Thania Maguire MD    I have reviewed the pertinent notes and labs in the chart from the past 30 days and (re)examined the patient.  Any updates or changes from those notes are reflected in this note.    Clinically Significant Risk Factors             # Hypomagnesemia: Lowest Mg = 1.6 mg/dL in last 2 days, will replace as needed   # Hypoalbuminemia: Lowest albumin = 1.9 g/dL at 7/28/2025  5:51 AM, will monitor as appropriate                # Overweight: Estimated body mass index is 29.14 kg/m  as calculated from the following:    Height as of this encounter: 1.778 m (5' 10\").    Weight as of this encounter: 92.1 kg (203 lb 1.6 oz).   # Moderate Malnutrition: based on nutrition assessment and treatment provided per dietitian's recommendations.    # Financial/Environmental Concerns: none               "

## 2025-07-29 NOTE — OP NOTE
General Surgery Operative Note    PREOPERATIVE DIAGNOSIS:  Cholecystitis [K81.9]    POSTOPERATIVE DIAGNOSIS: Perforated cholecystitis, large intra-abdominal abscess    PROCEDURE: Robot-assisted cholecystectomy, drainage intra-abdominal abscess, lysis of adhesions    Difficulty: Extremely difficult with a 22 modifier owing to large hepatic abscess, gallbladder perforation, extensive inflammatory adhesions.    ANESTHESIA:  General.    PREOPERATIVE MEDICATIONS: Ancef    SURGEON:  Scott Novoa MD    ASSISTANT:  Betty Conroy PA-C.  Assistant was required owing to challenging exposure and need for retraction.    INDICATIONS: Patient presented to the hospital with probable cholangitis.  Underwent ERCP.  Has been dealing with melanotic stools and has developed a PE.  Most recent 2 CTs have suggested gallbladder inflammation as well as a subcapsular fluid collection of the liver.  Given the CT findings, we have elected to proceed to the operating room for cholecystectomy.    PROCEDURE:  The patient was taken to the operating suite and uneventfully endotracheally intubated.  The abdomen was prepped and draped in a sterile fashion.  Surgeon initiated timeout was acknowledged.  We entered the abdomen in the left upper quadrant using a 5 mm Visiport technique.  3 8 mm robotic trochars were then placed across the mid abdomen under laparoscopic visualization.  The initial trocar was switched out to an 8 mm reusable robotic trocar.  Robot was brought into the field, the patient was placed in reverse Trendelenburg, and the robot was docked.  Camera was targeted toward the right upper quadrant.  Although the abdomen showed mild evidence of inflammatory change, the right upper quadrant was very socked in.  Omentum was caked above the liver and tethered to the anterior abdominal wall.  Knowing that there was a fluid collection in this area, I used blunt dissection to bring the omentum down and got into a very large abscess  cavity.  We continued to mobilize the omentum away and eventually aspirated out 1300 mL of tere pus from the right upper quadrant.  It became apparent that this represented a gallbladder perforation at the dome with a contained abscess.  I spent some time clearing out the abscess cavity and taking adhesions down from the perforated gallbladder.  I was eventually able to appreciate the gallbladder anatomy.  The left upper quadrant port was used to grasp the gallbladder fundus and elevated up toward the anterior abdominal wall.  We used the cauterizing mauri to take down fatty adhesions of the omentum and peritoneum away from the fundus of the gallbladder.  We continued to elevate the gallbladder up toward the anterior abdominal wall until we were able to identify the neck of the gallbladder.  I incised the peritoneum both medially and laterally with the robotic mauri.  We dissected away some fatty tissue in this area and were eventually able to identify the outline of the cystic duct.  I used the firefly technology to confirm the presence of the cystic duct and its length.  I then opened up a window between the cystic duct and what I thought was the artery.  Although ICG had been given, there was no flow through the cystic duct.  I got a window above the artery as well and was able to appreciate the critical view of the cystic duct and artery going into the gallbladder with no intervening structures.  Both the cystic artery and cystic duct were doubly clipped and divided.  I continued my dissection up along the body of the gallbladder, freeing the gallbladder from the underside of the liver.  There were several areas in the gallbladder fossa with mild bleeding and this was taken care of with electrocautery.  We were eventually able to remove the gallbladder from the gallbladder fossa and this was placed in an Endo Catch bag inserted through the left upper quadrant trocar site.  We had to open the incision a small  amount of the bag was able to be removed from the abdominal wall.  No further bleeding was appreciated from the gallbladder fossa.  We spent an additional 30 minutes cleaning abscess rind, gallbladder contents, and irrigating the pelvis and right upper quadrants.  A drain was placed above the liver and a drain was placed in the pelvis.  The Daniel-Darren device was used to close the left upper quadrant fascial incision under laparoscopic visualization.  Robotic instruments were then removed and the robot was undocked.  Trochars were removed and the patient was laid flat.  The skin edges were reapproximated with 4-0 Vicryl and Steri-Strips.  The patient was uneventfully extubated, awakened and taken to the PACU in stable condition.  At the conclusion of the case, all lap and needle counts were correct.      ESTIMATED BLOOD LOSS: 20 mL    INTRAOPERATIVE FINDINGS: Perforated gallbladder with contained right upper quadrant abscess, 1300 mL of pus evacuated and abdomen copiously irrigated.  Drains placed.    Scott Novoa MD, MD

## 2025-07-29 NOTE — ANESTHESIA PROCEDURE NOTES
Airway       Patient location during procedure: OR       Procedure Start/Stop Times: 7/29/2025 2:37 PM  Staff -        CRNA: Alivia Plaza APRN CRNA       Performed By: CRNA  Consent for Airway        Urgency: elective  Indications and Patient Condition       Indications for airway management: gela-procedural       Induction type:RSI       Mask difficulty assessment: 0 - not attempted    Final Airway Details       Final airway type: endotracheal airway       Successful airway: ETT - single  Endotracheal Airway Details        ETT size (mm): 8.0       Cuffed: yes       Successful intubation technique: video laryngoscopy       VL Blade Size: Glidescope 3       Grade View of Cords: 1       Adjucts: stylet       Position: Right       Measured from: lips       Secured at (cm): 22       Bite block used: None    Post intubation assessment        Placement verified by: capnometry, equal breath sounds and chest rise        Number of attempts at approach: 1       Secured with: tape       Ease of procedure: easy       Dentition: Intact and Unchanged    Medication(s) Administered   Medication Administration Time: 7/29/2025 2:37 PM

## 2025-07-29 NOTE — PROGRESS NOTES
[facility]    Medicine Progress Note - Hospitalist Service    Date of Admission:  7/13/2025    Assessment & Plan     Kristofer Montana is a 79 y/o male with Hx of HTN, HLD, HFrEF (LVEF 40%), CAD (remote PCI 2005), CVA (2013, no residual weakness/deficits) who [facility] with 2 days of fever, chills and RUQ pain.      CT C/A/P showed choledocholithiasis and mild bile duct dilation and possible acute cholecystitis. He was also found to have occlusive DVT in L femoral vein, nonocclusive DVT in L popliteal vein. He was started on iv heparin and transferred to [facility] for ERCP.      [facility] grew E coli. On 7/14, he went into A fib with RVR and was started on amiodarone infusion. Concerted to NSR. On 7/15, he developed acute hypoxic resp failure and was started on BIPAP. Weaned off by 7/16.     He underwent EUS/ERCP with sphincterotomy with removal of stones on 7/16. No stent was placed. He had an aspiration event during the procedure. He was intubated and admitted to ICU.     CT 7/17 showed residual 0.3 cm gallstone in CBD, persistent mild CBD dilatation of 1.1 cm. Mild dilatation of small bowel loops, likely ileus. Per GI, small CBD stone is expected to pass on its own and no intervention was recommended. He was extubated on 7/19 and was transferred to hospitalist service.     Per gen surg no intervention planned at this time. Cholecystostomy tube should be considered if concern for acute cholecystitis. Cholecystectomy should be considered when patient recovers from aspiration pneumonia.     On 7/20, the patient had an episode of melena and noted Hb drop from 9.7 to 8.4 to 6.7 on 7/21. Heparin infusion for paroxysmal atrial fibrillation and DVT has been on hold since 7/20. Received one unit RBC transfusion on 7/21.  Attempted to resume heparin infusion and aspirin again on 7/23 and hemoglobin dropped to 6.6 AM 7/24. Received another 1 unit RBC AM 7/24.  Attempted to resume heparin infusion and aspirin again but patient has had persistent melena since that time.      An abdominal US was obtained 7/26. Difficult exam due to bowel gas. Heterogeneous fluid collection over the right lobe of the liver is indeterminant. Concern for possible subcapsular hematoma versus more loculated ascites. Mild ascites and small right pleural effusion. Normal abdominal liver duplex.        Melena,  Acute blood loss anemia - due to GI bleed vs post sphincterotomy bleed due to anticoagulation   Loculated ascites versus subcapsular hematoma  Patient has been having intermittent melena since admission and while on therapeutic anticoagulation. Abdominal US on 7/26 concerning for loculated ascites versus subcapsular hematoma.  *Hb trend since admission 13 - 12.4 - 9.8 - 8.4 - 6.8 - (1 unit RBC) - 7.7 - 7.3 - 7.2 - 7.2 - 7.8 - 7.2 - 7.1 - 6.6 - (1 unit RBC) - 8.4 - 8.7 - 7.7 - 7.2 - 7.9 - 7.6 -7.2  - Heparin gtt and aspirin held PM 7/26 after patient had another melanotic stool   - CT scan of the abdomen and pelvis from 7/28 was concerning for large subcapsular fluid collection along the lateral right hepatic lobe measuring 10 cm in greatest thickness and they could not exclude active hemorrhage, small volume ascites, mildly distended gallbladder with mild pericholecystic inflammatory changes and may reflect sequela of recent ERCP but cannot exclude cholecystitis, diffuse fatty infiltration of the pancreas with subtle peripancreatic inflammatory fat stranding, multifocal bronchiolitis and small right pleural effusion  -CTA GI on 7/28 does not show any evidence of hemorrhage  -No evidence of any melena or black stools and hemoglobin is uptrending at 7.8  - GI recs are pending  -     Septic shock -resolved due to E coli bacteremia secondary to choledocholithiasis with ascending cholangitis, s/p EUS/ERCP with sphincterotomy with removal of stones on 7/16  Possible acute cholecystitis  Mild  ascites   Fluid collection over liver, subcapsular hematoma versus loculated ascites   -Now weaned off Vasopressors and stress dose steroids.  - On 7/17 found to have retained 0.3 cm CBD stone which is expected to pass in its own and no intervention was recommended by [facility].   - Gen surgery did not feel patient had acute cholecystitis. Recommended percutaneous cholecystostomy tube if needed. Will eventually need elective cholecystectomy once patient recovers from acute illness.   -CT A/P 7/16 had shown large loculated perihepatic/subdiaphragmatic fluid collection which decreased in size following ERCP, but increased fluid in abdomen with relative hyperdensity compared to simple ascites.  IR felt this was too small to sample percutaneously. Also pneumobilia status post ERCP.   - Received cefepime from 7/14 - 7/17, Flagyl 7/14 - 7/18, and Ceftriaxone 7/18 to 7/27  - Last day of antibiotics 7/27, completed two weeks for E. Coli bacteremia  - CT scan of the abdomen and pelvis as above concerning for large fluid collection around the subcapsular fluid around the lateral right hepatic lobe measuring up to 10 cm and patient was started on IV Zosyn  -On exam patient does not have RUQ tenderness   -CTA GI bleed on 7/28 does not show any evidence of hemorrhage but there is concern that there is stable irregular gallbladder wall thickening, pericholecystic inflammatory changes and small fluid collection abutting the gallbladder fundus and suspicious for acute cholecystitis and stable large subcapsular fluid collection along the right hepatic lobe  -LFTs continue to show upward trend and alkaline phosphatase is 495, ALT of 78, AST of 92 with total bilirubin of 1.3  -Continue with Zosyn  - Patient is scheduled to undergo laparoscopic cholecystectomy later in the day     Addendum 535  -s/p lap cholecystectomy and per surgery had perforated cholecystitis  -continue zosyn for now  - Hold heparin till am and reasses in am         Aspiration pneumonia versus CAP   Acute hypoxic respiratory failure - secondary to aspiration pneumonitis on 7/16 during EUS/ERCP-resolved  -Extubated on 7/19. Weaned off supplemental oxygen on 7/20. Respiratory culture growing candida glabrata, suspected contaminant.   - Received cefepime from 7/14 - 7/17, Flagyl 7/14 - 7/18, and Ceftriaxone 7/18 to present, continue for now given bacteremia, anticipating 14 days total of antibiotics from [facility]13     Mild FARNAZ - secondary to septic shock, improved   -Baseline Cr 1. Cr peaked at 1.39.   - Creatinine seems stable at 1.02     Hypokalemia-resolved  Hypophosphatemia-resolved  Hypomagnesemia -resolved  - Replace per protocol     Paroxysmal atrial fibrillation, converted to NSR with IV amiodarone infusion  - Amiodarone discontinued. Now on low dose toprol XL  - His IV heparin has been on hold since 7/24 due to concern for GI bleed   - Will restart heparin back again once okay by general surgery after laparoscopic cholecystectomy     Left Femoral/popliteal DVT, diagnosed 7/13  - IR did not recommend thrombectomy.  - IV Heparin currently on hold due to anemia and concerns of GI bleed but given that hemoglobin is stable and no evidence of any bleeding we will be able to start diet after surgical intervention when deemed safe by general surgery       Hyperlipidemia  - Continue atorvastatin     CAD s/p  remote PCI (2005)  - Aspirin was held for ERCP, resumed on 7/19, currently on hold again due to acute anemia      Essential HTN -   - PTA antihypertensives on hold. Resume as able     Mild Thrombocytopenia - due to severe sepsis  Now resolved     Chronic systolic CHF with reduced EF of 40%  Appears euvolemic. Monitor volume status. Weight up from admission from 83 to 93 kg   - Chest x-ray 7/18 showed no pulmonary edema  - May need to consider diuresis if swelling not improving or if patient becomes hypoxic      Prediabetes, HbA1c 5.8%, with stress  "and steroid induced hyperglycemia  Has completed stress dose steroids  - has not required sliding scale insulin so currently on hold           Diet: Room Service  Snacks/Supplements Adult: Other; Vanilla Ensure at 10am and 2pm  (RD); Between Meals  NPO for Procedure/Surgery per Anesthesia Guidelines Except for: Meds; Clear liquids before procedure/surgery: ADULT (Age GREATER than or Equal to 18 years) - Clear liquids 2 hours before procedure/surgery    DVT Prophylaxis: Pneumatic Compression Devices  Mar Catheter: Not present  Lines: None     Cardiac Monitoring: ACTIVE order. Indication: ICU  Code Status: Full Code      Clinically Significant Risk Factors             # Hypomagnesemia: Lowest Mg = 1.6 mg/dL in last 2 days, will replace as needed   # Hypoalbuminemia: Lowest albumin = 1.9 g/dL at 7/28/2025  5:51 AM, will monitor as appropriate                # Overweight: Estimated body mass index is 29.14 kg/m  as calculated from the following:    Height as of this encounter: 1.778 m (5' 10\").    Weight as of this encounter: 92.1 kg (203 lb 1.6 oz).   # Moderate Malnutrition: based on nutrition assessment and treatment provided per dietitian's recommendations.      # Financial/Environmental Concerns: none         Social Drivers of Health            Disposition Plan     Medically Ready for Discharge: Anticipated in 2-4 Days, will undergo laparoscopic cholecystectomy today and then later will need to be started on heparin drip and hemoglobin need to be monitored and then eventually start on DOAC anticipate he will be here 1 to 2 days         Curt Wahl MD  Hospitalist Service  [facility]  Securely message with [employer] (more info)  Text page via [employer] Paging/Directory     This note was completed in part using dictation via the Dragon voice recognition software. Some word and grammatical errors may occur and must be interpreted in the appropriate clinical context. If there are any questions " pertaining to this issue, please contact me for further clarification.    ______________________________________________________________________    Interval History     - Reviewed events of overnight and denies any nausea, vomiting  - No melanotic stools  - Tolerating diet well and after that did not had any pain    Physical Exam   Vital Signs: Temp: 99.1  F (37.3  C) Temp src: Oral BP: 127/67 Pulse: 85   Resp: 16 SpO2: 95 % O2 Device: None (Room air)    Weight: 203 lbs 1.6 oz        General: here x 3  HEENT: Head is atraumatic, normocephalic.    Neck: Neck is supple she is  Respiratory: Lungs are cShe near to auscultation bilaterally with no wheeze or crackles   Cardiovascular: Regular rate , S1 and S2 normal with no murmer or rubs or gallops  Abdomen:   soft , non tender , non distended and bowel sound present   Skin: No skin rashes or lesions to inspection or palpation.  Neurologic:  no focal deficit  Musculoskeletal: Normal Range of motion over upper and lower extremities bilaterally   Psychiatric: cooperative      Medical Decision Making       Time spent in care of patient is 44 minutes and I reviewed labs and discussed plan of care in detail with patient and nursing staff      Data     I have personally reviewed the following data over the past 24 hrs:    N/A  \   7.8 (L)   / N/A     137 103 10.5 /  104 (H)   3.6 23 1.08 \     ALT: 78 (H) AST: 92 (H) AP: 495 (H) TBILI: 1.3 (H)   ALB: 2.2 (L) TOT PROTEIN: 5.6 (L) LIPASE: N/A       Imaging results reviewed over the past 24 hrs:   Recent Results (from the past 24 hours)   CTA GI Bleed    Narrative    EXAM: CTA GI BLEED  LOCATION: [facility]  DATE: 7/28/2025    INDICATION: Persistent melena and anemia.  COMPARISON: 7/27/2025  TECHNIQUE: CT angiogram abdomen pelvis during arterial phase of injection of IV contrast. 2D and 3D MIP reconstructions were performed by the CT technologist. Dose reduction techniques were used.  CONTRAST: 127 mL  Isovue 370    FINDINGS:  ANGIOGRAM ABDOMEN/PELVIS: No evidence of active gastrointestinal hemorrhage.    No abdominal aortic aneurysm or dissection. The celiac artery, superior mesenteric artery, bilateral renal arteries, inferior mesenteric artery, bilateral common iliac, internal iliac and external iliac arteries are patent without stenosis.    LOWER CHEST: Stable partly visualized small to moderate right pleural effusion. Bibasilar atelectasis.    HEPATOBILIARY: Stable large subcapsular fluid collection along the right hepatic lobe measuring 1.8 x 1.1 cm. Stable small cyst in the central left hepatic lobe. Stable irregular gallbladder wall thickening, pericholecystic inflammatory changes and a   small collection of fluid abutting the gallbladder fundus measuring 2.1 cm. No calcified gallstones.. Pneumobilia related to recent sphincterotomy. No intrahepatic or extrahepatic biliary ductal dilatation. No calcified stones in the duct.    PANCREAS: Normal.    SPLEEN: Normal.    ADRENAL GLANDS: Normal.    KIDNEYS/BLADDER: No significant mass, stones, or hydronephrosis. There are simple or benign cysts. No follow up is needed. Excreted contrast in the urinary bladder and renal pelvis from the CT yesterday.    BOWEL: No small bowel or colonic obstruction or inflammatory changes. Multiple duodenal diverticuli in the second portion are stable. No active gastrointestinal hemorrhage. Normal appendix. Moderate amount of formed stool in the colon. Sigmoid   diverticulosis.    LYMPH NODES: No lymphadenopathy.    PELVIC ORGANS: No pelvic masses.    Other findings: Stable filling defect in the right internal iliac vein consistent with pelvic vein DVT (series 13, image [id number]).    Stable small abdominal and pelvic ascites.    MUSCULOSKELETAL: Degenerative changes in the spine. No suspicious lesions in the bones. Thoracolumbar scoliosis.      Impression    IMPRESSION:  1.  No active gastrointestinal hemorrhage.  2.  Stable  irregular gallbladder wall thickening, pericholecystic inflammatory changes and a small fluid collection abutting the gallbladder fundus. Findings are concerning for acute cholecystitis.  3.  Stable large subcapsular fluid collection along the right hepatic lobe, possibly loculated ascites. Stable small abdominal and pelvic ascites.  4.  Stable small to moderate right pleural effusion.  5.  Stable filling defect in the right internal iliac vein consistent with pelvic vein DVT.

## 2025-07-30 ENCOUNTER — APPOINTMENT (OUTPATIENT)
Dept: PHYSICAL THERAPY | Facility: CLINIC | Age: 80
DRG: 853 | End: 2025-07-30
Attending: INTERNAL MEDICINE
Payer: MEDICARE

## 2025-07-30 LAB
ALBUMIN SERPL BCG-MCNC: 1.9 G/DL (ref 3.5–5.2)
ALP SERPL-CCNC: 390 U/L (ref 40–150)
ALT SERPL W P-5'-P-CCNC: 44 U/L (ref 0–70)
ANION GAP SERPL CALCULATED.3IONS-SCNC: 11 MMOL/L (ref 7–15)
AST SERPL W P-5'-P-CCNC: 63 U/L (ref 0–45)
BACTERIA SPEC CULT: ABNORMAL
BASOPHILS # BLD AUTO: 0 10E3/UL (ref 0–0.2)
BASOPHILS NFR BLD AUTO: 0 %
BILIRUB SERPL-MCNC: 0.9 MG/DL
BUN SERPL-MCNC: 10.2 MG/DL (ref 8–23)
CALCIUM SERPL-MCNC: 7.8 MG/DL (ref 8.8–10.4)
CHLORIDE SERPL-SCNC: 103 MMOL/L (ref 98–107)
CREAT SERPL-MCNC: 1.04 MG/DL (ref 0.67–1.17)
EGFRCR SERPLBLD CKD-EPI 2021: 73 ML/MIN/1.73M2
EOSINOPHIL # BLD AUTO: 0 10E3/UL (ref 0–0.7)
EOSINOPHIL NFR BLD AUTO: 0 %
ERYTHROCYTE [DISTWIDTH] IN BLOOD BY AUTOMATED COUNT: 15.5 % (ref 10–15)
GLUCOSE BLDC GLUCOMTR-MCNC: 156 MG/DL (ref 70–99)
GLUCOSE SERPL-MCNC: 138 MG/DL (ref 70–99)
GRAM STAIN RESULT: ABNORMAL
GRAM STAIN RESULT: ABNORMAL
HCO3 SERPL-SCNC: 24 MMOL/L (ref 22–29)
HCT VFR BLD AUTO: 23.9 % (ref 40–53)
HGB BLD-MCNC: 7.5 G/DL (ref 13.3–17.7)
HGB BLD-MCNC: 7.6 G/DL (ref 13.3–17.7)
IMM GRANULOCYTES # BLD: 0.1 10E3/UL
IMM GRANULOCYTES NFR BLD: 1 %
LYMPHOCYTES # BLD AUTO: 0.7 10E3/UL (ref 0.8–5.3)
LYMPHOCYTES NFR BLD AUTO: 9 %
MAGNESIUM SERPL-MCNC: 1.8 MG/DL (ref 1.7–2.3)
MCH RBC QN AUTO: 29 PG (ref 26.5–33)
MCHC RBC AUTO-ENTMCNC: 31.8 G/DL (ref 31.5–36.5)
MCV RBC AUTO: 89 FL (ref 78–100)
MCV RBC AUTO: 91 FL (ref 78–100)
MONOCYTES # BLD AUTO: 0.5 10E3/UL (ref 0–1.3)
MONOCYTES NFR BLD AUTO: 6 %
NEUTROPHILS # BLD AUTO: 6.4 10E3/UL (ref 1.6–8.3)
NEUTROPHILS NFR BLD AUTO: 84 %
NRBC # BLD AUTO: 0 10E3/UL
NRBC BLD AUTO-RTO: 0 /100
PHOSPHATE SERPL-MCNC: 2.5 MG/DL (ref 2.5–4.5)
PLATELET # BLD AUTO: 279 10E3/UL (ref 150–450)
POTASSIUM SERPL-SCNC: 3.8 MMOL/L (ref 3.4–5.3)
PROT SERPL-MCNC: 5.2 G/DL (ref 6.4–8.3)
RBC # BLD AUTO: 2.62 10E6/UL (ref 4.4–5.9)
SODIUM SERPL-SCNC: 138 MMOL/L (ref 135–145)
UFH PPP CHRO-ACNC: 0.24 IU/ML (ref ?–1.1)
WBC # BLD AUTO: 7.6 10E3/UL (ref 4–11)

## 2025-07-30 PROCEDURE — 250N000013 HC RX MED GY IP 250 OP 250 PS 637: Performed by: PHYSICIAN ASSISTANT

## 2025-07-30 PROCEDURE — 83735 ASSAY OF MAGNESIUM: CPT | Performed by: INTERNAL MEDICINE

## 2025-07-30 PROCEDURE — 80053 COMPREHEN METABOLIC PANEL: CPT | Performed by: PHYSICIAN ASSISTANT

## 2025-07-30 PROCEDURE — 99222 1ST HOSP IP/OBS MODERATE 55: CPT | Performed by: PHYSICIAN ASSISTANT

## 2025-07-30 PROCEDURE — 120N000001 HC R&B MED SURG/OB

## 2025-07-30 PROCEDURE — 97530 THERAPEUTIC ACTIVITIES: CPT | Mod: GP | Performed by: PHYSICAL THERAPIST

## 2025-07-30 PROCEDURE — 84100 ASSAY OF PHOSPHORUS: CPT | Performed by: INTERNAL MEDICINE

## 2025-07-30 PROCEDURE — 85018 HEMOGLOBIN: CPT | Performed by: HOSPITALIST

## 2025-07-30 PROCEDURE — 36415 COLL VENOUS BLD VENIPUNCTURE: CPT | Performed by: PHYSICIAN ASSISTANT

## 2025-07-30 PROCEDURE — 36415 COLL VENOUS BLD VENIPUNCTURE: CPT | Performed by: INTERNAL MEDICINE

## 2025-07-30 PROCEDURE — 250N000011 HC RX IP 250 OP 636: Performed by: PHYSICIAN ASSISTANT

## 2025-07-30 PROCEDURE — 85004 AUTOMATED DIFF WBC COUNT: CPT | Performed by: PHYSICIAN ASSISTANT

## 2025-07-30 PROCEDURE — 99233 SBSQ HOSP IP/OBS HIGH 50: CPT | Performed by: HOSPITALIST

## 2025-07-30 PROCEDURE — 85520 HEPARIN ASSAY: CPT | Performed by: INTERNAL MEDICINE

## 2025-07-30 PROCEDURE — 250N000013 HC RX MED GY IP 250 OP 250 PS 637: Performed by: INTERNAL MEDICINE

## 2025-07-30 RX ORDER — MAGNESIUM OXIDE 400 MG/1
400 TABLET ORAL EVERY 4 HOURS
Status: COMPLETED | OUTPATIENT
Start: 2025-07-30 | End: 2025-07-30

## 2025-07-30 RX ADMIN — SENNOSIDES AND DOCUSATE SODIUM 2 TABLET: 50; 8.6 TABLET ORAL at 09:05

## 2025-07-30 RX ADMIN — METOPROLOL SUCCINATE 12.5 MG: 25 TABLET, EXTENDED RELEASE ORAL at 09:05

## 2025-07-30 RX ADMIN — PIPERACILLIN AND TAZOBACTAM 4.5 G: 4; .5 INJECTION, POWDER, FOR SOLUTION INTRAVENOUS at 01:54

## 2025-07-30 RX ADMIN — PIPERACILLIN AND TAZOBACTAM 4.5 G: 4; .5 INJECTION, POWDER, FOR SOLUTION INTRAVENOUS at 12:03

## 2025-07-30 RX ADMIN — ATORVASTATIN CALCIUM 40 MG: 40 TABLET, FILM COATED ORAL at 09:05

## 2025-07-30 RX ADMIN — SENNOSIDES AND DOCUSATE SODIUM 1 TABLET: 50; 8.6 TABLET ORAL at 21:19

## 2025-07-30 RX ADMIN — PIPERACILLIN AND TAZOBACTAM 4.5 G: 4; .5 INJECTION, POWDER, FOR SOLUTION INTRAVENOUS at 17:11

## 2025-07-30 RX ADMIN — PIPERACILLIN AND TAZOBACTAM 4.5 G: 4; .5 INJECTION, POWDER, FOR SOLUTION INTRAVENOUS at 06:42

## 2025-07-30 RX ADMIN — PIPERACILLIN AND TAZOBACTAM 4.5 G: 4; .5 INJECTION, POWDER, FOR SOLUTION INTRAVENOUS at 23:53

## 2025-07-30 RX ADMIN — Medication 400 MG: at 13:45

## 2025-07-30 RX ADMIN — PANTOPRAZOLE SODIUM 40 MG: 40 INJECTION, POWDER, FOR SOLUTION INTRAVENOUS at 09:08

## 2025-07-30 RX ADMIN — PANTOPRAZOLE SODIUM 40 MG: 40 INJECTION, POWDER, FOR SOLUTION INTRAVENOUS at 21:20

## 2025-07-30 RX ADMIN — Medication 400 MG: at 10:50

## 2025-07-30 RX ADMIN — Medication 400 MG: at 01:54

## 2025-07-30 ASSESSMENT — ACTIVITIES OF DAILY LIVING (ADL)
ADLS_ACUITY_SCORE: 41
ADLS_ACUITY_SCORE: 40
ADLS_ACUITY_SCORE: 41
ADLS_ACUITY_SCORE: 40
ADLS_ACUITY_SCORE: 41
ADLS_ACUITY_SCORE: 40
ADLS_ACUITY_SCORE: 41

## 2025-07-30 NOTE — PLAN OF CARE
Orientation: A&O x4  Aggression Stop Light: Green  Activity: Ax2 GBW  Diet/BS Checks: Clear Liquid diet  IV Access/Drains: L PIV SL with int. abx. 2 ANANDA drains with bloody output to abdomen  Pain Management: Denied pain this shift  Abnormal VS/Results: VSS on 1L O2. Hgb = 8.7  Bowel/Bladder: Continent of B/B, pt had 2 BM this shift, No melena noted  Skin/Wounds: Scattered bruising, scab to chest, blanchable redness to sacrum/coccyx, 4 Lap andrew sites with 2 ANANDA drain to R sites.  Consults: Surgery  D/C Disposition: Pending   Other Info:   - Pt POD # 0 for Lap andrew this shift. Large intra abdominal abscess drained during procedure- see surgical note  - Pt had a witnessed assisted fall with injury this shift- see House provider notes

## 2025-07-30 NOTE — PROGRESS NOTES
CLINICAL NUTRITION SERVICES - REASSESSMENT NOTE     Recommendations:  Continue Ensure BID between meals - modify flavor to vanilla  Encouraged good po intake of meals     INFORMATION OBTAINED  Assessed patient in room.    CURRENT NUTRITION ORDERS  Diet: Regular          Room Service with Assist    Snacks/Supplements: Chocolate Ensure at 10am and 2pm      CURRENT INTAKE/TOLERANCE  -Patient was NPO on 7/29 for procedure, advanced to FLD this morning, tolerating. Per surgery, likely advance to regular diet on 7/31  -He has been ordering 3 small meals/day and eating 100%  -Likes the Ensure and is drinking them      NEW FINDINGS  GI symptoms: Reviewed    Skin/wounds: Redness Blanchable, Scattered Bruising     Nutrition-relevant labs:  Reviewed.     Nutrition-relevant medications: Reviewed    Weight:   Date/Time Weight Weight Method   07/30/25 0657 89.3 kg (196 lb 13.9 oz) Bed scale   07/29/25 0650 92.1 kg (203 lb 1.6 oz) Standing scale   07/27/25 0707 94.7 kg (208 lb 12.8 oz) Standing scale   07/26/25 0642 94.7 kg (208 lb 12.8 oz) Standing scale   07/24/25 0632 91.6 kg (202 lb) Standing scale   07/23/25 0557 92.8 kg (204 lb 9.4 oz) Bed scale   07/20/25 0631 95.3 kg (210 lb 1.6 oz) Bed scale   07/18/25 0400 93.1 kg (205 lb 4 oz) Bed scale   07/16/25 0543 86.5 kg (190 lb 9.6 oz) Standing scale   07/15/25 0537 86.8 kg (191 lb 6.4 oz) --   07/14/25 0130 86.4 kg (190 lb 7.6 oz) Standing scale   07/13/25 2110 83.9 kg (184 lb 14.4 oz) Standing scale       ASSESSED NUTRITION NEEDS  Dosing Weight: 83.9 kg, based on admission wt (7/13)  Estimated Energy Needs: 5270-6609 kcals/day (25 - 30 kcals/kg)  Justification: Maintenance  Estimated Protein Needs: 100-125 grams protein/day (1.2 - 1.5 grams of pro/kg)  Justification: Maintenance  Estimated Fluid Needs: 2977-8130 mL/day (1 mL/kcal)  Justification: Maintenance    MALNUTRITION (7/21)  % Intake: </= 50% for >/= 5 days (severe) - pt was NPO/clear liquid diet 7/13-7/19, ordering  small meals  % Weight Loss: None noted  Subcutaneous Fat Loss: None observed  Muscle Loss: Temples (temporalis muscle): Mild/Moderate  Fluid Accumulation/Edema: Moderate to severe, 2-4+ - 1-2+ bilteral lower extremity pitting edema   Malnutrition Diagnosis: Moderate malnutrition in the context of acute illness or injury  Malnutrition Present on Admission: No    EVALUATION OF THE PROGRESS TOWARD GOALS   Previous Goals (7/25)  Pt to consume 75% meals and 100% supplements    Previous Nutrition Diagnosis (7/25)  No nutrition diagnosis at this time     NUTRITION DIAGNOSIS  No nutrition diagnosis at this time       INTERVENTIONS  Continue Ensure BID between meals - modify flavor to vanilla  Encouraged good po intake of meals    GOALS  Pt to consume 75% meals and 100% supplements     MONITORING/EVALUATION  Progress toward goals will be monitored and evaluated per policy.    Nikhil Guerrero RD, LD, MS  [employer]  Available on [employer]

## 2025-07-30 NOTE — PROVIDER NOTIFICATION
Spoke with Dejah Vogel PA-C from [facility].  She is aware of the gram positive bacilli from gallbladder abscess.

## 2025-07-30 NOTE — PROGRESS NOTES
Brief GI follow up note:     Chart reviewed.     Patient has a complex recent history of choledocholithiasis and cholangitis with bacteremia, as well as new DVT.     EUS/ERCP 7/16 with stone removal. Aspiration after the procedure with pneumonia requiring ICU and intubation.     There was report of melena with a hgb drop 7/20 and 7/23, on heparin. However melenic stool ceased and he had a CT showing a large subcapsular fluid collection in the liver which we supposed might be blood, however turns out it was an abscess. Ddx post sphincterotomy bleed vs other.     Hgb stabilized.     He had cholecystectomy finding perforated gallbladder and a large perihepatic abscess yesterday. Has ANANDA drains, ID following.     We will continue to follow peripherally. If melena occurs when restarting AC, please call.     Saw Gutiérrez MD on 7/30/2025 at 1:05 PM

## 2025-07-30 NOTE — PROGRESS NOTES
"Brief House Provider Note  I was asked to come see Mr. Montana on 7/29/2025 shortly after 1800 for a witnessed, assisted fall. Mr. Montana is hospitalize with cholecystitis with large intra-abdominal abscess. Today, he underwent a cholecystectomy, drainage intra-abdominal abscess and lysis of adhesions  Shortly after arriving up to the unit from PACU, he requested to ambulate to the bathroom. He was assisted by 2 staff member, including his bedside nurse. While he was ambulating, he suddenly felt \"light headed,\" and felt his knees \"buckle.\" He was wearing a gait belt and was eased down to the floor. He did not hit his head or loose consciousness. Nor did he sustain any traumatic injury. He was carefully transferred back to the bed.  When I came to see him, he was laying in bed with his eyes open. He was alert, oriented and did not appear to be in distress. Vital signs are stable with SBP of 134 and HR in the 90s. He was oxygenating well on 2  liters of supplemental oxygen. He denies any pain. He denies any lightheadedness, dizziness or vision changes. He also denies chest pain, shortness of breath, abdominal pain or nausea. No traumatic injury, neck or spinal tenderness noted. Plan to monitor him on telemetry for the time being. He will remain in his current disposition on station 88.       "

## 2025-07-30 NOTE — CONSULTS
[facility]    Infectious Disease Consultation     Date of Admission:  7/13/2025  Date of Consult (When I saw the patient): 07/30/25    Assessment & Plan   Kristofer Montana is a 80 year old male who was admitted on 7/13/2025.     Impression:  80 year old male with a history of HTN, HFrEF, CAD, and prior CVA [facility] with a 2 day history of fever, chills, and RUQ pain, found to have choledocholithiasis, mild bile duct dilatation, and possible acute cholecystitis as well as E.coli bacteremia. Now s/p ERCP with sphincterotomy and removal of stones 7/16 and laparoscopic cholecystectomy, lysis of adhesions and drainage of the intra-abdominal abscess from perforated gallbladder 7/29.    -[facility].   -Multiple complications this hospital stay: A.fib with RVR, acute hypoxic respiratory failure, aspiration event during ERCP resulting in intubation, retained small common bile duct stone, large perihepatic abscess from perforated gallbladder.   -S/p laparoscopic cholecystectomy, lysis of adhesions and drainage of the intra-abdominal abscess 7/29/25. OR cultures from the abscess with GPR on gram stain thus far.   -On Zosyn. Already completed 2 weeks total of Cefepime/Flagyl followed by Ceftriaxone for E.coli bacteremia.      Recommendations:   Continue Zosyn for now.     Following OR cultures.   Hopefully will be able to change to oral antibiotics based on susceptibilities to complete 7-10 days post-operatively of antibiotics.      Patient and plan discussed with Dr. Cardona.     Dejah Vogel PA-C    Reason for Consult   Reason for consult: Asked to evaluate this patient for recent E. coli bacteremia treated but then developed fluid collection and acute cholecystitis found to have an intra-abdominal abscess will appreciate with antibiotic management .    Primary Care Physician   [facility]    Chief Complaint    Fever/chills, abdominal pain    History is obtained from the patient and medical records    History of Present Illness   Kristofer Montana is a 80 year old male with a history of HTN, HFrEF, CAD, and prior CVA who [facility] with a 2 day history of fever, chills, and RUQ pain. CT C/A/P revealed choledocholithiasis, mild bile duct dilatation, and possible acute cholecystitis. He was also found to have LLE DVT. He was transferred to [facility] for ERCP.     Blood [facility] drawn [facility] grew E.coli. During early portion of hospital stay he developed A.fib with RVR and acute hypoxic respiratory failure. On 7/16/25 he underwent ERCP with sphincterotomy with removal of stones. During the procedure he aspirated, resulting in him needing to be intubated.     He was noted to have a small common bile duct stone. Repeat imaging did show a large loculated perihepatic fluid collection. He had been on antibiotics until 7/27 to treat E.coli bacteremia for 2 weeks (Cefepime/Flagyl, followed by Ceftriaxone). Repeat CT abdomen that day with large subcapsular fluid collection along the lateral right hepatic lobe, measuring up to 10 cm in greatest thickness, mildly distended gallbladder. She was started on Zosyn. CTA GI bleed on 7/28 with acute cholecystitis. She was taken to the OR on 7/29 where she was found to have perforated cholecystitis with a large intra-abdominal abscess. She underwent laparoscopic cholecystectomy, lysis of adhesions and drainage of the intra-abdominal abscess. OR cultures from the abscess with GPR on gram stain thus far.     Past Medical History   I have reviewed this patient's medical history and updated it with pertinent information if needed.   Past Medical History:   Diagnosis Date    CAD (coronary artery disease)     History of CVA (cerebrovascular accident)     HTN (hypertension)     Ischemic cardiomyopathy        Past Surgical History   I have reviewed this patient's  surgical history and updated it with pertinent information if needed.  Past Surgical History:   Procedure Laterality Date    DAVINCI LAPAROSCOPIC CHOLECYSTECTOMY WITHOUT GRAMS N/A 7/29/2025    Procedure: CHOLECYSTECTOMY, ROBOT-ASSISTED, LAPAROSCOPIC, WITHOUT CHOLANGIOGRAM;  Surgeon: Scott Novoa MD;  Location: [facility] OR    DAVINCI LYSIS OF ADHESIONS N/A 7/29/2025    Procedure: Davinci Lysis of Adhesions;  Surgeon: Scott Novoa MD;  Location: [facility] OR    ENDOSCOPIC RETROGRADE CHOLANGIOPANCREATOGRAM N/A 7/16/2025    Procedure: ENDOSCOPIC RETROGRADE CHOLANGIOPANCREATOGRAPHY, SHPYNCTEROTOMY, DILATION, STONE EXTRACTION;  Surgeon: Aleyda Lee MD;  Location: [facility] OR    ENDOSCOPIC ULTRASOUND UPPER GASTROINTESTINAL TRACT (GI) N/A 7/16/2025    Procedure: ENDOSCOPIC ULTRASOUND, ESOPHAGOSCOPY / UPPER GASTROINTESTINAL TRACT (GI);  Surgeon: Aleyda Lee MD;  Location: [facility] OR    INCISION AND DRAINAGE ABDOMEN WASHOUT, COMBINED N/A 7/29/2025    Procedure: AND WASHOUT OF INTRA-ABDOMINAL ABSCESS;  Surgeon: Scott Novoa MD;  Location:  OR       Prior to Admission Medications   Prior to Admission Medications   Prescriptions Last Dose Informant Patient Reported? Taking?   aspirin 81 MG EC tablet 7/12/2025  Yes Yes   Sig: Take 81 mg by mouth daily.   atorvastatin (LIPITOR) 40 MG tablet 7/12/2025  Yes Yes   Sig: Take 40 mg by mouth daily.   losartan (COZAAR) 50 MG tablet 7/12/2025  Yes Yes   Sig: Take 50 mg by mouth daily.   metoprolol succinate ER (TOPROL XL) 25 MG 24 hr tablet 7/12/2025  Yes Yes   Sig: Take 25 mg by mouth daily.   multivitamin w/minerals (MULTI-VITAMIN) tablet Past Week  Yes Yes   Sig: Take 1 tablet by mouth daily.   omeprazole (PRILOSEC OTC) 20 MG EC tablet 7/12/2025  Yes Yes   Sig: Take 20 mg by mouth daily.      Facility-Administered Medications: None     Allergies   Allergies   Allergen Reactions    Amoxicillin Diarrhea       Immunization History   Immunization History    Administered Date(s) Administered    COVID-19 12+ (Pfizer) 11/29/2023, 11/27/2024    COVID-19 Bivalent 12+ (Pfizer) 02/14/2023    COVID-19 MONOVALENT 12+ (Pfizer) 11/10/2021    COVID-19 Monovalent 18+ (Moderna) 02/15/2021, 03/15/2021       Social History   I have reviewed this patient's social history and updated it with pertinent information if needed. Kristofer Montana  reports that he has never smoked. He has been exposed to tobacco smoke. He has never used smokeless tobacco.    Family History   I have reviewed this patient's family history and updated it with pertinent information if needed.   History reviewed. No pertinent family history.    Review of Systems   The 10 point Review of Systems is negative    Physical Exam   Temp: 98.4  F (36.9  C) Temp src: Oral BP: 115/61 Pulse: 93   Resp: 18 SpO2: 94 % O2 Device: Nasal cannula Oxygen Delivery: 1 LPM  Vital Signs with Ranges  Temp:  [97  F (36.1  C)-99.3  F (37.4  C)] 98.4  F (36.9  C)  Pulse:  [] 93  Resp:  [16-37] 18  BP: (103-134)/(59-70) 115/61  SpO2:  [94 %-100 %] 94 %  196 lbs 13.93 oz  Body mass index is 28.25 kg/m .    GENERAL APPEARANCE:  awake  EYES: Eyes grossly normal to inspection  NECK: no adenopathy  RESP: lungs clear   CV: regular rates and rhythm  LYMPHATICS: normal ant/post cervical and supraclavicular nodes  ABDOMEN: soft, nontender  MS: extremities normal  SKIN: no suspicious lesions or rashes        Data   All laboratory data reviewed    Component      Latest Ref Rng 7/19/2025  4:29 AM 7/22/2025  4:15 AM 7/28/2025  5:51 AM 7/30/2025  8:59 AM   WBC      4.0 - 11.0 10e3/uL 23.4 (H)  14.9 (H)  6.8  7.6    RBC Count      4.40 - 5.90 10e6/uL 3.30 (L)  2.48 (L)  2.46 (L)  2.62 (L)    Hemoglobin      13.3 - 17.7 g/dL 9.7 (L)  7.2 (L)  7.2 (L)  7.6 (L)    Hematocrit      40.0 - 53.0 % 28.9 (L)  21.5 (L)  21.7 (L)  23.9 (L)    MCV      78 - 100 fL 88  87  88  91    MCH      26.5 - 33.0 pg 29.4  29.0  29.3  29.0    MCHC      31.5 - 36.5 g/dL 33.6   33.5  33.2  31.8    RDW      10.0 - 15.0 % 15.6 (H)  15.8 (H)  15.5 (H)  15.5 (H)    Platelet Count      150 - 450 10e3/uL 180  154  241  279    % Neutrophils      %    84    % Lymphocytes      %    9    % Monocytes      %    6    % Eosinophils      %    0    % Basophils      %    0    % Immature Granulocytes      %    1    NRBC/W      <1 /100    0    Absolute Neutrophil      1.6 - 8.3 10e3/uL    6.4    Absolute Lymphocytes      0.8 - 5.3 10e3/uL    0.7 (L)    Absolute Monocytes      0.0 - 1.3 10e3/uL    0.5    Absolute Eosinophils      0.0 - 0.7 10e3/uL    0.0    Absolute Basophils      0.0 - 0.2 10e3/uL    0.0    Absolute Immature Granulocytes      <=0.4 10e3/uL    0.1    Absolute NRBCs      10e3/uL    0.0       Component      Latest Ref Rng 7/22/2025  4:15 AM 7/24/2025  5:42 AM 7/29/2025  5:17 AM 7/30/2025  8:59 AM   Sodium      135 - 145 mmol/L 146 (H)  140  137  138    Potassium      3.4 - 5.3 mmol/L 3.8  3.6  3.6  3.8    Carbon Dioxide (CO2)      22 - 29 mmol/L 25  25  23  24    Anion Gap      7 - 15 mmol/L 8  9  11  11    Urea Nitrogen      8.0 - 23.0 mg/dL 39.8 (H)  23.6 (H)  10.5  10.2    Creatinine      0.67 - 1.17 mg/dL 1.08  1.01  1.08  1.04    GFR Estimate      >60 mL/min/1.73m2 70  76  70  73    Calcium      8.8 - 10.4 mg/dL 7.4 (L)  7.0 (L)  7.5 (L)  7.8 (L)    Chloride      98 - 107 mmol/L 113 (H)  106  103  103    Glucose      70 - 99 mg/dL 107 (H)  120 (H)  104 (H)  138 (H)    Alkaline Phosphatase      40 - 150 U/L 193 (H)  268 (H)  495 (H)  390 (H)    AST      0 - 45 U/L 42  77 (H)  92 (H)  63 (H)    ALT      0 - 70 U/L 23  52  78 (H)  44    Protein Total      6.4 - 8.3 g/dL 4.5 (L)  4.4 (L)  5.6 (L)  5.2 (L)    Albumin      3.5 - 5.2 g/dL 2.2 (L)  2.0 (L)  2.2 (L)  1.9 (L)    Bilirubin Total      <=1.2 mg/dL 0.6  0.7  1.3 (H)  0.9       07/29/2025 1538 07/29/2025 1550 Anaerobic Bacterial Culture Routine [[id number]]   Abscess from Gallbladder    In process Component Value   No component results              07/29/2025 1538 07/30/2025 1511 Gram Stain [[id number]]   (Abnormal)   Abscess from Gallbladder    Edited Result - FINAL Component Value   GS Culture See corresponding culture for results   Gram Stain Result 2+ Gram positive bacilli Abnormal  C   Corrected on July 30, 2025/3:10 PM by Blanca Grey  Previously reported as: Gram negative bacilli  Corrected result: Previously reported as 2+ Gram negative bacilli on 7/29/2025 at  8:56 PM CDT.   Gram Stain Result 3+ WBC seen Abnormal  C   Predominantly PMNs          07/29/2025 1538 07/29/2025 1550 Fungal or Yeast Culture Routine [[id number]]   Abscess from Gallbladder    In process Component Value   No component results             07/29/2025 1538 07/30/2025 1122 Abscess Aerobic Bacterial Culture Routine Without Gram Stain [[id number]]   Abscess from Gallbladder    Preliminary result Component Value   Culture No growth, less than 1 day P             07/17/2025 0003 07/19/2025 1425 Respiratory Aerobic Bacterial Culture with Gram Stain [[id number]]   (Abnormal)   Sputum from Endotracheal    Final result Component Value   Culture 3+ Candida glabrata complex Abnormal     Susceptibilities not routinely done, refer to antibiogram to view typical susceptibility profiles   Gram Stain Result <25 PMNs/low power field Abnormal     2+ Yeast Abnormal              07/16/2025 2328 07/22/2025 0446 Blood Culture Peripheral blood (BC) Hand, Left [[id number]]    Peripheral blood (BC) from Hand, Left    Final result Component Value   Culture No Growth             07/16/2025 2322 07/22/2025 0446 Blood Culture Peripheral blood (BC) Hand, Left [[id number]]    Peripheral blood (BC) from Hand, Left    Final result Component Value   Culture No Growth             07/16/2025 2301 07/17/2025 0637 Respiratory Panel PCR [[id number]]    Swab from Nasopharyngeal    Final result Component Value   Adenovirus Not Detected   Coronavirus Not Detected   This test detects  Coronavirus 229E, HKU1, NL63 and OC43 but does not distinguish between them. It does not detect MERS ( Respiratory Syndrome), SARS (Severe Acute Respiratory Syndrome) or 2019-nCoV (Novel 2019) Coronavirus.   Human Metapneumovirus Not Detected   Human Rhin/Enterovirus Not Detected   Influenza A Not Detected   Influenza A, H1 Not Detected   Influenza A 2009 H1N1 Not Detected   Influenza A, H3 Not Detected   Influenza B Not Detected   Parainfluenza Virus 1 Not Detected   Parainfluenza Virus 2 Not Detected   Parainfluenza Virus 3 Not Detected   Parainfluenza Virus 4 Not Detected   Respiratory Syncytial Virus A Not Detected   Respiratory Syncytial Virus B Not Detected   Chlamydia Pneumoniae Not Detected   Mycoplasma Pneumoniae Not Detected          07/16/2025 2301 07/17/2025 0617 MRSA MSSA PCR, Nasal Swab [[id number]]    Swab from Nares, Bilateral    Final result Component Value   MRSA Target DNA Negative   SA Target DNA Negative          07/15/2025 1115 07/20/2025 1604 Blood Culture Peripheral blood (BC) Arm, Right [[id number]]    Peripheral blood (BC) from Arm, Right    Final result Component Value   Culture No Growth             07/15/2025 1107 07/20/2025 1604 Blood Culture Peripheral blood (BC) Arm, Right [[id number]]    Peripheral blood (BC) from Arm, Right    Final result Component Value   Culture No Growth                     EXAM: CT ABDOMEN PELVIS W CONTRAST  LOCATION: [facility]  DATE: 7/27/2025     INDICATION: Indeterminate perihepatic fluid collection on ultrasound 7/26/2025; anemia.  COMPARISON: CT abdomen pelvis 7/16/2025; right upper quadrant ultrasound 7/26/2025.  TECHNIQUE: CT scan of the abdomen and pelvis was performed following injection of IV contrast. Multiplanar reformats were obtained. Dose reduction techniques were used.  CONTRAST: 104 mL Isovue-370.     FINDINGS:     LOWER CHEST: Multifocal bronchiolitis, predominantly involving the lingula and left  lower lobe. Mild to moderate dependent atelectatic change, greatest within the right lower lobe. There is some associated compression from an adjacent small right pleural   effusion.     Mild left ventricular enlargement. LAD stent. Atherosclerotic disease of coronary arteries and visualized thoracic aorta. Tiny hiatal hernia.     HEPATOBILIARY: Prominent mass effect upon the lateral right hepatic lobe, due to a large subcapsular fluid collection. This fluid collection measures simple fluid attenuation and demonstrates no evidence of active bleeding on this single portal venous   phase of contrast. In greatest thickness, this collection measures up to 10 cm, series 3 image 46. Small simple cyst of hepatic segment 4; no further follow-up recommended.       Gallbladder is mildly distended, with mild pericholecystic inflammatory change. This may reflect the sequela of recent ERCP, though acute cholecystitis difficult to exclude.     No intrahepatic or intrahepatic biliary ductal dilatation. Small amount of pneumobilia within the common duct, consistent with a recent ERCP.     PANCREAS: Diffuse fatty infiltration and subtle peripancreatic inflammatory fat stranding; correlation with laboratory markers is recommended to exclude an acute pancreatitis.     SPLEEN: Enhances normally. Normal size.     ADRENAL GLANDS: Normal.     KIDNEYS: Both kidneys enhance symmetrically, without hydronephrosis. Large, simple right renal cyst; no further follow-up recommended.     No nephroureterolithiasis.     Urinary bladder is unremarkable.     PELVIC ORGANS: Mild prostatomegaly.     BOWEL: No evidence of acute gastrointestinal inflammation or obstruction. Colonic and duodenal diverticulosis. Normal appendix.     Small volume ascites, with subtle peritoneal enhancement involving fluid at the dependent pelvis, which may be secondary to a developing peritonitis. No intraperitoneal free air.     LYMPH NODES: No suspicious abdominal or  pelvic lymphadenopathy.     VASCULATURE: No abdominal aortic aneurysm. Mild atheromatous disease.     MUSCULOSKELETAL: No suspicious abnormality. Prominent lumbar levoscoliosis. Multilevel degenerative change of the lumbar and visualized thoracic spine.     OTHER: No additionally significant abnormalities.                                                                      IMPRESSION:   1.  Large subcapsular fluid collection along the lateral right hepatic lobe, measuring up to 10 cm in greatest thickness. This demonstrates no evidence of active bleeding on this single portal venous phase of contrast. Although there is no internal high   attenuation to suggest overt evidence of blood products, acute hemorrhage would be difficult to exclude, particularly given the presence of heterogeneous internal echoes on yesterday's sonographic examination.   2.  Small volume ascites, with subtle peritoneal enhancement involving fluid at the dependent pelvis, which may be secondary to a developing peritonitis.  3.  Mildly distended gallbladder, with mild pericholecystic inflammatory change. This may reflect the sequela of recent ERCP, though acute cholecystitis difficult to exclude.  4.  Diffuse fatty infiltration of the pancreas, with subtle peripancreatic inflammatory fat stranding. Correlation with laboratory markers is recommended to exclude an acute pancreatitis.  5.  Multifocal bronchiolitis, predominantly involving the lingula and left lower lobe.  6.  Small right pleural effusion.  7.  Mild prostatomegaly.

## 2025-07-30 NOTE — PLAN OF CARE
Goal Outcome Evaluation:    Diagnosis: POD #1 for Lap andrew   Mental Status: A&O x4   Activity/dangle: Assist x1 w/ gb and walker  Diet: Tolerating full liquid  Mar/Voiding: Voiding in the bedside commode.  Pain: denies  Tele/Restraints/Iso: Tele- SR w/ BBB  Skin: scattered bruising, trace edema hands and BLE, abd lap andrew sites x2 with band aid, ANANDA site midline and site RUQ-with small to mod amout of serous sanguinous output   02/LDA: On room air, Hep @ 1550 units/hr- pending Hep Xa lab.   D/C Date: TBD  Other Info: On mag/phos protocol. Mag replaced.

## 2025-07-30 NOTE — PROGRESS NOTES
[facility]    General Surgery  Daily Progress Note       Assessment and Plan:   Kristofer Montana is a 80 year old male admitted on 7/13 with e coli bacteremia, choledocholithiasis, and concern of acute cholecystitis. He was also found to have occlusive DVT in L femoral vein, nonocclusive DVT in L popliteal vein. He was started on iv heparin and transferred to [facility] for ERCP. He had ERCP with sphincterotomy and removal of stones on 7/16. No stent was placed. He had an aspiration event during the procedure. He was intubated and admitted to ICU.     Our surgery team evaluated 7/17 and recommended cholecystectomy to prevent further cholangitis after recovering from aspiration pneumonia. No convincing evidence of acute cholecystitis at that time but recommended cholecystostomy tube if that were the case given patient's clinical condition. He also had melenic stools starting 7/20 and acute blood loss likely secondary to GI bleed vs post sphincterotomy bleed due to anticoagulation.    Interval ultrasound 7/26 visualized heterogeneous fluid collection over the right lobe of the liver and this was thought to be subcapsular hematoma on CT 7/27. Only mildly distended gallbladder, with mild pericholecystic inflammatory change was seen again which could reflect the sequela of recent ERCP. CTA GI bleed on 7/28 was concerning that the small fluid collection was abutting the gallbladder fundus and our surgery team was reconsulted.    He is now 1 day s/p robotic cholecystectomy with washout given perforated gallbladder and large liver abscess seen intraoperatively.    PLAN:  - Advance to full liquid diet, regular diet tomorrow.  - On IV zosyn, abscess cultures pending. ID consulted by hospitalist team, agree with this given large abscess.  - Continue ANANDA drains, stripping q 4 hours and recording output.  - Pain controlled with oral analgesia.  - Senokot BID and miralax daily prn.  - Ambulate QID to assist with  bowel function, PCDs for DVT prophylaxis.   - Acute blood loss anemia due to recent GI bleed vs post sphincterotomy. Hgb stable at 7.6 this morning, ANANDA drains more serous than sanguinous. Okay for heparin/anticoagulation from our standpoint.  - Encourage deep breathe and IS.   - Medical management per primary team.        Interval History:   Kristofer Montana is seen on surgical rounds. He is happy to report decreased pain this morning especially in RUQ. He feels much improvement since yesterday. He is passing flatus and tolerating clear liquid diet. Awaiting BM, feels like may have one today. Voiding. Vitals wnl this morning.         Physical Exam:   Temp: 98.4  F (36.9  C) Temp src: Oral BP: 115/61 Pulse: 93   Resp: 18 SpO2: 94 % O2 Device: Nasal cannula Oxygen Delivery: 1 LPM    I/O last 3 completed shifts:  In: 550 [I.V.:300]  Out: 930 [Urine:530; Drains:375; Blood:25]    GENERAL: VS reviewed, alert, oriented, no acute distress  LUNGS: Normal respiratory effort, no wheezing  ABDOMEN:  Mildly distended but soft, appropriately tender, ANANDA drains more serous than sanguinous, RUQ ANANDA: 100 ml/24 hours, midline ANANDA: 275 ml/24 hours    INCISION: Band-aids removed, steris in place. Incisions are clean, dry, and intact. No surrounding erythema.  EXTREMITIES: Moving all extremities, no gross deformities.  NEUROLOGICAL: Grossly non-focal, mood & affect appropriate    ---------------------------------------------------------------------------------------------------------------    Betty Conroy PA-C    Please use Vocera to page 7:30am-4pm, page on-call surgeon after 4pm.  Do not use Engage Alerts to page providers.  Office number: [phone]    Data   Recent Labs   Lab 07/30/25  0859 07/30/25  0150 07/29/25  1817 07/29/25  0517 07/28/25  1927 07/28/25  0551 07/27/25  1839 07/27/25  0702   WBC 7.6  --   --   --   --  6.8  --  7.8   HGB 7.6*  --  8.7* 7.8*   < > 7.2*   < > 7.6*  7.6*   MCV 91  --  90 89   < > 88   < > 89  89      --   --   --   --  241  --  239     --   --  137  --  138  --  138   POTASSIUM 3.8  --   --  3.6  --  3.6  --  3.6   CHLORIDE 103  --   --  103  --  106  --  106   CO2 24  --   --  23  --  22  --  23   BUN 10.2  --   --  10.5  --  12.9  --  16.1   CR 1.04  --   --  1.08  --  1.02  --  1.05   ANIONGAP 11  --   --  11  --  10  --  9   PAUL 7.8*  --   --  7.5*  --  7.4*  --  7.4*   * 156*  --  104*  --  95  --  104*   ALBUMIN 1.9*  --   --  2.2*  --  1.9*  --  2.0*   PROTTOTAL 5.2*  --   --  5.6*  --  5.0*  --  5.0*   BILITOTAL 0.9  --   --  1.3*  --  1.0  --  0.9   ALKPHOS 390*  --   --  495*  --  454*  --  424*   ALT 44  --   --  78*  --  85*  --  72*   AST 63*  --   --  92*  --  111*  --  101*    < > = values in this interval not displayed.

## 2025-07-30 NOTE — PLAN OF CARE
Goal Outcome Evaluation:    7/29/25    Orientation: A/Ox4,pleasant  Aggression Stop Light: green  Activity: A2 GBW   Diet/BS Checks: clears  Tele:  Sinus tachy at times with BBB  IV Access/Drains: L PIV SL int with Abx  Pain Management: denies  Abnormal VS/Results: VSS,on o2 1 NC, with BELLE  Bowel/Bladder: cont b/b, uses urinal at bedside  Skin/Wounds: scattered bruising, trace edema hands and BLE, abd lap andrew sites x2 with band aid,  ANANDA site midline and site RUQ-with small to mod amout of serous sanguinous output, noted soaked dressing, dressing changed  Consults: IR/GenSx/GI  D/C Disposition: pending     Other Info:   -On Mag/phos Protocol  -Mag 1.7-replaced 1x-rechecks in AM  -Phos WDL-rechecks in AM  -able to pass minimal gas

## 2025-07-30 NOTE — PROVIDER NOTIFICATION
Radha messaged Dr. Wahl: Infectious disease lab previously reported the gallbladder abcess fluid from yesterday as gram negative bacilli.  They have now corrected this to gram positive bacilli.

## 2025-07-30 NOTE — PROGRESS NOTES
[facility]    Medicine Progress Note - Hospitalist Service    Date of Admission:  7/13/2025    Assessment & Plan     Kristofer Montana is a 81 y/o male with Hx of HTN, HLD, HFrEF (LVEF 40%), CAD (remote PCI 2005), CVA (2013, no residual weakness/deficits) who [facility] with 2 days of fever, chills and RUQ pain.      CT C/A/P showed choledocholithiasis and mild bile duct dilation and possible acute cholecystitis. He was also found to have occlusive DVT in L femoral vein, nonocclusive DVT in L popliteal vein. He was started on iv heparin and transferred to [facility]CP.      [facility] E [facility]. On 7/14, he went into A fib with RVR and was started on amiodarone infusion. Concerted to NSR. On 7/15, he developed acute hypoxic resp failure and was started on BIPAP. Weaned off by 7/16.     He underwent EUS/ERCP with sphincterotomy with removal of stones on 7/16. No stent was placed. He had an aspiration event during the procedure. He was intubated and admitted to ICU.     CT 7/17 showed residual 0.3 cm gallstone in CBD, persistent mild CBD dilatation of 1.1 cm. Mild dilatation of small bowel loops, likely ileus. Per GI, small CBD stone is expected to pass on its own and no intervention was recommended. He was extubated on 7/19 and was transferred to hospitalist service.     Per gen surg no intervention planned at this time. Cholecystostomy tube should be considered if concern for acute cholecystitis. Cholecystectomy should be considered when patient recovers from aspiration pneumonia.     On 7/20, the patient had an episode of melena and noted Hb drop from 9.7 to 8.4 to 6.7 on 7/21. Heparin infusion for paroxysmal atrial fibrillation and DVT has been on hold since 7/20. Received one unit RBC transfusion on 7/21.  Attempted to resume heparin infusion and aspirin again on 7/23 and hemoglobin dropped to 6.6 AM 7/24. Received another 1 unit RBC AM 7/24.  Attempted to resume heparin infusion and aspirin again but patient has had persistent melena since that time.      An abdominal US was obtained 7/26. Difficult exam due to bowel gas. Heterogeneous fluid collection over the right lobe of the liver is indeterminant. Concern for possible subcapsular hematoma versus more loculated ascites. Mild ascites and small right pleural effusion. Normal abdominal liver duplex.    Perforated cholecystitis with large intra-abdominal abscess status laparoscopic cholecystectomy, lysis of adhesions and drainage of the intra-abdominal abscess on 7/29/2025  Mild ascites   Fluid collection over liver, subcapsular hematoma versus loculated ascites   Septic shock -resolved due to E coli bacteremia secondary to choledocholithiasis with ascending cholangitis, s/p EUS/ERCP with sphincterotomy with removal of stones on 7/16    -Now weaned off Vasopressors and stress dose steroids.  - On 7/17 found to have retained 0.3 cm CBD stone which is expected to pass in its own and no intervention was recommended by [facility].   - Gen surgery did not feel patient had acute cholecystitis. Recommended percutaneous cholecystostomy tube if needed. Will eventually need elective cholecystectomy once patient recovers from acute illness.   -CT A/P 7/16 had shown large loculated perihepatic/subdiaphragmatic fluid collection which decreased in size following ERCP, but increased fluid in abdomen with relative hyperdensity compared to simple ascites.  IR felt this was too small to sample percutaneously. Also pneumobilia status post ERCP.   - Received cefepime from 7/14 - 7/17, Flagyl 7/14 - 7/18, and Ceftriaxone 7/18 to 7/27  - Last day of antibiotics 7/27, completed two weeks for E. Coli bacteremia  - CT scan of the abdomen and pelvis as above concerning for large fluid collection around the subcapsular fluid around the lateral right hepatic lobe measuring up to 10 cm and patient was started on IV Zosyn  -On exam patient  does not have RUQ tenderness   -CTA GI bleed on 7/28 does not show any evidence of hemorrhage but there is concern that there is stable irregular gallbladder wall thickening, pericholecystic inflammatory changes and small fluid collection abutting the gallbladder fundus and suspicious for acute cholecystitis and stable large subcapsular fluid collection along the right hepatic lobe  - patient was taken to the OR on 7/29 and he was found to have perforated cholecystitis with large intra-abdominal abscess and underwent laparoscopic cholecystectomy, lysis of adhesion and drainage of the abscess and drains were placed  - Local aerobic cultures including aerobic anaerobic and fungal from 7/29 have been negative  -LFT trending down  -diet per surgery   - consult ID   - Continue with IV Zosyn  - okay for heparin drip per discussion with surgery        Melena  Acute blood loss anemia - due to GI bleed vs post sphincterotomy bleed due to anticoagulation   Loculated ascites versus subcapsular hematoma  Patient has been having intermittent melena since admission and while on therapeutic anticoagulation. Abdominal US on 7/26 concerning for loculated ascites versus subcapsular hematoma.  *Hb trend since admission 13 - 12.4 - 9.8 - 8.4 - 6.8 - (1 unit RBC) - 7.7 - 7.3 - 7.2 - 7.2 - 7.8 - 7.2 - 7.1 - 6.6 - (1 unit RBC) - 8.4 - 8.7 - 7.7 - 7.2 - 7.9 - 7.6 -7.2  - Heparin gtt and aspirin held PM 7/26 after patient had another melanotic stool   - CT scan of the abdomen and pelvis from 7/28 was concerning for large subcapsular fluid collection along the lateral right hepatic lobe measuring 10 cm in greatest thickness and they could not exclude active hemorrhage, small volume ascites, mildly distended gallbladder with mild pericholecystic inflammatory changes and may reflect sequela of recent ERCP but cannot exclude cholecystitis, diffuse fatty infiltration of the pancreas with subtle peripancreatic inflammatory fat stranding,  multifocal bronchiolitis and small right pleural effusion  -CTA GI on 7/28 does not show any evidence of hemorrhage  -No evidence of any melena or black stools and hemoglobin is uptrending at 7. 6  - Monitor hemoglobin closely  -start heparin drip per surgery     Aspiration pneumonia versus CAP   Acute hypoxic respiratory failure - secondary to aspiration pneumonitis on 7/16 during EUS/ERCP-resolved  -Extubated on 7/19. Weaned off supplemental oxygen on 7/20. Respiratory culture growing candida glabrata, suspected contaminant.   - Received cefepime from 7/14 - 7/17, Flagyl 7/14 - 7/18, and Ceftriaxone 7/18 to present, continue for now given bacteremia, anticipating 14 days total of antibiotics [facility]     Mild FARNAZ - secondary to septic shock, improved   -Baseline Cr 1. Cr peaked at 1.39.   - Creatinine seems stable at 1.04  -Monitor labs closely     Hypokalemia-resolved  Hypophosphatemia-resolved  Hypomagnesemia -resolved  - Replace per protocol     Paroxysmal atrial fibrillation, converted to NSR with IV amiodarone infusion  - Amiodarone discontinued.   -Now on low dose toprol XL  - His IV heparin has been on hold since 7/24 due to concern for GI bleed   - restart heparin drip     Left Femoral/popliteal DVT, diagnosed 7/13  - IR did not recommend thrombectomy.  - IV Heparin will be restarted again       Hyperlipidemia  - Continue atorvastatin     CAD s/p  remote PCI (2005)  - Aspirin was held for ERCP, resumed on 7/19,  - Continue to hold aspirin at this point in time and will see how his response is to IV heparin     Essential HTN -   - PTA antihypertensives on hold. Resume as able     Mild Thrombocytopenia - due to severe sepsis  Now resolved     Chronic systolic CHF with reduced EF of 40%  Appears euvolemic. Monitor volume status. Weight up from admission from 83 to 93 kg   - Chest x-ray 7/18 showed no pulmonary edema  - May need to consider diuresis if swelling not improving or if  "patient becomes hypoxic      Prediabetes, HbA1c 5.8%, with stress and steroid induced hyperglycemia  Has completed stress dose steroids  - has not required sliding scale insulin so currently on hold           Diet: Room Service  Snacks/Supplements Adult: Other; Vanilla Ensure at 10am and 2pm  (RD); Between Meals  Clear Liquid Diet    DVT Prophylaxis: Pneumatic Compression Devices  Mar Catheter: Not present  Lines: None     Cardiac Monitoring: ACTIVE order. Indication: Syncope- low cardiac risk (24 hours)  Code Status: Full Code      Clinically Significant Risk Factors               # Hypoalbuminemia: Lowest albumin = 1.9 g/dL at 7/28/2025  5:51 AM, will monitor as appropriate                # Overweight: Estimated body mass index is 28.25 kg/m  as calculated from the following:    Height as of this encounter: 1.778 m (5' 10\").    Weight as of this encounter: 89.3 kg (196 lb 13.9 oz).   # Moderate Malnutrition: based on nutrition assessment and treatment provided per dietitian's recommendations.      # Financial/Environmental Concerns: none         Social Drivers of Health            Disposition Plan     Medically Ready for Discharge: Anticipated in 2-4 Days, need further monitoring         Curt Wahl MD  Hospitalist Service  [facility]  Securely message with Search to Phone (more info)  Text page via [facility] Paging/Directory     This note was completed in part using dictation via the Dragon voice recognition software. Some word and grammatical errors may occur and must be interpreted in the appropriate clinical context. If there are any questions pertaining to this issue, please contact me for further clarification.    ______________________________________________________________________    Interval History     - Reviewed events of overnight and he had assisted fall and denies any lightheadedness or dizziness  -Lot of output from drains  -local pain better  -No evidence of any fever or " chill      Physical Exam   Vital Signs: Temp: 98.4  F (36.9  C) Temp src: Oral BP: 115/61 Pulse: 93   Resp: 18 SpO2: 94 % O2 Device: Nasal cannula Oxygen Delivery: 1 LPM  Weight: 196 lbs 13.93 oz        General: here x 3  HEENT: Head is atraumatic, normocephalic.    Neck: Neck is supple   Respiratory: CTLA  Cardiovascular: Regular rate , S1 and S2 normal with no murmer or rubs or gallops  Abdomen:   soft , laparoscopic port scar amanda send has ANANDA drain in place with significant output  Neurologic:  no focal deficit  Musculoskeletal: Normal Range of motion over upper and lower extremities bilaterally   Psychiatric: cooperative      Medical Decision Making       Time spent in care of patient is 50 minutes and I reviewed labs and discussed plan of care in detail with patient and nursing staff and surgery team      Data     I have personally reviewed the following data over the past 24 hrs:    7.6  \   7.6 (L)   / 279     138 103 10.2 /  138 (H)   3.8 24 1.04 \     ALT: 44 AST: 63 (H) AP: 390 (H) TBILI: 0.9   ALB: 1.9 (L) TOT PROTEIN: 5.2 (L) LIPASE: N/A       Imaging results reviewed over the past 24 hrs:   No results found for this or any previous visit (from the past 24 hours).

## 2025-07-30 NOTE — PROGRESS NOTES
Shift: [id number], 7/29    Orientation: A&O x4  Aggression Stop Light: Green  Activity: Did ok w/ A1 walker pivot to commode, got tired quickly   Diet/BS Checks: Clear Liquid diet  IV Access/Drains: L PIV SL with int. abx. -2 ANANDA drains (midline and RUQ) with sanguinous output  Pain Management: Denied pain this shift  Abnormal VS/Results: VSS on 1L O2.  Bowel/Bladder: Continent of B/B, no BM noted  Skin/Wounds: Scattered bruising, scab to chest, blanchable redness to sacrum/coccyx, 4 Lap andrew sites with 2 ANANDA drain  Consults:  D/C Disposition: Pending   Other Info:   - Pt POD # 0 for Lap andrew this shift. Large intra abdominal abscess drained during procedure- see surgical note

## 2025-07-31 ENCOUNTER — APPOINTMENT (OUTPATIENT)
Dept: PHYSICAL THERAPY | Facility: CLINIC | Age: 80
DRG: 853 | End: 2025-07-31
Attending: INTERNAL MEDICINE
Payer: MEDICARE

## 2025-07-31 VITALS
OXYGEN SATURATION: 94 % | DIASTOLIC BLOOD PRESSURE: 66 MMHG | HEART RATE: 90 BPM | RESPIRATION RATE: 18 BRPM | WEIGHT: 196.87 LBS | BODY MASS INDEX: 28.18 KG/M2 | HEIGHT: 70 IN | TEMPERATURE: 99 F | SYSTOLIC BLOOD PRESSURE: 116 MMHG

## 2025-07-31 LAB
ALBUMIN SERPL BCG-MCNC: 1.9 G/DL (ref 3.5–5.2)
ALP SERPL-CCNC: 321 U/L (ref 40–150)
ALT SERPL W P-5'-P-CCNC: 38 U/L (ref 0–70)
ANION GAP SERPL CALCULATED.3IONS-SCNC: 9 MMOL/L (ref 7–15)
AST SERPL W P-5'-P-CCNC: ABNORMAL U/L
BACTERIA ABSC ANAEROBE+AEROBE CULT: NORMAL
BILIRUB SERPL-MCNC: 0.6 MG/DL
BLD PROD TYP BPU: NORMAL
BLOOD COMPONENT TYPE: NORMAL
BUN SERPL-MCNC: 9.8 MG/DL (ref 8–23)
CALCIUM SERPL-MCNC: 7.2 MG/DL (ref 8.8–10.4)
CHLORIDE SERPL-SCNC: 101 MMOL/L (ref 98–107)
CODING SYSTEM: NORMAL
CREAT SERPL-MCNC: 0.95 MG/DL (ref 0.67–1.17)
CROSSMATCH: NORMAL
EGFRCR SERPLBLD CKD-EPI 2021: 82 ML/MIN/1.73M2
ERYTHROCYTE [DISTWIDTH] IN BLOOD BY AUTOMATED COUNT: 15.4 % (ref 10–15)
GLUCOSE SERPL-MCNC: 117 MG/DL (ref 70–99)
HCO3 SERPL-SCNC: 25 MMOL/L (ref 22–29)
HCT VFR BLD AUTO: 21.5 % (ref 40–53)
HGB BLD-MCNC: 7 G/DL (ref 13.3–17.7)
HGB BLD-MCNC: 7.2 G/DL (ref 13.3–17.7)
HGB BLD-MCNC: 7.8 G/DL (ref 13.3–17.7)
ISSUE DATE AND TIME: NORMAL
MAGNESIUM SERPL-MCNC: 1.7 MG/DL (ref 1.7–2.3)
MCH RBC QN AUTO: 29.2 PG (ref 26.5–33)
MCHC RBC AUTO-ENTMCNC: 32.6 G/DL (ref 31.5–36.5)
MCV RBC AUTO: 89 FL (ref 78–100)
MCV RBC AUTO: 90 FL (ref 78–100)
MCV RBC AUTO: 92 FL (ref 78–100)
PATH REPORT.COMMENTS IMP SPEC: NORMAL
PATH REPORT.COMMENTS IMP SPEC: NORMAL
PATH REPORT.FINAL DX SPEC: NORMAL
PATH REPORT.GROSS SPEC: NORMAL
PATH REPORT.MICROSCOPIC SPEC OTHER STN: NORMAL
PATH REPORT.RELEVANT HX SPEC: NORMAL
PHOTO IMAGE: NORMAL
PLATELET # BLD AUTO: 262 10E3/UL (ref 150–450)
POTASSIUM SERPL-SCNC: 4 MMOL/L (ref 3.4–5.3)
PROT SERPL-MCNC: 5 G/DL (ref 6.4–8.3)
RBC # BLD AUTO: 2.4 10E6/UL (ref 4.4–5.9)
SODIUM SERPL-SCNC: 135 MMOL/L (ref 135–145)
UFH PPP CHRO-ACNC: 0.28 IU/ML (ref ?–1.1)
UFH PPP CHRO-ACNC: 0.41 IU/ML (ref ?–1.1)
UFH PPP CHRO-ACNC: 0.54 IU/ML (ref ?–1.1)
UNIT ABO/RH: NORMAL
UNIT NUMBER: NORMAL
UNIT STATUS: NORMAL
UNIT TYPE ISBT: 6200
WBC # BLD AUTO: 7.1 10E3/UL (ref 4–11)

## 2025-07-31 PROCEDURE — 250N000011 HC RX IP 250 OP 636: Performed by: PHYSICIAN ASSISTANT

## 2025-07-31 PROCEDURE — P9016 RBC LEUKOCYTES REDUCED: HCPCS | Performed by: PHYSICIAN ASSISTANT

## 2025-07-31 PROCEDURE — 120N000001 HC R&B MED SURG/OB

## 2025-07-31 PROCEDURE — 85018 HEMOGLOBIN: CPT | Performed by: HOSPITALIST

## 2025-07-31 PROCEDURE — 99233 SBSQ HOSP IP/OBS HIGH 50: CPT | Performed by: HOSPITALIST

## 2025-07-31 PROCEDURE — 99232 SBSQ HOSP IP/OBS MODERATE 35: CPT | Performed by: PHYSICIAN ASSISTANT

## 2025-07-31 PROCEDURE — 83735 ASSAY OF MAGNESIUM: CPT | Performed by: INTERNAL MEDICINE

## 2025-07-31 PROCEDURE — 84155 ASSAY OF PROTEIN SERUM: CPT | Performed by: PHYSICIAN ASSISTANT

## 2025-07-31 PROCEDURE — 250N000013 HC RX MED GY IP 250 OP 250 PS 637: Performed by: PHYSICIAN ASSISTANT

## 2025-07-31 PROCEDURE — 85520 HEPARIN ASSAY: CPT | Performed by: HOSPITALIST

## 2025-07-31 PROCEDURE — 97530 THERAPEUTIC ACTIVITIES: CPT | Mod: GP

## 2025-07-31 PROCEDURE — 36415 COLL VENOUS BLD VENIPUNCTURE: CPT | Performed by: HOSPITALIST

## 2025-07-31 PROCEDURE — 250N000011 HC RX IP 250 OP 636: Performed by: HOSPITALIST

## 2025-07-31 PROCEDURE — 85014 HEMATOCRIT: CPT | Performed by: HOSPITALIST

## 2025-07-31 PROCEDURE — 250N000013 HC RX MED GY IP 250 OP 250 PS 637: Performed by: HOSPITALIST

## 2025-07-31 RX ORDER — MAGNESIUM OXIDE 400 MG/1
400 TABLET ORAL EVERY 4 HOURS
Status: COMPLETED | OUTPATIENT
Start: 2025-07-31 | End: 2025-07-31

## 2025-07-31 RX ADMIN — PANTOPRAZOLE SODIUM 40 MG: 40 INJECTION, POWDER, FOR SOLUTION INTRAVENOUS at 08:35

## 2025-07-31 RX ADMIN — HEPARIN SODIUM 1850 UNITS/HR: 10000 INJECTION, SOLUTION INTRAVENOUS at 20:17

## 2025-07-31 RX ADMIN — ATORVASTATIN CALCIUM 40 MG: 40 TABLET, FILM COATED ORAL at 08:34

## 2025-07-31 RX ADMIN — PIPERACILLIN AND TAZOBACTAM 4.5 G: 4; .5 INJECTION, POWDER, FOR SOLUTION INTRAVENOUS at 12:39

## 2025-07-31 RX ADMIN — METOPROLOL SUCCINATE 12.5 MG: 25 TABLET, EXTENDED RELEASE ORAL at 08:34

## 2025-07-31 RX ADMIN — PIPERACILLIN AND TAZOBACTAM 4.5 G: 4; .5 INJECTION, POWDER, FOR SOLUTION INTRAVENOUS at 06:17

## 2025-07-31 RX ADMIN — Medication 1 LOZENGE: at 00:02

## 2025-07-31 RX ADMIN — Medication 400 MG: at 04:39

## 2025-07-31 RX ADMIN — PANTOPRAZOLE SODIUM 40 MG: 40 INJECTION, POWDER, FOR SOLUTION INTRAVENOUS at 20:17

## 2025-07-31 RX ADMIN — Medication 400 MG: at 08:34

## 2025-07-31 RX ADMIN — HEPARIN SODIUM 1850 UNITS/HR: 10000 INJECTION, SOLUTION INTRAVENOUS at 04:27

## 2025-07-31 RX ADMIN — PIPERACILLIN AND TAZOBACTAM 4.5 G: 4; .5 INJECTION, POWDER, FOR SOLUTION INTRAVENOUS at 17:34

## 2025-07-31 ASSESSMENT — ACTIVITIES OF DAILY LIVING (ADL)
ADLS_ACUITY_SCORE: 41
ADLS_ACUITY_SCORE: 38
ADLS_ACUITY_SCORE: 38
ADLS_ACUITY_SCORE: 41
ADLS_ACUITY_SCORE: 41
ADLS_ACUITY_SCORE: 38
ADLS_ACUITY_SCORE: 41
ADLS_ACUITY_SCORE: 38
ADLS_ACUITY_SCORE: 38
ADLS_ACUITY_SCORE: 41
ADLS_ACUITY_SCORE: 38
ADLS_ACUITY_SCORE: 41
ADLS_ACUITY_SCORE: 38
ADLS_ACUITY_SCORE: 38
ADLS_ACUITY_SCORE: 41

## 2025-07-31 NOTE — PROGRESS NOTES
[facility]    Medicine Progress Note - Hospitalist Service    Date of Admission:  7/13/2025    Assessment & Plan     Kristofer Montana is a 79 y/o male with Hx of HTN, HLD, HFrEF (LVEF 40%), CAD (remote PCI 2005), CVA (2013, no residual weakness/deficits) who [facility] with 2 days of fever, chills and RUQ pain.      CT C/A/P showed choledocholithiasis and mild bile duct dilation and possible acute cholecystitis. He was also found to have occlusive DVT in L femoral vein, nonocclusive DVT in L popliteal vein. He was started on iv heparin and transferred to [facility] for ERCP.      [facility] grew E coli. On 7/14, he went into A fib with RVR and was started on amiodarone infusion. Concerted to NSR. On 7/15, he developed acute hypoxic resp failure and was started on BIPAP. Weaned off by 7/16.     He underwent EUS/ERCP with sphincterotomy with removal of stones on 7/16. No stent was placed. He had an aspiration event during the procedure. He was intubated and admitted to ICU.     CT 7/17 showed residual 0.3 cm gallstone in CBD, persistent mild CBD dilatation of 1.1 cm. Mild dilatation of small bowel loops, likely ileus. Per GI, small CBD stone is expected to pass on its own and no intervention was recommended. He was extubated on 7/19 and was transferred to hospitalist service.     Per gen surg no intervention planned at this time. Cholecystostomy tube should be considered if concern for acute cholecystitis. Cholecystectomy should be considered when patient recovers from aspiration pneumonia.     On 7/20, the patient had an episode of melena and noted Hb drop from 9.7 to 8.4 to 6.7 on 7/21. Heparin infusion for paroxysmal atrial fibrillation and DVT has been on hold since 7/20. Received one unit RBC transfusion on 7/21.  Attempted to resume heparin infusion and aspirin again on 7/23 and hemoglobin dropped to 6.6 AM 7/24. Received another 1 unit RBC AM 7/24.  Attempted to resume heparin infusion and aspirin again but patient has had persistent melena since that time.      An abdominal US was obtained 7/26. Difficult exam due to bowel gas. Heterogeneous fluid collection over the right lobe of the liver is indeterminant. Concern for possible subcapsular hematoma versus more loculated ascites. Mild ascites and small right pleural effusion. Normal abdominal liver duplex.    Perforated cholecystitis with large intra-abdominal abscess status laparoscopic cholecystectomy, lysis of adhesions and drainage of the intra-abdominal abscess on 7/29/2025  Mild ascites   Fluid collection over liver, subcapsular hematoma versus loculated ascites   Septic shock -resolved due to E coli bacteremia secondary to choledocholithiasis with ascending cholangitis, s/p EUS/ERCP with sphincterotomy with removal of stones on 7/16    -Now weaned off Vasopressors and stress dose steroids.  - On 7/17 found to have retained 0.3 cm CBD stone which is expected to pass in its own and no intervention was recommended by [facility].   - Gen surgery did not feel patient had acute cholecystitis. Recommended percutaneous cholecystostomy tube if needed. Will eventually need elective cholecystectomy once patient recovers from acute illness.   -CT A/P 7/16 had shown large loculated perihepatic/subdiaphragmatic fluid collection which decreased in size following ERCP, but increased fluid in abdomen with relative hyperdensity compared to simple ascites.  IR felt this was too small to sample percutaneously. Also pneumobilia status post ERCP.   - Received cefepime from 7/14 - 7/17, Flagyl 7/14 - 7/18, and Ceftriaxone 7/18 to 7/27  - Last day of antibiotics 7/27, completed two weeks for E. Coli bacteremia  - CT A/P 7/27concerning for large fluid collection around the subcapsular fluid around the lateral right hepatic lobe measuring up to 10 cm and patient was started on IV Zosyn  -CTA GI bleed on 7/28 does not show any evidence  of hemorrhage and there was concern about cholecystitis  - patient was taken to the OR on 7/29 and he was found to have perforated cholecystitis with large intra-abdominal abscess and underwent laparoscopic cholecystectomy, lysis of adhesion and drainage of the abscess and drains were placed  - Local aerobic cultures including aerobic anaerobic and fungal from 7/29 have been negative and Gram stain is growing gram-positive bacilli  -On exam has drain in place and the drainage is serosanguineous and not bloody  -LFT trending down  -Continue with Zosyn as per ID  - Appreciate input from the surgery and ID team     Melena-resolved  Acute blood loss anemia - due to GI bleed vs post sphincterotomy bleed due to anticoagulation versus underlying infective etiology  Patient has been having intermittent melena since admission and while on therapeutic anticoagulation. Abdominal US on 7/26 concerning for loculated ascites versus subcapsular hematoma.  *Hb trend since admission 13 - 12.4 - 9.8 - 8.4 - 6.8 - (1 unit RBC) - 7.7 - 7.3 - 7.2 - 7.2 - 7.8 - 7.2 - 7.1 - 6.6 - (1 unit RBC result) - 8.4 - 8.7 - 7.7 - 7.2 - 7.9 - 7.6 -7.2  - Heparin gtt and aspirin held PM 7/26 after patient had another melanotic stool   - CT scan of the abdomen and pelvis from 7/28 was concerning for large subcapsular fluid collection along the lateral right hepatic lobe measuring 10 cm in greatest thickness and they could not exclude active hemorrhage, small volume ascites, mildly distended gallbladder with mild pericholecystic inflammatory changes and may reflect sequela of recent ERCP but cannot exclude cholecystitis, diffuse fatty infiltration of the pancreas with subtle peripancreatic inflammatory fat stranding, multifocal bronchiolitis and small right pleural effusion  -CTA GI on 7/28 does not show any evidence of hemorrhage  -Patient has not had any melanotic stools or has had any bloody output from the and heparin drip was restarted back on 7/30  and his hemoglobin which was 7 point 6 in the morning dropped to 7 in the evening and he was given 1 unit of blood  - On examination of the drain the output is serosanguineous  -Hemoglobin this a.m. is 7.8  - Most likely reason of hemoglobin drop can be underlying recent infective process and given he is high risk for PE and stroke at this point in time we will continue with heparin drip for now  - Continue to monitor hemoglobin every 12 hours  - If he continues to drop then we will have to call GI again and he might need EGD    Aspiration pneumonia versus CAP   Acute hypoxic respiratory failure - secondary to aspiration pneumonitis on 7/16 during EUS/ERCP-resolved  -Extubated on 7/19. Weaned off supplemental oxygen on 7/20. Respiratory culture growing candida glabrata, suspected contaminant.   - Received cefepime from 7/14 - 7/17, Flagyl 7/14 - 7/18, and Ceftriaxone 7/18 to present, continue for now given bacteremia, anticipating 14 days total of antibiotics from [facility]13     Mild FARNAZ - secondary to septic shock, improved   -Baseline Cr 1. Cr peaked at 1.39.   - Creatinine seems stable at 0.95  -Monitor labs closely     Hypokalemia-resolved  Hypophosphatemia-resolved  Hypomagnesemia -resolved  - Replace per protocol if on the lower side     Paroxysmal atrial fibrillation, converted to NSR with IV amiodarone infusion  - Amiodarone discontinued.   -Continue with metoprolol  -Continue with heparin drip    Left Femoral/popliteal DVT, diagnosed 7/13  - IR did not recommend thrombectomy.  - Continue with heparin drip with close monitoring of hemoglobin       Hyperlipidemia  - Continue atorvastatin     CAD s/p  remote PCI (2005)  - Aspirin was held for ERCP, resumed on 7/19,  - Continue to hold aspirin at this point in time and will see how his response is to IV heparin     Essential HTN -   - PTA antihypertensives on hold. Resume as able     Mild Thrombocytopenia - due to severe sepsis  Now  "resolved     Chronic systolic CHF with reduced EF of 40%  Appears euvolemic. Monitor volume status. Weight up from admission from 83 to 93 kg   - Chest x-ray 7/18 showed no pulmonary edema  - May need to consider diuresis if swelling not improving or if patient becomes hypoxic      Prediabetes, HbA1c 5.8%, with stress and steroid induced hyperglycemia  -Has completed stress dose steroids  - Blood sugar has been stable around 110s          Diet: Room Service  Snacks/Supplements Adult: Other; Vanilla Ensure at 10am and 2pm  (RD); Between Meals  Regular Diet Adult    DVT Prophylaxis: Pneumatic Compression Devices  Mar Catheter: Not present  Lines: None     Cardiac Monitoring: ACTIVE order. Indication: Syncope- low cardiac risk (24 hours)  Code Status: Full Code      Clinically Significant Risk Factors               # Hypoalbuminemia: Lowest albumin = 1.9 g/dL at 7/31/2025  2:40 AM, will monitor as appropriate                # Overweight: Estimated body mass index is 28.25 kg/m  as calculated from the following:    Height as of this encounter: 1.778 m (5' 10\").    Weight as of this encounter: 89.3 kg (196 lb 13.9 oz).   # Moderate Malnutrition: based on nutrition assessment and treatment provided per dietitian's recommendations.      # Financial/Environmental Concerns: none         Social Drivers of Health            Disposition Plan     Medically Ready for Discharge: Anticipated in 2-4 Days, need further monitoring         Curt Wahl MD  Hospitalist Service  [facility]  Securely message with [employer] (more info)  Text page via [employer] Paging/Directory     This note was completed in part using dictation via the Dragon voice recognition software. Some word and grammatical errors may occur and must be interpreted in the appropriate clinical context. If there are any questions pertaining to this issue, please contact me for further clarification.  "   ______________________________________________________________________    Interval History     - Reviewed events of overnight and no episode of any melanotic stool, no hematemesis or hematochezia and he had brown stool overnight and did receive 1 unit of blood  - Denies any discomfort  - No nausea, vomiting  - Discussed with patient and the nurse and plan is to continue heparin drip for now and monitor for any signs of bleed      Physical Exam   Vital Signs: Temp: 98.5  F (36.9  C) Temp src: Oral BP: 106/63 Pulse: 85   Resp: 18 SpO2: 95 % O2 Device: None (Room air) Oxygen Delivery: 1 LPM  Weight: 196 lbs 13.93 oz        General: here x 3  HEENT: Head is atraumatic, normocephalic.    Neck: Neck is supple   Respiratory: CTLA  Cardiovascular: Regular rate , S1 and S2 normal with no murmer or rubs or gallops  Abdomen:   soft , laparoscopic port scar amanda send has ANANDA drain in place with significant output which is serosanguineous  Neurologic:  no focal deficit  Musculoskeletal: Normal Range of motion over upper and lower extremities bilaterally   Psychiatric: cooperative      Medical Decision Making       Time spent in care of patient is 45 minutes and I reviewed labs and discussed plan of care in detail with patient and nursing staff and surgery team      Data     I have personally reviewed the following data over the past 24 hrs:    7.1  \   7.0 (L)   / 262     135 101 9.8 /  117 (H)   4.0 25 0.95 \     ALT: 38 AST: N/A AP: 321 (H) TBILI: 0.6   ALB: 1.9 (L) TOT PROTEIN: 5.0 (L) LIPASE: N/A       Imaging results reviewed over the past 24 hrs:   No results found for this or any previous visit (from the past 24 hours).

## 2025-07-31 NOTE — PROGRESS NOTES
Patient looks to be doing well.  Tolerating diet, having bowel function.  Hemoglobin drifts down a little bit but ANANDA drains are serosanguineous.  Continue to advance diet, once labs and vital signs have stabilized patient should be eligible for discharge, 1 to 2 days.

## 2025-07-31 NOTE — PROGRESS NOTES
[facility]    Infectious Disease Progress Note    Date of Service (when I saw the patient): 07/31/2025     Assessment & Plan   Kristofer Montana is a 80 year old male who was admitted on 7/13/2025.     Impression:  80 year old male with a history of HTN, HFrEF, CAD, and prior CVA who [facility] with a 2 day history of fever, chills, and RUQ pain, found to have choledocholithiasis, mild bile duct dilatation, and possible acute cholecystitis as well as E.coli bacteremia. Now s/p ERCP with sphincterotomy and removal of stones 7/16 and laparoscopic cholecystectomy, lysis of adhesions and drainage of the intra-abdominal abscess from perforated gallbladder 7/29.     -[facility].   -Multiple complications this hospital stay: A.fib with RVR, acute hypoxic respiratory failure, aspiration event during ERCP resulting in intubation, retained small common bile duct stone, large perihepatic abscess from perforated gallbladder.   -S/p laparoscopic cholecystectomy, lysis of adhesions and drainage of the intra-abdominal abscess 7/29/25. OR cultures from the abscess with GPR on gram stain thus far.   -On Zosyn. Already completed 2 weeks total of Cefepime/Flagyl followed by Ceftriaxone for E.coli bacteremia.        Recommendations:   Continue Zosyn for now.     Following OR cultures.   Hopefully will be able to change to oral antibiotics based on susceptibilities to complete 7-10 days post-operatively of antibiotics.  Maintain in hospital until culture results are back.      Patient and plan discussed with Dr. Cardona.      Dejah Vogel PA-C    Interval History   Tolerating antibiotics ok   No new rashes or issues with antibiotics   Labs reviewed   No changes to past medical, social or family history   Feels well. Eating well. Good BM yesterday. No pain.       Physical Exam   Temp: 98.6  F (37  C) Temp src: Oral BP: 100/58 Pulse: 85   Resp:  18 SpO2: 94 % O2 Device: None (Room air)    Vitals:    07/27/25 0707 07/29/25 0650 07/30/25 0657   Weight: 94.7 kg (208 lb 12.8 oz) 92.1 kg (203 lb 1.6 oz) 89.3 kg (196 lb 13.9 oz)     Vital Signs with Ranges  Temp:  [98.3  F (36.8  C)-99.1  F (37.3  C)] 98.6  F (37  C)  Pulse:  [73-86] 85  Resp:  [16-18] 18  BP: (100-114)/(57-69) 100/58  SpO2:  [94 %-98 %] 94 %    Constitutional: Awake, alert, cooperative, no apparent distress  Lungs: Clear to auscultation bilaterally, no crackles or wheezing  Cardiovascular: Regular rate and rhythm, normal S1 and S2, and no murmur noted  Abdomen: Normal bowel sounds, soft, non-distended, non-tender. Drains remain in place with serosanguineous output.   Skin: No rashes, no cyanosis, no edema  Other:    Medications   Current Facility-Administered Medications   Medication Dose Route Frequency Provider Last Rate Last Admin    heparin 25,000 units in 0.45% NaCl 250 mL ANTICOAGULANT infusion  0-5,000 Units/hr Intravenous Continuous Curt Wahl MD 18.5 mL/hr at 07/31/25 0427 1,850 Units/hr at 07/31/25 0427    Patient is already receiving anticoagulation with heparin, enoxaparin (LOVENOX), warfarin (COUMADIN)  or other anticoagulant medication   Does not apply Continuous PRN Kalthoff, Betty, PA-C        sodium chloride (PF) 0.9% PF flush 10-40 mL  10-40 mL Intracatheter Continuous Kalthoff, Betty, PA-C   20 mL at 07/17/25 1439     Current Facility-Administered Medications   Medication Dose Route Frequency Provider Last Rate Last Admin    [Held by provider] aspirin EC tablet 81 mg  81 mg Oral Daily Kalthoff, Betty, PA-C   81 mg at 07/26/25 0910    atorvastatin (LIPITOR) tablet 40 mg  40 mg Oral or Feeding Tube Daily Kalthoff, Betty, PA-C   40 mg at 07/31/25 0834    [Held by provider] losartan (COZAAR) tablet 50 mg  50 mg Oral Daily Kalthoff, Betty, PA-C        metoprolol succinate ER (TOPROL-XL) 24 hr half-tab 12.5 mg  12.5 mg Oral Daily Betty Conroy PA-C   12.5 mg  at 07/31/25 0834    pantoprazole (PROTONIX) injection 40 mg  40 mg Intravenous BID Kalthoff, Betty, PA-C   40 mg at 07/31/25 0835    piperacillin-tazobactam (ZOSYN) 4.5 g vial to attach to  mL bag  4.5 g Intravenous Q6H Kalthoff, Betty, PA-C 200 mL/hr at 07/31/25 1239 4.5 g at 07/31/25 1239    senna-docusate (SENOKOT-S/PERICOLACE) 8.6-50 MG per tablet 1 tablet  1 tablet Oral BID Kalthoff, Betty, PA-C   1 tablet at 07/30/25 2119    Or    senna-docusate (SENOKOT-S/PERICOLACE) 8.6-50 MG per tablet 2 tablet  2 tablet Oral BID Kalthoff, Betty, PA-C   2 tablet at 07/30/25 0905    sodium chloride (PF) 0.9% PF flush 10-40 mL  10-40 mL Intracatheter Q7 Days Kalthoff, Betty, PA-C   10 mL at 07/23/25 2048    sodium chloride (PF) 0.9% PF flush 10-40 mL  10-40 mL Intracatheter Daily Kalthoff, Betty, PA-C   20 mL at 07/30/25 0905       Data   All microbiology laboratory data reviewed.  Recent Labs   Lab Test 07/31/25  1046 07/31/25  0240 07/30/25  2020 07/30/25  0859 07/28/25  1927 07/28/25  0551   WBC  --  7.1  --  7.6  --  6.8   HGB 7.8* 7.0* 7.5* 7.6*   < > 7.2*   HCT  --  21.5*  --  23.9*  --  21.7*   MCV 92 90 89 91   < > 88   PLT  --  262  --  279  --  241    < > = values in this interval not displayed.     Recent Labs   Lab Test 07/31/25  0240 07/30/25  0859 07/29/25  0517   CR 0.95 1.04 1.08     07/29/2025 1538 07/30/2025 1931 Anaerobic Bacterial Culture Routine [[id number]]   Abscess from Gallbladder    Preliminary result Component Value   Culture No anaerobic organisms isolated after 1 day P             07/29/2025 1538 07/30/2025 1511 Gram Stain [[id number]]   (Abnormal)   Abscess from Gallbladder    Edited Result - FINAL Component Value   GS Culture See corresponding culture for results   Gram Stain Result 2+ Gram positive bacilli Abnormal  C   Corrected on July 30, 2025/3:10 PM by Blanca Grey  Previously reported as: Gram negative bacilli  Corrected result: Previously reported as 2+  Gram negative bacilli on 7/29/2025 at  8:56 PM CDT.   Gram Stain Result 3+ WBC seen Abnormal  C   Predominantly PMNs          07/29/2025 1538 07/30/2025 1931 Fungal or Yeast Culture Routine [[id number]]   Abscess from Gallbladder    Preliminary result Component Value   Culture No growth after 1 day P             07/29/2025 1538 07/31/2025 1011 Abscess Aerobic Bacterial Culture Routine Without Gram Stain [[id number]]   Abscess from Gallbladder    Preliminary result Component Value   Culture No growth after 1 day P

## 2025-07-31 NOTE — PLAN OF CARE
Goal Outcome Evaluation:       Primary Diagnosis: Choledocholithiasis  S/p ERCP with sphincterectomy and removal of stones 7/16. POD #2 lap andrew  with lysis of adhesions and drainage of intra-abdominal abscess from perforated gallbladder 7/29.  Orientation: AOx4  Aggression Stop Light: Green  Mobility: SBA   Pain Management: Denies  Diet: Regular, tolerating well, denies nausea or abdominal discomfort.  Bowel/Bladder: Continent b/b  Abnormal Lab/Assessments: Hgb 7.8, Hep Xa 0.41, - rechecks @ 1800 currently pending. VSS on RA.  Drain/Device/Wound: L PIV infusing Heparin @ 1850 units/hr, int zosyn. X2 ANANDA drains. X2 lap sites CDI.  Consults: GI, ID, surgery following.  D/C Day/Goals/Place: Pending blood cultures. To TCU once medically ready.

## 2025-07-31 NOTE — PROGRESS NOTES
Shift: [id number]    Summary/diagnosis: S/P ERCP with stone removal. POD 2 lap andrew.   VS/O2: VSS on room air  Pain: Denies  Orientation: A&Ox4  Activity: SBA  GI/: Voiding via urinal. Large BM during evening.  Diet: Tolerated full liquids - switched to regular @ midnight.   Drains/Devices: PIV x2. Heparin @ 1850 units/hr. ANANDA drains x2 with serous output  Skin: Scattered bruises. Lap sites CDI.  Tests/Procedures/Consults: GI, ID, Surgery following  Discharge: TBD  Other: Hgb overnight 7.0. 1 unit PRBC given.

## 2025-08-01 ENCOUNTER — APPOINTMENT (OUTPATIENT)
Dept: PHYSICAL THERAPY | Facility: CLINIC | Age: 80
DRG: 853 | End: 2025-08-01
Attending: INTERNAL MEDICINE
Payer: MEDICARE

## 2025-08-01 ENCOUNTER — APPOINTMENT (OUTPATIENT)
Dept: OCCUPATIONAL THERAPY | Facility: CLINIC | Age: 80
DRG: 853 | End: 2025-08-01
Attending: INTERNAL MEDICINE
Payer: MEDICARE

## 2025-08-01 LAB
ANION GAP SERPL CALCULATED.3IONS-SCNC: 7 MMOL/L (ref 7–15)
BLD PROD TYP BPU: NORMAL
BLOOD COMPONENT TYPE: NORMAL
BUN SERPL-MCNC: 9.7 MG/DL (ref 8–23)
CALCIUM SERPL-MCNC: 7.4 MG/DL (ref 8.8–10.4)
CHLORIDE SERPL-SCNC: 106 MMOL/L (ref 98–107)
CODING SYSTEM: NORMAL
CREAT SERPL-MCNC: 0.95 MG/DL (ref 0.67–1.17)
CROSSMATCH: NORMAL
EGFRCR SERPLBLD CKD-EPI 2021: 82 ML/MIN/1.73M2
GLUCOSE BLDC GLUCOMTR-MCNC: 112 MG/DL (ref 70–99)
GLUCOSE BLDC GLUCOMTR-MCNC: 117 MG/DL (ref 70–99)
GLUCOSE SERPL-MCNC: 108 MG/DL (ref 70–99)
HCO3 SERPL-SCNC: 25 MMOL/L (ref 22–29)
HGB BLD-MCNC: 7 G/DL (ref 13.3–17.7)
HGB BLD-MCNC: 7.3 G/DL (ref 13.3–17.7)
MAGNESIUM SERPL-MCNC: 1.7 MG/DL (ref 1.7–2.3)
MCV RBC AUTO: 89 FL (ref 78–100)
MCV RBC AUTO: 89 FL (ref 78–100)
PHOSPHATE SERPL-MCNC: 1.4 MG/DL (ref 2.5–4.5)
PHOSPHATE SERPL-MCNC: 1.5 MG/DL (ref 2.5–4.5)
POTASSIUM SERPL-SCNC: 3.4 MMOL/L (ref 3.4–5.3)
POTASSIUM SERPL-SCNC: 3.7 MMOL/L (ref 3.4–5.3)
SODIUM SERPL-SCNC: 138 MMOL/L (ref 135–145)
UFH PPP CHRO-ACNC: 0.48 IU/ML (ref ?–1.1)
UNIT ABO/RH: NORMAL
UNIT NUMBER: NORMAL
UNIT STATUS: NORMAL
UNIT TYPE ISBT: 6200

## 2025-08-01 PROCEDURE — 999N000128 HC STATISTIC PERIPHERAL IV START W/O US GUIDANCE

## 2025-08-01 PROCEDURE — 250N000013 HC RX MED GY IP 250 OP 250 PS 637: Performed by: HOSPITALIST

## 2025-08-01 PROCEDURE — 97110 THERAPEUTIC EXERCISES: CPT | Mod: GO

## 2025-08-01 PROCEDURE — 80048 BASIC METABOLIC PNL TOTAL CA: CPT | Performed by: HOSPITALIST

## 2025-08-01 PROCEDURE — 120N000001 HC R&B MED SURG/OB

## 2025-08-01 PROCEDURE — 250N000013 HC RX MED GY IP 250 OP 250 PS 637: Performed by: PHYSICIAN ASSISTANT

## 2025-08-01 PROCEDURE — 84132 ASSAY OF SERUM POTASSIUM: CPT | Performed by: HOSPITALIST

## 2025-08-01 PROCEDURE — 85520 HEPARIN ASSAY: CPT | Performed by: INTERNAL MEDICINE

## 2025-08-01 PROCEDURE — 250N000011 HC RX IP 250 OP 636: Performed by: PHYSICIAN ASSISTANT

## 2025-08-01 PROCEDURE — 250N000011 HC RX IP 250 OP 636: Performed by: HOSPITALIST

## 2025-08-01 PROCEDURE — 250N000011 HC RX IP 250 OP 636: Performed by: INTERNAL MEDICINE

## 2025-08-01 PROCEDURE — 84100 ASSAY OF PHOSPHORUS: CPT | Performed by: HOSPITALIST

## 2025-08-01 PROCEDURE — 250N000013 HC RX MED GY IP 250 OP 250 PS 637: Performed by: INTERNAL MEDICINE

## 2025-08-01 PROCEDURE — 99233 SBSQ HOSP IP/OBS HIGH 50: CPT | Performed by: HOSPITALIST

## 2025-08-01 PROCEDURE — 36415 COLL VENOUS BLD VENIPUNCTURE: CPT | Performed by: INTERNAL MEDICINE

## 2025-08-01 PROCEDURE — 99232 SBSQ HOSP IP/OBS MODERATE 35: CPT | Performed by: PHYSICIAN ASSISTANT

## 2025-08-01 PROCEDURE — 999N000157 HC STATISTIC RCP TIME EA 10 MIN

## 2025-08-01 PROCEDURE — 85018 HEMOGLOBIN: CPT | Performed by: HOSPITALIST

## 2025-08-01 PROCEDURE — 83735 ASSAY OF MAGNESIUM: CPT | Performed by: HOSPITALIST

## 2025-08-01 PROCEDURE — 36415 COLL VENOUS BLD VENIPUNCTURE: CPT | Performed by: HOSPITALIST

## 2025-08-01 PROCEDURE — 258N000003 HC RX IP 258 OP 636: Performed by: INTERNAL MEDICINE

## 2025-08-01 PROCEDURE — 97530 THERAPEUTIC ACTIVITIES: CPT | Mod: GP

## 2025-08-01 RX ORDER — POTASSIUM CHLORIDE 1500 MG/1
40 TABLET, EXTENDED RELEASE ORAL ONCE
Status: COMPLETED | OUTPATIENT
Start: 2025-08-01 | End: 2025-08-01

## 2025-08-01 RX ORDER — MAGNESIUM OXIDE 400 MG/1
400 TABLET ORAL EVERY 4 HOURS
Status: COMPLETED | OUTPATIENT
Start: 2025-08-01 | End: 2025-08-01

## 2025-08-01 RX ADMIN — MICAFUNGIN SODIUM 150 MG: 50 INJECTION, POWDER, LYOPHILIZED, FOR SOLUTION INTRAVENOUS at 13:33

## 2025-08-01 RX ADMIN — PANTOPRAZOLE SODIUM 40 MG: 40 INJECTION, POWDER, FOR SOLUTION INTRAVENOUS at 09:03

## 2025-08-01 RX ADMIN — POTASSIUM & SODIUM PHOSPHATES POWDER PACK 280-160-250 MG 2 PACKET: 280-160-250 PACK at 15:14

## 2025-08-01 RX ADMIN — Medication 400 MG: at 11:35

## 2025-08-01 RX ADMIN — POTASSIUM & SODIUM PHOSPHATES POWDER PACK 280-160-250 MG 2 PACKET: 280-160-250 PACK at 22:05

## 2025-08-01 RX ADMIN — PIPERACILLIN AND TAZOBACTAM 4.5 G: 4; .5 INJECTION, POWDER, FOR SOLUTION INTRAVENOUS at 11:26

## 2025-08-01 RX ADMIN — PIPERACILLIN AND TAZOBACTAM 4.5 G: 4; .5 INJECTION, POWDER, FOR SOLUTION INTRAVENOUS at 17:26

## 2025-08-01 RX ADMIN — POTASSIUM & SODIUM PHOSPHATES POWDER PACK 280-160-250 MG 2 PACKET: 280-160-250 PACK at 09:03

## 2025-08-01 RX ADMIN — METOPROLOL SUCCINATE 12.5 MG: 25 TABLET, EXTENDED RELEASE ORAL at 09:03

## 2025-08-01 RX ADMIN — ATORVASTATIN CALCIUM 40 MG: 40 TABLET, FILM COATED ORAL at 09:03

## 2025-08-01 RX ADMIN — PIPERACILLIN AND TAZOBACTAM 4.5 G: 4; .5 INJECTION, POWDER, FOR SOLUTION INTRAVENOUS at 23:51

## 2025-08-01 RX ADMIN — Medication 400 MG: at 09:04

## 2025-08-01 RX ADMIN — PIPERACILLIN AND TAZOBACTAM 4.5 G: 4; .5 INJECTION, POWDER, FOR SOLUTION INTRAVENOUS at 00:47

## 2025-08-01 RX ADMIN — PANTOPRAZOLE SODIUM 40 MG: 40 INJECTION, POWDER, FOR SOLUTION INTRAVENOUS at 20:12

## 2025-08-01 RX ADMIN — POTASSIUM & SODIUM PHOSPHATES POWDER PACK 280-160-250 MG 2 PACKET: 280-160-250 PACK at 11:26

## 2025-08-01 RX ADMIN — SENNOSIDES AND DOCUSATE SODIUM 1 TABLET: 50; 8.6 TABLET ORAL at 20:12

## 2025-08-01 RX ADMIN — PIPERACILLIN AND TAZOBACTAM 4.5 G: 4; .5 INJECTION, POWDER, FOR SOLUTION INTRAVENOUS at 06:01

## 2025-08-01 RX ADMIN — POTASSIUM CHLORIDE 40 MEQ: 1500 TABLET, EXTENDED RELEASE ORAL at 09:03

## 2025-08-01 RX ADMIN — HEPARIN SODIUM 1550 UNITS/HR: 10000 INJECTION, SOLUTION INTRAVENOUS at 12:24

## 2025-08-01 ASSESSMENT — ACTIVITIES OF DAILY LIVING (ADL)
ADLS_ACUITY_SCORE: 38

## 2025-08-01 NOTE — CARE PLAN
Patient started med call Mycamine infusing completed noted big bruised on patient inner left forearm call RRT thought it was med reaction , according patient bruised been there for 3 days . RRT was canceled . Patient is stable .

## 2025-08-01 NOTE — CARE PLAN
Shift Summary [id number]    Admitting Diagnosis: Choledocholithiasis [K80.50]  Cholecystitis [K81.9]  DVT (deep venous thrombosis) (H) [I82.409]   Vitals .Vss  Pain 0/10. Taking PRN. Last dose   A&Ox4  Voiding. Bathroom   Mobility 1 assist gbw   Tele SR BBB  CMS.Intact , scattered bruising   Lung Sounds clear on Ra  via   GI . WNL  Dressing. Heparin infusing    Orders Placed This Encounter      Regular Diet Adult       Plan: Pending

## 2025-08-01 NOTE — PROGRESS NOTES
[facility]    Medicine Progress Note - Hospitalist Service    Date of Admission:  7/13/2025    Assessment & Plan     Kristofer Montana is a 81 y/o male with Hx of HTN, HLD, HFrEF (LVEF 40%), CAD (remote PCI 2005), CVA (2013, no residual weakness/deficits) who [facility] with 2 days of fever, chills and RUQ pain.      CT C/A/P showed choledocholithiasis and mild bile duct dilation and possible acute cholecystitis. He was also found to have occlusive DVT in L femoral vein, nonocclusive DVT in L popliteal vein. [facility] was started on iv [facility]      [facility] E [facility]. On 7/14, he went into A fib with RVR and was started on amiodarone infusion. Concerted to NSR. On 7/15, he developed acute hypoxic resp failure and was started on BIPAP. Weaned off by 7/16.     He underwent EUS/ERCP with sphincterotomy with removal of stones on 7/16. No stent was placed. He had an aspiration event during the procedure. He was intubated and admitted to ICU.     CT 7/17 showed residual 0.3 cm gallstone in CBD, persistent mild CBD dilatation of 1.1 cm. Mild dilatation of small bowel loops, likely ileus. Per GI, small CBD stone is expected to pass on its own and no intervention was recommended. He was extubated on 7/19 and was transferred to hospitalist service.     Per gen surg no intervention planned at this time. Cholecystostomy tube should be considered if concern for acute cholecystitis. Cholecystectomy should be considered when patient recovers from aspiration pneumonia.     On 7/20, the patient had an episode of melena and noted Hb drop from 9.7 to 8.4 to 6.7 on 7/21. Heparin infusion for paroxysmal atrial fibrillation and DVT has been on hold since 7/20. Received one unit RBC transfusion on 7/21.  Attempted to resume heparin infusion and aspirin again on 7/23 and hemoglobin dropped to 6.6 AM 7/24. Received another 1 unit RBC AM 7/24.  Attempted to resume heparin infusion and aspirin again but patient has had persistent melena since that time.      An abdominal US was obtained 7/26. Difficult exam due to bowel gas. Heterogeneous fluid collection over the right lobe of the liver is indeterminant. Concern for possible subcapsular hematoma versus more loculated ascites. Mild ascites and small right pleural effusion. Normal abdominal liver duplex.    Perforated cholecystitis with large intra-abdominal abscess status laparoscopic cholecystectomy, lysis of adhesions and drainage of the intra-abdominal abscess on 7/29/2025  Septic shock -resolved due to E coli bacteremia secondary to choledocholithiasis with ascending cholangitis, s/p EUS/ERCP with sphincterotomy with removal of stones on 7/16    -Now weaned off Vasopressors and stress dose steroids.  - On 7/17 found to have retained 0.3 cm CBD stone which is expected to pass in its own and no intervention was recommended by [facility].   - Gen surgery did not feel patient had acute cholecystitis. Recommended percutaneous cholecystostomy tube if needed. Will eventually need elective cholecystectomy once patient recovers from acute illness.   -CT A/P 7/16 had shown large loculated perihepatic/subdiaphragmatic fluid collection which decreased in size following ERCP, but increased fluid in abdomen with relative hyperdensity compared to simple ascites.  IR felt this was too small to sample percutaneously. Also pneumobilia status post ERCP.   - Received cefepime from 7/14 - 7/17, Flagyl 7/14 - 7/18, and Ceftriaxone 7/18 to 7/27  - Last day of antibiotics 7/27, completed two weeks for E. Coli bacteremia  - CT A/P 7/27concerning for large fluid collection around the subcapsular fluid around the lateral right hepatic lobe measuring up to 10 cm and patient was started on IV Zosyn  -CTA GI bleed on 7/28 does not show any evidence of hemorrhage and there was concern about cholecystitis  - patient was taken to the OR on  7/29 and he was found to have perforated cholecystitis with large intra-abdominal abscess and underwent laparoscopic cholecystectomy, lysis of adhesion and drainage of the abscess and drains were placed  -Serosanguineous output from the drains is minimal and decreasing  - Local cultures are growing gram-positive bacilli and yeast  -As per general surgery patient can be discharged from their standpoint when medically stable and they will remove the drain before  - Continue with Zosyn as per ID and they also added micafungin  - Monitor clinically    Melena-resolved  Acute blood loss anemia - due to GI bleed vs post sphincterotomy bleed due to anticoagulation versus underlying infective etiology  -Patient has been having intermittent melena since admission and while on therapeutic anticoagulation.   -Abdominal US on 7/26 concerning for loculated ascites versus subcapsular hematoma.  -During the course of hospital stay patient hemoglobin trended downwards and GI was consulted and on 7/28 he had CT abdomen done which was concerning for large subcapsular fluid over the right hepatic lobe  - His heparin and aspirin were initially held and GI was planning for EGD but he underwent CTA GI bleed which showed no evidence of any hemorrhage  - Started back on heparin drip and hemoglobin did drifted down and needed 1 unit of blood but he has not had any melena and hemoglobin has been stable around 7.2-7.3  - GI has signed  - Will continue the patient with heparin drip for the next 1 to 2 days and if he has no further indication of any blood and hemoglobin remained stable then we can transition him to oral DOAC  - Appreciate input from GI and they have signed off    Aspiration pneumonia versus CAP   Acute hypoxic respiratory failure - secondary to aspiration pneumonitis on 7/16 during EUS/ERCP-resolved  -Extubated on 7/19. Weaned off supplemental oxygen on 7/20. Respiratory culture growing candida glabrata, suspected contaminant.    - Received cefepime from 7/14 - 7/17, Flagyl 7/14 - 7/18, and Ceftriaxone 7/18 to present, continue for now given bacteremia, anticipating 14 days total of antibiotics from [facility] on 7[facility]13     Mild FARNAZ - secondary to septic shock, improved   -Baseline Cr 1. Cr peaked at 1.39.   - Creatinine seems stable at 0.95  -Monitor labs closely     Hypokalemia-resolved  Hypophosphatemia  Hypomagnesemia -resolved  - Continue with replacement     Paroxysmal atrial fibrillation, converted to NSR with IV amiodarone infusion  - Amiodarone discontinued.   -Continue with metoprolol  -Continue with heparin drip    Left Femoral/popliteal DVT, diagnosed 7/13  - IR did not recommend thrombectomy.  - Continue with heparin drip with close monitoring of hemoglobin       Hyperlipidemia  - Continue atorvastatin     CAD s/p  remote PCI (2005)  - Aspirin was held for ERCP, resumed on 7/19,  - Continue to hold aspirin at this point in time and will see how his response is to IV heparin     Essential HTN -   - PTA antihypertensives on hold. Resume as able     Mild Thrombocytopenia - due to severe sepsis  Now resolved     Chronic systolic CHF with reduced EF of 40%  Appears euvolemic. Monitor volume status. Weight up from admission from 83 to 93 kg   - Chest x-ray 7/18 showed no pulmonary edema  - May need to consider diuresis if swelling not improving or if patient becomes hypoxic      Prediabetes, HbA1c 5.8%, with stress and steroid induced hyperglycemia  -Has completed stress dose steroids  - Blood sugar has been stable around 110s          Diet: Room Service  Snacks/Supplements Adult: Other; Vanilla Ensure at 10am and 2pm  (RD); Between Meals  Regular Diet Adult    DVT Prophylaxis: Pneumatic Compression Devices  Mar Catheter: Not present  Lines: None     Cardiac Monitoring: None  Code Status: Full Code      Clinically Significant Risk Factors               # Hypoalbuminemia: Lowest albumin = 1.9 g/dL at 7/31/2025  2:40  "AM, will monitor as appropriate                # Overweight: Estimated body mass index is 28.25 kg/m  as calculated from the following:    Height as of this encounter: 1.778 m (5' 10\").    Weight as of this encounter: 89.3 kg (196 lb 13.9 oz).   # Moderate Malnutrition: based on nutrition assessment and treatment provided per dietitian's recommendations.      # Financial/Environmental Concerns: none         Social Drivers of Health            Disposition Plan     Medically Ready for Discharge: Anticipated in 2-4 Days, continue patient with heparin drip at this point in time and continue to monitor         Curt Wahl MD  Hospitalist Service  [facility]  Securely message with [employer] (more info)  Text page via [employer] Paging/Directory     This note was completed in part using dictation via the Dragon voice recognition software. Some word and grammatical errors may occur and must be interpreted in the appropriate clinical context. If there are any questions pertaining to this issue, please contact me for further clarification.    ______________________________________________________________________    Interval History     - Reviewed events and denied any fever, chills, nausea and vomiting  - Did had brown stools with no evidence of any melena, hematemesis or hematochezia  - No evidence of any fever or chills  - Tolerating diet pretty well    Physical Exam   Vital Signs: Temp: 98.3  F (36.8  C) Temp src: Oral BP: 122/64 Pulse: 77   Resp: 18 SpO2: 97 % O2 Device: None (Room air)    Weight: 196 lbs 13.93 oz        General: here x 3  Respiratory: CTLA  Cardiovascular: Regular rate , S1 and S2 normal with no murmer or rubs or gallops  Abdomen:   soft , laparoscopic port scar amanda send has ANANDA drain in place with significant output which is serosanguineous  Neurologic:  no focal deficit  Musculoskeletal: Normal Range of motion over upper and lower extremities bilaterally   Psychiatric: cooperative  "     Medical Decision Making       Time spent in care of patient is 41 minutes and I reviewed labs and discussed plan of care in detail with patient and nursing staff and reviewed notes from the consulting teams including surgery and infectious disease     Data     I have personally reviewed the following data over the past 24 hrs:    N/A  \   7.3 (L)   / N/A     138 106 9.7 /  117 (H)   3.4 25 0.95 \       Imaging results reviewed over the past 24 hrs:   No results found for this or any previous visit (from the past 24 hours).

## 2025-08-01 NOTE — PROGRESS NOTES
Shift: [id number]     Summary/diagnosis: S/P ERCP with stone removal. POD 3 lap andrew.   VS/O2: VSS on room air  Pain: Denies  Orientation: A&Ox4  Activity: SBA  GI/: Voiding via urinal. BM x2 overnight (soft, brown in color)  Diet: Tolerated full liquids - Tolerating regular diet  Drains/Devices: PIV x1. Heparin @ 1850 units/hr. ANANDA drains x2 with serous output  Skin: Scattered bruises. Lap sites CDI.  Tests/Procedures/Consults: GI, ID, Surgery following  Discharge: TBD pending hemoglobin, final cultures. TCU placement?  Other: Tele SR with BBB

## 2025-08-01 NOTE — PROGRESS NOTES
[facility]    General Surgery  Daily Progress Note       Assessment and Plan:   Kristofer Montana is a 80 year old male admitted on 7/13 with e coli bacteremia, choledocholithiasis, and concern of acute cholecystitis. He was also found to have occlusive DVT in L femoral vein, nonocclusive DVT in L popliteal vein. He was started on iv heparin and transferred to [facility] for ERCP. He had ERCP with sphincterotomy and removal of stones on 7/16. No stent was placed. He had an aspiration event during the procedure. He was intubated and admitted to ICU.     Our surgery team evaluated 7/17 and recommended cholecystectomy to prevent further cholangitis after recovering from aspiration pneumonia. No convincing evidence of acute cholecystitis at that time but recommended cholecystostomy tube if that were the case given patient's clinical condition. He also had melenic stools starting 7/20 and acute blood loss likely secondary to GI bleed vs post sphincterotomy bleed due to anticoagulation.    Interval ultrasound 7/26 visualized heterogeneous fluid collection over the right lobe of the liver and this was thought to be subcapsular hematoma on CT 7/27. Only mildly distended gallbladder, with mild pericholecystic inflammatory change was seen again which could reflect the sequela of recent ERCP. CTA GI bleed on 7/28 was concerning that the small fluid collection was abutting the gallbladder fundus and our surgery team was reconsulted.    He is now 3 days s/p robotic cholecystectomy with washout given perforated gallbladder and large liver abscess seen intraoperatively.    PLAN:  - Tolerating regular diet.  - On IV zosyn, abscess cultures pending. Continue ID recommendations for antibiotics.  - Continue ANANDA drains, stripping q 4 hours and recording output. Will likely remove prior to discharge.  - Pain controlled with oral analgesia.  - Senokot BID and miralax daily prn.  - Ambulate QID to assist with bowel function,  PCDs for DVT prophylaxis.   - Acute blood loss anemia due to recent GI bleed vs post sphincterotomy. Hgb stable at 7.3 this morning, ANANDA drains serous. Continue heparin/anticoagulation from our standpoint.  - Encourage deep breathe and IS.   - Medical management per primary team.    DISPOSITION:  - Discharge when medically appropriate. Patient is doing well from a surgical standpoint and okay to discharge when antibiotic plan in place. ANANDA drains will be removed prior to discharge.  - General Surgery will follow peripherally this weekend and see on Monday. Please call if any questions or concerns over the weekend.        Interval History:   Kristofer Montana is seen on surgical rounds. He continues to improve each day. He is tolerating regular diet well. He is now having normal BM's, melena resolved. He denies abdominal pain. He has no concerns at visit today. Vitals wnl.         Physical Exam:   Temp: 98.8  F (37.1  C) Temp src: Oral BP: 119/63 Pulse: 84   Resp: 15 SpO2: 95 % O2 Device: None (Room air)      I/O last 3 completed shifts:  In: 720 [P.O.:720]  Out: 720 [Urine:500; Drains:220]    GENERAL: VS reviewed, alert, oriented, no acute distress  LUNGS: Normal respiratory effort, no wheezing  ABDOMEN:  Nondistended but soft, appropriately tender, ANANDA drains serous, RUQ ANANDA: 70 ml/24 hours, midline ANANDA: 150 ml/24 hours    INCISION: Steris in place. Incisions are clean, dry, and intact. No surrounding erythema.  EXTREMITIES: Moving all extremities, no gross deformities.  NEUROLOGICAL: Grossly non-focal, mood & affect appropriate    ---------------------------------------------------------------------------------------------------------------    Betty Conroy PA-C    Please use Vocera to page 7:30am-4pm, page on-call surgeon after 4pm.  Do not use Engage Alerts to page providers.  Office number: [phone]    Data   Recent Labs   Lab 08/01/25  0857 08/01/25  0547 07/31/25  1833 07/31/25  1046 07/31/25  0240  07/30/25 2020 07/30/25  0859 07/29/25  1817 07/29/25  0517 07/28/25  1927 07/28/25  0551   WBC  --   --   --   --  7.1  --  7.6  --   --   --  6.8   HGB  --  7.3* 7.2* 7.8* 7.0*   < > 7.6*   < > 7.8*   < > 7.2*   MCV  --  89 89 92 90   < > 91   < > 89   < > 88   PLT  --   --   --   --  262  --  279  --   --   --  241   NA  --  138  --   --  135  --  138  --  137  --  138   POTASSIUM  --  3.4  --   --  4.0  --  3.8  --  3.6  --  3.6   CHLORIDE  --  106  --   --  101  --  103  --  103  --  106   CO2  --  25  --   --  25  --  24  --  23  --  22   BUN  --  9.7  --   --  9.8  --  10.2  --  10.5  --  12.9   CR  --  0.95  --   --  0.95  --  1.04  --  1.08  --  1.02   ANIONGAP  --  7  --   --  9  --  11  --  11  --  10   PAUL  --  7.4*  --   --  7.2*  --  7.8*  --  7.5*  --  7.4*   * 108*  --   --  117*  --  138*   < > 104*  --  95   ALBUMIN  --   --   --   --  1.9*  --  1.9*  --  2.2*  --  1.9*   PROTTOTAL  --   --   --   --  5.0*  --  5.2*  --  5.6*  --  5.0*   BILITOTAL  --   --   --   --  0.6  --  0.9  --  1.3*  --  1.0   ALKPHOS  --   --   --   --  321*  --  390*  --  495*  --  454*   ALT  --   --   --   --  38  --  44  --  78*  --  85*   AST  --   --   --   --   --   --  63*  --  92*  --  111*    < > = values in this interval not displayed.

## 2025-08-01 NOTE — PROGRESS NOTES
"[facility]  [facility]  Gastroenterology Progress note    Interval History:      Patient is feeling well.  No further melena.  Brown stools.      Vital Signs:      /63 (BP Location: Left arm, Patient Position: Semi-Turner's, Cuff Size: Adult Regular)   Pulse 84   Temp 98.8  F (37.1  C) (Oral)   Resp 15   Ht 1.778 m (5' 10\")   Wt 89.3 kg (196 lb 13.9 oz)   SpO2 95%   BMI 28.25 kg/m    Temp (24hrs), Av  F (37.2  C), Min:98.6  F (37  C), Max:99.4  F (37.4  C)    Patient Vitals for the past 72 hrs:   Weight   25 0657 89.3 kg (196 lb 13.9 oz)       Intake/Output Summary (Last 24 hours) at 2025 0926  Last data filed at 2025 0908  Gross per 24 hour   Intake 960 ml   Output 1080 ml   Net -120 ml         Constitutional: NAD, comfortable  Cardiovascular: RRR, normal S1, S2   Respiratory: CTAB  Abdomen: soft, non-tender, nondistended    Additional Comments:  ROS, FH, SH: See initial GI consult for details.    Laboratory Data:  Recent Labs   Lab Test 25  0547 25  1833 25  1046 25  0240 25  0859 25  1927 25  0551   WBC  --   --   --  7.1  --  7.6  --  6.8   HGB 7.3* 7.2* 7.8* 7.0*   < > 7.6*   < > 7.2*   MCV 89 89 92 90   < > 91   < > 88   PLT  --   --   --  262  --  279  --  241    < > = values in this interval not displayed.     Recent Labs   Lab Test 25  0547 25  0240 25  0859    135 138   POTASSIUM 3.4 4.0 3.8   CHLORIDE 106 101 103   CO2 25 25 24   BUN 9.7 9.8 10.2   CR 0.95 0.95 1.04   ANIONGAP 7 9 11   PAUL 7.4* 7.2* 7.8*     Recent Labs   Lab Test 25  0240 25  0859 25  0517 25  0551 25  0548 25  0417 25  2327   ALBUMIN 1.9* 1.9* 2.2* 1.9*   < > 2.1*  --    BILITOTAL 0.6 0.9 1.3* 1.0   < > 1.1  --    DBIL  --   --   --   --   --  0.58*  --    ALT 38 44 78* 85*   < > 14  --    AST  --  63* 92* 111*   < > 18  --    ALKPHOS 321* 390* 495* " 454*   < > 120  --    PROTEIN  --   --   --   --   --   --  30*    < > = values in this interval not displayed.         Assessment:  79 yo male with complex hospital stay for choledocholithiasis and cholangitis with bacteremia and new DVT.  EUS/ERCP 7/16 with stone removal.  Patient aspirated and required admission to the ICU and intubation.  Hemoglobin drop 7/20, 7/23 on heparin.  Report of melena at that time.  CT showed a large subcapsular collection in the liver which was originally thought to be blood but was an abscess.  Patient with cholecystectomy with perforated gallbladder and a large perihepatic abscess.    Brown stools overnight.  Hemoglobin 7.3.  Liver function tests improving.  Plan:  -  Monitor H/H; transfuse prn.  -  Watch for signs of bleeding.  -  No plan for endoscopic evaluation unless recurrent bleeding.  -  Appreciate ID and surgery input.  -  GI will sign off at this time.  Please call with questions.    Total Time Spent: 12 minutes      AMI Matta  [facility]  Office:  [phone] call if needed after 5PM  Cell:  [phone], not available after 5PM at this number

## 2025-08-01 NOTE — PROGRESS NOTES
[facility]    Infectious Disease Progress Note    Date of Service (when I saw the patient): 08/01/2025     Assessment & Plan   Kristofer Montana is a 80 year old male who was admitted on 7/13/2025.     Impression:  80 year old male with a history of HTN, HFrEF, CAD, and prior CVA who [facility] with a 2 day history of fever, chills, and RUQ pain, found to have choledocholithiasis, mild bile duct dilatation, and possible acute cholecystitis as well as E.coli bacteremia. Now s/p ERCP with sphincterotomy and removal of stones 7/16 and laparoscopic cholecystectomy, lysis of adhesions and drainage of the intra-abdominal abscess from perforated gallbladder 7/29.     -[facility] E.[facility].   -Multiple complications this hospital stay: A.fib with RVR, acute hypoxic respiratory failure, aspiration event during ERCP resulting in intubation, retained small common bile duct stone, large perihepatic abscess from perforated gallbladder.   -S/p laparoscopic cholecystectomy, lysis of adhesions and drainage of the intra-abdominal abscess 7/29/25. OR cultures from the abscess with GPR on gram stain thus far.   -On Zosyn. Already completed 2 weeks total of Cefepime/Flagyl followed by Ceftriaxone for E.coli bacteremia.        Recommendations:   Continue Zosyn for now.     Add Micafungin given yeast in culture now.    Following OR cultures. Await further identification of yeast and GPR.  Hopefully will be able to change to oral antibiotics based on susceptibilities to complete 7-10 days post-operatively of antibiotics/antifungals.  Maintain in hospital until culture results are back.      Patient and plan discussed with Dr. Cardona.      Dejah Vogel PA-C    Interval History   Tolerating antibiotics ok   No new rashes or issues with antibiotics   Labs reviewed   No changes to past medical, social or family history   Feels well. Eating well. Good BMs.  No pain.       Physical Exam   Temp: 98.8  F (37.1  C) Temp src: Oral BP: 119/63 Pulse: 84   Resp: 15 SpO2: 95 % O2 Device: None (Room air)    Vitals:    07/27/25 0707 07/29/25 0650 07/30/25 0657   Weight: 94.7 kg (208 lb 12.8 oz) 92.1 kg (203 lb 1.6 oz) 89.3 kg (196 lb 13.9 oz)     Vital Signs with Ranges  Temp:  [98.6  F (37  C)-99.4  F (37.4  C)] 98.8  F (37.1  C)  Pulse:  [84-90] 84  Resp:  [14-18] 15  BP: (100-119)/(58-66) 119/63  SpO2:  [94 %-95 %] 95 %    Constitutional: Awake, alert, cooperative, no apparent distress  Lungs: Clear to auscultation bilaterally, no crackles or wheezing  Cardiovascular: Regular rate and rhythm, normal S1 and S2, and no murmur noted  Abdomen: Normal bowel sounds, soft, non-distended, non-tender. Drains remain in place with serosanguineous output.   Skin: No rashes, no cyanosis, no edema  Other:    Medications   Current Facility-Administered Medications   Medication Dose Route Frequency Provider Last Rate Last Admin    heparin 25,000 units in 0.45% NaCl 250 mL ANTICOAGULANT infusion  0-5,000 Units/hr Intravenous Continuous Curt Wahl MD 15.5 mL/hr at 08/01/25 1224 1,550 Units/hr at 08/01/25 1224    Patient is already receiving anticoagulation with heparin, enoxaparin (LOVENOX), warfarin (COUMADIN)  or other anticoagulant medication   Does not apply Continuous PRN KalthoffAnastasiaBetty, PA-C        sodium chloride (PF) 0.9% PF flush 10-40 mL  10-40 mL Intracatheter Continuous KalAnastasia arellanoline, PA-C   20 mL at 07/17/25 1439     Current Facility-Administered Medications   Medication Dose Route Frequency Provider Last Rate Last Admin    [Held by provider] aspirin EC tablet 81 mg  81 mg Oral Daily Betty Conroy, PA-C   81 mg at 07/26/25 0910    atorvastatin (LIPITOR) tablet 40 mg  40 mg Oral or Feeding Tube Daily Betty Conroy PA-C   40 mg at 08/01/25 0903    [Held by provider] losartan (COZAAR) tablet 50 mg  50 mg Oral Daily Betty Conroy PA-C        metoprolol  succinate ER (TOPROL-XL) 24 hr half-tab 12.5 mg  12.5 mg Oral Daily Kalthoff, Betty, PA-C   12.5 mg at 08/01/25 0903    [START ON 8/2/2025] micafungin (MYCAMINE) 100 mg in sodium chloride 0.9 % 100 mL intermittent infusion  100 mg Intravenous Q24H Katherine Cardona MD        micafungin (MYCAMINE) 150 mg in sodium chloride 0.9 % 100 mL intermittent infusion  150 mg Intravenous Once Katherine Cardona MD        pantoprazole (PROTONIX) injection 40 mg  40 mg Intravenous BID Kalthoff, Betty, PA-C   40 mg at 08/01/25 0903    piperacillin-tazobactam (ZOSYN) 4.5 g vial to attach to  mL bag  4.5 g Intravenous Q6H Kalthoff, Betty, PA-C 200 mL/hr at 07/31/25 1734 4.5 g at 08/01/25 1126    potassium & sodium phosphates (NEUTRA-PHOS) Packet 2 packet  2 packet Oral or Feeding Tube Q4H Curt Wahl MD   2 packet at 08/01/25 1126    senna-docusate (SENOKOT-S/PERICOLACE) 8.6-50 MG per tablet 1 tablet  1 tablet Oral BID Kalthoff, Betty, PA-C   1 tablet at 07/30/25 2119    Or    senna-docusate (SENOKOT-S/PERICOLACE) 8.6-50 MG per tablet 2 tablet  2 tablet Oral BID Kalthoff, Betty, PA-C   2 tablet at 07/30/25 0905    sodium chloride (PF) 0.9% PF flush 10-40 mL  10-40 mL Intracatheter Q7 Days Kalthoff, Betty, PA-C   10 mL at 07/23/25 2048    sodium chloride (PF) 0.9% PF flush 10-40 mL  10-40 mL Intracatheter Daily Kalthoff, Betty, PA-C   10 mL at 08/01/25 0904       Data   All microbiology laboratory data reviewed.  Recent Labs   Lab Test 08/01/25  0547 07/31/25  1833 07/31/25  1046 07/31/25  0240 07/30/25  2020 07/30/25  0859 07/28/25  1927 07/28/25  0551   WBC  --   --   --  7.1  --  7.6  --  6.8   HGB 7.3* 7.2* 7.8* 7.0*   < > 7.6*   < > 7.2*   HCT  --   --   --  21.5*  --  23.9*  --  21.7*   MCV 89 89 92 90   < > 91   < > 88   PLT  --   --   --  262  --  279  --  241    < > = values in this interval not displayed.     Recent Labs   Lab Test 08/01/25  0547 07/31/25  0240 07/30/25  0859   CR 0.95 0.95 1.04      07/29/2025 1538 07/31/2025 1931 Anaerobic Bacterial Culture Routine [[id number]]   Abscess from Gallbladder    Preliminary result Component Value   Culture No anaerobic organisms isolated after 2 days P             07/29/2025 1538 07/30/2025 1511 Gram Stain [[id number]]   (Abnormal)   Abscess from Gallbladder    Edited Result - FINAL Component Value   GS Culture See corresponding culture for results   Gram Stain Result 2+ Gram positive bacilli Abnormal  C   Corrected on July 30, 2025/3:10 PM by Blanca Grey  Previously reported as: Gram negative bacilli  Corrected result: Previously reported as 2+ Gram negative bacilli on 7/29/2025 at  8:56 PM CDT.   Gram Stain Result 3+ WBC seen Abnormal  C   Predominantly PMNs          07/29/2025 1538 08/01/2025 0953 Fungal or Yeast Culture Routine [[id number]]   (Abnormal)   Abscess from Gallbladder    Preliminary result Component Value   Culture Yeast Abnormal  P             07/29/2025 1538 08/01/2025 0940 Abscess Aerobic Bacterial Culture Routine Without Gram Stain [[id number]]   (Abnormal)   Abscess from Gallbladder    Preliminary result Component Value   Culture Culture in progress P    Isolated in broth only Yeast Abnormal  P    On day 3 of incubation

## 2025-08-02 LAB
ABO + RH BLD: NORMAL
ANION GAP SERPL CALCULATED.3IONS-SCNC: 7 MMOL/L (ref 7–15)
BLD GP AB SCN SERPL QL: NEGATIVE
BLD PROD TYP BPU: NORMAL
BLOOD COMPONENT TYPE: NORMAL
BUN SERPL-MCNC: 8.2 MG/DL (ref 8–23)
CALCIUM SERPL-MCNC: 7.7 MG/DL (ref 8.8–10.4)
CHLORIDE SERPL-SCNC: 104 MMOL/L (ref 98–107)
CODING SYSTEM: NORMAL
CREAT SERPL-MCNC: 0.9 MG/DL (ref 0.67–1.17)
CROSSMATCH: NORMAL
EGFRCR SERPLBLD CKD-EPI 2021: 87 ML/MIN/1.73M2
ERYTHROCYTE [DISTWIDTH] IN BLOOD BY AUTOMATED COUNT: 15.2 % (ref 10–15)
GLUCOSE SERPL-MCNC: 126 MG/DL (ref 70–99)
HCO3 SERPL-SCNC: 23 MMOL/L (ref 22–29)
HCT VFR BLD AUTO: 26.3 % (ref 40–53)
HGB BLD-MCNC: 8.5 G/DL (ref 13.3–17.7)
HGB BLD-MCNC: 8.5 G/DL (ref 13.3–17.7)
ISSUE DATE AND TIME: NORMAL
MAGNESIUM SERPL-MCNC: 1.7 MG/DL (ref 1.7–2.3)
MCH RBC QN AUTO: 28.7 PG (ref 26.5–33)
MCHC RBC AUTO-ENTMCNC: 32.3 G/DL (ref 31.5–36.5)
MCV RBC AUTO: 89 FL (ref 78–100)
MCV RBC AUTO: 89 FL (ref 78–100)
PHOSPHATE SERPL-MCNC: 1.8 MG/DL (ref 2.5–4.5)
PLATELET # BLD AUTO: 246 10E3/UL (ref 150–450)
POTASSIUM SERPL-SCNC: 3.8 MMOL/L (ref 3.4–5.3)
RBC # BLD AUTO: 2.96 10E6/UL (ref 4.4–5.9)
SODIUM SERPL-SCNC: 134 MMOL/L (ref 135–145)
SPECIMEN EXP DATE BLD: NORMAL
UFH PPP CHRO-ACNC: 0.57 IU/ML (ref ?–1.1)
UNIT ABO/RH: NORMAL
UNIT NUMBER: NORMAL
UNIT STATUS: NORMAL
UNIT TYPE ISBT: 6200
WBC # BLD AUTO: 6.8 10E3/UL (ref 4–11)

## 2025-08-02 PROCEDURE — 36415 COLL VENOUS BLD VENIPUNCTURE: CPT | Performed by: INTERNAL MEDICINE

## 2025-08-02 PROCEDURE — 250N000013 HC RX MED GY IP 250 OP 250 PS 637: Performed by: PHYSICIAN ASSISTANT

## 2025-08-02 PROCEDURE — 36415 COLL VENOUS BLD VENIPUNCTURE: CPT | Performed by: PHYSICIAN ASSISTANT

## 2025-08-02 PROCEDURE — 85014 HEMATOCRIT: CPT | Performed by: PHYSICIAN ASSISTANT

## 2025-08-02 PROCEDURE — 99233 SBSQ HOSP IP/OBS HIGH 50: CPT | Performed by: HOSPITALIST

## 2025-08-02 PROCEDURE — 250N000009 HC RX 250: Performed by: HOSPITALIST

## 2025-08-02 PROCEDURE — P9016 RBC LEUKOCYTES REDUCED: HCPCS | Performed by: PHYSICIAN ASSISTANT

## 2025-08-02 PROCEDURE — 258N000003 HC RX IP 258 OP 636: Performed by: INTERNAL MEDICINE

## 2025-08-02 PROCEDURE — 250N000011 HC RX IP 250 OP 636: Performed by: INTERNAL MEDICINE

## 2025-08-02 PROCEDURE — 250N000011 HC RX IP 250 OP 636: Performed by: PHYSICIAN ASSISTANT

## 2025-08-02 PROCEDURE — 85520 HEPARIN ASSAY: CPT | Performed by: HOSPITALIST

## 2025-08-02 PROCEDURE — 120N000001 HC R&B MED SURG/OB

## 2025-08-02 PROCEDURE — 250N000011 HC RX IP 250 OP 636: Performed by: HOSPITALIST

## 2025-08-02 PROCEDURE — 36415 COLL VENOUS BLD VENIPUNCTURE: CPT | Performed by: HOSPITALIST

## 2025-08-02 PROCEDURE — 80048 BASIC METABOLIC PNL TOTAL CA: CPT | Performed by: HOSPITALIST

## 2025-08-02 PROCEDURE — 99232 SBSQ HOSP IP/OBS MODERATE 35: CPT | Performed by: INTERNAL MEDICINE

## 2025-08-02 PROCEDURE — 250N000013 HC RX MED GY IP 250 OP 250 PS 637: Performed by: INTERNAL MEDICINE

## 2025-08-02 PROCEDURE — 258N000003 HC RX IP 258 OP 636: Performed by: HOSPITALIST

## 2025-08-02 PROCEDURE — 83735 ASSAY OF MAGNESIUM: CPT | Performed by: HOSPITALIST

## 2025-08-02 PROCEDURE — 86900 BLOOD TYPING SEROLOGIC ABO: CPT | Performed by: INTERNAL MEDICINE

## 2025-08-02 PROCEDURE — 84100 ASSAY OF PHOSPHORUS: CPT | Performed by: INTERNAL MEDICINE

## 2025-08-02 PROCEDURE — 86923 COMPATIBILITY TEST ELECTRIC: CPT | Performed by: PHYSICIAN ASSISTANT

## 2025-08-02 RX ORDER — HEPARIN SODIUM 10000 [USP'U]/100ML
0-5000 INJECTION, SOLUTION INTRAVENOUS CONTINUOUS
Status: DISCONTINUED | OUTPATIENT
Start: 2025-08-02 | End: 2025-08-03 | Stop reason: ALTCHOICE

## 2025-08-02 RX ORDER — MAGNESIUM SULFATE HEPTAHYDRATE 40 MG/ML
2 INJECTION, SOLUTION INTRAVENOUS ONCE
Status: COMPLETED | OUTPATIENT
Start: 2025-08-02 | End: 2025-08-02

## 2025-08-02 RX ADMIN — HEPARIN SODIUM 1850 UNITS/HR: 10000 INJECTION, SOLUTION INTRAVENOUS at 04:04

## 2025-08-02 RX ADMIN — ATORVASTATIN CALCIUM 40 MG: 40 TABLET, FILM COATED ORAL at 08:50

## 2025-08-02 RX ADMIN — POTASSIUM & SODIUM PHOSPHATES POWDER PACK 280-160-250 MG 2 PACKET: 280-160-250 PACK at 01:46

## 2025-08-02 RX ADMIN — MICAFUNGIN SODIUM 100 MG: 50 INJECTION, POWDER, LYOPHILIZED, FOR SOLUTION INTRAVENOUS at 10:35

## 2025-08-02 RX ADMIN — PANTOPRAZOLE SODIUM 40 MG: 40 INJECTION, POWDER, FOR SOLUTION INTRAVENOUS at 08:50

## 2025-08-02 RX ADMIN — MAGNESIUM SULFATE HEPTAHYDRATE 2 G: 40 INJECTION, SOLUTION INTRAVENOUS at 19:55

## 2025-08-02 RX ADMIN — PIPERACILLIN AND TAZOBACTAM 4.5 G: 4; .5 INJECTION, POWDER, FOR SOLUTION INTRAVENOUS at 12:41

## 2025-08-02 RX ADMIN — SODIUM PHOSPHATE, MONOBASIC, MONOHYDRATE AND SODIUM PHOSPHATE, DIBASIC, ANHYDROUS 9 MMOL: 142; 276 INJECTION, SOLUTION INTRAVENOUS at 22:55

## 2025-08-02 RX ADMIN — SODIUM PHOSPHATE, MONOBASIC, MONOHYDRATE AND SODIUM PHOSPHATE, DIBASIC, ANHYDROUS 9 MMOL: 142; 276 INJECTION, SOLUTION INTRAVENOUS at 20:42

## 2025-08-02 RX ADMIN — SENNOSIDES AND DOCUSATE SODIUM 1 TABLET: 50; 8.6 TABLET ORAL at 19:56

## 2025-08-02 RX ADMIN — POTASSIUM & SODIUM PHOSPHATES POWDER PACK 280-160-250 MG 2 PACKET: 280-160-250 PACK at 05:54

## 2025-08-02 RX ADMIN — PIPERACILLIN AND TAZOBACTAM 4.5 G: 4; .5 INJECTION, POWDER, FOR SOLUTION INTRAVENOUS at 18:31

## 2025-08-02 RX ADMIN — PANTOPRAZOLE SODIUM 40 MG: 40 INJECTION, POWDER, FOR SOLUTION INTRAVENOUS at 19:55

## 2025-08-02 RX ADMIN — PIPERACILLIN AND TAZOBACTAM 4.5 G: 4; .5 INJECTION, POWDER, FOR SOLUTION INTRAVENOUS at 05:54

## 2025-08-02 RX ADMIN — HEPARIN SODIUM 1850 UNITS/HR: 10000 INJECTION, SOLUTION INTRAVENOUS at 17:05

## 2025-08-02 RX ADMIN — METOPROLOL SUCCINATE 12.5 MG: 25 TABLET, EXTENDED RELEASE ORAL at 08:50

## 2025-08-02 ASSESSMENT — ACTIVITIES OF DAILY LIVING (ADL)
ADLS_ACUITY_SCORE: 38

## 2025-08-02 NOTE — PROGRESS NOTES
[facility]    Infectious Disease Progress Note    Date of Service (when I saw the patient): 08/02/2025     Assessment & Plan   Kristofer Montana is a 80 year old male who was admitted on 7/13/2025.     Impression:  80 year old male with a history of HTN, HFrEF, CAD, and prior CVA who [facility] with a 2 day history of fever, chills, and RUQ pain, found to have choledocholithiasis, mild bile duct dilatation, and possible acute cholecystitis as well as E.coli bacteremia. Now s/p ERCP with sphincterotomy and removal of stones 7/16 and laparoscopic cholecystectomy, lysis of adhesions and drainage of the intra-abdominal abscess from perforated gallbladder 7/29.     -[facility] E.[facility].   -Multiple complications this hospital stay: A.fib with RVR, acute hypoxic respiratory failure, aspiration event during ERCP resulting in intubation, retained small common bile duct stone, large perihepatic abscess from perforated gallbladder.   -S/p laparoscopic cholecystectomy, lysis of adhesions and drainage of the intra-abdominal abscess 7/29/25. OR cultures from the abscess with GPR on gram stain thus far.   -On Zosyn. Already completed 2 weeks total of Cefepime/Flagyl followed by Ceftriaxone for E.coli bacteremia.        Recommendations:   Continue Zosyn for now.     Micafungin given yeast in culture now.    Following OR cultures. Await further identification of yeast and GPR.  Hopefully will be able to change to oral antibiotics based on susceptibilities to complete 7-10 days post-operatively of antibiotics/antifungals.  Maintain in hospital until culture results are back.          Interval History   Tolerating antibiotics ok   No new rashes or issues with antibiotics   Labs reviewed   No changes to past medical, social or family history   Feels well. Eating well. Good BMs. No pain.   He is not identified yet, Gram stain with Gram positive rods  but not growing in culture so far    Physical Exam   Temp: 99.5  F (37.5  C) Temp src: Oral BP: 124/59 Pulse: 70   Resp: 16 SpO2: 97 % O2 Device: None (Room air)    Vitals:    07/27/25 0707 07/29/25 0650 07/30/25 0657   Weight: 94.7 kg (208 lb 12.8 oz) 92.1 kg (203 lb 1.6 oz) 89.3 kg (196 lb 13.9 oz)     Vital Signs with Ranges  Temp:  [96.8  F (36  C)-100.2  F (37.9  C)] 99.5  F (37.5  C)  Pulse:  [66-78] 70  Resp:  [14-18] 16  BP: (105-129)/(54-70) 124/59  SpO2:  [96 %-98 %] 97 %    Constitutional: Awake, alert, cooperative, no apparent distress  Lungs: Clear to auscultation bilaterally, no crackles or wheezing  Cardiovascular: Regular rate and rhythm, normal S1 and S2, and no murmur noted  Abdomen: Normal bowel sounds, soft, non-distended, non-tender. Drains remain in place with serosanguineous output.   Skin: No rashes, no cyanosis, no edema  Other:    Medications   Current Facility-Administered Medications   Medication Dose Route Frequency Provider Last Rate Last Admin    heparin 25,000 units in 0.45% NaCl 250 mL ANTICOAGULANT infusion  0-5,000 Units/hr Intravenous Continuous Curt Wahl MD 18.5 mL/hr at 08/02/25 0848 1,850 Units/hr at 08/02/25 0848    Patient is already receiving anticoagulation with heparin, enoxaparin (LOVENOX), warfarin (COUMADIN)  or other anticoagulant medication   Does not apply Continuous PRN Kalthoff, Betty, PA-C        sodium chloride (PF) 0.9% PF flush 10-40 mL  10-40 mL Intracatheter Continuous Kaladan, Betty, PA-C   20 mL at 07/17/25 1439     Current Facility-Administered Medications   Medication Dose Route Frequency Provider Last Rate Last Admin    [Held by provider] aspirin EC tablet 81 mg  81 mg Oral Daily Anastasia Conroyline, PA-C   81 mg at 07/26/25 0910    atorvastatin (LIPITOR) tablet 40 mg  40 mg Oral or Feeding Tube Daily Betty Conroy, PA-C   40 mg at 08/02/25 0850    [Held by provider] losartan (COZAAR) tablet 50 mg  50 mg Oral Daily Betty Conroy,  PA-C        metoprolol succinate ER (TOPROL-XL) 24 hr half-tab 12.5 mg  12.5 mg Oral Daily Betty Conroy, PA-C   12.5 mg at 08/02/25 0850    micafungin (MYCAMINE) 100 mg in sodium chloride 0.9 % 100 mL intermittent infusion  100 mg Intravenous Q24H Katherine Cardona  mL/hr at 08/02/25 1035 100 mg at 08/02/25 1035    pantoprazole (PROTONIX) injection 40 mg  40 mg Intravenous BID Betty Conroy, PA-C   40 mg at 08/02/25 0850    piperacillin-tazobactam (ZOSYN) 4.5 g vial to attach to  mL bag  4.5 g Intravenous Q6H Marycarmen, Betty, PA-C 200 mL/hr at 07/31/25 1734 4.5 g at 08/02/25 0554    senna-docusate (SENOKOT-S/PERICOLACE) 8.6-50 MG per tablet 1 tablet  1 tablet Oral BID Kalairamoff, Betty, PA-C   1 tablet at 08/01/25 2012    Or    senna-docusate (SENOKOT-S/PERICOLACE) 8.6-50 MG per tablet 2 tablet  2 tablet Oral BID Kalthoff, Betty, PA-C   2 tablet at 07/30/25 0905    sodium chloride (PF) 0.9% PF flush 10-40 mL  10-40 mL Intracatheter Q7 Days Kalthoff, Betty, PA-C   10 mL at 07/23/25 2048    sodium chloride (PF) 0.9% PF flush 10-40 mL  10-40 mL Intracatheter Daily Kalthoff, Betty, PA-C   10 mL at 08/02/25 0851       Data   All microbiology laboratory data reviewed.  Recent Labs   Lab Test 08/01/25  2115 08/01/25  0547 07/31/25  1833 07/31/25  1046 07/31/25  0240 07/30/25  2020 07/30/25  0859 07/28/25  1927 07/28/25  0551   WBC  --   --   --   --  7.1  --  7.6  --  6.8   HGB 7.0* 7.3* 7.2*   < > 7.0*   < > 7.6*   < > 7.2*   HCT  --   --   --   --  21.5*  --  23.9*  --  21.7*   MCV 89 89 89   < > 90   < > 91   < > 88   PLT  --   --   --   --  262  --  279  --  241    < > = values in this interval not displayed.     Recent Labs   Lab Test 08/01/25  0547 07/31/25  0240 07/30/25  0859   CR 0.95 0.95 1.04     07/29/2025 1538 07/31/2025 1931 Anaerobic Bacterial Culture Routine [[id number]]   Abscess from Gallbladder    Preliminary result Component Value   Culture No anaerobic organisms  isolated after 2 days P             07/29/2025 1538 07/30/2025 1511 Gram Stain [[id number]]   (Abnormal)   Abscess from Gallbladder    Edited Result - FINAL Component Value   GS Culture See corresponding culture for results   Gram Stain Result 2+ Gram positive bacilli Abnormal  C   Corrected on July 30, 2025/3:10 PM by Blanca Grey  Previously reported as: Gram negative bacilli  Corrected result: Previously reported as 2+ Gram negative bacilli on 7/29/2025 at  8:56 PM CDT.   Gram Stain Result 3+ WBC seen Abnormal  C   Predominantly PMNs          07/29/2025 1538 08/01/2025 0953 Fungal or Yeast Culture Routine [[id number]]   (Abnormal)   Abscess from Gallbladder    Preliminary result Component Value   Culture Yeast Abnormal  P             07/29/2025 1538 08/01/2025 0940 Abscess Aerobic Bacterial Culture Routine Without Gram Stain [[id number]]   (Abnormal)   Abscess from Gallbladder    Preliminary result Component Value   Culture Culture in progress P    Isolated in broth only Yeast Abnormal  P    On day 3 of incubation

## 2025-08-02 NOTE — PROGRESS NOTES
[facility]    Medicine Progress Note - Hospitalist Service    Date of Admission:  7/13/2025    Assessment & Plan     Kristofer Montana is a 79 y/o male with Hx of HTN, HLD, HFrEF (LVEF 40%), CAD (remote PCI 2005), CVA (2013, no residual weakness/deficits) who [facility] with 2 days of fever, chills and RUQ pain.      CT C/A/P showed choledocholithiasis and mild bile duct dilation and possible acute cholecystitis. He was also found to have occlusive DVT in L femoral vein, nonocclusive DVT in L popliteal vein. He was started on iv heparin and transferred to [facility] for ERCP.      [facility] grew E coli. On 7/14, he went into A fib with RVR and was started on amiodarone infusion. Concerted to NSR. On 7/15, he developed acute hypoxic resp failure and was started on BIPAP. Weaned off by 7/16.     He underwent EUS/ERCP with sphincterotomy with removal of stones on 7/16. No stent was placed. He had an aspiration event during the procedure. He was intubated and admitted to ICU.     CT 7/17 showed residual 0.3 cm gallstone in CBD, persistent mild CBD dilatation of 1.1 cm. Mild dilatation of small bowel loops, likely ileus. Per GI, small CBD stone is expected to pass on its own and no intervention was recommended. He was extubated on 7/19 and was transferred to hospitalist service.     Per gen surg no intervention planned at this time. Cholecystostomy tube should be considered if concern for acute cholecystitis. Cholecystectomy should be considered when patient recovers from aspiration pneumonia.     On 7/20, the patient had an episode of melena and noted Hb drop from 9.7 to 8.4 to 6.7 on 7/21. Heparin infusion for paroxysmal atrial fibrillation and DVT has been on hold since 7/20. Received one unit RBC transfusion on 7/21.  Attempted to resume heparin infusion and aspirin again on 7/23 and hemoglobin dropped to 6.6 AM 7/24. Received another 1 unit RBC AM 7/24.  Attempted to resume heparin infusion and aspirin again but patient has had persistent melena since that time.      An abdominal US was obtained 7/26. Difficult exam due to bowel gas. Heterogeneous fluid collection over the right lobe of the liver is indeterminant. Concern for possible subcapsular hematoma versus more loculated ascites. Mild ascites and small right pleural effusion. Normal abdominal liver duplex.    Perforated cholecystitis with large intra-abdominal abscess status laparoscopic cholecystectomy, lysis of adhesions and drainage of the intra-abdominal abscess on 7/29/2025  Septic shock -resolved due to E coli bacteremia secondary to choledocholithiasis with ascending cholangitis, s/p EUS/ERCP with sphincterotomy with removal of stones on 7/16    -Now weaned off Vasopressors and stress dose steroids.  - On 7/17 found to have retained 0.3 cm CBD stone which is expected to pass in its own and no intervention was recommended by [facility].   - Gen surgery did not feel patient had acute cholecystitis. Recommended percutaneous cholecystostomy tube if needed. Will eventually need elective cholecystectomy once patient recovers from acute illness.   -CT A/P 7/16 had shown large loculated perihepatic/subdiaphragmatic fluid collection which decreased in size following ERCP, but increased fluid in abdomen with relative hyperdensity compared to simple ascites.  IR felt this was too small to sample percutaneously. Also pneumobilia status post ERCP.   - Received cefepime from 7/14 - 7/17, Flagyl 7/14 - 7/18, and Ceftriaxone 7/18 to 7/27  - Last day of antibiotics 7/27, completed two weeks for E. Coli bacteremia  - CT A/P 7/27concerning for large fluid collection around the subcapsular fluid around the lateral right hepatic lobe measuring up to 10 cm and patient was started on IV Zosyn  -CTA GI bleed on 7/28 does not show any evidence of hemorrhage and there was concern about cholecystitis  - patient was taken to the OR on  7/29 and he was found to have perforated cholecystitis with large intra-abdominal abscess and underwent laparoscopic cholecystectomy, lysis of adhesion and drainage of the abscess and drains were placed  -Serosanguineous output from the drains is minimal and decreasing  - Local cultures are growing gram-positive bacilli and yeast  -As per general surgery patient can be discharged from their standpoint when medically stable and they will remove the drain before  - Continue with Zosyn and micafungin per infectious disease team  - Monitor clinically    Melena-resolved  Acute blood loss anemia - due to GI bleed vs post sphincterotomy bleed due to anticoagulation versus underlying infective etiology  -Patient has been having intermittent melena since admission and while on therapeutic anticoagulation.   -Abdominal US on 7/26 concerning for loculated ascites versus subcapsular hematoma.  -During the course of hospital stay patient hemoglobin trended downwards and GI was consulted and on 7/28 he had CT abdomen done which was concerning for large subcapsular fluid over the right hepatic lobe  - His heparin and aspirin were initially held and GI was planning for EGD but he underwent CTA GI bleed which showed no evidence of any hemorrhage  -Of note patient's hemoglobin has been fluctuating and has not had any episode of any bleeding-and did had hemoglobin dropped to 7 and got 1 unit of blood and most likely reason of anemia can be underlying infective etiology along with multiple phlebotomies and I had discussed with GI and given no active bleeding they have signed off  -Will continue to monitor hemoglobin every 12 hours  - Transfuse for hemoglobin less than 7  - Continue with heparin drip for today    Aspiration pneumonia versus CAP   Acute hypoxic respiratory failure - secondary to aspiration pneumonitis on 7/16 during EUS/ERCP-resolved  -Extubated on 7/19. Weaned off supplemental oxygen on 7/20. Respiratory culture growing  candida glabrata, suspected contaminant.   - Received cefepime from 7/14 - 7/17, Flagyl 7/14 - 7/18, and Ceftriaxone 7/18 to present, continue for now given bacteremia, anticipating 14 days total of antibiotics from [facility] on 7[facility]13     Mild FARNAZ - secondary to septic shock, improved   -Baseline Cr 1. Cr peaked at 1.39.   - Creatinine seems stable at 0.95  -Monitor labs closely     Hypokalemia-resolved  Hypophosphatemia  Hypomagnesemia -resolved  - Continue with replacement     Paroxysmal atrial fibrillation, converted to NSR with IV amiodarone infusion  - Amiodarone discontinued.   -Continue with metoprolol  -Continue with heparin drip    Left Femoral/popliteal DVT, diagnosed 7/13  - IR did not recommend thrombectomy.  - Continue with heparin drip with close monitoring of hemoglobin       Hyperlipidemia  - Continue atorvastatin     CAD s/p  remote PCI (2005)  - Aspirin was held for ERCP, resumed on 7/19,  - Continue to hold aspirin at this point in time and will see how his response is to IV heparin     Essential HTN -   - PTA antihypertensives on hold. Resume as able     Mild Thrombocytopenia - due to severe sepsis  Now resolved     Chronic systolic CHF with reduced EF of 40%  Appears euvolemic. Monitor volume status. Weight up from admission from 83 to 93 kg   - Chest x-ray 7/18 showed no pulmonary edema  - May need to consider diuresis if swelling not improving or if patient becomes hypoxic      Prediabetes, HbA1c 5.8%, with stress and steroid induced hyperglycemia  -Has completed stress dose steroids  - Blood sugar has been stable around 110s          Diet: Room Service  Snacks/Supplements Adult: Other; Vanilla Ensure at 10am and 2pm  (RD); Between Meals  Regular Diet Adult    DVT Prophylaxis: Pneumatic Compression Devices  Mar Catheter: Not present  Lines: None     Cardiac Monitoring: None  Code Status: Full Code      Clinically Significant Risk Factors               # Hypoalbuminemia: Lowest  "albumin = 1.9 g/dL at 7/31/2025  2:40 AM, will monitor as appropriate                # Overweight: Estimated body mass index is 28.25 kg/m  as calculated from the following:    Height as of this encounter: 1.778 m (5' 10\").    Weight as of this encounter: 89.3 kg (196 lb 13.9 oz).   # Moderate Malnutrition: based on nutrition assessment and treatment provided per dietitian's recommendations.      # Financial/Environmental Concerns: none         Social Drivers of Health            Disposition Plan     Medically Ready for Discharge: Anticipated in 2-4 Days, continue patient with heparin drip at this point in time and continue to monitor and we still need to figure out what antibiotic         Curt Wahl MD  Hospitalist Service  [facility]  Securely message with [employer] (more info)  Text page via [employer] Paging/Directory     This note was completed in part using dictation via the Dragon voice recognition software. Some word and grammatical errors may occur and must be interpreted in the appropriate clinical context. If there are any questions pertaining to this issue, please contact me for further clarification.    ______________________________________________________________________    Interval History     - Reviewed events of overnight and his hemoglobin went from 7.3-7 and got 1 conditional unit of blood but he has not had any evidence of bleeding and has been having brown stools  - No evidence of any fever or chills  - No evidence of any worsening abdominal discomfort  - Very less output from the drain  - A.m. labs pending    Physical Exam   Vital Signs: Temp: 99.5  F (37.5  C) Temp src: Oral BP: 105/54 Pulse: 66   Resp: 16 SpO2: 97 % O2 Device: None (Room air)    Weight: 196 lbs 13.93 oz        General: here x 3  Respiratory: CTLA  Cardiovascular: Regular rate , S1 and S2 normal with no murmer or rubs or gallops  Abdomen:   soft , laparoscopic port scar amanda send has ANANDA drain in place " with significant output which is serosanguineous  Neurologic:  no focal deficit  Musculoskeletal: Normal Range of motion over upper and lower extremities bilaterally   Psychiatric: cooperative      Medical Decision Making       Time spent in care of patient is 41 minutes and I reviewed labs and discussed plan of care in detail with patient and nursing staff      Data     I have personally reviewed the following data over the past 24 hrs:    N/A  \   7.0 (L)   / N/A     N/A N/A N/A /  117 (H)   3.7 N/A N/A \       Imaging results reviewed over the past 24 hrs:   No results found for this or any previous visit (from the past 24 hours).

## 2025-08-02 NOTE — PLAN OF CARE
Goal Outcome Evaluation:    Shift:  08/01/2025 [id number]   Summary: Choledocholithiasis   Orientation: A&O x4   Activity Level: SBA walker   Fall Risk: yes  Behavior & Aggression Tool Color: green   Pain Management: denies pain this shift    ABNL VS/O2: VSS on RA  Tele: NSR  ABNL Lab/BG: HGB 7.0 - waiting for second IV to transfuse. Hep gtt at 1850 units/hr. K/Mg/Phos protocol   Diet: Regular   Bowel/Bladder: continent of B/B  Drains/Devices: ANANDA x2   Skin: Scattered bruising, 4 lap sites, drain sites   Tests/Procedures for next shift: pending   Anticipated DC date: TBD - tcu once cleared   Other Important Info:

## 2025-08-02 NOTE — PLAN OF CARE
Goal Outcome Evaluation:       Alert and oriented. Vitals are with in normal limits. Denies pain. Good appetite. Regular bowel movement refused senna today. Continue with heparin drip monitor heparin 10 a next at 15:00. ANANDA sites oozing copious amount serosanguinous fluid. Dressing changed. Minimal output in ANANDA. Will monitor. Unable to get labs drawn until late afternoon due to short staffing. Multiple phone calls made to lab. Will follow up. Assist of one in the room.

## 2025-08-02 NOTE — PROGRESS NOTES
Patient with PICC removed from right upper arm 7/26/25 following discovery of occlusive thrombi.  Requested to place second PIV, as patient receiving heparin, and to have transfusion.  Upon assessment of right upper arm, note occasional drop of clear fluid leaking from previous PICC site, which is currently not covered.  Occlusive dressing reapplied.  Patient with large hematoma to left forearm from previous IV site, per patient.  New IV established in proximal upper arm, superficial vessel above previous PICC site, not in pathway of basilic vessel.  If patient's plan of care to continue more than a couple days, midline should be considered, as patient with few options left for peripheral access, and patient expressing frustration with the number of venipunctures he is experiencing per day.  Primary RN in room, aware.  Will page VAT if further IV issues.

## 2025-08-02 NOTE — PLAN OF CARE
[id number]  Summary: Dx choledocholithiasis, s/p ERCP w/ sphincterectomy and stones removal. POD 3 of lap andrew w/lysis of adhesions and drainage of intra-abd abscess from perforated gallbladder 7/29  Orientation: A&O x4  Aggression Stop Light: Green  Tele: NSR  Activity:  A1 walker to commode/bathroom  Diet/BS Checks: Regular diet  IV Access/Drains: L PIV SL with int. abx. Heparin gtt @ 1850 units/hr. -2 ANANDA drains (midline and RUQ) with serous output  Pain Management: Denied pain this shift  Abnormal VS/Results: VSS. Q12 Hgb - last 7.3, awaiting 1800 draw  Bowel/Bladder: Continent of B/B,  Skin/Wounds: Scattered bruising, large bruise to L forearm, scab to chest, blanchable redness to sacrum/coccyx, 4 Lap andrew sites with 2 ANANDA drain  Consults: ID, Gen Surg  D/C Disposition: Pending cultures, hgb. To TCU once medically ready

## 2025-08-02 NOTE — PLAN OF CARE
Goal Outcome Evaluation: Progressing    Reason for Admission: Choledocholithiasis & L DVT     Evaluation of Goal:   Patient is A&Ox 4. Vitals are stable on RA. Tele NSR. Patient is up with SBA. Heparin gtt at 1,850 units/hr; next recheck around 0000 (previous anti-xa therapeutic). Adequate urine output; one bowel movement. Two ANANDA drains to bulb with serous drainage. Abdominal surgical site is WDL and IGOR. Patient scoring green on the Aggression Stoplight Tool. Pt calm and cooperative with cares, able to make needs known, call light within reach, bed alarm on for safety. Discharge disposition is pending.       Kelly Scherer RN on 8/2/2025 at 6:45 PM

## 2025-08-02 NOTE — PROGRESS NOTES
"Surgery Note    Patient reports he feels he is improving.  He denies any significant abdominal pain.  He did have a transfusion overnight per his report.    O: /59   Pulse 70   Temp 99.5  F (37.5  C) (Oral)   Resp 16   Ht 1.778 m (5' 10\")   Wt 89.3 kg (196 lb 13.9 oz)   SpO2 97%   BMI 28.25 kg/m    ANANDA drains are serosanguineous.  Total volume 105 mL.  Abdomen is mildly slightly distended.  Minimally tender.  Laparoscopic port sites all intact with no erythema or drainage.     A/P: Kristofer is a 80-year-old male who is status post robotic cholecystectomy due to perforated gallbladder and liver abscess with drain placement.improving overall.  No evidence of intra-abdominal bleeding.  - regular diet   -Continue ANANDA drains  -Continue antibiotics  -We will follow along.      Dejah Lake MD  [facility]  [phone]    "

## 2025-08-02 NOTE — PLAN OF CARE
This writer met with patient at bedside to discuss need for MRI. Patient has a spine nerve stimulator in place that needs to be turned to \"MRI Mode\" in order for test to be completed. Patient states does not have her controller with her and that it is at home in a box \"buried\" under many other boxes and does not think anybody will be able to get it for her as it would be too difficult. Patient does have her ID card and this writer called Technical Support and spoke with Cami. Cami was able to connect this writer with representative Alyssia ([phone]) who referred me to Miki ([phone]) who is in the area and able to come to the hospital to assist. This writer updated Lyndsey in MRI who states will need to be present to witness. RENAE Rai and Dr. Blevins updated.    UPDATE: Miki and this writer met with patient at bedside. Lyndsey from MRI also present. Per Miki, unable to place device in \"MRI Mode\" as electronic criteria not being met and device will not allow it. Patient, therefore, will be unable to obtain MRI per Lyndsey. This writer updated Dr. Blevins and RENAE Rai.    Goal Outcome Evaluation:                      Shift: [id number], 8/2     Orientation: A&O x4  Aggression Stop Light: Green  Activity: Did ok w/ A1 walker pivot to commode, got tired quickly   Diet/BS Checks: Clear Liquid diet  IV Access/Drains: L PIV SL with int. abx. -2 ANANDA drains (midline and RUQ) with sanguinous output  Pain Management: Denied pain this shift  Abnormal VS/Results: VSS on RA, tel, SR  Bowel/Bladder: Continent of B/B, has soft BM   Skin/Wounds: Scattered bruising, edema. scab to chest, blanchable redness to sacrum/coccyx, 4 Lap andrew sites with 2 ANANDA drain  Consults: blood administer with hgb 7.0 recheck pending  D/C Disposition: Pending   Other Info:   - Pt POD # 5 for Lap andrew this  CDI, ANANDA stripped this shift.

## 2025-08-03 LAB
ANION GAP SERPL CALCULATED.3IONS-SCNC: 8 MMOL/L (ref 7–15)
BUN SERPL-MCNC: 7.2 MG/DL (ref 8–23)
CALCIUM SERPL-MCNC: 7.7 MG/DL (ref 8.8–10.4)
CHLORIDE SERPL-SCNC: 105 MMOL/L (ref 98–107)
CREAT SERPL-MCNC: 0.93 MG/DL (ref 0.67–1.17)
EGFRCR SERPLBLD CKD-EPI 2021: 84 ML/MIN/1.73M2
GLUCOSE SERPL-MCNC: 96 MG/DL (ref 70–99)
HCO3 SERPL-SCNC: 24 MMOL/L (ref 22–29)
HGB BLD-MCNC: 8.7 G/DL (ref 13.3–17.7)
MAGNESIUM SERPL-MCNC: 1.9 MG/DL (ref 1.7–2.3)
MCV RBC AUTO: 88 FL (ref 78–100)
PHOSPHATE SERPL-MCNC: 2.3 MG/DL (ref 2.5–4.5)
POTASSIUM SERPL-SCNC: 3.5 MMOL/L (ref 3.4–5.3)
SODIUM SERPL-SCNC: 137 MMOL/L (ref 135–145)
UFH PPP CHRO-ACNC: 0.61 IU/ML (ref ?–1.1)

## 2025-08-03 PROCEDURE — 250N000011 HC RX IP 250 OP 636: Performed by: PHYSICIAN ASSISTANT

## 2025-08-03 PROCEDURE — 84100 ASSAY OF PHOSPHORUS: CPT | Performed by: HOSPITALIST

## 2025-08-03 PROCEDURE — 250N000013 HC RX MED GY IP 250 OP 250 PS 637

## 2025-08-03 PROCEDURE — 83735 ASSAY OF MAGNESIUM: CPT | Performed by: HOSPITALIST

## 2025-08-03 PROCEDURE — 258N000003 HC RX IP 258 OP 636: Performed by: INTERNAL MEDICINE

## 2025-08-03 PROCEDURE — 80048 BASIC METABOLIC PNL TOTAL CA: CPT | Performed by: HOSPITALIST

## 2025-08-03 PROCEDURE — 250N000013 HC RX MED GY IP 250 OP 250 PS 637: Performed by: PHYSICIAN ASSISTANT

## 2025-08-03 PROCEDURE — 36415 COLL VENOUS BLD VENIPUNCTURE: CPT | Performed by: HOSPITALIST

## 2025-08-03 PROCEDURE — 120N000001 HC R&B MED SURG/OB

## 2025-08-03 PROCEDURE — 85018 HEMOGLOBIN: CPT | Performed by: HOSPITALIST

## 2025-08-03 PROCEDURE — 250N000013 HC RX MED GY IP 250 OP 250 PS 637: Performed by: HOSPITALIST

## 2025-08-03 PROCEDURE — 99233 SBSQ HOSP IP/OBS HIGH 50: CPT | Performed by: HOSPITALIST

## 2025-08-03 PROCEDURE — 250N000011 HC RX IP 250 OP 636: Performed by: HOSPITALIST

## 2025-08-03 PROCEDURE — 250N000011 HC RX IP 250 OP 636: Performed by: INTERNAL MEDICINE

## 2025-08-03 RX ORDER — MAGNESIUM OXIDE 400 MG/1
400 TABLET ORAL EVERY 4 HOURS
Status: COMPLETED | OUTPATIENT
Start: 2025-08-03 | End: 2025-08-03

## 2025-08-03 RX ADMIN — APIXABAN 10 MG: 5 TABLET, FILM COATED ORAL at 10:07

## 2025-08-03 RX ADMIN — MELATONIN TAB 3 MG 3 MG: 3 TAB at 23:47

## 2025-08-03 RX ADMIN — PIPERACILLIN AND TAZOBACTAM 4.5 G: 4; .5 INJECTION, POWDER, FOR SOLUTION INTRAVENOUS at 18:09

## 2025-08-03 RX ADMIN — PIPERACILLIN AND TAZOBACTAM 4.5 G: 4; .5 INJECTION, POWDER, FOR SOLUTION INTRAVENOUS at 01:52

## 2025-08-03 RX ADMIN — PIPERACILLIN AND TAZOBACTAM 4.5 G: 4; .5 INJECTION, POWDER, FOR SOLUTION INTRAVENOUS at 12:28

## 2025-08-03 RX ADMIN — PIPERACILLIN AND TAZOBACTAM 4.5 G: 4; .5 INJECTION, POWDER, FOR SOLUTION INTRAVENOUS at 23:20

## 2025-08-03 RX ADMIN — Medication 400 MG: at 16:35

## 2025-08-03 RX ADMIN — PANTOPRAZOLE SODIUM 40 MG: 40 INJECTION, POWDER, FOR SOLUTION INTRAVENOUS at 08:34

## 2025-08-03 RX ADMIN — MICAFUNGIN SODIUM 100 MG: 50 INJECTION, POWDER, LYOPHILIZED, FOR SOLUTION INTRAVENOUS at 10:07

## 2025-08-03 RX ADMIN — APIXABAN 10 MG: 5 TABLET, FILM COATED ORAL at 20:23

## 2025-08-03 RX ADMIN — POTASSIUM & SODIUM PHOSPHATES POWDER PACK 280-160-250 MG 1 PACKET: 280-160-250 PACK at 20:23

## 2025-08-03 RX ADMIN — ATORVASTATIN CALCIUM 40 MG: 40 TABLET, FILM COATED ORAL at 08:34

## 2025-08-03 RX ADMIN — HEPARIN SODIUM 1850 UNITS/HR: 10000 INJECTION, SOLUTION INTRAVENOUS at 07:28

## 2025-08-03 RX ADMIN — POTASSIUM & SODIUM PHOSPHATES POWDER PACK 280-160-250 MG 1 PACKET: 280-160-250 PACK at 16:36

## 2025-08-03 RX ADMIN — METOPROLOL SUCCINATE 12.5 MG: 25 TABLET, EXTENDED RELEASE ORAL at 08:34

## 2025-08-03 RX ADMIN — POTASSIUM & SODIUM PHOSPHATES POWDER PACK 280-160-250 MG 1 PACKET: 280-160-250 PACK at 13:03

## 2025-08-03 RX ADMIN — PIPERACILLIN AND TAZOBACTAM 4.5 G: 4; .5 INJECTION, POWDER, FOR SOLUTION INTRAVENOUS at 06:01

## 2025-08-03 RX ADMIN — PANTOPRAZOLE SODIUM 40 MG: 40 INJECTION, POWDER, FOR SOLUTION INTRAVENOUS at 20:23

## 2025-08-03 RX ADMIN — Medication 400 MG: at 13:03

## 2025-08-03 ASSESSMENT — ACTIVITIES OF DAILY LIVING (ADL)
ADLS_ACUITY_SCORE: 38
ADLS_ACUITY_SCORE: 39
ADLS_ACUITY_SCORE: 38
ADLS_ACUITY_SCORE: 39
ADLS_ACUITY_SCORE: 38
ADLS_ACUITY_SCORE: 39
ADLS_ACUITY_SCORE: 38
ADLS_ACUITY_SCORE: 39
ADLS_ACUITY_SCORE: 38
ADLS_ACUITY_SCORE: 38
ADLS_ACUITY_SCORE: 39
ADLS_ACUITY_SCORE: 38
ADLS_ACUITY_SCORE: 39

## 2025-08-03 NOTE — PLAN OF CARE
Goal Outcome Evaluation:         Goal Outcome Evaluation:     Shift:  08/02/2025 [id number]   Summary: Choledocholithiasis   Orientation: A&O x4   Activity Level: SBA walker   Fall Risk: yes  Behavior & Aggression Tool Color: green   Pain Management: denies pain this shift    ABNL VS/O2: VSS on RA  Tele: NSR  ABNL Lab/BG: HGB 8.5 IV  Hep gtt at 1850 units/hr. K/Mg/Phos protocol   Diet: Regular   Bowel/Bladder: continent of B/B  Drains/Devices: ANANDA x2   Skin: Scattered bruising, 4 lap sites, drain sites   Tests/Procedures for next shift: pending   Anticipated DC date: TBD - tcu once cleared   Other Important Info:

## 2025-08-03 NOTE — PLAN OF CARE
Goal Outcome Evaluation:  Orientation: AnOx4   Aggression Stop Light: Green   Activity: SBA GBW   Diet/BS Checks: Reg   Tele:  NA   IV Access/Drains: R. PIV CDI SL, L. PIV CDI w/ Heparin running @ 1850 units/hr- next HepXa AM recheck. X2 R. ANANDA drains sites CDI.   Pain Management: Denies   Abnormal VS/Results: VSS on RA. AM Hgb recheck last check 7.2. Conditionals to replace >7    Bowel/Bladder: Continent B/B, voiding spontaneously, 1 BM this shift no melena.    Skin/Wounds: 4 Lap andrew sites CDI, x2 ANANDA drains CDI, scattered bruising w/ large bruise on L. Forearm, blanchable redness to coccyx, scab on chest.   Consults: ID, Gen surg   D/C Disposition: Pending   Other Info:   - K,Mag,Phos protocol; Mag & phos replaced- AM rechecks.

## 2025-08-03 NOTE — PROGRESS NOTES
"Nursing note  Pt a/o x 4. Denies any pain. Up with A1/GB/W. Denies any dizziness or lightheadedness. No c/o SOB/BELLE. Uses urinal. PIV x 2(1 on the right shoulder and 1 on the L lower FA) both are patent. Pt denies any n/v, tolerating regular diet. LUE bruised/ecchymotic from extravasation. BLE's1-2+ edema. ANANDA was pulled out this morning, one of the site keep oozing a lot, dressing changed on it, the other site dressing is c/d/I. VSS, on RA./65 (BP Location: Right arm)   Pulse 70   Temp 98.6  F (37  C) (Oral)   Resp 16   Ht 1.778 m (5' 10\")   Wt 89.2 kg (196 lb 10.4 oz)   SpO2 97%   BMI 28.22 kg/m    Will continue to montior.  .Abbie Muñoz, RN,BSN.    "

## 2025-08-03 NOTE — PROGRESS NOTES
"General Surgery Progress Note    Subjective: Kristofer reports he is feeling feeling well.  He is tolerating a diet.  He has been having bowel movements.  There has been a significant amount of drainage around his ANANDA drains.  He is hoping for discharge in the next few days.    Objective: /63   Pulse 60   Temp 98.5  F (36.9  C) (Oral)   Resp 16   Ht 1.778 m (5' 10\")   Wt 89.2 kg (196 lb 10.4 oz)   SpO2 98%   BMI 28.22 kg/m    Gen: Lying in bed, appears comfortable  CV: RRR  Pulm: nonlabored breathing  Abd: Soft, nondistended ANANDA drains in the right lower quadrant with serosanguineous fluid in bulb.  Serous fluid draining around drains and saturating dressings.  Other laparoscopic incisions are healing well  Ext: WWP, left arm with significant ecchymosis across entire arm    Assessment/Plan:   Kristofer Montana  is a 80 year old male who is status post robotic cholecystectomy due to perforated gallbladder and liver abscess who is improving.  I removed his ANANDA drains as there was minimal output and no purulence noted.  - Change ANANDA drain dressings as needed to keep area clean and dry.  If there is no further drainage okay to leave current dressings in place for 2 days then remove and leave off.  Okay to shower in 2 days.  Do not submerge incisions for 2 weeks.  - Awaiting culture results to guide antibiotics, appreciate ID recommendations.    Dejah Lake MD  [facility]  [phone]            "

## 2025-08-03 NOTE — PLAN OF CARE
Goal Outcome Evaluation:       Alert and oriented. Vitals are with in normal limits. Denies pain. Good appetite. No signs of bleeding in urine and stool. Heparin drip was switched to Apixaban. Phosphorous and magnesium replaced. Will check in am . Abdominal ANANDA drain removed  this afternoon. Dressing was soaked with serous fluid. New dressing applied. Linens changed and bed bath done. Continue on micafungin and zosyn. Up in the room with assist of one walker and gait belt.

## 2025-08-03 NOTE — PROGRESS NOTES
[facility]    Medicine Progress Note - Hospitalist Service    Date of Admission:  7/13/2025    Assessment & Plan     Kristofer Montana is a 81 y/o male with Hx of HTN, HLD, HFrEF (LVEF 40%), CAD (remote PCI 2005), CVA (2013, no residual weakness/deficits) who [facility] with 2 days of fever, chills and RUQ pain.      CT C/A/P showed choledocholithiasis and mild bile duct dilation and possible acute cholecystitis. He was also found to have occlusive DVT in L femoral vein, nonocclusive DVT in L popliteal vein. He was started on iv heparin and transferred to [facility] for [facility]CP.      [facility] E [facility]. On 7/14, he went into A fib with RVR and was started on amiodarone infusion. Concerted to NSR. On 7/15, he developed acute hypoxic resp failure and was started on BIPAP. Weaned off by 7/16.     He underwent EUS/ERCP with sphincterotomy with removal of stones on 7/16. No stent was placed. He had an aspiration event during the procedure. He was intubated and admitted to ICU.     CT 7/17 showed residual 0.3 cm gallstone in CBD, persistent mild CBD dilatation of 1.1 cm. Mild dilatation of small bowel loops, likely ileus. Per GI, small CBD stone is expected to pass on its own and no intervention was recommended. He was extubated on 7/19 and was transferred to hospitalist service.        On 7/20, the patient had an episode of melena and noted Hb drop from 9.7 to 8.4 to 6.7 on 7/21. Heparin infusion for paroxysmal atrial fibrillation and DVT has been on hold since 7/20. Received one unit RBC transfusion on 7/21.  Attempted to resume heparin infusion and aspirin again on 7/23 and hemoglobin dropped to 6.6 AM 7/24. Received another 1 unit RBC AM 7/24. Attempted to resume heparin infusion and aspirin again but patient has had persistent melena since that time.  And after that the melena stopped and GI was consulted     An abdominal US was obtained 7/26. Difficult exam  due to bowel gas. Heterogeneous fluid collection over the right lobe of the liver is indeterminant. Concern for possible subcapsular hematoma versus more loculated ascites. Mild ascites and small right pleural effusion. Normal abdominal liver duplex.  He had CT abdomen which was concerning for hemorrhage around the hepatic area and underwent CT GI bleed which did not show any evidence of hemorrhage but there was concerns about cholecystitis.  Patient was continued with Zosyn and surgery was reconsulted and he had no local symptoms and LFTs did go up and he underwent laparoscopic cholecystectomy and was found to have perforated gallbladder with significant hepatic abscess which was drained and ID was consulted and is currently on micafungin and Zosyn based on the prelim culture data.  He was again started back on heparin drip on 7/31 and of note baseline hemoglobin was low and till date he required 2 units of blood(conditional ordered) but has not had any melena and was thought due to multiple phlebotomies and hemoglobin has been trending upwards and switch to oral Eliquis on 8/3/2020 5 AM.  He still has a drain in place which is being managed by the general surgery team.    Perforated cholecystitis with large intra-abdominal abscess status laparoscopic cholecystectomy, lysis of adhesions and drainage of the intra-abdominal abscess on 7/29/2025  Septic shock -resolved due to E coli bacteremia secondary to choledocholithiasis with ascending cholangitis, s/p EUS/ERCP with sphincterotomy with removal of stones on 7/16    -Now weaned off Vasopressors and stress dose steroids.  - On 7/17 found to have retained 0.3 cm CBD stone which is expected to pass in its own and no intervention was recommended by [facility].   - Gen surgery did not feel patient had acute cholecystitis. Recommended percutaneous cholecystostomy tube if needed. Will eventually need elective cholecystectomy once patient recovers from acute illness.   -CT A/P  7/16 had shown large loculated perihepatic/subdiaphragmatic fluid collection which decreased in size following ERCP, but increased fluid in abdomen with relative hyperdensity compared to simple ascites.  IR felt this was too small to sample percutaneously. Also pneumobilia status post ERCP.   - Received cefepime from 7/14 - 7/17, Flagyl 7/14 - 7/18, and Ceftriaxone 7/18 to 7/27  - Last day of antibiotics 7/27, completed two weeks for E. Coli bacteremia  - CT A/P 7/27concerning for large fluid collection around the subcapsular fluid around the lateral right hepatic lobe measuring up to 10 cm and patient was started on IV Zosyn  -CTA GI bleed on 7/28 does not show any evidence of hemorrhage and there was concern about cholecystitis  - patient was taken to the OR on 7/29 and he was found to have perforated cholecystitis with large intra-abdominal abscess and underwent laparoscopic cholecystectomy, lysis of adhesion and drainage of the abscess and drains were placed  -Serosanguineous output from the drains is minimal and decreasing  - Local cultures are growing gram-positive bacilli and Candida glabrata  -As per general surgery patient can be discharged from their standpoint when medically stable and they will remove the drain before  - Continue with Zosyn and micafungin per infectious disease team and final antibiotics to be decided by the ID team  - Monitor clinically    Melena-resolved  Acute blood loss anemia - due to GI bleed vs post sphincterotomy bleed due to anticoagulation versus underlying infective etiology  -Patient has been having intermittent melena since admission and while on therapeutic anticoagulation.   -Abdominal US on 7/26 concerning for loculated ascites versus subcapsular hematoma.  -During the course of hospital stay patient hemoglobin trended downwards and GI was consulted and on 7/28 he had CT abdomen done which was concerning for large subcapsular fluid over the right hepatic lobe  - His  heparin and aspirin were initially held and GI was planning for EGD but he underwent CTA GI bleed which showed no evidence of any hemorrhage  -Of note patient's hemoglobin has been fluctuating and has not had any episode of any bleeding-and did had hemoglobin dropped to 7 and got 2 unit of blood after surgery and most likely reason of anemia can be underlying infective etiology along with multiple phlebotomies and I had discussed with GI and given no active bleeding they have signed off  -Hemoglobin now is uptrending and is 8.7  -Discussed with pharmacy and he has been therapeutic for 3 days and will switch him to Eliquis 10 twice daily for another 4 days and then Eliquis 5 twice daily  -Check daily hemoglobin    Aspiration pneumonia versus CAP -resolved  Acute hypoxic respiratory failure - secondary to aspiration pneumonitis on 7/16 during EUS/ERCP-resolved  -Extubated on 7/19. Weaned off supplemental oxygen on 7/20. Respiratory culture growing candida glabrata, suspected contaminant.   - Received cefepime from 7/14 - 7/17, Flagyl 7/14 - 7/18, and Ceftriaxone 7/18 to present, continue for now given bacteremia, anticipating 14 days total of antibiotics from [facility]13     Mild FARNAZ - secondary to septic shock-resolved  -Baseline Cr 1. Cr peaked at 1.39.   - Creatinine seems stable at 0.93  -Monitor labs closely     Hypokalemia-resolved  Hypophosphatemia  Hypomagnesemia -resolved  - Continue with replacement     Paroxysmal atrial fibrillation, converted to NSR with IV amiodarone infusion  - During the course of hospital stay patient was initially started on amiodarone drip which was later discontinued and he was switched to metoprolol   -Continue with metoprolol  - Will switch him to Eliquis twice daily    Left Femoral/popliteal DVT, diagnosed 7/13  - IR did not recommend thrombectomy.  - We will switch him to Eliquis twice daily on 8/3/2025    Hyperlipidemia  - Continue atorvastatin     CAD s/p   remote PCI (2005)  - Aspirin was held for ERCP, resumed on 7/19,  - He has tolerated heparin drip pretty well and hemoglobin seems to be stable and we will switch him to Eliquis twice daily on 8/3 and if he is doing fine by 8/4 then we can safely resume his PTA aspirin on 8/4     Essential HTN   -will continue to hold losartan for now and continue with metoprolol     Mild Thrombocytopenia - due to severe sepsis  Now resolved     Chronic systolic CHF with reduced EF of 40%  - Chest x-ray 7/18 showed no pulmonary edema  - Echo on 7/15 showed EF of 45%, anteroseptal and apical hypokinesis and RV is normal in structure function and size and no valve disease and grade 1 diastolic dysfunction, mild MR, mild AR and no pericardial and prior echo in 2006 showed EF of 35% with anterior septal hypokinesis  -On exam look euvolemic no indication of diuresis and is on room air     Prediabetes, HbA1c 5.8%, with stress and steroid induced hyperglycemia  -Has completed stress dose steroids  - Blood sugar this morning is stable at 96    Physical deconditioning  - Patient has had long hospital course and currently lives by himself and as per my discussion with the nursing staff today he is pretty independent.  Waiting on final plan as far as antibiotics are concerned from the ID team and stability of the hemoglobin after starting DOAC  - PT had earlier recommended TCU but will see what their final recommendations are          Diet: Room Service  Snacks/Supplements Adult: Other; Vanilla Ensure at 10am and 2pm  (RD); Between Meals  Regular Diet Adult    DVT Prophylaxis: Pneumatic Compression Devices  Mra Catheter: Not present  Lines: None     Cardiac Monitoring: None  Code Status: Full Code      Clinically Significant Risk Factors         # Hyponatremia: Lowest Na = 134 mmol/L in last 2 days, will monitor as appropriate       # Hypoalbuminemia: Lowest albumin = 1.9 g/dL at 7/31/2025  2:40 AM, will monitor as appropriate               "  # Overweight: Estimated body mass index is 28.22 kg/m  as calculated from the following:    Height as of this encounter: 1.778 m (5' 10\").    Weight as of this encounter: 89.2 kg (196 lb 10.4 oz).   # Moderate Malnutrition: based on nutrition assessment and treatment provided per dietitian's recommendations.      # Financial/Environmental Concerns: none         Social Drivers of Health            Disposition Plan     Medically Ready for Discharge: Anticipated in 2-4 Days, hemoglobin seems to be stable and will be switched to oral DOAC and we are still waiting on final antibiotic plan as per the infectious disease team and will monitor hemoglobin for next 1 to 2 days and if he is stable and we have antibiotic planned then he can be discharged and PT had already recommended TCU         Curt Wahl MD  Hospitalist Service  [facility]  Securely message with [employer] (more info)  Text page via [employer] Paging/Directory     This note was completed in part using dictation via the Dragon voice recognition software. Some word and grammatical errors may occur and must be interpreted in the appropriate clinical context. If there are any questions pertaining to this issue, please contact me for further clarification.      I am off service from tomorrow morning and his care will be taken over by one of my colleagues from hospital medicine team  ______________________________________________________________________    Interval History     - Reviewed events and no evidence of any fever, chills, nausea, vomiting and no melena, hematemesis, hematochezia  - Vitals have been stable with no tachycardia and discussed with patient about his hemoglobin which is uptrending and he is comfortable going on DOAC  - As per nursing staff there is mild leakage around the drain site and they will reach out to the surgery team and the output is very minimal  - As per nurse patient is moving pretty well    Physical Exam "   Vital Signs: Temp: 98.5  F (36.9  C) Temp src: Oral BP: 138/63 Pulse: 60   Resp: 16 SpO2: 98 % O2 Device: None (Room air)    Weight: 196 lbs 10.41 oz        General: here x 3  Respiratory: CTLA  Cardiovascular: Regular rate , S1 and S2 normal with no murmer or rubs or gallops  Abdomen:   soft , laparoscopic port scar amanda send has ANANDA drain in place output which is minimal  Neurologic:  no focal deficit  Musculoskeletal: Normal Range of motion over upper and lower extremities bilaterally   Psychiatric: cooperative      Medical Decision Making       Time spent in care of patient is 42 minutes and I reviewed labs and discussed plan of care in detail with patient and nursing staff and also spoke with pharmacy      Data     I have personally reviewed the following data over the past 24 hrs:    6.8  \   8.7 (L)   / 246     137 105 7.2 (L) /  96   3.5 24 0.93 \       Imaging results reviewed over the past 24 hrs:   No results found for this or any previous visit (from the past 24 hours).

## 2025-08-04 ENCOUNTER — APPOINTMENT (OUTPATIENT)
Dept: OCCUPATIONAL THERAPY | Facility: CLINIC | Age: 80
DRG: 853 | End: 2025-08-04
Attending: INTERNAL MEDICINE
Payer: MEDICARE

## 2025-08-04 ENCOUNTER — APPOINTMENT (OUTPATIENT)
Dept: PHYSICAL THERAPY | Facility: CLINIC | Age: 80
DRG: 853 | End: 2025-08-04
Attending: INTERNAL MEDICINE
Payer: MEDICARE

## 2025-08-04 LAB
HGB BLD-MCNC: 9.5 G/DL (ref 13.3–17.7)
MAGNESIUM SERPL-MCNC: 1.8 MG/DL (ref 1.7–2.3)
MCV RBC AUTO: 89 FL (ref 78–100)
PHOSPHATE SERPL-MCNC: 2.3 MG/DL (ref 2.5–4.5)
POTASSIUM SERPL-SCNC: 3.8 MMOL/L (ref 3.4–5.3)

## 2025-08-04 PROCEDURE — 84100 ASSAY OF PHOSPHORUS: CPT | Performed by: HOSPITALIST

## 2025-08-04 PROCEDURE — 99232 SBSQ HOSP IP/OBS MODERATE 35: CPT | Performed by: PHYSICIAN ASSISTANT

## 2025-08-04 PROCEDURE — 250N000013 HC RX MED GY IP 250 OP 250 PS 637: Performed by: HOSPITALIST

## 2025-08-04 PROCEDURE — 99233 SBSQ HOSP IP/OBS HIGH 50: CPT | Performed by: HOSPITALIST

## 2025-08-04 PROCEDURE — 250N000013 HC RX MED GY IP 250 OP 250 PS 637: Performed by: PHYSICIAN ASSISTANT

## 2025-08-04 PROCEDURE — 83735 ASSAY OF MAGNESIUM: CPT | Performed by: HOSPITALIST

## 2025-08-04 PROCEDURE — 97535 SELF CARE MNGMENT TRAINING: CPT | Mod: GO

## 2025-08-04 PROCEDURE — 258N000003 HC RX IP 258 OP 636: Performed by: INTERNAL MEDICINE

## 2025-08-04 PROCEDURE — 120N000001 HC R&B MED SURG/OB

## 2025-08-04 PROCEDURE — 85018 HEMOGLOBIN: CPT | Performed by: HOSPITALIST

## 2025-08-04 PROCEDURE — 97530 THERAPEUTIC ACTIVITIES: CPT | Mod: GP

## 2025-08-04 PROCEDURE — 36415 COLL VENOUS BLD VENIPUNCTURE: CPT | Performed by: HOSPITALIST

## 2025-08-04 PROCEDURE — 250N000011 HC RX IP 250 OP 636: Performed by: PHYSICIAN ASSISTANT

## 2025-08-04 PROCEDURE — 84132 ASSAY OF SERUM POTASSIUM: CPT | Performed by: HOSPITALIST

## 2025-08-04 PROCEDURE — 250N000013 HC RX MED GY IP 250 OP 250 PS 637

## 2025-08-04 PROCEDURE — 97116 GAIT TRAINING THERAPY: CPT | Mod: GP

## 2025-08-04 PROCEDURE — 250N000011 HC RX IP 250 OP 636: Performed by: INTERNAL MEDICINE

## 2025-08-04 PROCEDURE — 97530 THERAPEUTIC ACTIVITIES: CPT | Mod: GO

## 2025-08-04 RX ORDER — MAGNESIUM OXIDE 400 MG/1
400 TABLET ORAL EVERY 4 HOURS
Status: COMPLETED | OUTPATIENT
Start: 2025-08-04 | End: 2025-08-04

## 2025-08-04 RX ADMIN — POTASSIUM & SODIUM PHOSPHATES POWDER PACK 280-160-250 MG 1 PACKET: 280-160-250 PACK at 13:12

## 2025-08-04 RX ADMIN — Medication 400 MG: at 13:12

## 2025-08-04 RX ADMIN — PANTOPRAZOLE SODIUM 40 MG: 40 INJECTION, POWDER, FOR SOLUTION INTRAVENOUS at 09:46

## 2025-08-04 RX ADMIN — PANTOPRAZOLE SODIUM 40 MG: 40 INJECTION, POWDER, FOR SOLUTION INTRAVENOUS at 20:08

## 2025-08-04 RX ADMIN — PIPERACILLIN AND TAZOBACTAM 4.5 G: 4; .5 INJECTION, POWDER, FOR SOLUTION INTRAVENOUS at 17:41

## 2025-08-04 RX ADMIN — PIPERACILLIN AND TAZOBACTAM 4.5 G: 4; .5 INJECTION, POWDER, FOR SOLUTION INTRAVENOUS at 23:16

## 2025-08-04 RX ADMIN — PIPERACILLIN AND TAZOBACTAM 4.5 G: 4; .5 INJECTION, POWDER, FOR SOLUTION INTRAVENOUS at 06:02

## 2025-08-04 RX ADMIN — APIXABAN 10 MG: 5 TABLET, FILM COATED ORAL at 09:46

## 2025-08-04 RX ADMIN — METOPROLOL SUCCINATE 12.5 MG: 25 TABLET, EXTENDED RELEASE ORAL at 09:46

## 2025-08-04 RX ADMIN — MELATONIN TAB 3 MG 3 MG: 3 TAB at 23:16

## 2025-08-04 RX ADMIN — APIXABAN 10 MG: 5 TABLET, FILM COATED ORAL at 20:08

## 2025-08-04 RX ADMIN — Medication 400 MG: at 10:30

## 2025-08-04 RX ADMIN — MICAFUNGIN SODIUM 100 MG: 50 INJECTION, POWDER, LYOPHILIZED, FOR SOLUTION INTRAVENOUS at 10:38

## 2025-08-04 RX ADMIN — POTASSIUM & SODIUM PHOSPHATES POWDER PACK 280-160-250 MG 1 PACKET: 280-160-250 PACK at 10:30

## 2025-08-04 RX ADMIN — POTASSIUM & SODIUM PHOSPHATES POWDER PACK 280-160-250 MG 1 PACKET: 280-160-250 PACK at 17:41

## 2025-08-04 RX ADMIN — ATORVASTATIN CALCIUM 40 MG: 40 TABLET, FILM COATED ORAL at 09:46

## 2025-08-04 RX ADMIN — PIPERACILLIN AND TAZOBACTAM 4.5 G: 4; .5 INJECTION, POWDER, FOR SOLUTION INTRAVENOUS at 12:36

## 2025-08-04 ASSESSMENT — ACTIVITIES OF DAILY LIVING (ADL)
ADLS_ACUITY_SCORE: 38
ADLS_ACUITY_SCORE: 39
ADLS_ACUITY_SCORE: 37
ADLS_ACUITY_SCORE: 39
ADLS_ACUITY_SCORE: 38
ADLS_ACUITY_SCORE: 39
ADLS_ACUITY_SCORE: 38
ADLS_ACUITY_SCORE: 39
ADLS_ACUITY_SCORE: 38
ADLS_ACUITY_SCORE: 39
ADLS_ACUITY_SCORE: 38
ADLS_ACUITY_SCORE: 38

## 2025-08-04 NOTE — PROGRESS NOTES
[facility]    Medicine Progress Note - Hospitalist Service    Date of Admission:  7/13/2025    Assessment & Plan     Kristofer Montana is a 81 y/o male with Hx of HTN, HLD, HFrEF (LVEF 40%), CAD (remote PCI 2005), CVA (2013, no residual weakness/deficits) who [facility] with 2 days of fever, chills and RUQ pain.      CT C/A/P showed choledocholithiasis and mild bile duct dilation and possible acute cholecystitis. He was also found to have occlusive DVT in L femoral vein, nonocclusive DVT in L popliteal vein. He was started on iv heparin and transferred to [facility] for [facility].      [facility] grew E coli. On 7/14, he went into A fib with RVR and was started on amiodarone infusion. Converted to NSR. On 7/15, he developed acute hypoxic resp failure and was started on BIPAP. Weaned off by 7/16.     He underwent EUS/ERCP with sphincterotomy with removal of stones on 7/16. No stent was placed. He had an aspiration event during the procedure. He was intubated and admitted to ICU requiring pressors and stress dose steroids.     CT abdomen 7/17 showed residual 0.3 cm gallstone in CBD, persistent mild CBD dilatation of 1.1 cm. Mild dilatation of small bowel loops, likely ileus. Per GI, small CBD stone is expected to pass on its own and no intervention was recommended. He was extubated on 7/19 and was transferred back to hospitalist service.     On 7/20, the patient had an episode of melena and noted Hb drop from 9.7 to 8.4 to 6.7 on 7/21. Heparin infusion for paroxysmal atrial fibrillation and DVT was held. Received one unit RBC transfusion on 7/21.  Attempted to resume heparin infusion and aspirin again on 7/23 and hemoglobin dropped to 6.6 AM 7/24. Received another 1 unit RBC AM 7/24. Attempted to resume heparin infusion and aspirin again but patient had recurrent melena. GI was consulted.     Abdominal US was obtained 7/26. Difficult exam due to bowel gas.  Heterogeneous fluid collection over the right lobe of the liver is indeterminant. Concern for possible subcapsular hematoma versus more loculated ascites. Mild ascites and small right pleural effusion. Normal abdominal liver duplex.  He had CT abdomen which was concerning for hemorrhage around the hepatic area and underwent CTA GI bleed which did not show any evidence of hemorrhage but there was concern about cholecystitis.    EGD initially was planned but held off given melena episodes stopped and hemoglobin stabilized.  Patient was continued with Zosyn and surgery was reconsulted.  He underwent laparoscopic cholecystectomy on 7/29 and was found to have perforated gallbladder with significant hepatic abscess which was drained.   ID was consulted and he is currently on micafungin and Zosyn based on the prelim culture data.  He was again started back on IV heparin drip on 7/31.  He has not had any further melena and anemia was thought to be due to multiple phlebotomies.  Hemoglobin stabilized and he was transitioned to oral Eliquis on 8/3/25.  ANANDA drain removed by general surgery on 8/3.    Perforated cholecystitis with large intra-abdominal abscess s/p laparoscopic cholecystectomy, lysis of adhesions and drainage of the intra-abdominal abscess on 7/29/2025  Septic shock [resolved] due to E coli bacteremia secondary to choledocholithiasis with ascending cholangitis, s/p EUS/ERCP with sphincterotomy with removal of stones on 7/16  OR cultures from the abscess drained on 7/29 with Candida glabrata and staph saccharolyticus.   Patient has completed 2 weeks total of cefepime/Flagyl followed by ceftriaxone for E. coli bacteremia.  -Continue current IV Zosyn for intra-abdominal abscess, susceptibilities pending on staph saprophyticus  -Continue current IV micafungin for yeast in abscess culture, susceptibilities pending.  - ID following.  -As per general surgery: Patient can be discharged from their standpoint when  medically stable.  ANANDA drains have been removed.  Telephone follow-up in 2 to 3 weeks.  General surgery has signed off 8/4.    Melena-resolved  Acute blood loss anemia, multifactorial- due to GI bleed vs post sphincterotomy bleed due to anticoagulation vs underlying infective etiology  Patient had been having intermittent melena since admission and while on therapeutic anticoagulation.   Abdominal US on 7/26 concerning for loculated ascites versus subcapsular hematoma.  During the course of hospital stay patient hemoglobin trended downwards and GI was consulted and on 7/28 he had CT abdomen done which was concerning for large subcapsular fluid over the right hepatic lobe  His heparin and aspirin were initially held and GI was planning for EGD but he underwent CTA GI bleed which showed no evidence of any hemorrhage  Of note patient's hemoglobin has been fluctuating, despite no obvious bleeding, hemoglobin dropped to 7 for which he received 2 units of blood after surgery.  Anemia likely multifactorial due to surgical blood loss, multiple phlebotomies, underlying infection.  GI has signed off.  No active melena.  - Hb has stabilized.  Has been resumed on Eliquis since 8/3.  Monitor Hb.    Aspiration pneumonia versus CAP -resolved  Acute hypoxic respiratory failure - secondary to aspiration pneumonitis on 7/16 during EUS/ERCP-resolved  -Extubated on 7/19. Weaned off supplemental oxygen on 7/20. Respiratory culture growing candida glabrata, suspected contaminant.   - Completed 2 weeks of cefepime/Flagyl followed by ceftriaxone for E. coli bacteremia as noted above.  Currently on IV Zosyn for intra-abdominal abscess per ID.     Mild FARNAZ - secondary to septic shock-resolved  -Baseline Cr 1. Cr peaked at 1.39.   - Creatinine remains in normal range  -Monitor labs closely     Hypokalemia-resolved  Hypophosphatemia  Hypomagnesemia -resolved  - Continue with replacement     Paroxysmal atrial fibrillation, converted to NSR with  "IV amiodarone infusion  Initially on amiodarone drip, later transitioned to metoprolol.  -Continue with metoprolol  - Continue Eliquis    Left Femoral/popliteal DVT, diagnosed 7/13  IR did not recommend thrombectomy.  -  Eliquis twice daily on 8/3/2025    Hyperlipidemia  - Continue atorvastatin     CAD s/p  remote PCI (2005)  - Aspirin resumed 8/4.     Essential HTN   -will continue to hold losartan for now and continue with metoprolol     Mild Thrombocytopenia - due to severe sepsis  Now resolved     Chronic systolic CHF with reduced EF of 40%  - Chest x-ray 7/18 showed no pulmonary edema  - Echo on 7/15 showed EF of 45%, anteroseptal and apical hypokinesis and RV is normal in structure function and size and no valve disease and grade 1 diastolic dysfunction, mild MR, mild AR and no pericardial and prior echo in 2006 showed EF of 35% with anterior septal hypokinesis  - Monitor need for diuresis, does have some peripheral edema     Prediabetes, HbA1c 5.8%, with stress and steroid induced hyperglycemia  -Has completed stress dose steroids  - Blood sugar stable    Physical deconditioning  - Progressed well with therapies, anticipating home with home cares.        Diet: Room Service  Regular Diet Adult  Diet  Snacks/Supplements Adult: Other; Vanilla Ensure at 10am  (RD); Between Meals    DVT Prophylaxis: Pneumatic Compression Devices  Mar Catheter: Not present  Lines: None     Cardiac Monitoring: None  Code Status: Full Code      Clinically Significant Risk Factors               # Hypoalbuminemia: Lowest albumin = 1.9 g/dL at 7/31/2025  2:40 AM, will monitor as appropriate                # Overweight: Estimated body mass index is 28.22 kg/m  as calculated from the following:    Height as of this encounter: 1.778 m (5' 10\").    Weight as of this encounter: 89.2 kg (196 lb 10.4 oz).   # Moderate Malnutrition: based on nutrition assessment and treatment provided per dietitian's recommendations.      # " Financial/Environmental Concerns: none         Social Drivers of Health            Disposition Plan     Medically Ready for Discharge: Anticipated in 2-4 Days, pending hemoglobin stability, antibiotic plans per ID         Jyoti Mayberry MD  Hospitalist Service  [facility]  Securely message with [employer] (more info)  Text page via [employer] Paging/Directory       ______________________________________________________________________    Interval History   Stable overnight, no melena.  Doing well, eager to discharge home.  Awaiting antibiotic plans per ID.  Does have some leg edema that he notes is better than before.    Physical Exam   Vital Signs: Temp: 98.9  F (37.2  C) Temp src: Oral BP: 120/68 Pulse: 87   Resp: 16 SpO2: 97 % O2 Device: None (Room air)    Weight: 196 lbs 10.41 oz        General: A  O x 3, cooperative, no distress  Respiratory: Nonlabored breathing, clear bilaterally  Cardiovascular: Regular rate , S1 and S2 normal with no murmer or rubs or gallops, lower extremity edema noted  Abdomen: Soft, nontender, nondistended  Neurologic:  no focal deficit  Psychiatric: cooperative      Medical Decision Making       Time spent in care of patient is 50 minutes and I reviewed labs and discussed plan of care in detail with patient and nursing staff and also spoke with pharmacy      Data     I have personally reviewed the following data over the past 24 hrs:    N/A  \   9.5 (L)   / N/A     N/A N/A N/A /  N/A   3.8 N/A N/A \       Imaging results reviewed over the past 24 hrs:   No results found for this or any previous visit (from the past 24 hours).

## 2025-08-04 NOTE — PROGRESS NOTES
[facility]    General Surgery  Daily Progress Note       Assessment and Plan:   Kristofer Montana is a 80 year old male admitted on 7/13 with e coli bacteremia, choledocholithiasis, and concern of acute cholecystitis. He was also found to have occlusive DVT in L femoral vein, nonocclusive DVT in L popliteal vein. He was started on iv heparin and transferred to [facility] for ERCP. He had ERCP with sphincterotomy and removal of stones on 7/16. No stent was placed. He had an aspiration event during the procedure. He was intubated and admitted to ICU.     Our surgery team evaluated 7/17 and recommended cholecystectomy to prevent further cholangitis after recovering from aspiration pneumonia. No convincing evidence of acute cholecystitis at that time but recommended cholecystostomy tube if that were the case given patient's clinical condition. He also had melenic stools starting 7/20 and acute blood loss likely secondary to GI bleed vs post sphincterotomy bleed due to anticoagulation.    Interval ultrasound 7/26 visualized heterogeneous fluid collection over the right lobe of the liver and this was thought to be subcapsular hematoma on CT 7/27. Only mildly distended gallbladder, with mild pericholecystic inflammatory change was seen again which could reflect the sequela of recent ERCP. CTA GI bleed on 7/28 was concerning that the small fluid collection was abutting the gallbladder fundus and our surgery team was reconsulted.    He is now 6 days s/p robotic cholecystectomy with washout given perforated gallbladder and large liver abscess seen intraoperatively.    PLAN:  - Tolerating regular diet.  - On IV zosyn, abscess cultures pending. Continue ID recommendations for antibiotics. They plan to keep inpatient until culture final results.  - ANANDA drains removed yesterday. Cover sites as needed for drainage. Okay to shower tomorrow, no submerging for 2 weeks.  - Pain controlled with oral analgesia.  - Senokot  BID and miralax daily prn.  - Ambulate QID to assist with bowel function, PCDs for DVT prophylaxis.   - Acute blood loss anemia due to recent GI bleed vs post sphincterotomy. Hgb now stable at 8.7. Continue heparin/anticoagulation from our standpoint, back on Eliqu 8/3.  - Encourage deep breathe and IS.   - Medical management per primary team.    DISPOSITION:  - Doing well from surgical standpoint. Discharge when medically appropriate.   - Discharge instructions reviewed. Declines narcotic for discharge, ID to send antibiotics/antifungals.  - Telephone follow up in 2-3 weeks, avoid submerging incisions and lifting >15 lbs during this time.  - General Surgery will sign off. Please call if questions or concerns.        Interval History:   Kristofer Montana is seen on surgical rounds. He is tolerating regular diet without nausea. States bowel movements are becoming more regular. Had a zinger of pain last night after drain removal, nothing requiring narcotics. He denies abdominal pain today. Vitals wnl.         Physical Exam:   Temp: 98.2  F (36.8  C) Temp src: Oral BP: 103/66 Pulse: 88   Resp: 16 SpO2: 95 % O2 Device: None (Room air)      I/O last 3 completed shifts:  In: 850 [P.O.:750; IV Piggyback:100]  Out: 2805 [Urine:2775; Drains:30]    GENERAL: VS reviewed, alert, oriented, no acute distress  LUNGS: Normal respiratory effort, no wheezing  ABDOMEN:  Nondistended but soft, nontender, drain sites without saturation.   INCISION: Steris in place. Incisions are clean, dry, and intact. No surrounding erythema.  EXTREMITIES: Moving all extremities, no gross deformities.  NEUROLOGICAL: Grossly non-focal, mood & affect appropriate    ---------------------------------------------------------------------------------------------------------------    Betty Conroy PA-C    Please use [employer] to page 7:30am-4pm, page on-call surgeon after 4pm.  Do not use [employer] Alerts to page providers.  Office number: [phone]    Data    Recent Labs   Lab 08/04/25  0837 08/03/25  0642 08/02/25  1647 08/01/25  2115 08/01/25  1154 08/01/25  0857 08/01/25  0547 07/31/25  1046 07/31/25  0240 07/30/25 2020 07/30/25  0859 07/29/25  1817 07/29/25  0517   WBC  --   --  6.8  --   --   --   --   --  7.1  --  7.6  --   --    HGB 9.5* 8.7* 8.5*  8.5*   < >  --   --  7.3*   < > 7.0*   < > 7.6*   < > 7.8*   MCV 89 88 89  89   < >  --   --  89   < > 90   < > 91   < > 89   PLT  --   --  246  --   --   --   --   --  262  --  279  --   --    NA  --  137 134*  --   --   --  138  --  135  --  138  --  137   POTASSIUM 3.8 3.5 3.8   < >  --   --  3.4  --  4.0  --  3.8  --  3.6   CHLORIDE  --  105 104  --   --   --  106  --  101  --  103  --  103   CO2  --  24 23  --   --   --  25  --  25  --  24  --  23   BUN  --  7.2* 8.2  --   --   --  9.7  --  9.8  --  10.2  --  10.5   CR  --  0.93 0.90  --   --   --  0.95  --  0.95  --  1.04  --  1.08   ANIONGAP  --  8 7  --   --   --  7  --  9  --  11  --  11   PAUL  --  7.7* 7.7*  --   --   --  7.4*  --  7.2*  --  7.8*  --  7.5*   GLC  --  96 126*  --  117*   < > 108*  --  117*  --  138*   < > 104*   ALBUMIN  --   --   --   --   --   --   --   --  1.9*  --  1.9*  --  2.2*   PROTTOTAL  --   --   --   --   --   --   --   --  5.0*  --  5.2*  --  5.6*   BILITOTAL  --   --   --   --   --   --   --   --  0.6  --  0.9  --  1.3*   ALKPHOS  --   --   --   --   --   --   --   --  321*  --  390*  --  495*   ALT  --   --   --   --   --   --   --   --  38  --  44  --  78*   AST  --   --   --   --   --   --   --   --   --   --  63*  --  92*    < > = values in this interval not displayed.

## 2025-08-04 NOTE — PLAN OF CARE
Goal Outcome Evaluation:       Alert and oriented. Vitals are with in normal limits. Denies pain. Good appetite. . Phosphorous and magnesium replaced. Will check in am . Abdominal ANANDA site less damp today. New dressing applied. Continue on micafungin and zosyn. Up with PT and in the room. Appears steady and reports being eager to go home. Will apply Compression stockings.

## 2025-08-04 NOTE — PROGRESS NOTES
[facility]    Infectious Disease Progress Note    Date of Service (when I saw the patient): 08/04/2025     Assessment & Plan   Kristofer Montana is a 80 year old male who was admitted on 7/13/2025.     Impression:  80 year old male with a history of HTN, HFrEF, CAD, and prior CVA who [facility] with a 2 day history of fever, chills, and RUQ pain, found to have choledocholithiasis, mild bile duct dilatation, and possible acute cholecystitis as well as E.coli bacteremia. Now s/p ERCP with sphincterotomy and removal of stones 7/16 and laparoscopic cholecystectomy, lysis of adhesions and drainage of the intra-abdominal abscess from perforated gallbladder 7/29.     -[facility].   -Multiple complications this hospital stay: A.fib with RVR, acute hypoxic respiratory failure, aspiration event during ERCP resulting in intubation, retained small common bile duct stone, large perihepatic abscess from perforated gallbladder.   -S/p laparoscopic cholecystectomy, lysis of adhesions and drainage of the intra-abdominal abscess 7/29/25. OR cultures from the abscess with Candida glabrata and staph saccharolyticus.   -On Zosyn. Already completed 2 weeks total of Cefepime/Flagyl followed by Ceftriaxone for E.coli bacteremia.        Recommendations:   Continue Zosyn for now. Asked microbiology lab to add on susceptibilities to staph that grew on culture.     Continue micafungin for yeast in culture. Have asked microbiology lab to add on susceptibilities.   Hopefully will be able to change to oral antibiotics based on susceptibilities to complete 7-10 days post-operatively of antibiotics/antifungals.  Maintain in hospital until culture results are back.      Patient and plan discussed with Dr. Cardona.     Dejah Vogel PA-C        Interval History   Tolerating antibiotics ok   No new rashes or issues with antibiotics   Labs reviewed   No  changes to past medical, social or family history   Feels well. Eating well. Good BMs. No pain.   Waiting on cultures.     Physical Exam   Temp: 98.2  F (36.8  C) Temp src: Oral BP: 103/66 Pulse: 88   Resp: 16 SpO2: 95 % O2 Device: None (Room air)    Vitals:    07/29/25 0650 07/30/25 0657 08/03/25 0612   Weight: 92.1 kg (203 lb 1.6 oz) 89.3 kg (196 lb 13.9 oz) 89.2 kg (196 lb 10.4 oz)     Vital Signs with Ranges  Temp:  [98.2  F (36.8  C)-98.7  F (37.1  C)] 98.2  F (36.8  C)  Pulse:  [64-88] 88  Resp:  [16] 16  BP: (103-132)/(65-70) 103/66  SpO2:  [95 %-98 %] 95 %    Constitutional: Awake, alert, cooperative, no apparent distress  Lungs: Clear to auscultation bilaterally, no crackles or wheezing  Cardiovascular: Regular rate and rhythm, normal S1 and S2, and no murmur noted  Abdomen: Normal bowel sounds, soft, non-distended, non-tender.   Skin: No rashes, no cyanosis, no edema  Other:    Medications   Current Facility-Administered Medications   Medication Dose Route Frequency Provider Last Rate Last Admin    Patient is already receiving anticoagulation with heparin, enoxaparin (LOVENOX), warfarin (COUMADIN)  or other anticoagulant medication   Does not apply Continuous PRN Betty Conroy PA-C        sodium chloride (PF) 0.9% PF flush 10-40 mL  10-40 mL Intracatheter Continuous Betty Conroy PA-C   20 mL at 07/17/25 1439     Current Facility-Administered Medications   Medication Dose Route Frequency Provider Last Rate Last Admin    apixaban ANTICOAGULANT (ELIQUIS) tablet 10 mg  10 mg Oral BID Curt Wahl MD   10 mg at 08/04/25 0946    Followed by    [START ON 8/7/2025] apixaban ANTICOAGULANT (ELIQUIS) tablet 5 mg  5 mg Oral BID Curt Wahl MD        [Held by provider] aspirin EC tablet 81 mg  81 mg Oral Daily Betty Conroy PA-C   81 mg at 07/26/25 0910    atorvastatin (LIPITOR) tablet 40 mg  40 mg Oral or Feeding Tube Daily Betty Conroy PA-C   40 mg at 08/04/25 0946    [Held by provider]  losartan (COZAAR) tablet 50 mg  50 mg Oral Daily Kalthoff, Betty, PA-C        magnesium oxide (MAG-OX) tablet 400 mg  400 mg Oral Q4H Jyoti Mayberry MD   400 mg at 08/04/25 1030    metoprolol succinate ER (TOPROL-XL) 24 hr half-tab 12.5 mg  12.5 mg Oral Daily Kalthoff, Betty, PA-C   12.5 mg at 08/04/25 0946    micafungin (MYCAMINE) 100 mg in sodium chloride 0.9 % 100 mL intermittent infusion  100 mg Intravenous Q24H Katherine Cardona  mL/hr at 08/04/25 1038 100 mg at 08/04/25 1038    pantoprazole (PROTONIX) injection 40 mg  40 mg Intravenous BID Kalthoff, Betty, PA-C   40 mg at 08/04/25 0946    piperacillin-tazobactam (ZOSYN) 4.5 g vial to attach to  mL bag  4.5 g Intravenous Q6H Kalthoff, Betty, PA-C 200 mL/hr at 07/31/25 1734 4.5 g at 08/04/25 0602    potassium & sodium phosphates (NEUTRA-PHOS) Packet 1 packet  1 packet Oral or Feeding Tube Q4H Jyoti Mayberry MD   1 packet at 08/04/25 1030    senna-docusate (SENOKOT-S/PERICOLACE) 8.6-50 MG per tablet 1 tablet  1 tablet Oral BID Kalthoff, Betty, PA-C   1 tablet at 08/02/25 1956    Or    senna-docusate (SENOKOT-S/PERICOLACE) 8.6-50 MG per tablet 2 tablet  2 tablet Oral BID Kalthoff, Betty, PA-C   2 tablet at 07/30/25 0905    sodium chloride (PF) 0.9% PF flush 10-40 mL  10-40 mL Intracatheter Q7 Days Kalthoff, Betty, PA-C   10 mL at 07/23/25 2048    sodium chloride (PF) 0.9% PF flush 10-40 mL  10-40 mL Intracatheter Daily Kalthoff, Betty, PA-C   10 mL at 08/04/25 0947       Data   All microbiology laboratory data reviewed.  Recent Labs   Lab Test 08/04/25  0837 08/03/25  0642 08/02/25  1647 07/31/25  1046 07/31/25  0240 07/30/25  2020 07/30/25  0859   WBC  --   --  6.8  --  7.1  --  7.6   HGB 9.5* 8.7* 8.5*  8.5*   < > 7.0*   < > 7.6*   HCT  --   --  26.3*  --  21.5*  --  23.9*   MCV 89 88 89  89   < > 90   < > 91   PLT  --   --  246  --  262  --  279    < > = values in this interval not displayed.     Recent Labs   Lab  Test 08/03/25  0642 08/02/25  1647 08/01/25  0547   CR 0.93 0.90 0.95     07/29/2025 1538 08/03/2025 1422 Anaerobic Bacterial Culture Routine [[id number]]   (Abnormal)   Abscess from Gallbladder    Preliminary result Component Value   Culture Culture in progress P    Isolated in broth only Staphylococcus saccharolyticus Abnormal  P    On day 3 of incubation  Identification obtained by MALDI-TOF mass spectrometry research use only database. Test characteristics determined and verified by the Infectious Diseases Diagnostic Laboratory.  Susceptibilities not routinely done, refer to antibiogram to view typical susceptibility profiles             07/29/2025 1538 07/30/2025 1511 Gram Stain [[id number]]   (Abnormal)   Abscess from Gallbladder    Edited Result - FINAL Component Value   GS Culture See corresponding culture for results   Gram Stain Result 2+ Gram positive bacilli Abnormal  C   Corrected on July 30, 2025/3:10 PM by Blanca Grey  Previously reported as: Gram negative bacilli  Corrected result: Previously reported as 2+ Gram negative bacilli on 7/29/2025 at  8:56 PM CDT.   Gram Stain Result 3+ WBC seen Abnormal  C   Predominantly PMNs          07/29/2025 1538 08/03/2025 1038 Fungal or Yeast Culture Routine [[id number]]   (Abnormal)   Abscess from Gallbladder    Preliminary result Component Value   Culture Candida glabrata complex Abnormal  P             07/29/2025 1538 08/04/2025 0650 Abscess Aerobic Bacterial Culture Routine Without Gram Stain [[id number]]   (Abnormal)   Abscess from Gallbladder    Preliminary result Component Value   Culture Culture in progress P    Isolated in broth only Candida glabrata complex Abnormal  P    On day 3 of incubation  Susceptibilities not routinely done, refer to antibiogram to view typical susceptibility profiles

## 2025-08-04 NOTE — PROGRESS NOTES
"CLINICAL NUTRITION SERVICES - REASSESSMENT NOTE         Registered Dietitian Interventions:  Modified supplement order ---> 10am: vanilla Ensure, cancel 2pm Ensure           CURRENT NUTRITION ORDERS  Diet: Regular          Room Service with Assist    Snacks/Supplements: vanilla Ensure at 10am and 2pm        CURRENT INTAKE/TOLERANCE  Chart reviewed  Pt tolerating po intake  Per flowsheets, pt consuming 100%    Visited with pt  Reports good intake - only wanting Ensure once per day  Eager to go home - \"just need to be able to do steps\"     NEW FINDINGS  GI symptoms: +BM      Skin/wounds: Abdominal sites CDI, scattered bruising w/ large bruise on L. Forearm, blanchable redness to coccyx, scab on chest.       Nutrition-relevant labs:   8/4: K 3.8         Mg 1.8         Phos 2.3 (L)    Nutrition-relevant medications: Reviewed    Weight:   08/03/25 0612 89.2 kg (196 lb 10.4 oz) Standing scale   07/30/25 0657 89.3 kg (196 lb 13.9 oz) Bed scale   07/29/25 0650 92.1 kg (203 lb 1.6 oz) Standing scale   07/27/25 0707 94.7 kg (208 lb 12.8 oz) Standing scale   07/26/25 0642 94.7 kg (208 lb 12.8 oz) Standing scale   07/24/25 0632 91.6 kg (202 lb) Standing scale   07/23/25 0557 92.8 kg (204 lb 9.4 oz) Bed scale   07/20/25 0631 95.3 kg (210 lb 1.6 oz) Bed scale   07/18/25 0400 93.1 kg (205 lb 4 oz) Bed scale   07/16/25 0543 86.5 kg (190 lb 9.6 oz) Standing scale   07/15/25 0537 86.8 kg (191 lb 6.4 oz) --   07/14/25 0130 86.4 kg (190 lb 7.6 oz) Standing scale   07/13/25 2110 83.9 kg (184 lb 14.4 oz) Standing scale       ASSESSED NUTRITION NEEDS  Dosing Weight: 83.9 kg, based on admission wt (7/13)  Estimated Energy Needs: 4240-2422 kcals/day (25 - 30 kcals/kg)  Justification: Maintenance  Estimated Protein Needs: 100-125 grams protein/day (1.2 - 1.5 grams of pro/kg)  Justification: Maintenance  Estimated Fluid Needs: 3669-9656 mL/day (1 mL/kcal)  Justification: Maintenance    MALNUTRITION (7/21)  % Intake: </= 50% for >/= 5 days " (severe) - pt was NPO/clear liquid diet 7/13-7/19, ordering small meals  % Weight Loss: None noted  Subcutaneous Fat Loss: None observed  Muscle Loss: Temples (temporalis muscle): Mild/Moderate  Fluid Accumulation/Edema: Moderate to severe, 2-4+ - 1-2+ bilteral lower extremity pitting edema   Malnutrition Diagnosis: Moderate malnutrition in the context of acute illness or injury  Malnutrition Present on Admission: No    EVALUATION OF THE PROGRESS TOWARD GOALS   Previous Goals (7/30)  Pt to consume 75% meals and 100% supplements   Evaluation: Progressing      Previous Nutrition Diagnosis (7/30)  No nutrition diagnosis at this time   Evaluation: No change    NUTRITION DIAGNOSIS  No nutrition diagnosis at this time     INTERVENTIONS  Modified supplement order ---> 10am: vanilla Ensure    GOALS  Pt to consume 75% meals and 100% supplements      MONITORING/EVALUATION  Progress toward goals will be monitored and evaluated per policy.

## 2025-08-05 ENCOUNTER — APPOINTMENT (OUTPATIENT)
Dept: PHYSICAL THERAPY | Facility: CLINIC | Age: 80
DRG: 853 | End: 2025-08-05
Attending: INTERNAL MEDICINE
Payer: MEDICARE

## 2025-08-05 ENCOUNTER — APPOINTMENT (OUTPATIENT)
Dept: OCCUPATIONAL THERAPY | Facility: CLINIC | Age: 80
DRG: 853 | End: 2025-08-05
Attending: INTERNAL MEDICINE
Payer: MEDICARE

## 2025-08-05 LAB
BACTERIA ABSC ANAEROBE+AEROBE CULT: ABNORMAL
HGB BLD-MCNC: 8.8 G/DL (ref 13.3–17.7)
MAGNESIUM SERPL-MCNC: 1.8 MG/DL (ref 1.7–2.3)
MCV RBC AUTO: 89 FL (ref 78–100)
NT-PROBNP SERPL-MCNC: 1082 PG/ML (ref 0–852)
PHOSPHATE SERPL-MCNC: 2.4 MG/DL (ref 2.5–4.5)
POTASSIUM SERPL-SCNC: 3.8 MMOL/L (ref 3.4–5.3)

## 2025-08-05 PROCEDURE — 97116 GAIT TRAINING THERAPY: CPT | Mod: GP

## 2025-08-05 PROCEDURE — 258N000003 HC RX IP 258 OP 636: Performed by: INTERNAL MEDICINE

## 2025-08-05 PROCEDURE — 83735 ASSAY OF MAGNESIUM: CPT | Performed by: HOSPITALIST

## 2025-08-05 PROCEDURE — 84132 ASSAY OF SERUM POTASSIUM: CPT | Performed by: HOSPITALIST

## 2025-08-05 PROCEDURE — 97530 THERAPEUTIC ACTIVITIES: CPT | Mod: GO

## 2025-08-05 PROCEDURE — 97535 SELF CARE MNGMENT TRAINING: CPT | Mod: GO

## 2025-08-05 PROCEDURE — 250N000011 HC RX IP 250 OP 636: Performed by: PHYSICIAN ASSISTANT

## 2025-08-05 PROCEDURE — 120N000001 HC R&B MED SURG/OB

## 2025-08-05 PROCEDURE — 258N000003 HC RX IP 258 OP 636: Performed by: HOSPITALIST

## 2025-08-05 PROCEDURE — 83880 ASSAY OF NATRIURETIC PEPTIDE: CPT | Performed by: HOSPITALIST

## 2025-08-05 PROCEDURE — 250N000013 HC RX MED GY IP 250 OP 250 PS 637: Performed by: HOSPITALIST

## 2025-08-05 PROCEDURE — 250N000013 HC RX MED GY IP 250 OP 250 PS 637: Performed by: PHYSICIAN ASSISTANT

## 2025-08-05 PROCEDURE — 85018 HEMOGLOBIN: CPT | Performed by: HOSPITALIST

## 2025-08-05 PROCEDURE — 250N000011 HC RX IP 250 OP 636: Performed by: INTERNAL MEDICINE

## 2025-08-05 PROCEDURE — 250N000011 HC RX IP 250 OP 636: Performed by: HOSPITALIST

## 2025-08-05 PROCEDURE — 36415 COLL VENOUS BLD VENIPUNCTURE: CPT | Performed by: HOSPITALIST

## 2025-08-05 PROCEDURE — 97530 THERAPEUTIC ACTIVITIES: CPT | Mod: GP

## 2025-08-05 PROCEDURE — 250N000009 HC RX 250: Performed by: HOSPITALIST

## 2025-08-05 PROCEDURE — 99232 SBSQ HOSP IP/OBS MODERATE 35: CPT | Performed by: HOSPITALIST

## 2025-08-05 PROCEDURE — 84100 ASSAY OF PHOSPHORUS: CPT | Performed by: HOSPITALIST

## 2025-08-05 PROCEDURE — 250N000013 HC RX MED GY IP 250 OP 250 PS 637: Performed by: INTERNAL MEDICINE

## 2025-08-05 PROCEDURE — 99232 SBSQ HOSP IP/OBS MODERATE 35: CPT | Performed by: PHYSICIAN ASSISTANT

## 2025-08-05 RX ORDER — MAGNESIUM SULFATE HEPTAHYDRATE 40 MG/ML
2 INJECTION, SOLUTION INTRAVENOUS ONCE
Status: COMPLETED | OUTPATIENT
Start: 2025-08-05 | End: 2025-08-05

## 2025-08-05 RX ORDER — PANTOPRAZOLE SODIUM 40 MG/1
40 TABLET, DELAYED RELEASE ORAL
Status: DISCONTINUED | OUTPATIENT
Start: 2025-08-05 | End: 2025-08-07 | Stop reason: HOSPADM

## 2025-08-05 RX ADMIN — METOPROLOL SUCCINATE 12.5 MG: 25 TABLET, EXTENDED RELEASE ORAL at 09:36

## 2025-08-05 RX ADMIN — ASPIRIN 81 MG: 81 TABLET, DELAYED RELEASE ORAL at 09:36

## 2025-08-05 RX ADMIN — MAGNESIUM SULFATE HEPTAHYDRATE 2 G: 40 INJECTION, SOLUTION INTRAVENOUS at 13:58

## 2025-08-05 RX ADMIN — PIPERACILLIN AND TAZOBACTAM 4.5 G: 4; .5 INJECTION, POWDER, FOR SOLUTION INTRAVENOUS at 06:18

## 2025-08-05 RX ADMIN — APIXABAN 10 MG: 5 TABLET, FILM COATED ORAL at 09:36

## 2025-08-05 RX ADMIN — SODIUM PHOSPHATE, MONOBASIC, MONOHYDRATE AND SODIUM PHOSPHATE, DIBASIC, ANHYDROUS 15 MMOL: 142; 276 INJECTION, SOLUTION INTRAVENOUS at 13:58

## 2025-08-05 RX ADMIN — APIXABAN 10 MG: 5 TABLET, FILM COATED ORAL at 20:49

## 2025-08-05 RX ADMIN — ATORVASTATIN CALCIUM 40 MG: 40 TABLET, FILM COATED ORAL at 09:36

## 2025-08-05 RX ADMIN — PANTOPRAZOLE SODIUM 40 MG: 40 TABLET, DELAYED RELEASE ORAL at 10:09

## 2025-08-05 RX ADMIN — MICAFUNGIN SODIUM 100 MG: 50 INJECTION, POWDER, LYOPHILIZED, FOR SOLUTION INTRAVENOUS at 10:10

## 2025-08-05 RX ADMIN — PANTOPRAZOLE SODIUM 40 MG: 40 TABLET, DELAYED RELEASE ORAL at 18:04

## 2025-08-05 RX ADMIN — PIPERACILLIN AND TAZOBACTAM 4.5 G: 4; .5 INJECTION, POWDER, FOR SOLUTION INTRAVENOUS at 23:04

## 2025-08-05 RX ADMIN — PIPERACILLIN AND TAZOBACTAM 4.5 G: 4; .5 INJECTION, POWDER, FOR SOLUTION INTRAVENOUS at 18:05

## 2025-08-05 RX ADMIN — PIPERACILLIN AND TAZOBACTAM 4.5 G: 4; .5 INJECTION, POWDER, FOR SOLUTION INTRAVENOUS at 11:54

## 2025-08-05 ASSESSMENT — ACTIVITIES OF DAILY LIVING (ADL)
ADLS_ACUITY_SCORE: 38

## 2025-08-05 NOTE — PROGRESS NOTES
[facility]    Medicine Progress Note - Hospitalist Service    Date of Admission:  7/13/2025    Assessment & Plan     Kristofer Montana is a 79 y/o male with Hx of HTN, HLD, HFrEF (LVEF 40%), CAD (remote PCI 2005), CVA (2013, no residual weakness/deficits) [facility] with 2 days of fever, chills and RUQ pain.      CT C/A/P showed choledocholithiasis and mild bile duct dilation and possible acute cholecystitis. He was also found to have occlusive DVT in L femoral vein, nonocclusive DVT in L popliteal vein. He was started on iv heparin and transferred to [facility].      [facility] E [facility]. On 7/14, he went into A fib with RVR and was started on amiodarone infusion. Converted to NSR. On 7/15, he developed acute hypoxic resp failure and was started on BIPAP. Weaned off by 7/16.     He underwent EUS/ERCP with sphincterotomy with removal of stones on 7/16. No stent was placed. He had an aspiration event during the procedure. He was intubated and admitted to ICU requiring pressors and stress dose steroids.     CT abdomen 7/17 showed residual 0.3 cm gallstone in CBD, persistent mild CBD dilatation of 1.1 cm. Mild dilatation of small bowel loops, likely ileus. Per GI, small CBD stone is expected to pass on its own and no intervention was recommended. He was extubated on 7/19 and was transferred back to hospitalist service.     On 7/20, the patient had an episode of melena and noted Hb drop from 9.7 to 8.4 to 6.7 on 7/21. Heparin infusion for paroxysmal atrial fibrillation and DVT was held. Received one unit RBC transfusion on 7/21.  Attempted to resume heparin infusion and aspirin again on 7/23 and hemoglobin dropped to 6.6 AM 7/24. Received another 1 unit RBC AM 7/24. Attempted to resume heparin infusion and aspirin again but patient had recurrent melena. GI was consulted.     Abdominal US was obtained 7/26. Difficult exam due to bowel gas.  Heterogeneous fluid collection over the right lobe of the liver is indeterminant. Concern for possible subcapsular hematoma versus more loculated ascites. Mild ascites and small right pleural effusion. Normal abdominal liver duplex.  He had CT abdomen which was concerning for hemorrhage around the hepatic area and underwent CTA GI bleed which did not show any evidence of hemorrhage but there was concern about cholecystitis.    EGD initially was planned but held off given melena episodes stopped and hemoglobin stabilized.  Patient was continued with Zosyn and surgery was reconsulted.  He underwent laparoscopic cholecystectomy on 7/29 and was found to have perforated gallbladder with significant hepatic abscess which was drained.   ID was consulted and he is currently on micafungin and Zosyn based on the prelim culture data.  He was again started back on IV heparin drip on 7/31.  He has not had any further melena and anemia was thought to be due to multiple phlebotomies.  Hemoglobin stabilized and he was transitioned to oral Eliquis on 8/3/25.  ANANDA drain removed by general surgery on 8/3.    Perforated cholecystitis with large intra-abdominal abscess s/p laparoscopic cholecystectomy, lysis of adhesions and drainage of the intra-abdominal abscess on 7/29/2025  Septic shock [resolved] due to E coli bacteremia secondary to choledocholithiasis with ascending cholangitis, s/p EUS/ERCP with sphincterotomy with removal of stones on 7/16  OR cultures from the abscess drained on 7/29 with Candida glabrata and staph saccharolyticus.   Patient has completed 2 weeks total of cefepime/Flagyl followed by ceftriaxone for E. coli bacteremia.  -Continue current IV Zosyn for intra-abdominal abscess, susceptibilities pending on staph saccharolyticus.  -Continue current IV micafungin for yeast in abscess culture, susceptibilities pending.  - ID following.  Awaiting final discharge antibiotic plans from ID.  -As per general surgery:  Patient can be discharged from their standpoint when medically stable.  ANANDA drains have been removed.  Telephone follow-up in 2 to 3 weeks.  General surgery has signed off 8/4.    Melena-resolved  Acute blood loss anemia, multifactorial- due to GI bleed vs post sphincterotomy bleed due to anticoagulation vs underlying infective etiology  Patient had been having intermittent melena since admission and while on therapeutic anticoagulation.   Abdominal US on 7/26 concerning for loculated ascites versus subcapsular hematoma.  During the course of hospital stay patient hemoglobin trended downwards and GI was consulted and on 7/28 he had CT abdomen done which was concerning for large subcapsular fluid over the right hepatic lobe  His heparin and aspirin were initially held and GI was planning for EGD but he underwent CTA GI bleed which showed no evidence of any hemorrhage  Of note patient's hemoglobin has been fluctuating, despite no obvious bleeding, hemoglobin dropped to 7 for which he received 2 units of blood after surgery.  Anemia likely multifactorial due to surgical blood loss, multiple phlebotomies, underlying infection.  GI has signed off.  No active melena.  - Hb has stabilized.  Has been resumed on Eliquis since 8/3.  Monitor Hb.    Aspiration pneumonia versus CAP -resolved  Acute hypoxic respiratory failure - secondary to aspiration pneumonitis on 7/16 during EUS/ERCP-resolved  -Extubated on 7/19. Weaned off supplemental oxygen on 7/20. Respiratory culture growing candida glabrata, suspected contaminant.   - Completed 2 weeks of cefepime/Flagyl followed by ceftriaxone for E. coli bacteremia as noted above.  Currently on IV Zosyn for intra-abdominal abscess per ID.     Mild FARNAZ - secondary to septic shock-resolved  -Baseline Cr 1. Cr peaked at 1.39.   - Creatinine remains in normal range  -Monitor labs closely     Hypokalemia-resolved  Hypophosphatemia  Hypomagnesemia -resolved  - Continue with replacement    "  Paroxysmal atrial fibrillation, converted to NSR with IV amiodarone infusion  Initially on amiodarone drip, later transitioned to metoprolol.  -Continue with metoprolol  - Continue Eliquis    Left Femoral/popliteal DVT, diagnosed 7/13  IR did not recommend thrombectomy.  -  Eliquis twice daily on 8/3/2025    Hyperlipidemia  - Continue atorvastatin     CAD s/p  remote PCI (2005)  - Aspirin resumed 8/4.     Essential HTN   -will continue to hold losartan for now and continue with metoprolol.  Currently normotensive.     Mild Thrombocytopenia - due to severe sepsis  Now resolved     Chronic systolic CHF with reduced EF of 40%  - Chest x-ray 7/18 showed no pulmonary edema  - Echo on 7/15 showed EF of 45%, anteroseptal and apical hypokinesis and RV is normal in structure function and size and no valve disease and grade 1 diastolic dysfunction, mild MR, mild AR and no pericardial and prior echo in 2006 showed EF of 35% with anterior septal hypokinesis  - Monitor need for diuresis, does have some peripheral edema.  Add on BNP.     Prediabetes, HbA1c 5.8%, with stress and steroid induced hyperglycemia  -Has completed stress dose steroids  - Blood sugar stable    Physical deconditioning  - Progressed well with therapies, anticipating home with home cares.        Diet: Room Service  Regular Diet Adult  Diet  Snacks/Supplements Adult: Other; Vanilla Ensure at 10am  (RD); Between Meals    DVT Prophylaxis: Pneumatic Compression Devices  Mar Catheter: Not present  Lines: None     Cardiac Monitoring: None  Code Status: Full Code      Clinically Significant Risk Factors               # Hypoalbuminemia: Lowest albumin = 1.9 g/dL at 7/31/2025  2:40 AM, will monitor as appropriate                # Overweight: Estimated body mass index is 26.67 kg/m  as calculated from the following:    Height as of this encounter: 1.778 m (5' 10\").    Weight as of this encounter: 84.3 kg (185 lb 13.6 oz).   # Moderate Malnutrition: based on " nutrition assessment and treatment provided per dietitian's recommendations.      # Financial/Environmental Concerns: none         Social Drivers of Health            Disposition Plan     Medically Ready for Discharge: Anticipated in 2-4 Days, pending antibiotic plans per ID         Jyoti Mayberry MD  Hospitalist Service  [facility]  Securely message with [employer] (more info)  Text page via [employer] Paging/Directory       ______________________________________________________________________    Interval History   Stable overnight, no melena.  Doing well, eager to discharge home.  Awaiting antibiotic plans per ID.  No complaints today.    Physical Exam   Vital Signs: Temp: 98.3  F (36.8  C) Temp src: Oral BP: 116/64 Pulse: 73   Resp: 16 SpO2: 94 % O2 Device: None (Room air)    Weight: 185 lbs 13.56 oz        General: A  O x 3, cooperative, no distress  Respiratory: Nonlabored breathing, clear bilaterally  Cardiovascular: Regular rate , S1 and S2 normal with no murmer or rubs or gallops, lower extremity edema noted  Abdomen: Soft, nontender, nondistended  Neurologic:  no focal deficit  Psychiatric: cooperative      Medical Decision Making       Time spent in care of patient is 30 minutes and I reviewed labs and discussed plan of care in detail with patient and nursing staff and also spoke with pharmacy      Data     I have personally reviewed the following data over the past 24 hrs:    N/A  \   8.8 (L)   / N/A     N/A N/A N/A /  N/A   3.8 N/A N/A \       Imaging results reviewed over the past 24 hrs:   No results found for this or any previous visit (from the past 24 hours).

## 2025-08-05 NOTE — PROGRESS NOTES
[facility]    Infectious Disease Progress Note    Date of Service (when I saw the patient): 08/05/2025     Assessment & Plan   Kristofer Montana is a 80 year old male who was admitted on 7/13/2025.     Impression:  80 year old male with a history of HTN, HFrEF, CAD, and prior CVA [facility] with a 2 day history of fever, chills, and RUQ pain, found to have choledocholithiasis, mild bile duct dilatation, and possible acute cholecystitis as well as E.coli bacteremia. Now s/p ERCP with sphincterotomy and removal of stones 7/16 and laparoscopic cholecystectomy, lysis of adhesions and drainage of the intra-abdominal abscess from perforated gallbladder 7/29.     -[facility] grew E.coli.   -Multiple complications this hospital stay: A.fib with RVR, acute hypoxic respiratory failure, aspiration event during ERCP resulting in intubation, retained small common bile duct stone, large perihepatic abscess from perforated gallbladder.   -S/p laparoscopic cholecystectomy, lysis of adhesions and drainage of the intra-abdominal abscess 7/29/25. OR cultures from the abscess with Candida glabrata and staph saccharolyticus.   -On Zosyn. Already completed 2 weeks total of Cefepime/Flagyl followed by Ceftriaxone for E.coli bacteremia.        Recommendations:   Continue Zosyn for now. Asked microbiology lab to add on susceptibilities to staph that grew on culture.     Continue micafungin for yeast in culture. Have asked microbiology lab to add on susceptibilities.   Hopefully will be able to change to oral antibiotics based on susceptibilities to complete 7-10 days post-operatively of antibiotics/antifungals.  Maintain in hospital until culture results are back.   Will need repeat CT abdomen/pelvis with contrast prior to discharge to assist with determining final duration of antibiotics as this was a very large abscess that was drained during surgery.       Patient and plan discussed with Dr. Cardona.     Dejah Vogel PA-C        Interval History   Tolerating antibiotics ok   No new rashes or issues with antibiotics   Labs reviewed   No changes to past medical, social or family history   Feels well. Eating well. Good BMs. No pain. Drains out.   Waiting on cultures.     Physical Exam   Temp: 98.3  F (36.8  C) Temp src: Oral BP: 116/64 Pulse: 73   Resp: 16 SpO2: 94 % O2 Device: None (Room air)    Vitals:    07/30/25 0657 08/03/25 0612 08/05/25 0622   Weight: 89.3 kg (196 lb 13.9 oz) 89.2 kg (196 lb 10.4 oz) 84.3 kg (185 lb 13.6 oz)     Vital Signs with Ranges  Temp:  [98.3  F (36.8  C)-99.5  F (37.5  C)] 98.3  F (36.8  C)  Pulse:  [73-87] 73  Resp:  [16-18] 16  BP: (114-128)/(64-73) 116/64  SpO2:  [94 %-97 %] 94 %    Constitutional: Awake, alert, cooperative, no apparent distress  Lungs: Clear to auscultation bilaterally, no crackles or wheezing  Cardiovascular: Regular rate and rhythm, normal S1 and S2, and no murmur noted  Abdomen: Normal bowel sounds, soft, non-distended, non-tender. Drains out.   Skin: No rashes, no cyanosis, no edema  Other:    Medications   Current Facility-Administered Medications   Medication Dose Route Frequency Provider Last Rate Last Admin    Patient is already receiving anticoagulation with heparin, enoxaparin (LOVENOX), warfarin (COUMADIN)  or other anticoagulant medication   Does not apply Continuous PRN Betty Conroy PA-C        sodium chloride (PF) 0.9% PF flush 10-40 mL  10-40 mL Intracatheter Continuous Betty Conroy PA-C   20 mL at 07/17/25 1439     Current Facility-Administered Medications   Medication Dose Route Frequency Provider Last Rate Last Admin    apixaban ANTICOAGULANT (ELIQUIS) tablet 10 mg  10 mg Oral BID Curt Wahl MD   10 mg at 08/05/25 0936    Followed by    [START ON 8/7/2025] apixaban ANTICOAGULANT (ELIQUIS) tablet 5 mg  5 mg Oral BID Curt Wahl MD        aspirin EC tablet 81 mg  81 mg Oral  Daily Jyoti Mayberry MD   81 mg at 08/05/25 0936    atorvastatin (LIPITOR) tablet 40 mg  40 mg Oral or Feeding Tube Daily Marycarmen, Betty, PA-C   40 mg at 08/05/25 0936    [Held by provider] losartan (COZAAR) tablet 50 mg  50 mg Oral Daily Kalthoff, Betty, PA-C        magnesium sulfate 2 g in 50 mL sterile water intermittent infusion  2 g Intravenous Once Jyoti Mayberry MD 50 mL/hr at 08/05/25 1358 2 g at 08/05/25 1358    metoprolol succinate ER (TOPROL-XL) 24 hr half-tab 12.5 mg  12.5 mg Oral Daily Hanyoff, Betty, PA-C   12.5 mg at 08/05/25 0936    micafungin (MYCAMINE) 100 mg in sodium chloride 0.9 % 100 mL intermittent infusion  100 mg Intravenous Q24H Katherine Cardona  mL/hr at 08/05/25 1010 100 mg at 08/05/25 1010    pantoprazole (PROTONIX) injection 40 mg  40 mg Intravenous BID AC Kristofer Mack MD        Or    pantoprazole (PROTONIX) EC tablet 40 mg  40 mg Oral BID AC Kristofer Mack MD   40 mg at 08/05/25 1009    piperacillin-tazobactam (ZOSYN) 4.5 g vial to attach to  mL bag  4.5 g Intravenous Q6H Herbthoff, Betty, PA-C 200 mL/hr at 07/31/25 1734 4.5 g at 08/05/25 1154    senna-docusate (SENOKOT-S/PERICOLACE) 8.6-50 MG per tablet 1 tablet  1 tablet Oral BID Kalthoff, Betty, PA-C   1 tablet at 08/02/25 1956    Or    senna-docusate (SENOKOT-S/PERICOLACE) 8.6-50 MG per tablet 2 tablet  2 tablet Oral BID Kalthoff, Betty, PA-C   2 tablet at 07/30/25 0905    sodium chloride (PF) 0.9% PF flush 10-40 mL  10-40 mL Intracatheter Q7 Days Kalthoff, Betty, PA-C   10 mL at 07/23/25 2048    sodium chloride (PF) 0.9% PF flush 10-40 mL  10-40 mL Intracatheter Daily Betty Conroy PA-C   10 mL at 08/05/25 0937    sodium phosphate 15 mmol in NS 250mL intermittent infusion  15 mmol Intravenous Once Jyoti Mayberry MD 62 mL/hr at 08/05/25 1358 15 mmol at 08/05/25 1358       Data   All microbiology laboratory data reviewed.  Recent Labs   Lab Test 08/05/25  0729  08/04/25  0837 08/03/25  0642 08/02/25  1647 07/31/25  1046 07/31/25  0240 07/30/25 2020 07/30/25  0859   WBC  --   --   --  6.8  --  7.1  --  7.6   HGB 8.8* 9.5* 8.7* 8.5*  8.5*   < > 7.0*   < > 7.6*   HCT  --   --   --  26.3*  --  21.5*  --  23.9*   MCV 89 89 88 89  89   < > 90   < > 91   PLT  --   --   --  246  --  262  --  279    < > = values in this interval not displayed.     Recent Labs   Lab Test 08/03/25  0642 08/02/25  1647 08/01/25  0547   CR 0.93 0.90 0.95     07/29/2025 1538 08/03/2025 1422 Anaerobic Bacterial Culture Routine [[id number]]   (Abnormal)   Abscess from Gallbladder    Preliminary result Component Value   Culture Culture in progress P    Isolated in broth only Staphylococcus saccharolyticus Abnormal  P    On day 3 of incubation  Identification obtained by MALDI-TOF mass spectrometry research use only database. Test characteristics determined and verified by the [facility] Diseases Diagnostic Laboratory.  Susceptibilities not routinely done, refer to antibiogram to view typical susceptibility profiles             07/29/2025 1538 07/30/2025 1511 Gram Stain [[id number]]   (Abnormal)   Abscess from Gallbladder    Edited Result - FINAL Component Value   GS Culture See corresponding culture for results   Gram Stain Result 2+ Gram positive bacilli Abnormal  C   Corrected on July 30, 2025/3:10 PM by Blanca Grey  Previously reported as: Gram negative bacilli  Corrected result: Previously reported as 2+ Gram negative bacilli on 7/29/2025 at  8:56 PM CDT.   Gram Stain Result 3+ WBC seen Abnormal  C   Predominantly PMNs          07/29/2025 1538 08/03/2025 1038 Fungal or Yeast Culture Routine [[id number]]   (Abnormal)   Abscess from Gallbladder    Preliminary result Component Value   Culture Candida glabrata complex Abnormal  P             07/29/2025 1538 08/04/2025 0650 Abscess Aerobic Bacterial Culture Routine Without Gram Stain [[id number]]   (Abnormal)   Abscess from Gallbladder     Preliminary result Component Value   Culture Culture in progress P    Isolated in broth only Candida glabrata complex Abnormal  P    On day 3 of incubation  Susceptibilities not routinely done, refer to antibiogram to view typical susceptibility profiles

## 2025-08-05 NOTE — PLAN OF CARE
Goal Outcome Evaluation:  Orientation: AnOx4   Aggression Stop Light: Green   Activity: SBA GBW   Diet/BS Checks: Reg   Tele:  NA   IV Access/Drains: R. PIV CDI SL, L. PIV CDI SL.   Pain Management: Denies   Abnormal VS/Results: VSS on RA. AM Hgb recheck last check 7.2. Conditionals to replace >7    Bowel/Bladder: Continent B/B, voiding spontaneously, 1 BM this shift no melena.    Skin/Wounds: Abdominal sites CDI, scattered bruising w/ large bruise on L. Forearm, blanchable redness to coccyx, scab on chest.   Consults: ID, Gen surg   D/C Disposition: Pending   Other Info:   - K,Mag,Phos protocol; Mag & phos replaced- AM rechecks.

## 2025-08-05 NOTE — PLAN OF CARE
08/05/25 4965   Appointment Info   Signing Clinician's Name / Credentials (PT) Jazmyn Mcelroy DPT   Therapeutic Activity   Therapeutic Activities: dynamic activities to improve functional performance Minutes (76222) 8   Treatment Detail/Skilled Intervention Pt seated upon arrival. Pt engaged in seated LAQ and AP w/i tolerance for improved bld flow, tolerated well. Pt engaged in STS w/ FWW and CGA x 2, progressed to mod I w/ safe hand placement. Safe stand > sit transfer technique. Continues to fatigue quickly w/ OOB activity. Recommend pt ambulate 2-3x/day in hallway with FWW and staff assist, pt verbalized understanding. Has 4ww at home, reviewed safety locking brakes w/ transfers using 4ww. Pt left seated needs in reach end of session   Gait Training   Gait Training Minutes (70518) 10   Symptoms Noted During/After Treatment (Gait Training) fatigue   Treatment Detail/Skilled Intervention Focus on gait training and progressing ambulation distance. Pt tolerated ~ 200' with FWW, slow pace, cues for upright posture. Difficulty increasing gait speed. REmains below baseline. Stair training up/down 5 steps x 2 sets, limited by IV length. No LOB. Performs step to pattern. Fatigue post standing rest break. Pt educated on placing chairs on landings at home for seated rest breaks as pt needs to negotiate three flights of 8 stairs to main level.   Distance in Feet 200   PT Discharge Planning   PT Plan Repeated STS, balance, standing exercise, gait   PT Discharge Recommendation (DC Rec) home with assist;home with home care physical therapy   PT Rationale for DC Rec Continue to recommend HHPT as pt currently SBA x 1 w/ FWW, demonstrates gross functional weakness, impaired balance, imparied activity tolerance from baseline. Will benefit from HHPT to improve above impairments prior to return home   PT Brief overview of current status SBA FWW   PT Total Distance Amb During Session (feet) 200   Physical Therapy Time and  Intention   Timed Code Treatment Minutes 18   Total Session Time (sum of timed and untimed services) 18

## 2025-08-06 ENCOUNTER — APPOINTMENT (OUTPATIENT)
Dept: PHYSICAL THERAPY | Facility: CLINIC | Age: 80
DRG: 853 | End: 2025-08-06
Attending: INTERNAL MEDICINE
Payer: MEDICARE

## 2025-08-06 ENCOUNTER — APPOINTMENT (OUTPATIENT)
Dept: CT IMAGING | Facility: CLINIC | Age: 80
DRG: 853 | End: 2025-08-06
Attending: HOSPITALIST
Payer: MEDICARE

## 2025-08-06 ENCOUNTER — APPOINTMENT (OUTPATIENT)
Dept: OCCUPATIONAL THERAPY | Facility: CLINIC | Age: 80
DRG: 853 | End: 2025-08-06
Attending: INTERNAL MEDICINE
Payer: MEDICARE

## 2025-08-06 LAB — PHOSPHATE SERPL-MCNC: 2.4 MG/DL (ref 2.5–4.5)

## 2025-08-06 PROCEDURE — 250N000013 HC RX MED GY IP 250 OP 250 PS 637: Performed by: HOSPITALIST

## 2025-08-06 PROCEDURE — 120N000001 HC R&B MED SURG/OB

## 2025-08-06 PROCEDURE — 99232 SBSQ HOSP IP/OBS MODERATE 35: CPT | Performed by: HOSPITALIST

## 2025-08-06 PROCEDURE — 97116 GAIT TRAINING THERAPY: CPT | Mod: GP

## 2025-08-06 PROCEDURE — 250N000011 HC RX IP 250 OP 636: Performed by: INTERNAL MEDICINE

## 2025-08-06 PROCEDURE — 258N000003 HC RX IP 258 OP 636: Performed by: INTERNAL MEDICINE

## 2025-08-06 PROCEDURE — 250N000011 HC RX IP 250 OP 636: Performed by: HOSPITALIST

## 2025-08-06 PROCEDURE — 250N000011 HC RX IP 250 OP 636: Performed by: PHYSICIAN ASSISTANT

## 2025-08-06 PROCEDURE — 99232 SBSQ HOSP IP/OBS MODERATE 35: CPT | Performed by: PHYSICIAN ASSISTANT

## 2025-08-06 PROCEDURE — 250N000013 HC RX MED GY IP 250 OP 250 PS 637: Performed by: PHYSICIAN ASSISTANT

## 2025-08-06 PROCEDURE — 97530 THERAPEUTIC ACTIVITIES: CPT | Mod: GO | Performed by: OCCUPATIONAL THERAPIST

## 2025-08-06 PROCEDURE — 84100 ASSAY OF PHOSPHORUS: CPT | Performed by: HOSPITALIST

## 2025-08-06 PROCEDURE — 250N000013 HC RX MED GY IP 250 OP 250 PS 637: Performed by: INTERNAL MEDICINE

## 2025-08-06 PROCEDURE — 250N000009 HC RX 250: Performed by: HOSPITALIST

## 2025-08-06 PROCEDURE — 74177 CT ABD & PELVIS W/CONTRAST: CPT

## 2025-08-06 PROCEDURE — 36415 COLL VENOUS BLD VENIPUNCTURE: CPT | Performed by: HOSPITALIST

## 2025-08-06 RX ORDER — IOPAMIDOL 755 MG/ML
90 INJECTION, SOLUTION INTRAVASCULAR ONCE
Status: COMPLETED | OUTPATIENT
Start: 2025-08-06 | End: 2025-08-06

## 2025-08-06 RX ADMIN — ASPIRIN 81 MG: 81 TABLET, DELAYED RELEASE ORAL at 09:07

## 2025-08-06 RX ADMIN — PIPERACILLIN AND TAZOBACTAM 4.5 G: 4; .5 INJECTION, POWDER, FOR SOLUTION INTRAVENOUS at 11:58

## 2025-08-06 RX ADMIN — POTASSIUM & SODIUM PHOSPHATES POWDER PACK 280-160-250 MG 1 PACKET: 280-160-250 PACK at 20:13

## 2025-08-06 RX ADMIN — SENNOSIDES AND DOCUSATE SODIUM 1 TABLET: 50; 8.6 TABLET ORAL at 20:13

## 2025-08-06 RX ADMIN — APIXABAN 10 MG: 5 TABLET, FILM COATED ORAL at 09:07

## 2025-08-06 RX ADMIN — METOPROLOL SUCCINATE 12.5 MG: 25 TABLET, EXTENDED RELEASE ORAL at 09:07

## 2025-08-06 RX ADMIN — POTASSIUM & SODIUM PHOSPHATES POWDER PACK 280-160-250 MG 1 PACKET: 280-160-250 PACK at 16:06

## 2025-08-06 RX ADMIN — PIPERACILLIN AND TAZOBACTAM 4.5 G: 4; .5 INJECTION, POWDER, FOR SOLUTION INTRAVENOUS at 17:27

## 2025-08-06 RX ADMIN — POTASSIUM & SODIUM PHOSPHATES POWDER PACK 280-160-250 MG 1 PACKET: 280-160-250 PACK at 11:58

## 2025-08-06 RX ADMIN — PIPERACILLIN AND TAZOBACTAM 4.5 G: 4; .5 INJECTION, POWDER, FOR SOLUTION INTRAVENOUS at 06:33

## 2025-08-06 RX ADMIN — IOPAMIDOL 90 ML: 755 INJECTION, SOLUTION INTRAVENOUS at 14:05

## 2025-08-06 RX ADMIN — ATORVASTATIN CALCIUM 40 MG: 40 TABLET, FILM COATED ORAL at 09:07

## 2025-08-06 RX ADMIN — MICAFUNGIN SODIUM 100 MG: 50 INJECTION, POWDER, LYOPHILIZED, FOR SOLUTION INTRAVENOUS at 09:10

## 2025-08-06 RX ADMIN — APIXABAN 10 MG: 5 TABLET, FILM COATED ORAL at 20:13

## 2025-08-06 RX ADMIN — PANTOPRAZOLE SODIUM 40 MG: 40 TABLET, DELAYED RELEASE ORAL at 06:33

## 2025-08-06 RX ADMIN — PANTOPRAZOLE SODIUM 40 MG: 40 TABLET, DELAYED RELEASE ORAL at 16:06

## 2025-08-06 RX ADMIN — SODIUM CHLORIDE 66 ML: 9 INJECTION, SOLUTION INTRAVENOUS at 14:05

## 2025-08-06 ASSESSMENT — ACTIVITIES OF DAILY LIVING (ADL)
ADLS_ACUITY_SCORE: 38
ADLS_ACUITY_SCORE: 39
ADLS_ACUITY_SCORE: 38
ADLS_ACUITY_SCORE: 39
ADLS_ACUITY_SCORE: 38
ADLS_ACUITY_SCORE: 39
ADLS_ACUITY_SCORE: 39
ADLS_ACUITY_SCORE: 38
ADLS_ACUITY_SCORE: 39
ADLS_ACUITY_SCORE: 38
ADLS_ACUITY_SCORE: 39
ADLS_ACUITY_SCORE: 38
ADLS_ACUITY_SCORE: 39
ADLS_ACUITY_SCORE: 38

## 2025-08-06 NOTE — PROGRESS NOTES
[facility]    Medicine Progress Note - Hospitalist Service    Date of Admission:  7/13/2025    Assessment & Plan     Kristofer Montana is a 81 y/o male with Hx of HTN, HLD, HFrEF (LVEF 40%), CAD (remote PCI 2005), CVA (2013, no residual weakness/deficits) who [facility] with 2 days of fever, chills and RUQ pain.      CT C/A/P showed choledocholithiasis and mild bile duct dilation and possible acute cholecystitis. He was also found to have occlusive DVT in L femoral vein, nonocclusive DVT in L popliteal vein. He was started on iv heparin and transferred to [facility] for ERCP.      [facility] grew E coli. On 7/14, he went into A fib with RVR and was started on amiodarone infusion. Converted to NSR. On 7/15, he developed acute hypoxic resp failure and was started on BIPAP. Weaned off by 7/16.     He underwent EUS/ERCP with sphincterotomy with removal of stones on 7/16. No stent was placed. He had an aspiration event during the procedure. He was intubated and admitted to ICU requiring pressors and stress dose steroids.     CT abdomen 7/17 showed residual 0.3 cm gallstone in CBD, persistent mild CBD dilatation of 1.1 cm. Mild dilatation of small bowel loops, likely ileus. Per GI, small CBD stone is expected to pass on its own and no intervention was recommended. He was extubated on 7/19 and was transferred back to hospitalist service.     On 7/20, the patient had an episode of melena and noted Hb drop from 9.7 to 8.4 to 6.7 on 7/21. Heparin infusion for paroxysmal atrial fibrillation and DVT was held. Received one unit RBC transfusion on 7/21.  Attempted to resume heparin infusion and aspirin again on 7/23 and hemoglobin dropped to 6.6 AM 7/24. Received another 1 unit RBC AM 7/24. Attempted to resume heparin infusion and aspirin again but patient had recurrent melena. GI was consulted.     Abdominal US was obtained 7/26. Difficult exam due to bowel gas.  Heterogeneous fluid collection over the right lobe of the liver is indeterminant. Concern for possible subcapsular hematoma versus more loculated ascites. Mild ascites and small right pleural effusion. Normal abdominal liver duplex.  He had CT abdomen which was concerning for hemorrhage around the hepatic area and underwent CTA GI bleed which did not show any evidence of hemorrhage but there was concern about cholecystitis.    EGD initially was planned but held off given melena episodes stopped and hemoglobin stabilized.  Patient was continued with Zosyn and surgery was reconsulted.  He underwent laparoscopic cholecystectomy on 7/29 and was found to have perforated gallbladder with significant hepatic abscess which was drained.   ID was consulted and he is currently on micafungin and Zosyn based on the prelim culture data.  He was again started back on IV heparin drip on 7/31.  He has not had any further melena and anemia was thought to be due to multiple phlebotomies.  Hemoglobin stabilized and he was transitioned to oral Eliquis on 8/3/25.  ANANDA drain removed by general surgery on 8/3.    Perforated cholecystitis with large intra-abdominal abscess s/p laparoscopic cholecystectomy, lysis of adhesions and drainage of the intra-abdominal abscess on 7/29/2025  Septic shock [resolved] due to E coli bacteremia secondary to choledocholithiasis with ascending cholangitis, s/p EUS/ERCP with sphincterotomy with removal of stones on 7/16  OR cultures from the abscess drained on 7/29 with Candida glabrata and staph saccharolyticus.   Patient has completed 2 weeks total of cefepime/Flagyl followed by ceftriaxone for E. coli bacteremia.  -Continue current IV Zosyn for intra-abdominal abscess, susceptibilities pending on staph saccharolyticus.  -Continue current IV micafungin for yeast in abscess culture, susceptibilities pending.  - ID following.  Repeat CT abdomen/pelvis with contrast on 8/6/2025 showed near complete resolution  of perihepatic fluid collection.  ID recommended stopping Zosyn and micafungin after tomorrow.  They have signed off.  Anticipate discharge home tomorrow.  -As per general surgery: Patient can be discharged from their standpoint when medically stable.  ANANDA drains have been removed.  Telephone follow-up in 2 to 3 weeks.  General surgery has signed off 8/4.    Melena-resolved  Acute blood loss anemia, multifactorial- due to GI bleed vs post sphincterotomy bleed due to anticoagulation vs underlying infective etiology  Patient had been having intermittent melena since admission and while on therapeutic anticoagulation.   Abdominal US on 7/26 concerning for loculated ascites versus subcapsular hematoma.  During the course of hospital stay patient hemoglobin trended downwards and GI was consulted and on 7/28 he had CT abdomen done which was concerning for large subcapsular fluid over the right hepatic lobe  His heparin and aspirin were initially held and GI was planning for EGD but he underwent CTA GI bleed which showed no evidence of any hemorrhage  Of note patient's hemoglobin has been fluctuating, despite no obvious bleeding, hemoglobin dropped to 7 for which he received 2 units of blood after surgery.  Anemia likely multifactorial due to surgical blood loss, multiple phlebotomies, underlying infection.  GI has signed off.  No active melena.  - Hb has stabilized.  Has been resumed on Eliquis since 8/3.  Monitor Hb.    Aspiration pneumonia versus CAP -resolved  Acute hypoxic respiratory failure - secondary to aspiration pneumonitis on 7/16 during EUS/ERCP-resolved  -Extubated on 7/19. Weaned off supplemental oxygen on 7/20. Respiratory culture growing candida glabrata, suspected contaminant.   - Completed 2 weeks of cefepime/Flagyl followed by ceftriaxone for E. coli bacteremia as noted above.  Currently on IV Zosyn, micafungin for intra-abdominal abscess per ID.  ID recommends completing antibiotics on 8/7/2025.      Mild FARNAZ - secondary to septic shock-resolved  -Baseline Cr 1. Cr peaked at 1.39.   - Creatinine remains in normal range  -Monitor labs closely     Hypokalemia-resolved  Hypophosphatemia  Hypomagnesemia -resolved  - Continue with replacement     Paroxysmal atrial fibrillation, converted to NSR with IV amiodarone infusion  Initially on amiodarone drip, later transitioned to metoprolol.  -Continue with metoprolol  - Continue Eliquis    Left Femoral/popliteal DVT, diagnosed 7/13  IR did not recommend thrombectomy.  -  Eliquis twice daily on 8/3/2025    Hyperlipidemia  - Continue atorvastatin     CAD s/p  remote PCI (2005)  - Aspirin resumed 8/4.     Essential HTN   -will continue to hold losartan for now and continue with metoprolol.  Currently normotensive.  Resume at discharge.     Mild Thrombocytopenia - due to severe sepsis  Now resolved     Chronic systolic CHF with reduced EF of 40%  - Chest x-ray 7/18 showed no pulmonary edema  - Echo on 7/15 showed EF of 45%, anteroseptal and apical hypokinesis and RV is normal in structure function and size and no valve disease and grade 1 diastolic dysfunction, mild MR, mild AR and no pericardial and prior echo in 2006 showed EF of 35% with anterior septal hypokinesis  - Monitor need for diuresis, does have some peripheral edema.  Add on BNP at 1082, improved from prior on 8/5..     Prediabetes, HbA1c 5.8%, with stress and steroid induced hyperglycemia  -Has completed stress dose steroids  - Blood sugar stable    Physical deconditioning  - Progressed well with therapies, anticipating home with home cares.        Diet: Room Service  Regular Diet Adult  Diet  Snacks/Supplements Adult: Other; Vanilla Ensure at 10am  (RD); Between Meals    DVT Prophylaxis: Pneumatic Compression Devices  Mar Catheter: Not present  Lines: None     Cardiac Monitoring: None  Code Status: Full Code      Clinically Significant Risk Factors               # Hypoalbuminemia: Lowest albumin = 1.9 g/dL  "at 7/31/2025  2:40 AM, will monitor as appropriate                # Overweight: Estimated body mass index is 25.73 kg/m  (pended) as calculated from the following:    Height as of this encounter: 1.778 m (5' 10\").    Weight as of this encounter: (P) 81.3 kg (179 lb 4.8 oz).   # Moderate Malnutrition: based on nutrition assessment and treatment provided per dietitian's recommendations.      # Financial/Environmental Concerns: none         Social Drivers of Health            Disposition Plan     Medically Ready for Discharge: Anticipated Tomorrow, ID recommends completing antibiotics tomorrow and can discharge potentially after that.  Can likely discharge after evening dose of Zosyn.         Jyoti Mayberry MD  Hospitalist Service  [facility]  Securely message with User Replay (more info)  Text page via [employer] Paging/Directory       ______________________________________________________________________    Interval History   Stable overnight, no complaints.  Discussed repeat CT abdomen today.  Hoping to go home soon.    Physical Exam   Vital Signs: Temp: 98.1  F (36.7  C) Temp src: Oral BP: 113/65 Pulse: 91   Resp: 18 SpO2: 97 % O2 Device: None (Room air)    Weight: 185 lbs 13.56 oz        General: A  O x 3, cooperative, no distress  Respiratory: Nonlabored breathing, clear bilaterally  Cardiovascular: Regular rate , S1 and S2 normal with no murmer or rubs or gallops, lower extremity edema noted  Abdomen: Soft, nontender, nondistended  Neurologic:  no focal deficit  Psychiatric: cooperative      Medical Decision Making       Time spent in care of patient is 30 minutes and I reviewed labs and discussed plan of care in detail with patient and nursing staff and also spoke with pharmacy      Data         Imaging results reviewed over the past 24 hrs:   Recent Results (from the past 24 hours)   CT Abdomen Pelvis w Contrast    Narrative    EXAM: CT ABDOMEN PELVIS W CONTRAST  LOCATION: [facility]  DATE: 8/6/2025    INDICATION: Intra-abdominal abscess s p drainage  COMPARISON: CT angiogram abdomen/pelvis 7/28/2025  TECHNIQUE: CT scan of the abdomen and pelvis was performed following injection of IV contrast. Multiplanar reformats were obtained. Dose reduction techniques were used.  CONTRAST: 90mL Isovue 370    FINDINGS:   LOWER CHEST: Persistent small right pleural effusion with associated compressive atelectasis. Left ventricular myocardial thinning, stable from prior exam.    HEPATOBILIARY: Interval cholecystectomy. Small volume of fluid in the surgical bed but no well-defined, drainable fluid collection. Near-complete interval resolution of perihepatic fluid collection with trace residual fluid measuring 0.8 cm in short axis   (series 4 image 48). No evidence of biliary obstruction. Small left hepatic cyst, no specific follow-up recommended.    PANCREAS: Normal.    SPLEEN: Normal.    ADRENAL GLANDS: Normal.    KIDNEYS/BLADDER: Right renal cyst, no specific follow-up recommended. No hydronephrosis. Urinary bladder is unremarkable.    BOWEL: Diverticulosis of the colon. No acute inflammatory change. No obstruction. Normal appendix.    LYMPH NODES: No suspicious lymphadenopathy. Trace free fluid in the pelvis. No drainable ascites.    VASCULATURE: Mild to moderate calcified atherosclerosis. No abdominal aortic aneurysm.    PELVIC ORGANS: Unremarkable.    MUSCULOSKELETAL: No acute bony abnormality. Mild body wall edema.      Impression    IMPRESSION:   1.  Interval cholecystectomy with trace free fluid in the surgical bed but no large, drainable fluid collection. No biliary obstruction.  2.  Near-complete resolution of perihepatic fluid collection.  3.  Persistent small right pleural effusion.

## 2025-08-06 NOTE — PROVIDER NOTIFICATION
MD Notification    Notified Person: MD    Notified Person Name:Dr Jyoti Thompsonracquel    Notification Date/Time:08/06@1544    Notification Interaction:Radha    Purpose of Notification:How often do we need to check vital signs, could you pls put an order in. Thank you      Orders Received:Once every shift    Comments:

## 2025-08-06 NOTE — PROGRESS NOTES
Care Management Follow Up    Length of Stay (days): 24    Expected Discharge Date: 08/07/2025     Concerns to be Addressed: all concerns addressed in this encounter, discharge planning     Patient plan of care discussed at interdisciplinary rounds: Yes    Anticipated Discharge Disposition: Transitional Care              Anticipated Discharge Services: None  Anticipated Discharge DME: Walker, Oxygen    Patient/family educated on Medicare website which has current facility and service quality ratings: yes  Education Provided on the Discharge Plan: Yes  Patient/Family in Agreement with the Plan: yes    Referrals Placed by CM/SW:  Home Care  Private pay costs discussed: Not applicable    Discussed  Partnership in Safe Discharge Planning  document with patient/family: No     Handoff Completed: No, handoff not indicated or clinically appropriate    Additional Information:       Writer introduced self and role to patient. He was anticipated to discharge to TCU, but has progressed while in hospital. The therapy team recommends home care SN/PT/OT. Writer discussed with patient what services he would like. He reported he only wants SN to assist with medication management. He does get his medications from the [facility] and deliver via mail. He would need to have a PCP visit to continue medications started during this hospital stay and writer notified patient of this.     Next Steps: Await accepting [facility] agency and fax discharge orders.     Irish Wilson RN BSN  Care Coordination  [facility]

## 2025-08-06 NOTE — PROGRESS NOTES
[facility]    Infectious Disease Progress Note    Date of Service (when I saw the patient): 08/06/2025     Assessment & Plan   Kristofer Montana is a 80 year old male who was admitted on 7/13/2025.     Impression:  80 year old male with a history of HTN, HFrEF, CAD, and prior CVA [facility] with a 2 day history of fever, chills, and RUQ pain, found to have choledocholithiasis, mild bile duct dilatation, and possible acute cholecystitis as well as E.coli bacteremia. Now s/p ERCP with sphincterotomy and removal of stones 7/16 and laparoscopic cholecystectomy, lysis of adhesions and drainage of the intra-abdominal abscess from perforated gallbladder 7/29.     -[facility] grew E.[facility].   -Multiple complications this hospital stay: A.fib with RVR, acute hypoxic respiratory failure, aspiration event during ERCP resulting in intubation, retained small common bile duct stone, large perihepatic abscess from perforated gallbladder.   -S/p laparoscopic cholecystectomy, lysis of adhesions and drainage of the intra-abdominal abscess 7/29/25. OR cultures from the abscess with Candida glabrata and staph saccharolyticus.   -On Zosyn and Micafungin. Already completed 2 weeks total of Cefepime/Flagyl followed by Ceftriaxone for E.coli bacteremia.  -Repeat CT today with near complete resolution of perihepatic fluid collection.         Recommendations:   Continue Zosyn and Micafungin through tomorrow, then stop.   ID will sign off. Please call us back with questions.      Patient and plan discussed with Dr. Cardona.     Dejah Vogel PA-C        Interval History   Tolerating antibiotics ok   No new rashes or issues with antibiotics   Labs reviewed   No changes to past medical, social or family history   Feels well. Eating well. Good BMs. No pain. Drains out.   Waiting on cultures.     Physical Exam   Temp: 98.1  F (36.7  C) Temp src: Oral BP: 113/65  Pulse: 91   Resp: 18 SpO2: 97 % O2 Device: None (Room air)    Vitals:    08/03/25 0612 08/05/25 0622 08/06/25 0633   Weight: 89.2 kg (196 lb 10.4 oz) 84.3 kg (185 lb 13.6 oz) (P) 81.3 kg (179 lb 4.8 oz)     Vital Signs with Ranges  Temp:  [97.9  F (36.6  C)-98.3  F (36.8  C)] 98.1  F (36.7  C)  Pulse:  [] 91  Resp:  [18] 18  BP: (108-134)/(65-77) 113/65  SpO2:  [95 %-99 %] 97 %    Constitutional: Awake, alert, cooperative, no apparent distress  Lungs: Clear to auscultation bilaterally, no crackles or wheezing  Cardiovascular: Regular rate and rhythm, normal S1 and S2, and no murmur noted  Abdomen: Normal bowel sounds, soft, non-distended, non-tender. Drains out.   Skin: No rashes, no cyanosis, no edema  Other:    Medications   Current Facility-Administered Medications   Medication Dose Route Frequency Provider Last Rate Last Admin    Patient is already receiving anticoagulation with heparin, enoxaparin (LOVENOX), warfarin (COUMADIN)  or other anticoagulant medication   Does not apply Continuous PRN Betty Conroy PA-C        sodium chloride (PF) 0.9% PF flush 10-40 mL  10-40 mL Intracatheter Continuous Betty Conroy, PA-C   10 mL at 08/05/25 1804     Current Facility-Administered Medications   Medication Dose Route Frequency Provider Last Rate Last Admin    apixaban ANTICOAGULANT (ELIQUIS) tablet 10 mg  10 mg Oral BID Curt Wahl MD   10 mg at 08/06/25 0907    Followed by    [START ON 8/7/2025] apixaban ANTICOAGULANT (ELIQUIS) tablet 5 mg  5 mg Oral BID Curt Wahl MD        aspirin EC tablet 81 mg  81 mg Oral Daily Jyoti Mayberry MD   81 mg at 08/06/25 0907    atorvastatin (LIPITOR) tablet 40 mg  40 mg Oral or Feeding Tube Daily Betty Conroy PA-C   40 mg at 08/06/25 0907    [Held by provider] losartan (COZAAR) tablet 50 mg  50 mg Oral Daily Betty Conroy PA-C        metoprolol succinate ER (TOPROL-XL) 24 hr half-tab 12.5 mg  12.5 mg Oral Daily Betty Conroy PA-C    12.5 mg at 08/06/25 0907    micafungin (MYCAMINE) 100 mg in sodium chloride 0.9 % 100 mL intermittent infusion  100 mg Intravenous Q24H Katherine Cardona  mL/hr at 08/06/25 0910 100 mg at 08/06/25 0910    pantoprazole (PROTONIX) injection 40 mg  40 mg Intravenous BID Kristofer Little MD        Or    pantoprazole (PROTONIX) EC tablet 40 mg  40 mg Oral BID AC Kristofer Mack MD   40 mg at 08/06/25 1606    piperacillin-tazobactam (ZOSYN) 4.5 g vial to attach to  mL bag  4.5 g Intravenous Q6H KalBetty arellano, PA-C 200 mL/hr at 08/05/25 1805 4.5 g at 08/06/25 1158    potassium & sodium phosphates (NEUTRA-PHOS) Packet 1 packet  1 packet Oral or Feeding Tube Q4H Jyoti Mayberry MD   1 packet at 08/06/25 1606    senna-docusate (SENOKOT-S/PERICOLACE) 8.6-50 MG per tablet 1 tablet  1 tablet Oral BID Kalthoff, Betty, PA-C   1 tablet at 08/02/25 1956    Or    senna-docusate (SENOKOT-S/PERICOLACE) 8.6-50 MG per tablet 2 tablet  2 tablet Oral BID Kalthoff, Betty, PA-C   2 tablet at 07/30/25 0905    sodium chloride (PF) 0.9% PF flush 10-40 mL  10-40 mL Intracatheter Q7 Days Kalthoff, Betty, PA-C   10 mL at 07/23/25 2048    sodium chloride (PF) 0.9% PF flush 10-40 mL  10-40 mL Intracatheter Daily Kalthoff, Betty, PA-C   10 mL at 08/06/25 0909       Data   All microbiology laboratory data reviewed.  Recent Labs   Lab Test 08/05/25  0735 08/04/25  0837 08/03/25  0642 08/02/25  1647 07/31/25  1046 07/31/25  0240 07/30/25  2020 07/30/25  0859   WBC  --   --   --  6.8  --  7.1  --  7.6   HGB 8.8* 9.5* 8.7* 8.5*  8.5*   < > 7.0*   < > 7.6*   HCT  --   --   --  26.3*  --  21.5*  --  23.9*   MCV 89 89 88 89  89   < > 90   < > 91   PLT  --   --   --  246  --  262  --  279    < > = values in this interval not displayed.     Recent Labs   Lab Test 08/03/25  0642 08/02/25  1647 08/01/25  0547   CR 0.93 0.90 0.95     07/29/2025 1538 08/06/2025 1238 Anaerobic Bacterial Culture Routine [[id number]]     (Abnormal)   Abscess from Gallbladder    Preliminary result Component Value   Culture Culture in progress P    Isolated in broth only Staphylococcus saccharolyticus Abnormal  P    On day 3 of incubation  Identification obtained by MALDI-TOF mass spectrometry research use only database. Test characteristics determined and verified by the [facility].  Susceptibilities not routinely done, refer to antibiogram to view typical susceptibility profiles             07/29/2025 1538 07/30/2025 1511 Gram Stain [[id number]]   (Abnormal)   Abscess from Gallbladder    Edited Result - FINAL Component Value   GS Culture See corresponding culture for results   Gram Stain Result 2+ Gram positive bacilli Abnormal  C   Corrected on July 30, 2025/3:10 PM by Blanca Grey  Previously reported as: Gram negative bacilli  Corrected result: Previously reported as 2+ Gram negative bacilli on 7/29/2025 at  8:56 PM CDT.   Gram Stain Result 3+ WBC seen Abnormal  C   Predominantly PMNs          07/29/2025 1538 08/06/2025 0805 Fungal or Yeast Culture Routine [[id number]]    (Abnormal)   Abscess from Gallbladder    Preliminary result Component Value   Culture Candida glabrata complex Abnormal  P             07/29/2025 1538 08/05/2025 1326 Abscess Aerobic Bacterial Culture Routine Without Gram Stain [[id number]]   (Abnormal)   Abscess from Gallbladder    Final result Component Value   Culture Isolated in broth only Candida glabrata complex Abnormal     On day 3 of incubation  Susceptibilities not routinely done, refer to antibiogram to view typical susceptibility profiles             07/17/2025 0003 07/19/2025 1425 Respiratory Aerobic Bacterial Culture with Gram Stain [[id number]]   (Abnormal)   Sputum from Endotracheal    Final result Component Value   Culture 3+ Candida glabrata complex Abnormal     Susceptibilities not routinely done, refer to antibiogram to view typical susceptibility profiles   Gram  Stain Result <25 PMNs/low power field Abnormal     2+ Yeast Abnormal              07/16/2025 2328 07/22/2025 0446 Blood Culture Peripheral blood (BC) Hand, Left [[id number]]    Peripheral blood (BC) from Hand, Left    Final result Component Value   Culture No Growth             07/16/2025 2322 07/22/2025 0446 Blood Culture Peripheral blood (BC) Hand, Left [[id number]]    Peripheral blood (BC) from Hand, Left    Final result Component Value   Culture No Growth           EXAM: CT ABDOMEN PELVIS W CONTRAST  LOCATION: [facility]  DATE: 8/6/2025    INDICATION: Intra-abdominal abscess s p drainage  COMPARISON: CT angiogram abdomen/pelvis 7/28/2025  TECHNIQUE: CT scan of the abdomen and pelvis was performed following injection of IV contrast. Multiplanar reformats were obtained. Dose reduction techniques were used.  CONTRAST: 90mL Isovue 370    FINDINGS:  LOWER CHEST: Persistent small right pleural effusion with associated compressive atelectasis. Left ventricular myocardial thinning, stable from prior exam.    HEPATOBILIARY: Interval cholecystectomy. Small volume of fluid in the surgical bed but no well-defined, drainable fluid collection. Near-complete interval resolution of perihepatic fluid collection with trace residual fluid measuring 0.8 cm in short axis   (series 4 image 48). No evidence of biliary obstruction. Small left hepatic cyst, no specific follow-up recommended.    PANCREAS: Normal.    SPLEEN: Normal.    ADRENAL GLANDS: Normal.    KIDNEYS/BLADDER: Right renal cyst, no specific follow-up recommended. No hydronephrosis. Urinary bladder is unremarkable.    BOWEL: Diverticulosis of the colon. No acute inflammatory change. No obstruction. Normal appendix.    LYMPH NODES: No suspicious lymphadenopathy. Trace free fluid in the pelvis. No drainable ascites.    VASCULATURE: Mild to moderate calcified atherosclerosis. No abdominal aortic aneurysm.    PELVIC ORGANS:  Unremarkable.    MUSCULOSKELETAL: No acute bony abnormality. Mild body wall edema.   Impression:     IMPRESSION:  1.  Interval cholecystectomy with trace free fluid in the surgical bed but no large, drainable fluid collection. No biliary obstruction.  2.  Near-complete resolution of perihepatic fluid collection.  3.  Persistent small right pleural effusion.

## 2025-08-06 NOTE — PLAN OF CARE
Goal Outcome Evaluation:  Orientation: AnOx4   Aggression Stop Light: Green   Activity: SBA GBW   Diet/BS Checks: Reg   Tele:  NA   IV Access/Drains: R. PIV CDI SL, L. PIV CDI SL.   Pain Management: Denies   Abnormal VS/Results: VSS on RA.   Bowel/Bladder: Continent B/B, voiding spontaneously, 1 BM this shift no melena.    Skin/Wounds: Abdominal sites CDI, scattered bruising w/ large bruise on L. Forearm, blanchable redness to coccyx, scab on chest.   Consults: ID, Gen surg   D/C Disposition: Pending   Other Info:   - K,Mag,Phos protocol; Mag & phos replaced- AM rechecks.

## 2025-08-06 NOTE — PLAN OF CARE
Goal Outcome Evaluation:        Alert and oriented. Vitals are with in normal limits. Denies pain. Good appetite. . Phosphorous and magnesium replaced via IV.  Will check in am . Abdominal ANANDA site appeared healing and not oozing drainage. New dressing applied. Continue on micafungin and zosyn. Up with PT and in the room. CT scan of the abdomin and  pelvis planned for tomorrow.

## 2025-08-06 NOTE — PLAN OF CARE
08/06/-1928    Orientation: A&Ox4     Aggression Stop Light: Green     Activity: SBA GBW     Diet/BS Checks: Reg     Tele:  NA     IV Access/Drains: R. PIV CDI SL, L. PIV CDI SL.     Pain Management: Denies     Abnormal VS/Results: VSS on RA.     Bowel/Bladder: Continent B/B, using urinal at bedside.  3x  BM this shift no melena.      Skin/Wounds: Abdominal sites CDI, scattered bruising w/ large bruise on L. Forearm, blanchable redness to coccyx, scab on chest.     Consults: ID, GI, PT, OT, SW    D/C Disposition: tomorrow to home after IV antibiotic    Other Info:   Phos protocol, replaced, recheck AM.  CT abd/pelvis done today-see report

## 2025-08-07 ENCOUNTER — APPOINTMENT (OUTPATIENT)
Dept: OCCUPATIONAL THERAPY | Facility: CLINIC | Age: 80
DRG: 853 | End: 2025-08-07
Attending: INTERNAL MEDICINE
Payer: MEDICARE

## 2025-08-07 VITALS
WEIGHT: 175.7 LBS | DIASTOLIC BLOOD PRESSURE: 60 MMHG | SYSTOLIC BLOOD PRESSURE: 121 MMHG | OXYGEN SATURATION: 98 % | BODY MASS INDEX: 25.15 KG/M2 | HEIGHT: 70 IN | RESPIRATION RATE: 18 BRPM | TEMPERATURE: 98.1 F | HEART RATE: 72 BPM

## 2025-08-07 LAB
BACTERIA ABSC ANAEROBE+AEROBE CULT: ABNORMAL
PHOSPHATE SERPL-MCNC: 2.5 MG/DL (ref 2.5–4.5)

## 2025-08-07 PROCEDURE — 250N000013 HC RX MED GY IP 250 OP 250 PS 637: Performed by: INTERNAL MEDICINE

## 2025-08-07 PROCEDURE — 250N000011 HC RX IP 250 OP 636: Performed by: PHYSICIAN ASSISTANT

## 2025-08-07 PROCEDURE — 84100 ASSAY OF PHOSPHORUS: CPT | Performed by: HOSPITALIST

## 2025-08-07 PROCEDURE — 250N000013 HC RX MED GY IP 250 OP 250 PS 637: Performed by: HOSPITALIST

## 2025-08-07 PROCEDURE — 250N000011 HC RX IP 250 OP 636: Performed by: INTERNAL MEDICINE

## 2025-08-07 PROCEDURE — 97530 THERAPEUTIC ACTIVITIES: CPT | Mod: GO

## 2025-08-07 PROCEDURE — 258N000003 HC RX IP 258 OP 636: Performed by: INTERNAL MEDICINE

## 2025-08-07 PROCEDURE — 250N000013 HC RX MED GY IP 250 OP 250 PS 637: Performed by: PHYSICIAN ASSISTANT

## 2025-08-07 PROCEDURE — 99239 HOSP IP/OBS DSCHRG MGMT >30: CPT | Performed by: STUDENT IN AN ORGANIZED HEALTH CARE EDUCATION/TRAINING PROGRAM

## 2025-08-07 PROCEDURE — 36415 COLL VENOUS BLD VENIPUNCTURE: CPT | Performed by: HOSPITALIST

## 2025-08-07 RX ORDER — METOPROLOL SUCCINATE 25 MG/1
12.5 TABLET, EXTENDED RELEASE ORAL DAILY
Qty: 15 TABLET | Refills: 2 | Status: SHIPPED | OUTPATIENT
Start: 2025-08-08

## 2025-08-07 RX ADMIN — PIPERACILLIN AND TAZOBACTAM 4.5 G: 4; .5 INJECTION, POWDER, FOR SOLUTION INTRAVENOUS at 11:19

## 2025-08-07 RX ADMIN — ATORVASTATIN CALCIUM 40 MG: 40 TABLET, FILM COATED ORAL at 08:58

## 2025-08-07 RX ADMIN — METOPROLOL SUCCINATE 12.5 MG: 25 TABLET, EXTENDED RELEASE ORAL at 08:58

## 2025-08-07 RX ADMIN — PIPERACILLIN AND TAZOBACTAM 4.5 G: 4; .5 INJECTION, POWDER, FOR SOLUTION INTRAVENOUS at 00:03

## 2025-08-07 RX ADMIN — ASPIRIN 81 MG: 81 TABLET, DELAYED RELEASE ORAL at 08:58

## 2025-08-07 RX ADMIN — PANTOPRAZOLE SODIUM 40 MG: 40 TABLET, DELAYED RELEASE ORAL at 06:09

## 2025-08-07 RX ADMIN — PIPERACILLIN AND TAZOBACTAM 4.5 G: 4; .5 INJECTION, POWDER, FOR SOLUTION INTRAVENOUS at 06:08

## 2025-08-07 RX ADMIN — MICAFUNGIN SODIUM 100 MG: 50 INJECTION, POWDER, LYOPHILIZED, FOR SOLUTION INTRAVENOUS at 09:03

## 2025-08-07 RX ADMIN — APIXABAN 5 MG: 5 TABLET, FILM COATED ORAL at 08:58

## 2025-08-07 ASSESSMENT — ACTIVITIES OF DAILY LIVING (ADL)
ADLS_ACUITY_SCORE: 40
ADLS_ACUITY_SCORE: 39
ADLS_ACUITY_SCORE: 40
ADLS_ACUITY_SCORE: 39
ADLS_ACUITY_SCORE: 40

## 2025-08-07 NOTE — PLAN OF CARE
Orientation: A&Ox4  Aggression Stop Light: Green  Activity: A1GBW  Diet/BS Checks: Reg  IV Access/Drains: PIV x2 SL with int abx   Pain Management: Denies pain this shift   Abnormal VS/Results: VSS on RA   Bowel/Bladder: Cont of B/B- uses urinal   Skin/Wounds: Large bruise to LUE, scattered generalized bruising/ scabs. Abdominal incisions are covered- dressing is CDI  Consults: ID, GI  D/C Disposition: Likely discharging home today

## 2025-08-07 NOTE — PROGRESS NOTES
Care Management Discharge Note    Discharge Date: 08/07/2025       Discharge Disposition: Home, Home Care    Discharge Services: Home Care    Discharge DME: None    Discharge Transportation: family or friend will provide    Is transportation arrangement complete? Yes his brother with transport him home    Private pay costs discussed: Not applicable    Does the patient's insurance plan have a 3 day qualifying hospital stay waiver?  No    PAS Confirmation Code:    Patient/family educated on Medicare website which has current facility and service quality ratings: yes    Education Provided on the Discharge Plan: Yes  Persons Notified of Discharge Plans: patient, bedside RN, charge RN  Patient/Family in Agreement with the Plan: yes    Handoff Referral Completed: No, handoff not indicated or clinically appropriate    Additional Information:       Writer secured home care and MD did include this in discharge orders. [facility] did accept him. Discharge orders, medications, and AVS faxed to home care agency. Patient reported his brother will transport him today.     Irish Wilson RN BSN  Care Coordination  [facility]

## 2025-08-07 NOTE — DISCHARGE SUMMARY
"[facility]  Hospitalist Discharge Summary      Date of Admission:  7/13/2025  Date of Discharge:  8/7/2025  Discharging Provider: Stacey Cleveland MD  Discharge Service: Hospitalist Service    Discharge Diagnoses   Perforated cholecystitis with large intra-abdominal abscess s/p laparoscopic cholecystectomy, lysis of adhesions and drainage of the intra-abdominal abscess on 7/29/2025  Septic shock [resolved] due to E coli bacteremia secondary to choledocholithiasis with ascending cholangitis, s/p EUS/ERCP with sphincterotomy with removal of stones on 7/16  Melena-resolved  Acute blood loss anemia, multifactorial- due to GI bleed vs post sphincterotomy bleed due to anticoagulation vs underlying infective etiology  Aspiration pneumonia versus CAP -resolved  Acute hypoxic respiratory failure - secondary to aspiration pneumonitis on 7/16 during EUS/ERCP-resolved  Mild FARNAZ - secondary to septic shock-resolved  Hypokalemia-resolved  Hypophosphatemia  Hypomagnesemia -resolved  Paroxysmal atrial fibrillation, converted to NSR with IV amiodarone infusion  Left Femoral/popliteal DVT, diagnosed 7/13  Hyperlipidemia  CAD s/p  remote PCI (2005)  Essential HTN  Mild Thrombocytopenia - due to severe sepsis  Chronic systolic CHF with reduced EF of 40%  Prediabetes, HbA1c 5.8%, with stress and steroid induced hyperglycemia  Physical deconditioning      Clinically Significant Risk Factors     # Overweight: Estimated body mass index is 25.21 kg/m  as calculated from the following:    Height as of this encounter: 1.778 m (5' 10\").    Weight as of this encounter: 79.7 kg (175 lb 11.2 oz).  # Moderate Malnutrition: based on nutrition assessment and treatment provided per dietitian's recommendations.      Follow-ups Needed After Discharge   Follow-up Appointments       Hospital Follow-up with Existing Primary Care Provider (PCP)          Schedule Primary Care visit within: 7 Days   Recommended labs and Imaging " (to be ordered by Primary Care Provider): BMP, CBC       Follow Up      Dr. Novoa's surgical office will contact you in approximately 2-3 weeks to check on your progress and answer any questions you may have. If you are doing well, you will not need to return for a follow up appointment. If any concerns are identified over the phone, we will help you make an appointment to see a provider.  If you have not received a phone call, have any questions or concerns, or would like to be seen, please call us at [phone] and ask to speak with our nurse. We are located at [facility] [address]  [place] [address].                Discharge Disposition   Discharged to home  Condition at discharge: Stable    Hospital Course   Kristofer Montana is a 79 y/o male with Hx of HTN, HLD, HFrEF (LVEF 40%), CAD (remote PCI 2005), CVA (2013, no residual weakness/deficits) who [facility] with 2 days of fever, chills and RUQ pain.      CT C/A/P showed choledocholithiasis and mild bile duct dilation and possible acute cholecystitis. He was also found to have occlusive DVT in L femoral vein, nonocclusive DVT in L popliteal vein. He was started on iv heparin and transferred to [facility] for ERCP.      [facility] grew E coli. On 7/14, he went into A fib with RVR and was started on amiodarone infusion. Converted to NSR. On 7/15, he developed acute hypoxic resp failure and was started on BIPAP. Weaned off by 7/16.     He underwent EUS/ERCP with sphincterotomy with removal of stones on 7/16. No stent was placed. He had an aspiration event during the procedure. He was intubated and admitted to ICU requiring pressors and stress dose steroids.     CT abdomen 7/17 showed residual 0.3 cm gallstone in CBD, persistent mild CBD dilatation of 1.1 cm. Mild dilatation of small bowel loops, likely ileus. Per GI, small CBD stone is expected to pass on its own and no intervention was recommended. He was extubated on  7/19 and was transferred back to hospitalist service.     On 7/20, the patient had an episode of melena and noted Hb drop from 9.7 to 8.4 to 6.7 on 7/21. Heparin infusion for paroxysmal atrial fibrillation and DVT was held. Received one unit RBC transfusion on 7/21.  Attempted to resume heparin infusion and aspirin again on 7/23 and hemoglobin dropped to 6.6 AM 7/24. Received another 1 unit RBC AM 7/24. Attempted to resume heparin infusion and aspirin again but patient had recurrent melena. GI was consulted.     Abdominal US was obtained 7/26. Difficult exam due to bowel gas. Heterogeneous fluid collection over the right lobe of the liver is indeterminant. Concern for possible subcapsular hematoma versus more loculated ascites. Mild ascites and small right pleural effusion. Normal abdominal liver duplex.  He had CT abdomen which was concerning for hemorrhage around the hepatic area and underwent CTA GI bleed which did not show any evidence of hemorrhage but there was concern about cholecystitis.    EGD initially was planned but held off given melena episodes stopped and hemoglobin stabilized.  Patient was continued with Zosyn and surgery was reconsulted.  He underwent laparoscopic cholecystectomy on 7/29 and was found to have perforated gallbladder with significant hepatic abscess which was drained.   ID was consulted and he is currently on micafungin and Zosyn based on the prelim culture data.  He was again started back on IV heparin drip on 7/31.  He has not had any further melena and anemia was thought to be due to multiple phlebotomies.  Hemoglobin stabilized and he was transitioned to oral Eliquis on 8/3/25.  ANANDA drain removed by general surgery on 8/3. Pt completed course of antimicrobials and was discharged home in stable condition.      Perforated cholecystitis with large intra-abdominal abscess s/p laparoscopic cholecystectomy, lysis of adhesions and drainage of the intra-abdominal abscess on  7/29/2025  Septic shock [resolved] due to E coli bacteremia secondary to choledocholithiasis with ascending cholangitis, s/p EUS/ERCP with sphincterotomy with removal of stones on 7/16  OR cultures from the abscess drained on 7/29 with Candida glabrata and staph saccharolyticus.   Patient has completed 2 weeks total of cefepime/Flagyl followed by ceftriaxone for E. coli bacteremia.  Repeat CT abdomen/pelvis with contrast on 8/6/2025 showed near complete resolution of perihepatic fluid collection.  -Completed course of IV Zosyn an IV micafungin per ID. No antimicrobials needed at discharge.   -As per general surgery: Patient can be discharged from their standpoint when medically stable.  ANANDA drains have been removed.  Telephone follow-up in 2 to 3 weeks.  General surgery has signed off 8/4.     Melena-resolved  Acute blood loss anemia, multifactorial- due to GI bleed vs post sphincterotomy bleed due to anticoagulation vs underlying infective etiology  Patient had been having intermittent melena since admission and while on therapeutic anticoagulation.   Abdominal US on 7/26 concerning for loculated ascites versus subcapsular hematoma.  During the course of hospital stay patient hemoglobin trended downwards and GI was consulted and on 7/28 he had CT abdomen done which was concerning for large subcapsular fluid over the right hepatic lobe  His heparin and aspirin were initially held and GI was planning for EGD but he underwent CTA GI bleed which showed no evidence of any hemorrhage  Of note patient's hemoglobin has been fluctuating, despite no obvious bleeding, hemoglobin dropped to 7 for which he received 2 units of blood after surgery.  Anemia likely multifactorial due to surgical blood loss, multiple phlebotomies, underlying infection.  GI has signed off.  No active melena.  - Hb has stabilized.  Has been resumed on Eliquis since 8/3.      Aspiration pneumonia versus CAP -resolved  Acute hypoxic respiratory failure -  secondary to aspiration pneumonitis on 7/16 during EUS/ERCP-resolved  -Extubated on 7/19. Weaned off supplemental oxygen on 7/20. Respiratory culture growing candida glabrata, suspected contaminant.   - Completed 2 weeks of cefepime/Flagyl followed by ceftriaxone for E. coli bacteremia as noted above.  Completed course of IV Zosyn, micafungin for intra-abdominal abscess per ID.  ID recommends completing antibiotics on 8/7/2025.     Mild FARNAZ - secondary to septic shock-resolved  -Baseline Cr 1. Cr peaked at 1.39.   -Creatinine remains in normal range     Hypokalemia-resolved  Hypophosphatemia-resolved  Hypomagnesemia -resolved    Paroxysmal atrial fibrillation, converted to NSR with IV amiodarone infusion  Initially on amiodarone drip, later transitioned to metoprolol.  - Continue with metoprolol  - Continue Eliquis     Left Femoral/popliteal DVT, diagnosed 7/13  IR did not recommend thrombectomy.  -  Eliquis twice daily on 8/3/2025     Hyperlipidemia  - Continue atorvastatin     CAD s/p  remote PCI (2005)  - Aspirin resumed 8/4.     Essential HTN  - Discontinue losartan   - Continue metoprolol 12/5mg daily.       Mild Thrombocytopenia - due to severe sepsis  Now resolved     Chronic systolic CHF with reduced EF of 40%  - Chest x-ray 7/18 showed no pulmonary edema  - Echo on 7/15 showed EF of 45%, anteroseptal and apical hypokinesis and RV is normal in structure function and size and no valve disease and grade 1 diastolic dysfunction, mild MR, mild AR and no pericardial and prior echo in 2006 showed EF of 35% with anterior septal hypokinesis     Prediabetes, HbA1c 5.8%, with stress and steroid induced hyperglycemia  - Has completed stress dose steroids  - Blood sugar stable     Physical deconditioning  - Progressed well with therapies, anticipating home with home cares.    Consultations This Hospital Stay   PHARMACY IP CONSULT  GASTROENTEROLOGY IP CONSULT  SURGERY GENERAL IP CONSULT  INTERVENTIONAL RADIOLOGY  ADULT/PEDS IP CONSULT  PHYSICAL THERAPY ADULT IP CONSULT  PHARMACY LIAISON FOR MEDICATION COVERAGE CONSULT  CARDIOLOGY IP CONSULT  INTERVENTIONAL RADIOLOGY ADULT/PEDS IP CONSULT  INTERVENTIONAL RADIOLOGY ADULT/PEDS IP CONSULT  VASCULAR ACCESS ADULT IP CONSULT  PHYSICAL THERAPY ADULT IP CONSULT  OCCUPATIONAL THERAPY ADULT IP CONSULT  VASCULAR ACCESS ADULT IP CONSULT  CARE MANAGEMENT / SOCIAL WORK IP CONSULT  INTERVENTIONAL RADIOLOGY ADULT/PEDS IP CONSULT  PHARMACY IP CONSULT  VASCULAR ACCESS PEDS IP CONSULT  CARE MANAGEMENT / SOCIAL WORK IP CONSULT  OCCUPATIONAL THERAPY ADULT IP CONSULT  PHYSICAL THERAPY ADULT IP CONSULT  NUTRITION SERVICES ADULT IP CONSULT  PHARMACY LIAISON FOR MEDICATION COVERAGE CONSULT  VASCULAR ACCESS ADULT IP CONSULT  SPEECH LANGUAGE PATH ADULT IP CONSULT  VASCULAR ACCESS ADULT IP CONSULT  GASTROENTEROLOGY IP CONSULT  CONSULT FOR INPATIENT VASCULAR ACCESS CARE  GASTROENTEROLOGY IP CONSULT  SURGERY GENERAL IP CONSULT  INFECTIOUS DISEASES IP CONSULT  VASCULAR ACCESS ADULT IP CONSULT    Code Status   Full Code    Time Spent on this Encounter   I, Stacey Cleveland MD, personally saw the patient today and spent greater than 30 minutes discharging this patient.       Stacey Cleveland MD  [address], [address] [address]  Phone: [phone]  ______________________________________________________________________    Physical Exam   Vital Signs: Temp: 98.1  F (36.7  C) Temp src: Oral BP: 121/60 Pulse: 72   Resp: 18 SpO2: 98 % O2 Device: None (Room air)    Weight: 175 lbs 11.2 oz  Constitutional: Awake, alert, cooperative, no apparent distress.  Eyes: Conjunctiva and pupils examined and normal.  HEENT: Moist mucous membranes, normal dentition.  Respiratory: Non-labored breathing  Cardiovascular: Regular rate and rhythm  GI: Soft, non-distended, non-tender, normal bowel sounds. Incision c/d/I.   Skin: No rashes, no cyanosis, no  edema.  Musculoskeletal: No joint swelling, erythema or tenderness.  Neurologic: Cranial nerves 2-12 intact, normal strength and sensation.  Psychiatric: Alert, oriented to person, place and time, no obvious anxiety or depression.    Primary Care Physician   [facility]    Discharge Orders      Home Care Referral      Activity    Please see attached discharge instructions.     Follow Up    Dr. Novoa's surgical office will contact you in approximately 2-3 weeks to check on your progress and answer any questions you may have. If you are doing well, you will not need to return for a follow up appointment. If any concerns are identified over the phone, we will help you make an appointment to see a provider.  If you have not received a phone call, have any questions or concerns, or would like to be seen, please call us at [phone] and ask to speak with our nurse. We are located at [address] [place] [address].     Reason for your hospital stay    Perforated cholecystitis with large intra-abdominal abscess s/p laparoscopic cholecystectomy, lysis of adhesions and drainage of the intra-abdominal abscess on 7/29/2025  Septic shock [resolved] due to E coli bacteremia secondary to choledocholithiasis with ascending cholangitis, s/p EUS/ERCP with sphincterotomy with removal of stones on 7/16     Activity    Your activity upon discharge: activity as tolerated     When to contact your care team    Call your primary doctor if you have any of the following: Worsening fever, shortness of breath, leg swelling, abdominal pain, lightheadedness, dizziness, nausea, vomiting, diarrhea     Diet    Please see attached discharge instructions.     Diet    Follow this diet upon discharge: Current Diet:Orders Placed This Encounter      Room Service      Snacks/Supplements Adult: Other; Vanilla Ensure at 10am  (RD); Between Meals      Regular Diet Adult      Diet     Hospital Follow-up with Existing  Primary Care Provider (PCP)            Significant Results and Procedures   Most Recent 3 CBC's:  Recent Labs   Lab Test 08/05/25  0735 08/04/25  0837 08/03/25  0642 08/02/25  1647 07/31/25  1046 07/31/25  0240 07/30/25  2020 07/30/25  0859   WBC  --   --   --  6.8  --  7.1  --  7.6   HGB 8.8* 9.5* 8.7* 8.5*  8.5*   < > 7.0*   < > 7.6*   MCV 89 89 88 89  89   < > 90   < > 91   PLT  --   --   --  246  --  262  --  279    < > = values in this interval not displayed.     Most Recent 3 BMP's:  Recent Labs   Lab Test 08/05/25  0735 08/04/25  0837 08/03/25  0642 08/02/25  1647 08/01/25  2115 08/01/25  1154 08/01/25  0857 08/01/25  0547   NA  --   --  137 134*  --   --   --  138   POTASSIUM 3.8 3.8 3.5 3.8   < >  --   --  3.4   CHLORIDE  --   --  105 104  --   --   --  106   CO2  --   --  24 23  --   --   --  25   BUN  --   --  7.2* 8.2  --   --   --  9.7   CR  --   --  0.93 0.90  --   --   --  0.95   ANIONGAP  --   --  8 7  --   --   --  7   PAUL  --   --  7.7* 7.7*  --   --   --  7.4*   GLC  --   --  96 126*  --  117*   < > 108*    < > = values in this interval not displayed.   ,   Results for orders placed or performed during the hospital encounter of 07/13/25   XR Chest Port 1 View    Narrative    EXAM: XR CHEST PORT 1 VIEW  LOCATION: [facility]  DATE: 7/15/2025    INDICATION: SOB  COMPARISON: Chest x-ray 06/30/2025      Impression    IMPRESSION: Lower lung volumes compared to previous chest x-ray. Increased interstitial opacities, possibly bronchovascular crowding from low lung volumes but could be due to edema or pneumonitis. Increased bibasilar opacities, atelectasis versus   pneumonitis. Possible trace pleural effusions or pleural thickening. No pneumothorax.   US Lower Extremity Venous Duplex Left    Narrative    EXAM: US LOWER EXTREMITY VENOUS DUPLEX LEFT  LOCATION: [facility]  DATE: 7/15/2025    INDICATION: Acute DVT   now with leg sweelling  COMPARISON: None  available at the time of dictation.  TECHNIQUE: Venous Duplex ultrasound of the left lower extremity with and without compression, augmentation and duplex. Color flow and spectral Doppler with waveform analysis performed.    FINDINGS: Exam includes the common femoral, femoral, popliteal, and contralateral common femoral veins as well as segmentally visualized deep calf veins and greater saphenous vein.     LEFT: Acute appearing thrombus from the proximal femoral vein to the popliteal vein. No superficial thrombophlebitis. No popliteal cyst.      Impression    IMPRESSION:  1.  Acute appearing thrombus from the left proximal femoral vein to popliteal vein.  2.  On review of the chart, the primary team is aware of the acute DVT and the patient is on heparin for treatment.   CT Chest Pulmonary Embolism w Contrast    Narrative    EXAM: CT CHEST PULMONARY EMBOLISM W CONTRAST  LOCATION: [facility]  DATE: 7/16/2025    INDICATION: Acute hypoxic respiratory failure.  Acute left lower extremity DVT. Evaluate for PE.  COMPARISON: Noncontrast CT 7/13/2025.  TECHNIQUE: CT chest pulmonary angiogram during arterial phase injection of IV contrast. Multiplanar reformats and MIP reconstructions were performed. Dose reduction techniques were used.   CONTRAST: 70mL Isovue 370.    FINDINGS:  ANGIOGRAM CHEST: Evaluation of pulmonary embolism limited due to motion artifact and consolidation/pleural fluid in the right lung base.      Allowing for the aforementioned limitations, there is no evidence for pulmonary embolism. Normal caliber thoracic aorta. No dissection. Minimal vascular calcification.    LUNGS AND PLEURA: Loculated subpulmonic posterior right pleural fluid. Minimal pleural fluid posterior medial right mid hemithorax. Atelectasis right lung base. Allowing for motion artifact there is no evidence for pulmonary infiltrate or nodularity.   Bronchial wall thickening bilaterally.    MEDIASTINUM/AXILLAE:  Mild cardiac enlargement. No pericardial fluid. Wall thickening mid to distal esophagus with surrounding mild edema. No lymphadenopathy.    CORONARY ARTERY CALCIFICATION: Mild.    UPPER ABDOMEN: Large loculated subdiaphragmatic upper abdominal fluid collection (series 3, image 219 and series 9, image 65). This is elevating the right hemidiaphragm. Surface contour nodularity to liver compatible with chronic underlying hepatic   parenchymal disease. Left hepatic lobe cyst, no follow-up. Mild gallbladder wall thickening with mild gallbladder distention. No radiopaque cholelithiasis. Right renal cyst, no follow-up. Adrenal glands negative.    MUSCULOSKELETAL: Degenerative hypertrophic changes in the spine.      Impression    IMPRESSION:  1.  Evaluation for pulmonary embolism limited due to motion artifact and consolidation/pleural fluid right lung base.     2.  Loculated subpulmonic pleural fluid right lung base with pleural fluid in the posterior aspect of the right hemithorax. Associated atelectasis right lung base. Allowing for motion artifact there is no definitive evidence for pulmonary infiltrate   although pneumonia in the right lung base cannot be excluded in the area of volume loss. This process has a significantly progressed since prior study.    3.  Bronchial wall thickening bilaterally.    4.  No definitive evidence for pulmonary embolism.    5.  Normal caliber aorta without dissection.    6.  Large loculated subdiaphragmatic fluid collection over the right hepatic lobe, markedly increased since prior. Underlying chronic hepatic parenchymal disease.    7.  Distended gallbladder with gallbladder wall thickening, similar to prior. Consider further evaluation with ultrasound if not previously performed.   XR ERCP    Narrative    This exam was marked as non-reportable because it will not be read by a   radiologist or a [facility] non-radiologist provider.         XR Chest Port 1 View    Narrative    EXAM: XR CHEST  PORT 1 VIEW  LOCATION: [facility]  DATE: 7/16/2025    INDICATION: Endotracheal tube positioning, NG tube position  COMPARISON: CTA chest 7/16/2025      Impression    IMPRESSION: Endotracheal tube tip approximately 2.2 cm above the shahriar. Enteric tube tip projects over the left upper quadrant/proximal stomach.    Increased retrocardiac opacity that could reflect atelectasis or infection.. Similar right pleural effusion and adjacent opacity. No pneumothorax. Similar heart size.    Linear radiodensity along the right upper quadrant could reflect peritoneal contrast from the recent ERCP. Correlate with recent procedure for any contrast leakage or concern for perforation.   CT Abdomen Pelvis w/o Contrast    Narrative    EXAM: CT ABDOMEN PELVIS W/O CONTRAST  LOCATION: [facility]  DATE: 7/16/2025    INDICATION: choledocholithiasis s p ERCP 07 16, CXR w  concern for RUQ radiodensity contrast leakage vs perf, ?free air  COMPARISON: CTA chest 7/16/2025, chest radiograph 7/16/2025  TECHNIQUE: CT scan of the abdomen and pelvis was performed without IV contrast. Multiplanar reformats were obtained. Dose reduction techniques were used.  CONTRAST: None.    FINDINGS:   LOWER CHEST: Unchanged small right pleural effusion with adjacent compressive atelectasis. New dense opacity in the left lower lobe with associated volume loss, likely near complete lobar atelectasis.    Streak artifact from the bilateral arms placed at the side, slightly limiting evaluation.    HEPATOBILIARY: Unchanged left hepatic lobe cyst measuring 1.3 cm. New pneumobilia in the left hepatic lobe. Contrast within the gallbladder. No definite adjacent contrast. Residual 0.3 cm gallstone in the common duct. Persistent mild dilatation of the   common duct measuring up to 1.1 cm.    PANCREAS: Diffuse fatty replacement.    SPLEEN: Unremarkable.    ADRENAL GLANDS: Unremarkable.    KIDNEYS/BLADDER: Right renal simple  cysts; no follow-up indicated. No hydronephrosis. Excreted contrast in the urinary bladder.    BOWEL: Mild dilatation of nondependent small bowel loops, measuring up to 3.6 cm in diameter. No discrete transition point identified. Moderate colonic diverticulosis. No evidence of acute diverticulitis. Normal appendix. No evidence of acute   appendicitis.    PERITONEUM/RETROPERITONEUM: New small volume free fluid in the abdomen and pelvis. Fluid measures higher than simple fluid attenuation. No focal fluid collection. No intraperitoneal free air.    LYMPH NODES: No lymphadenopathy.    VASCULATURE: Mild atherosclerotic calcifications.    PELVIC ORGANS: Mildly enlarged prostate.    MUSCULOSKELETAL: Multilevel degenerative changes of spine. Severe levoconvex curvature of the lumbar spine.      Impression    IMPRESSION:   1.  Pneumobilia, within expected status post ERCP. Residual 0.3 cm gallstone in the common duct. Persistent mild common duct dilatation measuring 1.1 cm.    2.  Contrast within the gallbladder. No definite extraluminal contrast. However, new small volume intraperitoneal free fluid measuring slightly higher than simple fluid attenuation, which may be due to dilute contrast or other complicated fluid (blood   products or proteinaceous contents). Consider correlation with fluid sampling. If there is clinical concern for bile leak, HIDA may be considered.    3.  No intraperitoneal free air.    4.  Mild dilatation of nondependent small bowel loops, likely ileus. No transition point to suggest obstruction.    5.  New near complete lobar atelectasis of the left lower lobe. Unchanged small right pleural effusion with adjacent compressive atelectasis.     XR Chest Port 1 View    Narrative    EXAM: XR CHEST PORT 1 VIEW  LOCATION: [facility]  DATE: 7/17/2025    INDICATION: Intubation.  COMPARISON: 7/16/2025.      Impression    IMPRESSION: There has been interval placement of a right PICC  line, with tip in the low SVC. Endotracheal tube tip is 2.5 cm above the shahriar. Enteric tube, tip in the gastric body. Shallow inspiration. Bibasilar opacities are unchanged, and may be   related to atelectasis or infection. No pneumothorax. Previously described linear radiodensity projected over the right upper quadrant is unchanged.   US Upper Extremity Venous Duplex Right    Narrative    EXAM: US UPPER EXTREMITY VENOUS DUPLEX RIGHT  LOCATION: [facility]  DATE: 7/17/2025    INDICATION*: Rule out DVT pt with PICC; PICC line difficulties, assess for DVT  COMPARISON: None.  TECHNIQUE: Venous Duplex ultrasound of the right upper extremity with (when possible) and without compression, augmentation, and duplex. Color flow and spectral Doppler with waveform analysis performed.    FINDINGS: Ultrasound includes evaluation of the internal jugular vein, innominate vein, subclavian vein, axillary vein, and brachial vein. The superficial cephalic and basilic veins were also evaluated where seen.     RIGHT: No deep venous thrombosis. Superficial thrombophlebitis is identified in the right basilic vein at site of PICC line (greater than 5 cm in length). Occlusive thrombus surrounds the PICC line site, with nonocclusive thrombus proximal and distal to   the insertion site.      Impression    IMPRESSION:  1.  No deep venous thrombosis in the right upper extremity.  2.  Exam POSITIVE for right basilic vein superficial thrombophlebitis.   XR Chest Port 1 View    Narrative    EXAM: XR CHEST PORT 1 VIEW  LOCATION: [facility]  DATE: 7/18/2025    INDICATION: Interval change.  COMPARISON: 07/17/2025      Impression    IMPRESSION: Stable cardiomegaly. The ET tube is unchanged in position. Enteric tube tip projects over the mid stomach. The side port is near the GE junction. The lungs are hypoventilated and there is bibasilar atelectasis similar to the previous study.   Can't exclude  tiny effusions. No evidence for pneumothorax or consolidation.   US Abdomen Limited w Abdomen Doppler Limited    Narrative    EXAM: US ABDOMEN LIMITED W ABDOMEN DOPPLER LIMITED  LOCATION: [facility]  DATE: 7/26/2025    INDICATION: evaluate gall bladder, portal vein, look for ascites. Choledocholithiasis. Status post ERCP 716.  COMPARISON: CT 7/16/2025.  TECHNIQUE: Limited abdominal ultrasound. Color flow with spectral Doppler and waveform analysis performed.     FINDINGS: Overall difficult study due to bowel gas.    GALLBLADDER: Poorly seen due to bowel gas.     BILE DUCTS: No biliary dilatation. The common duct measures 7 mm.    LIVER: Fluid pocket adjacent to the right lobe of the liver causing some slight liver distortion. On prior CT there was ascites in this location this appears almost more subcapsular. No focal mass.     RIGHT KIDNEY: No hydronephrosis. 5 cm cyst requiring no follow-up.     PANCREAS: Poorly seen due to bowel gas.    Mild ascites and small right pleural effusion.    ABDOMINAL DUPLEX: The hepatic artery, hepatic veins, IVC, portal veins, and splenic vein are patent with flow in the normal direction.       Impression    IMPRESSION:  1.  Very difficult exam due to bowel gas.    2.  Heterogeneous fluid collection over the right lobe of the liver is indeterminant. Concern for possible subcapsular hematoma versus more loculated ascites. Consider follow-up CT with IV contrast.    3.  Mild ascites and small right pleural effusion.  4.  Normal abdominal liver duplex.       CT Abdomen Pelvis w Contrast    Narrative    EXAM: CT ABDOMEN PELVIS W CONTRAST  LOCATION: [facility]  DATE: 7/27/2025    INDICATION: Indeterminate perihepatic fluid collection on ultrasound 7/26/2025; anemia.  COMPARISON: CT abdomen pelvis 7/16/2025; right upper quadrant ultrasound 7/26/2025.  TECHNIQUE: CT scan of the abdomen and pelvis was performed following injection of IV  contrast. Multiplanar reformats were obtained. Dose reduction techniques were used.  CONTRAST: 104 mL Isovue-370.    FINDINGS:    LOWER CHEST: Multifocal bronchiolitis, predominantly involving the lingula and left lower lobe. Mild to moderate dependent atelectatic change, greatest within the right lower lobe. There is some associated compression from an adjacent small right pleural   effusion.    Mild left ventricular enlargement. LAD stent. Atherosclerotic disease of coronary arteries and visualized thoracic aorta. Tiny hiatal hernia.    HEPATOBILIARY: Prominent mass effect upon the lateral right hepatic lobe, due to a large subcapsular fluid collection. This fluid collection measures simple fluid attenuation and demonstrates no evidence of active bleeding on this single portal venous   phase of contrast. In greatest thickness, this collection measures up to 10 cm, series 3 image 46. Small simple cyst of hepatic segment 4; no further follow-up recommended.      Gallbladder is mildly distended, with mild pericholecystic inflammatory change. This may reflect the sequela of recent ERCP, though acute cholecystitis difficult to exclude.    No intrahepatic or intrahepatic biliary ductal dilatation. Small amount of pneumobilia within the common duct, consistent with a recent ERCP.    PANCREAS: Diffuse fatty infiltration and subtle peripancreatic inflammatory fat stranding; correlation with laboratory markers is recommended to exclude an acute pancreatitis.    SPLEEN: Enhances normally. Normal size.    ADRENAL GLANDS: Normal.    KIDNEYS: Both kidneys enhance symmetrically, without hydronephrosis. Large, simple right renal cyst; no further follow-up recommended.    No nephroureterolithiasis.    Urinary bladder is unremarkable.    PELVIC ORGANS: Mild prostatomegaly.    BOWEL: No evidence of acute gastrointestinal inflammation or obstruction. Colonic and duodenal diverticulosis. Normal appendix.    Small volume ascites, with  subtle peritoneal enhancement involving fluid at the dependent pelvis, which may be secondary to a developing peritonitis. No intraperitoneal free air.    LYMPH NODES: No suspicious abdominal or pelvic lymphadenopathy.    VASCULATURE: No abdominal aortic aneurysm. Mild atheromatous disease.    MUSCULOSKELETAL: No suspicious abnormality. Prominent lumbar levoscoliosis. Multilevel degenerative change of the lumbar and visualized thoracic spine.    OTHER: No additionally significant abnormalities.      Impression    IMPRESSION:   1.  Large subcapsular fluid collection along the lateral right hepatic lobe, measuring up to 10 cm in greatest thickness. This demonstrates no evidence of active bleeding on this single portal venous phase of contrast. Although there is no internal high   attenuation to suggest overt evidence of blood products, acute hemorrhage would be difficult to exclude, particularly given the presence of heterogeneous internal echoes on yesterday's sonographic examination.   2.  Small volume ascites, with subtle peritoneal enhancement involving fluid at the dependent pelvis, which may be secondary to a developing peritonitis.  3.  Mildly distended gallbladder, with mild pericholecystic inflammatory change. This may reflect the sequela of recent ERCP, though acute cholecystitis difficult to exclude.  4.  Diffuse fatty infiltration of the pancreas, with subtle peripancreatic inflammatory fat stranding. Correlation with laboratory markers is recommended to exclude an acute pancreatitis.  5.  Multifocal bronchiolitis, predominantly involving the lingula and left lower lobe.  6.  Small right pleural effusion.  7.  Mild prostatomegaly.    Impression 1, 3, and 4 discussed verbally via telephone with Dr. Arriaga at 6:15 PM on 7/27/2025.   CTA GI Bleed    Narrative    EXAM: CTA GI BLEED  LOCATION: [facility]  DATE: 7/28/2025    INDICATION: Persistent melena and anemia.  COMPARISON:  7/27/2025  TECHNIQUE: CT angiogram abdomen pelvis during arterial phase of injection of IV contrast. 2D and 3D MIP reconstructions were performed by the CT technologist. Dose reduction techniques were used.  CONTRAST: 127 mL Isovue 370    FINDINGS:  ANGIOGRAM ABDOMEN/PELVIS: No evidence of active gastrointestinal hemorrhage.    No abdominal aortic aneurysm or dissection. The celiac artery, superior mesenteric artery, bilateral renal arteries, inferior mesenteric artery, bilateral common iliac, internal iliac and external iliac arteries are patent without stenosis.    LOWER CHEST: Stable partly visualized small to moderate right pleural effusion. Bibasilar atelectasis.    HEPATOBILIARY: Stable large subcapsular fluid collection along the right hepatic lobe measuring 1.8 x 1.1 cm. Stable small cyst in the central left hepatic lobe. Stable irregular gallbladder wall thickening, pericholecystic inflammatory changes and a   small collection of fluid abutting the gallbladder fundus measuring 2.1 cm. No calcified gallstones.. Pneumobilia related to recent sphincterotomy. No intrahepatic or extrahepatic biliary ductal dilatation. No calcified stones in the duct.    PANCREAS: Normal.    SPLEEN: Normal.    ADRENAL GLANDS: Normal.    KIDNEYS/BLADDER: No significant mass, stones, or hydronephrosis. There are simple or benign cysts. No follow up is needed. Excreted contrast in the urinary bladder and renal pelvis from the CT yesterday.    BOWEL: No small bowel or colonic obstruction or inflammatory changes. Multiple duodenal diverticuli in the second portion are stable. No active gastrointestinal hemorrhage. Normal appendix. Moderate amount of formed stool in the colon. Sigmoid   diverticulosis.    LYMPH NODES: No lymphadenopathy.    PELVIC ORGANS: No pelvic masses.    Other findings: Stable filling defect in the right internal iliac vein consistent with pelvic vein DVT (series 13, image [id number]).    Stable small abdominal and  pelvic ascites.    MUSCULOSKELETAL: Degenerative changes in the spine. No suspicious lesions in the bones. Thoracolumbar scoliosis.      Impression    IMPRESSION:  1.  No active gastrointestinal hemorrhage.  2.  Stable irregular gallbladder wall thickening, pericholecystic inflammatory changes and a small fluid collection abutting the gallbladder fundus. Findings are concerning for acute cholecystitis.  3.  Stable large subcapsular fluid collection along the right hepatic lobe, possibly loculated ascites. Stable small abdominal and pelvic ascites.  4.  Stable small to moderate right pleural effusion.  5.  Stable filling defect in the right internal iliac vein consistent with pelvic vein DVT.     CT Abdomen Pelvis w Contrast    Narrative    EXAM: CT ABDOMEN PELVIS W CONTRAST  LOCATION: [facility]  DATE: 8/6/2025    INDICATION: Intra-abdominal abscess s p drainage  COMPARISON: CT angiogram abdomen/pelvis 7/28/2025  TECHNIQUE: CT scan of the abdomen and pelvis was performed following injection of IV contrast. Multiplanar reformats were obtained. Dose reduction techniques were used.  CONTRAST: 90mL Isovue 370    FINDINGS:   LOWER CHEST: Persistent small right pleural effusion with associated compressive atelectasis. Left ventricular myocardial thinning, stable from prior exam.    HEPATOBILIARY: Interval cholecystectomy. Small volume of fluid in the surgical bed but no well-defined, drainable fluid collection. Near-complete interval resolution of perihepatic fluid collection with trace residual fluid measuring 0.8 cm in short axis   (series 4 image 48). No evidence of biliary obstruction. Small left hepatic cyst, no specific follow-up recommended.    PANCREAS: Normal.    SPLEEN: Normal.    ADRENAL GLANDS: Normal.    KIDNEYS/BLADDER: Right renal cyst, no specific follow-up recommended. No hydronephrosis. Urinary bladder is unremarkable.    BOWEL: Diverticulosis of the colon. No acute inflammatory  change. No obstruction. Normal appendix.    LYMPH NODES: No suspicious lymphadenopathy. Trace free fluid in the pelvis. No drainable ascites.    VASCULATURE: Mild to moderate calcified atherosclerosis. No abdominal aortic aneurysm.    PELVIC ORGANS: Unremarkable.    MUSCULOSKELETAL: No acute bony abnormality. Mild body wall edema.      Impression    IMPRESSION:   1.  Interval cholecystectomy with trace free fluid in the surgical bed but no large, drainable fluid collection. No biliary obstruction.  2.  Near-complete resolution of perihepatic fluid collection.  3.  Persistent small right pleural effusion.   Echocardiogram Complete     Value    Biplane LVEF 45%    Narrative    [id number]  [id number]  [id number]  [id number]^DESI^JUN^     [facility]  Echocardiography Laboratory  [address] [place] [address]     Name: JENNY JOLLY  MRN: [id number]  : 1945  Study Date: 07/15/2025 10:18 AM  Age: 80 yrs  Gender: Male  Patient Location: [facility]  Reason For Study: Atrial Fibrillation  Ordering Physician: JUN WEEKS  Referring Physician: WES HOLMAN  Performed By: Lonnie Giraldo     BSA: 2.0 m2  Height: 70 in  Weight: 190 lb  HR: 56  BP: 103/80 mmHg  ______________________________________________________________________________  Procedure  Echocardiogram with two-dimensional, color and spectral Doppler. Definity (NDC  #[id number]) given intravenously.  ______________________________________________________________________________  Interpretation Summary     1. The left ventricle is normal in size. Biplane LVEF is 45%. Anteroseptal and  apical hypokinesis.  2. The right ventricle is normal in structure, function and size.  3. No valve disease.     Outside echo  reported EF 35% with anteroseptal hypokinesis.  ______________________________________________________________________________  Left Ventricle  The left ventricle is normal in size. There is normal left ventricular  wall  thickness. Biplane LVEF is 45%. Grade I or early diastolic dysfunction.  Anteroseptal and apical hypokinesis.     Right Ventricle  The right ventricle is normal in structure, function and size.     Atria  The left atrium is mildly dilated. Right atrial size is normal. There is no  atrial shunt seen.     Mitral Valve  There is mild (1+) mitral regurgitation.     Tricuspid Valve  No tricuspid regurgitation.     Aortic Valve  There is mild (1+) aortic regurgitation.     Pulmonic Valve  The pulmonic valve is normal in structure and function.     Vessels  Normal ascending, transverse (arch), and descending aorta. Inferior vena cava  not well visualized for estimation of right atrial pressure.     Pericardium  There is no pericardial effusion.     Rhythm  Sinus rhythm was noted.  ______________________________________________________________________________  MMode/2D Measurements & Calculations  IVSd: 1.2 cm     LVIDd: 4.5 cm  LVIDs: 4.0 cm  LVPWd: 1.1 cm  FS: 11.2 %  LV mass(C)d: 192.0 grams  LV mass(C)dI: 94.0 grams/m2  Ao root diam: 3.9 cm  asc Aorta Diam: 3.4 cm  LVOT diam: 2.1 cm  LVOT area: 3.6 cm2  Ao root diam index Ht(cm/m): 2.2  Ao root diam index BSA (cm/m2): 1.9  Asc Ao diam index BSA (cm/m2): 1.7  Asc Ao diam index Ht(cm/m): 1.9  EF Biplane: 45.6 %  LA Volume (BP): 76.1 ml     LA Volume Index (BP): 37.3 ml/m2  RV Base: 3.3 cm  RWT: 0.47  TAPSE: 2.0 cm     Doppler Measurements & Calculations  MV E max theodore: 63.7 cm/sec  MV A max theodore: 85.2 cm/sec  MV E/A: 0.75  MV dec slope: 266.0 cm/sec2  MV dec time: 0.24 sec  PA acc time: 0.10 sec  E/E' av.5  Lateral E/e': 5.1  Medial E/e': 11.9  RV S Theodore: 11.0 cm/sec     ______________________________________________________________________________  Report approved by: Rusty Huitron MD on 07/15/2025 12:03 PM             Discharge Medications      Review of your medicines        START taking        Dose / Directions   apixaban ANTICOAGULANT 5 MG  tablet  Commonly known as: ELIQUIS      Dose: 5 mg  Take 1 tablet (5 mg) by mouth 2 times daily.  Quantity: 60 tablet  Refills: 0            CHANGE how you take these medications        Dose / Directions   metoprolol succinate ER 25 MG 24 hr tablet  Commonly known as: TOPROL XL  This may have changed: how much to take      Dose: 12.5 mg  Start taking on: August 8, 2025  Take 0.5 tablets (12.5 mg) by mouth daily.  Quantity: 15 tablet  Refills: 2            CONTINUE these medicines which have NOT CHANGED        Dose / Directions   aspirin 81 MG EC tablet      Dose: 81 mg  Take 81 mg by mouth daily.  Refills: 0     atorvastatin 40 MG tablet  Commonly known as: LIPITOR      Dose: 40 mg  Take 40 mg by mouth daily.  Refills: 0     Multi-vitamin per tablet  Generic drug: multivitamin w/minerals      Dose: 1 tablet  Take 1 tablet by mouth daily.  Refills: 0     omeprazole 20 MG EC tablet  Commonly known as: PriLOSEC OTC      Dose: 20 mg  Take 20 mg by mouth daily.  Refills: 0            STOP taking      losartan 50 MG tablet  Commonly known as: COZAAR                  Where to get your medicines        These medications were sent to [facility] [place]  [place] - [address] [address]  [address] [address], [place] [address]      Phone: [phone]   apixaban ANTICOAGULANT 5 MG tablet  metoprolol succinate ER 25 MG 24 hr tablet       Allergies   Allergies   Allergen Reactions    Amoxicillin Diarrhea

## 2025-08-07 NOTE — PLAN OF CARE
Occupational Therapy Discharge Summary    Reason for therapy discharge:    Discharged to home with home therapy.    Progress towards therapy goal(s). See goals on Care Plan in [facility] electronic health record for goal details.  Goals partially met.  Barriers to achieving goals:   discharge from facility.    Therapy recommendation(s):      Continued therapy is recommended.  Rationale/Recommendations:   .OT Rationale for DC Rec: Pt below baseline, completes all ADLs with SBA this date with FWW. Pt reports living with his brother who is able to assist with IADLs as needed. Biggest barrier to d/c is that pt needs to do 3 flights of stairs to get to apartment, progressing with PT. Pt would benefit from going home and having assist from brother for IADLs and home care.

## 2025-08-07 NOTE — PLAN OF CARE
Discharge Note    Patient discharged to home via private vehicle  accompanied by brother.  IV: Discontinued  Prescriptions filled and given to patient/family.   Belongings reviewed and sent with patient.   Home medications returned to patient: NA  Equipment sent with: patient, N/A.   patient verbalizes understanding of discharge instructions. AVS given to patient.  Dressing removed from abd and kept IGOR. Intact, no drainage. Wound care discharge instructions reviewed with pt.

## 2025-08-07 NOTE — PLAN OF CARE
Physical Therapy Discharge Summary    Reason for therapy discharge:    Discharged to home with home therapy.    Progress towards therapy goal(s). See goals on Care Plan in [facility] electronic health record for goal details.  Goals partially met.  Barriers to achieving goals:   discharge from facility.    Therapy recommendation(s):    Continued therapy is recommended.  Rationale/Recommendations: Continue to recommend HHPT as pt currently SBA x 1 w/ FWW, demonstrates gross functional weakness, impaired balance, imparied activity tolerance from baseline. Will benefit from HHPT to improve above impairments prior to return home  PT Brief overview of current status: SBA FWW  PT Total Distance Amb During Session (feet): 450    Recommendation above provided by last treating therapist.

## 2025-08-07 NOTE — PROGRESS NOTES
Goal Outcome Evaluation:  Date & Shift: 08/6/2025  [id number]  Orientation: A/Ox4, [facility] care status   Aggression Stop Light: Green   Activity: SBA, GBW   Diet/BS Checks: Reg   Tele:  NA   IV Access/Drains: R,  L PIV both SL.   Pain Management: Denies   Abnormal VS/Results: VSS on RA. AM Hgb recheck last check 8.8.   Bowel/Bladder: Continent B/B, urinal at bedside    Skin/Wounds: x2 abdominal lap sites dressing CDI, scattered bruising w/ large L arm bruising. Blanchable redness to coccyx, scab on chest.   Consults: ID, Gen surg   D/C Disposition: Pending   Other Info: Phos protocol; rechecks in AM.

## 2025-08-11 LAB — BACTERIA ABSC ANAEROBE+AEROBE CULT: ABNORMAL

## 2025-08-20 ENCOUNTER — TELEPHONE (OUTPATIENT)
Dept: SURGERY | Facility: CLINIC | Age: 80
End: 2025-08-20

## 2025-08-26 LAB — BACTERIA ABSC ANAEROBE+AEROBE CULT: ABNORMAL

## (undated) DEVICE — DRAPE BREAST/CHEST 29420

## (undated) DEVICE — ENDO BITE BLOCK 60 MAXI LF 00712804

## (undated) DEVICE — LINEN TOWEL PACK X5 5464

## (undated) DEVICE — RAD RX ISOVUE 300 (50ML) 61% IOPAMIDOL CHARGE PER ML

## (undated) DEVICE — ENDO TROCAR FIRST ENTRY KII FIOS Z-THRD 05X100MM CTF03

## (undated) DEVICE — ENDO CATH BALLOON DILATION HURRICAINE 10MMX4CM M00545960

## (undated) DEVICE — SU SILK 2-0 SH 30" K833H

## (undated) DEVICE — SU MONOCRYL 4-0 PS-2 18" UND Y496G

## (undated) DEVICE — BAG DECANTER STERILE WHITE DYNJDEC09

## (undated) DEVICE — SUCTION MANIFOLD NEPTUNE 2 SYS 4 PORT 0702-020-000

## (undated) DEVICE — KIT TURNOVER FAIRVIEW SOUTHDALE FULL SP3889

## (undated) DEVICE — SPHINCTEROTOME 7FRX25MM TRITOME G22555

## (undated) DEVICE — SOL NACL 0.9% INJ 1000ML BAG 2B1324X

## (undated) DEVICE — GLOVE PROTEXIS BLUE W/NEU-THERA 7.5  2D73EB75

## (undated) DEVICE — LUBRICANT INST ELECTROLUBE EL101

## (undated) DEVICE — DAVINCI XI DRAPE ARM 470015

## (undated) DEVICE — GLOVE PROTEXIS MICRO 7.5 LT BLUE 2D73PM75

## (undated) DEVICE — DAVINCI XI SEAL UNIVERSAL 5-12MM 470500

## (undated) DEVICE — DAVINCI XI OBTURATOR BLADELESS 8MM 470359

## (undated) DEVICE — ANTIFOG SOLUTION SEE SHARP 150M TROCAR SWABS 30978 (COI)

## (undated) DEVICE — SOL WATER IRRIG 1000ML BOTTLE 2F7114

## (undated) DEVICE — DRSG DRAIN 4X4" 7086

## (undated) DEVICE — CLEANER INST PRE-KLENZ SOAK SHIELD TUBE 6 ML MEDIUM 2D66J4

## (undated) DEVICE — DEVICE SUTURE PASSER 14GA WECK EFX EFXSP2

## (undated) DEVICE — INFLATION DEVICE BIG 60 ENDO-AN6012

## (undated) DEVICE — SU VICRYL+ 0 27 UR6 VLT VCP603H

## (undated) DEVICE — WIRE GUIDE 0.035"X450CM JAGWIRE HP STR TIP M00556580

## (undated) DEVICE — DAVINCI XI CLIP APPLIER MED HEM-O-LOK GREEN 470327

## (undated) DEVICE — ESU GROUND PAD UNIVERSAL W/O CORD

## (undated) DEVICE — Device

## (undated) DEVICE — PREP CHLORAPREP 26ML TINTED HI-LITE ORANGE 930815

## (undated) DEVICE — SUCTION IRR STRYKERFLOW II W/TIP 250-070-520

## (undated) DEVICE — DAVINCI HOT SHEARS TIP COVER  400180

## (undated) DEVICE — CLIP ENDO HEMO-LOC GREEN MED/LG 544230

## (undated) DEVICE — PACK LAP CHOLE SLC15LCFSD

## (undated) DEVICE — ESU GROUND PAD ADULT W/CORD E7507

## (undated) DEVICE — POUCH TISSUE RETRIEVAL ROBOTIC 8MM 5.1" INTRO TRS-ROBO-8

## (undated) DEVICE — BALLOON EXTRACTION 3-LUMEN 15X190MM 2.8MM TL B-V233P-A

## (undated) DEVICE — EVAC SYSTEM CLEAR FLOW SC082500

## (undated) DEVICE — DRAIN RESERVOIR 100ML JP 0070740

## (undated) DEVICE — DAVINCI XI DRAPE COLUMN 470341

## (undated) DEVICE — DRAPE SHEET REV FOLD 3/4 9349

## (undated) RX ORDER — CEFAZOLIN SODIUM/WATER 2 G/20 ML
SYRINGE (ML) INTRAVENOUS
Status: DISPENSED
Start: 2025-07-29

## (undated) RX ORDER — FENTANYL CITRATE 50 UG/ML
INJECTION, SOLUTION INTRAMUSCULAR; INTRAVENOUS
Status: DISPENSED
Start: 2025-07-29

## (undated) RX ORDER — INDOCYANINE GREEN AND WATER 25 MG
KIT INJECTION
Status: DISPENSED
Start: 2025-07-29

## (undated) RX ORDER — BUPIVACAINE HYDROCHLORIDE 5 MG/ML
INJECTION, SOLUTION EPIDURAL; INTRACAUDAL; PERINEURAL
Status: DISPENSED
Start: 2025-07-29

## (undated) RX ORDER — EPHEDRINE SULFATE 50 MG/ML
INJECTION, SOLUTION INTRAMUSCULAR; INTRAVENOUS; SUBCUTANEOUS
Status: DISPENSED
Start: 2025-07-16

## (undated) RX ORDER — EPHEDRINE SULFATE 50 MG/ML
INJECTION, SOLUTION INTRAMUSCULAR; INTRAVENOUS; SUBCUTANEOUS
Status: DISPENSED
Start: 2025-07-29

## (undated) RX ORDER — ONDANSETRON 2 MG/ML
INJECTION INTRAMUSCULAR; INTRAVENOUS
Status: DISPENSED
Start: 2025-07-16

## (undated) RX ORDER — DEXAMETHASONE SODIUM PHOSPHATE 4 MG/ML
INJECTION, SOLUTION INTRA-ARTICULAR; INTRALESIONAL; INTRAMUSCULAR; INTRAVENOUS; SOFT TISSUE
Status: DISPENSED
Start: 2025-07-16